# Patient Record
Sex: MALE
[De-identification: names, ages, dates, MRNs, and addresses within clinical notes are randomized per-mention and may not be internally consistent; named-entity substitution may affect disease eponyms.]

---

## 2018-04-17 ENCOUNTER — HOSPITAL ENCOUNTER (INPATIENT)
Dept: HOSPITAL 97 - ER | Age: 83
LOS: 4 days | Discharge: NURSING, REHAB OR LONG-TERM CARE | DRG: 872 | End: 2018-04-21
Attending: INTERNAL MEDICINE | Admitting: INTERNAL MEDICINE
Payer: COMMERCIAL

## 2018-04-17 VITALS — BODY MASS INDEX: 33.9 KG/M2

## 2018-04-17 DIAGNOSIS — N30.00: ICD-10-CM

## 2018-04-17 DIAGNOSIS — D64.9: ICD-10-CM

## 2018-04-17 DIAGNOSIS — B95.62: ICD-10-CM

## 2018-04-17 DIAGNOSIS — E78.5: ICD-10-CM

## 2018-04-17 DIAGNOSIS — E11.9: ICD-10-CM

## 2018-04-17 DIAGNOSIS — I10: ICD-10-CM

## 2018-04-17 DIAGNOSIS — A41.9: Primary | ICD-10-CM

## 2018-04-17 LAB
ALBUMIN SERPL BCP-MCNC: 3.5 G/DL (ref 3.2–5.5)
ALP SERPL-CCNC: 139 IU/L (ref 42–121)
ALT SERPL W P-5'-P-CCNC: 27 IU/L (ref 10–60)
AST SERPL W P-5'-P-CCNC: 29 IU/L (ref 10–42)
BUN BLD-MCNC: 53 MG/DL (ref 6–20)
GLUCOSE SERPLBLD-MCNC: 259 MG/DL (ref 65–120)
HCT VFR BLD CALC: 41 % (ref 39.6–49)
LIPASE SERPL-CCNC: 45 U/L (ref 22–51)
LYMPHOCYTES # SPEC AUTO: 2 K/UL (ref 0.7–4.9)
MCH RBC QN AUTO: 28.4 PG (ref 27–35)
MCV RBC: 88.4 FL (ref 80–100)
PMV BLD: 7.8 FL (ref 7.6–11.3)
POTASSIUM SERPL-SCNC: 4.8 MEQ/L (ref 3.6–5)
RBC # BLD: 4.64 M/UL (ref 4.33–5.43)
UA COMPLETE W REFLEX CULTURE PNL UR: (no result)

## 2018-04-17 PROCEDURE — 85025 COMPLETE CBC W/AUTO DIFF WBC: CPT

## 2018-04-17 PROCEDURE — 99285 EMERGENCY DEPT VISIT HI MDM: CPT

## 2018-04-17 PROCEDURE — 36415 COLL VENOUS BLD VENIPUNCTURE: CPT

## 2018-04-17 PROCEDURE — 82565 ASSAY OF CREATININE: CPT

## 2018-04-17 PROCEDURE — 87086 URINE CULTURE/COLONY COUNT: CPT

## 2018-04-17 PROCEDURE — 80202 ASSAY OF VANCOMYCIN: CPT

## 2018-04-17 PROCEDURE — 82962 GLUCOSE BLOOD TEST: CPT

## 2018-04-17 PROCEDURE — 87186 SC STD MICRODIL/AGAR DIL: CPT

## 2018-04-17 PROCEDURE — 71045 X-RAY EXAM CHEST 1 VIEW: CPT

## 2018-04-17 PROCEDURE — 93005 ELECTROCARDIOGRAM TRACING: CPT

## 2018-04-17 PROCEDURE — 81003 URINALYSIS AUTO W/O SCOPE: CPT

## 2018-04-17 PROCEDURE — 80053 COMPREHEN METABOLIC PANEL: CPT

## 2018-04-17 PROCEDURE — 96361 HYDRATE IV INFUSION ADD-ON: CPT

## 2018-04-17 PROCEDURE — 83735 ASSAY OF MAGNESIUM: CPT

## 2018-04-17 PROCEDURE — 96374 THER/PROPH/DIAG INJ IV PUSH: CPT

## 2018-04-17 PROCEDURE — 80048 BASIC METABOLIC PNL TOTAL CA: CPT

## 2018-04-17 PROCEDURE — 87040 BLOOD CULTURE FOR BACTERIA: CPT

## 2018-04-17 PROCEDURE — 87077 CULTURE AEROBIC IDENTIFY: CPT

## 2018-04-17 PROCEDURE — 80076 HEPATIC FUNCTION PANEL: CPT

## 2018-04-17 PROCEDURE — 83690 ASSAY OF LIPASE: CPT

## 2018-04-17 PROCEDURE — 81015 MICROSCOPIC EXAM OF URINE: CPT

## 2018-04-17 PROCEDURE — 87088 URINE BACTERIA CULTURE: CPT

## 2018-04-17 RX ADMIN — SODIUM CHLORIDE SCH MLS: 0.9 INJECTION, SOLUTION INTRAVENOUS at 23:53

## 2018-04-17 NOTE — ER
Nurse's Notes                                                                                     

 [facility]                                                                

Name: Pinky Avitia                                                                                 

Age: 83 yrs                                                                                       

Sex: Male                                                                                         

: 1935                                                                                   

MRN: [id number]                                                                                   

Arrival Date: 2018                                                                          

Time: 14:11                                                                                       

Account#: [id number]                                                                            

Bed 19                                                                                            

Private MD:                                                                                       

Diagnosis: Dehydration;Urinary tract infection, site not specified                                

                                                                                                  

Presentation:                                                                                     

                                                                                             

14:00 Presenting complaint: EMS states: Pt. is a 83 yr. old male from home. When he got up    rb1 

      this morning he felt dizzy. He is a paraplegic. A \T\ O x 4, GCS 15. Has history of         

      hypertension, diabetes, and hyperlipidemia; NKA. Pt. did complain of nausea on the ride     

      over here, but is not currently nauseous. SR on EKG with BBB. Denies SOB. , BP        

      170/83, P 81, R 16, 98% on RA. Pt. takes Metformin, Lasix, and Lipitor. Transition of       

      care: patient was not received from another setting of care. Onset of symptoms was          

      2018. Care prior to arrival: None.                                                

14:00 Method Of Arrival: EMS: [facility]                                                rb1 

14:00 Acuity: EVELIA 3                                                                           rb1 

                                                                                                  

Triage Assessment:                                                                                

15:20 General: Appears in no apparent distress. comfortable.                                  aj1 

                                                                                                  

Historical:                                                                                       

- Allergies:                                                                                      

14:00 No Known Allergies;                                                                     rb1 

- Home Meds:                                                                                      

14:00 Lipitor Oral [Active];                                                                  rb1 

15:20 metformin 1,000 mg oral tab 2 times per day [Active]; Lasix 40 mg oral tab once daily   aj1 

      [Active]; amlodipine 10 mg tab 1 tab once daily [Active]; Vitamin D Oral 400 unit daily     

      [Active]; gabapentin 300 mg oral cap 1 cap at bedtime [Active]; metoprolol tartrate 25      

      mg Oral tab 0.5 tab 2 times per day [Active]; multivitamin oral oral daily [Active];        

- PMHx:                                                                                           

14:00 Diabetes - NIDDM; Hypertension; muscle atrophy; Hyperlipidemia;                         rb1 

                                                                                                  

- Immunization history:: Adult Immunizations not up to date.                                      

- Social history:: Smoking status: Patient/guardian denies using tobacco.                         

- Family history:: not pertinent.                                                                 

- Hospitalizations: : No recent hospitalization is reported.                                      

                                                                                                  

                                                                                                  

Screening:                                                                                        

15:09 Abuse screen: Denies threats or abuse. Denies injuries from another. Nutritional        aj1 

      screening: No deficits noted. Tuberculosis screening: No symptoms or risk factors           

      identified.                                                                                 

20:15 Fall Risk No fall in past 12 months (0 pts). Secondary diagnosis (15 points) impaired   aj1 

      mobility, IV access (20 points). Ambulatory Aid- None/Bed Rest/Nurse Assist (0 pts).        

      Gait- Normal/Bed Rest/Wheelchair (0 pts) Mental Status- Oriented to own ability (0          

      pts). Total Hung Fall Scale indicates Low Risk Score (25-44 pts). Fall prevention          

      measures have been instituted. Side Rails Up X 2 Frequent Obs/Assesments occuring           

      Family Present and informed to notify staff if they need to leave bedside As available      

      Patient and Family Educated on Fall Prevention Program and strategies.                      

                                                                                                  

Assessment:                                                                                       

15:09 General: Appears in no apparent distress. comfortable, Behavior is calm, cooperative,   aj1 

      appropriate for age. Pain: Denies pain. Neuro: Level of Consciousness is awake, alert,      

      obeys commands, Oriented to person, place, time, situation, Paralysis in bilateral          

      leg(s) Patient states it is from "muscle weakness" but he has never received a              

      diagnosis further than that . Speech is normal, Facial symmetry appears normal, Reports     

      dizziness, started when he got out of bed this morning (his son was lifting him in          

      Leif lift). States he is now feeling better. Cardiovascular: Heart tones S1 S2 present     

      Patient's skin is warm and dry. Rhythm is regular. Respiratory: Airway is patent            

      Respiratory effort is even, unlabored, Respiratory pattern is regular, symmetrical,         

      Breath sounds are clear bilaterally. GI: No signs and/or symptoms were reported             

      involving the gastrointestinal system. : No signs and/or symptoms were reported           

      regarding the genitourinary system. EENT: No signs and/or symptoms were reported            

      regarding the EENT system. Derm: No signs and/or symptoms reported regarding the            

      dermatologic system. Skin is pink, warm \T\ dry. Musculoskeletal: No signs and/or           

      symptoms reported regarding the musculoskeletal system.                                     

15:14 Reassessment: Spoke with [facility] at [facility] who states that the patient's wife called   aj 

      her and told him that he was dizzy and he was advised to come to the emergency room for     

      that reason.                                                                                

16:15 Reassessment: Patient appears in no apparent distress at this time. No changes from     aj 

      previously documented assessment. Patient and/or family updated on plan of care and         

      expected duration. Pain level reassessed. Patient is alert, oriented x 3, equal             

      unlabored respirations, skin warm/dry/pink.                                                 

17:15 Reassessment: Patient appears in no apparent distress at this time. No changes from     aj1 

      previously documented assessment. Patient and/or family updated on plan of care and         

      expected duration. Pain level reassessed. Patient is alert, oriented x 3, equal             

      unlabored respirations, skin warm/dry/pink.                                                 

18:15 Reassessment: Patient and/or family updated on plan of care and expected duration. Pain aj1 

      level reassessed. General: Appears in no apparent distress. comfortable, Behavior is        

      calm, cooperative. Pain: Denies pain. Neuro: Level of Consciousness is awake, alert,        

      obeys commands, Oriented to person, place, time, situation, Paralysis in bilateral          

      leg(s) Speech is normal, Facial symmetry appears normal. Cardiovascular: Patient's skin     

      is warm and dry. Respiratory: Airway is patent Respiratory effort is even, unlabored,       

      Respiratory pattern is regular, symmetrical. GI: No signs and/or symptoms were reported     

      involving the gastrointestinal system. : No signs and/or symptoms were reported           

      regarding the genitourinary system. EENT: No signs and/or symptoms were reported            

      regarding the EENT system. Derm: No signs and/or symptoms reported regarding the            

      dermatologic system. Skin is pink, warm \T\ dry. Musculoskeletal: No signs and/or           

      symptoms reported regarding the musculoskeletal system.                                     

19:15 Reassessment: Patient appears in no apparent distress at this time. No changes from     aj1 

      previously documented assessment. Patient and/or family updated on plan of care and         

      expected duration. Pain level reassessed. Patient is alert, oriented x 3, equal             

      unlabored respirations, skin warm/dry/pink.                                                 

20:07 Reassessment: report given to Gisela velez  

20:14 Reassessment: Patient appears in no apparent distress at this time. No changes from     aj1 

      previously documented assessment. Patient and/or family updated on plan of care and         

      expected duration. Pain level reassessed. Patient is alert, oriented x 3, equal             

      unlabored respirations, skin warm/dry/pink.                                                 

21:21 Reassessment: Patient appears in no apparent distress at this time. No changes from     aj1 

      previously documented assessment. Patient and/or family updated on plan of care and         

      expected duration. Pain level reassessed. Patient is alert, oriented x 3, equal             

      unlabored respirations, skin warm/dry/pink.                                                 

                                                                                                  

Vital Signs:                                                                                      

14:00  / 71; Pulse 68; Resp 15; Pulse Ox 98% on R/A;                                    rb1 

15:09  / 69; Pulse 70; Resp 19; Pulse Ox 100% ;                                         aj1 

17:16  / 80; Pulse 65; Resp 18; Pulse Ox 100% on R/A;                                   aj1 

18:15  / 79; Pulse 66; Resp 17; Pulse Ox 100% on R/A;                                   aj1 

20:14  / 73; Pulse 70; Resp 20; Pulse Ox 97% on R/A;                                    aj1 

21:23  / 85; Pulse 69; Resp 18; Pulse Ox 99% ;                                          aj1 

                                                                                                  

ED Course:                                                                                        

14:11 Patient arrived in ED.                                                                  rb1 

14:17 Triage completed.                                                                       rb1 

14:33 Joleen Beasley, RN is Primary Nurse.                                                   aj1 

15:09 Walt Benton MD is Attending Physician.                                                rn  

15:09 Patient has correct armband on for positive identification. Bed in low position. Call   aj1 

      light in reach. Side rails up X2.                                                           

15:09 No provider procedures requiring assistance completed.                                  aj1 

15:20 EKG done, by EKG tech. reviewed by Walt Benton MD.                                      at1 

15:21 Arm band placed on.                                                                     aj1 

15:38 X-ray completed. Portable x-ray completed in exam room. Patient tolerated procedure     kc2 

      well.                                                                                       

15:39 XRAY Chest (1 view) In Process Unspecified.                                             EDMS

15:45 Missed attempt(s): 22 gauge in right antecubital area. Bleeding controlled, band aid    aj1 

      applied, catheter tip intact.                                                               

18:19 Rayna Posadas MD is Hospitalizing Provider.                                          rn  

20:15 Patient admitted, IV remains in place.                                                  aj1 

                                                                                                  

Administered Medications:                                                                         

17:13 Drug: NS 0.9% 500 ml Route: IV; Rate: bolus; Site: right hand;                          aj1 

20:17 Follow up: IV Status: Completed infusion; IV Intake: 500ml                              aj1 

20:02 Drug: Rocephin - (cefTRIAXone) 1 grams Route: IVPB; Infused Over: 30 mins; Site: right  aj1 

      hand;                                                                                       

20:17 Follow up: IV Status: Completed infusion; IV Intake: 10ml                               aj1 

                                                                                                  

                                                                                                  

Intake:                                                                                           

20:17 IV: 10ml; Total: 10ml.                                                                  aj1 

20:17 IV: 500ml; Total: 510ml.                                                                aj1 

                                                                                                  

Outcome:                                                                                          

18:19 Decision to Hospitalize by Provider.                                                    rn  

21:23 Admitted to Med/surg accompanied by tech, via stretcher, room 420.                      aj1 

21:23 Condition: stable                                                                           

21:23 Discharge instructions given to patient, Instructed on the need for admit, Demonstrated     

      understanding of instructions.                                                              

21:24 Patient left the ED.                                                                    aj1 

                                                                                                  

Signatures:                                                                                       

Dispatcher [employer]                           EDJoleen Ken RN                     RN   aj1                                                  

Veronica Mariscal RN                     RN   Walt Pettit MD MD rn gonzales, Amanda, EKG Tech              EKG Tat1                                                  

Mitali Hernandez, RN                     RN   rb1                                                  

Darlin Cameron2                                                  

                                                                                                  

Corrections: (The following items were deleted from the chart)                                    

15:20 14:00 Home Meds: Metformin Oral; rb1                                                    aj1 

15:20 14:00 Home Meds: Lasix Oral; rb1                                                        aj1 

                                                                                                  

**************************************************************************************************

## 2018-04-17 NOTE — EKG
Test Date:    2018-04-17               Test Time:    15:14:41

Technician:   LINDA                                     

                                                     

MEASUREMENT RESULTS:                                       

Intervals:                                           

Rate:         64                                     

RI:           160                                    

QRSD:         98                                     

QT:           446                                    

QTc:          460                                    

Axis:                                                

P:            61                                     

RI:           160                                    

QRS:          36                                     

T:            83                                     

                                                     

INTERPRETIVE STATEMENTS:                                       

                                                     

Normal sinus rhythm

Normal ECG

Compared to ECG 02/09/2017 10:00:54

No significant changes



Electronically Signed On 04-17-18 16:26:39 CDT by Sukumar Lares

## 2018-04-17 NOTE — RAD REPORT
EXAM DESCRIPTION:  RAD - Chest Single View - 4/17/2018 3:38 pm

 

CLINICAL HISTORY:  Shortness of breath

 

COMPARISON:  02/10/2027

 

FINDINGS:  Portable technique limits examination quality.

 

Calcified granuloma are present in left lung. The lungs are clear of acute infiltrate. The heart is n
ormal in size. No displaced fractures.

 

IMPRESSION:  No acute intrathoracic process suspected.

## 2018-04-17 NOTE — EDPHYS
Physician Documentation                                                                           

 [facility]                                                                

Name: Pinky Avitia                                                                                 

Age: 83 yrs                                                                                       

Sex: Male                                                                                         

: 1935                                                                                   

MRN: [id number]                                                                                   

Arrival Date: 2018                                                                          

Time: 14:11                                                                                       

Account#: [id number]                                                                            

Bed 19                                                                                            

Private MD:                                                                                       

ED Physician Walt Benton                                                                         

HPI:                                                                                              

                                                                                             

18:14 This 83 yrs old  Male presents to ER via EMS with complaints of Dizziness.      rn  

18:14 The patient presents with dizziness. Onset: The symptoms/episode began/occurred this    rn  

      morning. Modifying factors: The symptoms are alleviated by nothing, the symptoms are        

      aggravated by movement of head, changing position. Associated signs and symptoms:           

      Pertinent negatives: abdominal pain, chest pain, seizure, syncope, tingling, vomiting.      

      Severity of symptoms: At their worst the symptoms were moderate in the emergency            

      department the symptoms are unchanged. The patient has experienced similar episodes in      

      the past.                                                                                   

18:14 Pt woke up dizzy and lightheaded, felt like was going to pass out, no syncope, [facility]   [facility]  

      [facility]. .                                                             

                                                                                                  

Historical:                                                                                       

- Allergies:                                                                                      

14:00 No Known Allergies;                                                                     rb1 

- Home Meds:                                                                                      

14:00 Lipitor Oral [Active];                                                                  rb1 

15:20 metformin 1,000 mg oral tab 2 times per day [Active]; Lasix 40 mg oral tab once daily   aj1 

      [Active]; amlodipine 10 mg tab 1 tab once daily [Active]; Vitamin D Oral 400 unit daily     

      [Active]; gabapentin 300 mg oral cap 1 cap at bedtime [Active]; metoprolol tartrate 25      

      mg Oral tab 0.5 tab 2 times per day [Active]; multivitamin oral oral daily [Active];        

- PMHx:                                                                                           

14:00 Diabetes - NIDDM; Hypertension; muscle atrophy; Hyperlipidemia;                         rb1 

                                                                                                  

- Immunization history:: Adult Immunizations not up to date.                                      

- Social history:: Smoking status: Patient/guardian denies using tobacco.                         

- Family history:: not pertinent.                                                                 

- Hospitalizations: : No recent hospitalization is reported.                                      

                                                                                                  

                                                                                                  

ROS:                                                                                              

18:14 Constitutional: Negative for fever, chills, and weight loss, Eyes: Negative for injury, rn  

      pain, redness, and discharge, Neck: Negative for injury, pain, and swelling,                

      Cardiovascular: Negative for chest pain, palpitations, and edema, Respiratory: Negative     

      for shortness of breath, cough, wheezing, and pleuritic chest pain, Abdomen/GI:             

      Negative for abdominal pain, nausea, vomiting, diarrhea, and constipation, Back:            

      Negative for injury and pain, MS/Extremity: Negative for injury and deformity, Skin:        

      Negative for injury, rash, and discoloration, Neuro: Negative for headache, numbness,       

      tingling, and seizure.                                                                      

                                                                                                  

Exam:                                                                                             

18:14 Constitutional:  This is a well developed, well nourished patient who is awake, alert,  rn  

      and in no acute distress. Head/Face:  Normocephalic, atraumatic. Eyes:  Pupils equal        

      round and reactive to light, extra-ocular motions intact.  Lids and lashes normal.          

      Conjunctiva and sclera are non-icteric and not injected.  Cornea within normal limits.      

      Periorbital areas with no swelling, redness, or edema. Neck:  Trachea midline, no           

      thyromegaly or masses palpated, and no cervical lymphadenopathy.  Supple, full range of     

      motion without nuchal rigidity, or vertebral point tenderness.  No Meningismus.             

      Cardiovascular:  Regular rate and rhythm with a normal S1 and S2.  No gallops, murmurs,     

      or rubs.  Normal PMI, no JVD.  No pulse deficits. Respiratory:  Lungs have equal breath     

      sounds bilaterally, clear to auscultation and percussion.  No rales, rhonchi or wheezes     

      noted.  No increased work of breathing, no retractions or nasal flaring. Abdomen/GI:        

      Soft, non-tender, with normal bowel sounds.  No distension or tympany.  No guarding or      

      rebound.  No evidence of tenderness throughout. MS/ Extremity:  Pulses equal, no            

      cyanosis.   Neuro:  Awake, alert, paraplegic, normal upper strength                         

                                                                                                  

Vital Signs:                                                                                      

14:00  / 71; Pulse 68; Resp 15; Pulse Ox 98% on R/A;                                    rb1 

15:09  / 69; Pulse 70; Resp 19; Pulse Ox 100% ;                                         aj1 

17:16  / 80; Pulse 65; Resp 18; Pulse Ox 100% on R/A;                                   aj1 

18:15  / 79; Pulse 66; Resp 17; Pulse Ox 100% on R/A;                                   aj1 

20:14  / 73; Pulse 70; Resp 20; Pulse Ox 97% on R/A;                                    aj1 

21:23  / 85; Pulse 69; Resp 18; Pulse Ox 99% ;                                          aj1 

                                                                                                  

MDM:                                                                                              

15:09 Patient medically screened.                                                             rn  

18:14 Differential diagnosis: generalized weakness, hypovolemia, idiopathic dizziness,        rn  

      near-syncope, UTI. Data reviewed: vital signs, nurses notes, lab test result(s), EKG,       

      radiologic studies, plain films, and as a result, I will admit patient. Counseling: I       

      had a detailed discussion with the patient and/or guardian regarding: the historical        

      points, exam findings, and any diagnostic results supporting the discharge/admit            

      diagnosis, lab results, radiology results, the need for further work-up and treatment       

      in the hospital. Response to treatment: the patient's symptoms have mildly improved         

      after treatment, and as a result, I will admit patient. Admission orders: after a           

      detailed discussion of the patient's condition and case, the admit orders are written       

      by me.                                                                                      

                                                                                                  

                                                                                             

15:18 Order name: CBC with Diff; Complete Time: 18:09                                         rn  

                                                                                             

15:18 Order name: Basic Metabolic Panel; Complete Time: 18:09                                 rn  

                                                                                             

15:18 Order name: Urine Culture                                                               rn  

                                                                                             

15:18 Order name: Urine Microscopic Only; Complete Time: 18:09                                rn  

                                                                                             

15:19 Order name: Hepatic Function; Complete Time: 18:09                                      rn  

                                                                                             

15:19 Order name: Lipase; Complete Time: 18:09                                                rn  

                                                                                             

15:18 Order name: IV Start; Complete Time: 17:14                                              rn  

                                                                                             

15:18 Order name: Urine Dipstick-Ancillary (obtain specimen); Complete Time: 17:14            rn  

                                                                                             

15:18 Order name: EKG; Complete Time: 15:18                                                   rn  

                                                                                             

15:18 Order name: EKG - Nurse/Tech; Complete Time: 15:23                                      rn  

                                                                                             

15:18 Order name: XRAY Chest (1 view); Complete Time: 16:18                                   rn  

                                                                                             

16:57 Order name: Urine Dipstick--Ancillary (enter results); Complete Time: 18:09               

                                                                                             

15:19 Order name: Labs collected and sent; Complete Time: 17:14                               rn  

                                                                                                  

Administered Medications:                                                                         

17:13 Drug: NS 0.9% 500 ml Route: IV; Rate: bolus; Site: right hand;                          aj1 

20:17 Follow up: IV Status: Completed infusion; IV Intake: 500ml                              aj1 

20:02 Drug: Rocephin - (cefTRIAXone) 1 grams Route: IVPB; Infused Over: 30 mins; Site: right  aj1 

      hand;                                                                                       

20:17 Follow up: IV Status: Completed infusion; IV Intake: 10ml                               aj 

                                                                                                  

                                                                                                  

Disposition:                                                                                      

18 18:19 Hospitalization ordered by Rayna Posadas for Observation. Preliminary            

  diagnosis are Dehydration, Urinary tract infection, site not specified.                         

- Bed requested for Telemetry/MedSurg (observation).                                              

- Status is Observation.                                                                      aj1 

- Condition is Stable.                                                                            

- Problem is new.                                                                                 

- Symptoms have improved.                                                                         

UTI on Admission? Yes                                                                             

                                                                                                  

                                                                                                  

                                                                                                  

Signatures:                                                                                       

Dispatcher [employer]                           EDJoleen Ken RN                     RN   aj1                                                  

Marine Helm RN                        RN   Walt Worthington MD MD rn Barber, Rebecca RN                     RN   rb1                                                  

                                                                                                  

Corrections: (The following items were deleted from the chart)                                    

15:20 14:00 Home Meds: Metformin Oral; rb1                                                    aj 

15:20 14:00 Home Meds: Lasix Oral; [place] [id number] 

                                                                                                  

**************************************************************************************************

## 2018-04-18 LAB
BUN BLD-MCNC: 49 MG/DL (ref 6–20)
GLUCOSE SERPLBLD-MCNC: 173 MG/DL (ref 65–120)
HCT VFR BLD CALC: 49.6 % (ref 39.6–49)
LYMPHOCYTES # SPEC AUTO: 3 K/UL (ref 0.7–4.9)
MCH RBC QN AUTO: 28.5 PG (ref 27–35)
MCV RBC: 87.8 FL (ref 80–100)
MORPHOLOGY BLD-IMP: (no result)
PMV BLD: 8.4 FL (ref 7.6–11.3)
POTASSIUM SERPL-SCNC: 4.8 MEQ/L (ref 3.6–5)
RBC # BLD: 5.65 M/UL (ref 4.33–5.43)

## 2018-04-18 RX ADMIN — Medication SCH: at 21:00

## 2018-04-18 RX ADMIN — HUMAN INSULIN SCH UNIT: 100 INJECTION, SOLUTION SUBCUTANEOUS at 17:12

## 2018-04-18 RX ADMIN — HUMAN INSULIN SCH UNIT: 100 INJECTION, SOLUTION SUBCUTANEOUS at 12:22

## 2018-04-18 RX ADMIN — SODIUM CHLORIDE SCH: 0.9 INJECTION, SOLUTION INTRAVENOUS at 19:00

## 2018-04-18 RX ADMIN — SODIUM CHLORIDE SCH MLS: 0.9 INJECTION, SOLUTION INTRAVENOUS at 11:42

## 2018-04-18 RX ADMIN — HUMAN INSULIN SCH UNIT: 100 INJECTION, SOLUTION SUBCUTANEOUS at 21:31

## 2018-04-18 RX ADMIN — SODIUM CHLORIDE SCH MLS: 0.9 INJECTION, SOLUTION INTRAVENOUS at 21:34

## 2018-04-18 RX ADMIN — Medication SCH: at 09:00

## 2018-04-18 NOTE — P.HP
Certification for Inpatient


Patient admitted to: Observation


With expected LOS: <2 Midnights


Practitioner: I am a practitioner with admitting privileges, knowledge of 

patient current condition, hospital course, and medical plan of care.


Services: Services provided to patient in accordance with Admission 

requirements found in Title 42 Section 412.3 of the Code of Federal Regulations





Patient History


Date of Service: 04/17/18


Reason for admission: near syncope episode


History of Present Illness: 





Mr Avitia is an 83 years old male with multiple medical problems including DM II

, HTN, muscle atrophy, who was at home yesterday when he was trying to stand up 

from his bed, and become dizzy, had nausea, and felt that almost passed out. He 

denied any fever or chills. No chest pain, palpitations or SOB prior the 

episode. He satates that it happened before several times, but he never 

addressed this problem with his PCP. At the time of my encounter the patient 

was in non-distress. He has not repeated similar episodes since arrived to the 

hospital. He recently finish a course of antibiotics for UTI. Lab work was 

remarkable for Leukocytosis, UA abnormal consistent with UTI, which may be 

responsible for his leukocytosis, consider also leukemoid reaction. CXR showed 

no acute abnormalities.


Allergies





adhesive tape Adverse Reaction (Intermediate, Verified 02/09/17 15:25)


 Rash


No Allergy (Uncoded 02/14/18 23:14)


 Unknown


No Known Allergies Allergy (Uncoded 04/17/18 21:28)


 Unknown





Home Medications: 








Metoprolol Tartrate [Lopressor*] 25 mg PO BID 12/08/15 


Multivitamin [Multivitamins] 1 each PO DAILY 02/10/16 


Cholecalciferol (Vitamin D3) [Vitamin D 400 IU TAB*] 1,200 unit PO DAILY 02/09/ 17 


Furosemide [Lasix] 40 mg PO DAILY 02/09/17 


Gabapentin [Gralise] 300 mg PO BEDTIME 02/09/17 


Lisinopril 40 mg PO DAILY 02/09/17 


Metformin HCl [Glucophage] 1,000 mg PO BID 02/09/17 


Amlodipine [Norvasc*] 10 mg PO DAILY #30 tab 02/11/17 


Amox/Clavulanate [Augmentin 875-125 Tab*] 875 mg PO BID #10 tab 02/11/17 








- Past Medical/Surgical History


Has patient received pneumonia vaccine in the past: No


Diabetic: Yes


-: Hyperlipidemia


-: HTN


-: NIDDM


-: bladder infection


-: prostate problem


-: Myasthenia gravis


-: Peripheral neuropathy


-: Crow cataracts


-: broken leg 2006


-: finger surgery


-: prostate surgery





- Family History


  ** Father


-: Heart disease





  ** Mother


-: Hypertension





  ** Sister


-: Hypertension, Diabetes, Cancer, Kidney disease





  ** Brother


-: Heart disease, Cancer





- Social History


Smoking Status: Never smoker


Alcohol use: No


CD- Drugs: No


Caffeine use: Yes


Place of Residence: Home





Review of Systems


10-point ROS is otherwise unremarkable





Physical Examination





- Vital Signs


Temperature: 97.8 F


Blood Pressure: 154/79


Pulse: 94


Respirations: 18


Pulse Ox (%): 99





- Physical Exam


General: Alert, In no apparent distress


HEENT: Atraumatic, PERRLA, Mucous membr. moist/pink, EOMI, Sclerae nonicteric


Neck: Supple, 2+ carotid pulse no bruit, No LAD, Without JVD or thyroid 

abnormality


Respiratory: Clear to auscultation bilaterally, Normal air movement


Cardiovascular: Regular rate/rhythm, Normal S1 S2


Gastrointestinal: Normal bowel sounds, No tenderness


Musculoskeletal: No tenderness


Integumentary: No rashes


Neurological: Normal gait, Normal speech, Normal tone, Normal affect


Lymphatics: No axilla or inguinal lymphadenopathy





- Studies


Laboratory Data (last 24 hrs)





04/17/18 17:00: Sodium 133 L, Potassium 4.8, BUN 53 H, Creatinine 2.07 H, 

Glucose 259 H, Total Bilirubin 0.4, AST 29, ALT 27, Alkaline Phosphatase 139 H, 

Lipase 45


04/17/18 17:00: WBC 14.0 H, Hgb 13.2 L, Hct 41.0, Plt Count 437 H








Assessment and Plan





- Problems (Diagnosis)


(1) Vasovagal near syncope


Current Visit: Yes   Status: Acute   





(2) Diabetes


Current Visit: No   Status: Acute   


Qualifiers: 


   Diabetes mellitus type: type 2   Diabetes mellitus long term insulin use: 

without long term use   Diabetes mellitus complication status: with unspecified 

complications   Qualified Code(s): E11.8 - Type 2 diabetes mellitus with 

unspecified complications   





(3) HTN (hypertension)


Current Visit: No   Status: Acute   


Qualifiers: 


   Hypertension type: essential hypertension   Qualified Code(s): I10 - 

Essential (primary) hypertension   





(4) Leukocytosis


Onset Date: 10/19/15   Current Visit: No   Status: Acute   


Qualifiers: 


   Leukocytosis type: unspecified   Qualified Code(s): D72.829 - Elevated white 

blood cell count, unspecified   





(5) Urinary tract infectious disease


Onset Date: 12/09/15   Current Visit: No   Status: Acute   


Qualifiers: 


   Urinary tract infection type: acute cystitis   Hematuria presence: without 

hematuria   Qualified Code(s): N30.00 - Acute cystitis without hematuria   





- Plan





The patient will be admitte under observation due to near syncope episode. He 

is asymptomatic at this moment. likely vasovagal episodes. Will continue 

cardiac monitoring. Start IV Rocephin for UTI.





- Advance Directives


Does patient have a Living Will: No


Does patient have a Durable POA for Healthcare: No





- Code Status/Comfort Care


Code Status Assessed: Yes


Code Status: Full Code

## 2018-04-18 NOTE — P.PN
Subjective


Date of Service: 04/18/18


Chief Complaint: near syncope episode





Patient seen and examined at bedside with RN.  Case reviewed.  Patient 

currently has no complaints to offer.  Alert and oriented x3.  [facility] 

[facility]  And would like to be discharged 

before that.





Review of Systems


General: As per HPI





Physical Examination





- Vital Signs


Temperature: 97.8 F


Blood Pressure: 153/70


Pulse: 82


Respirations: 18


Pulse Ox (%): 98





- Physical Exam


General: Alert, Obese


HEENT: Atraumatic


Neck: Supple


Respiratory: Clear to auscultation bilaterally, Normal air movement


Cardiovascular: Regular rate/rhythm, Normal S1 S2


Gastrointestinal: Normal bowel sounds, Soft and benign, Non-distended, No 

tenderness


Musculoskeletal: No tenderness


Integumentary: Other (Flaking of the skin BL UE and LE. )


Neurological: Normal speech, Normal tone, Normal affect


Lymphatics: No axilla or inguinal lymphadenopathy





- Studies


Laboratory Data (last 24 hrs)





04/18/18 05:44: Sodium 134 L, Potassium 4.8, BUN 49 H, Creatinine 1.63 H, 

Glucose 173 H


04/18/18 05:44: WBC 20.2 H* D, Hgb 16.1  D, Hct 49.6 H D, Plt Count 320  D


04/17/18 17:00: Sodium 133 L, Potassium 4.8, BUN 53 H, Creatinine 2.07 H, 

Glucose 259 H, Total Bilirubin 0.4, AST 29, ALT 27, Alkaline Phosphatase 139 H, 

Lipase 45


04/17/18 17:00: WBC 14.0 H, Hgb 13.2 L, Hct 41.0, Plt Count 437 H





Medications List Reviewed: Yes





Assessment & Plan





- Problems (Diagnosis)


(1) UTI (urinary tract infection)


Onset Date: 04/18/18   Current Visit: Yes   Status: Acute   


Plan: 


UA + for UTI 


-Culture pending for now. So far growing Gram + Cocci


-Currently on Rocephin will switch to Levaquin at this time. 


-F/u with Culture esdras. 





Qualifiers: 


   Urinary tract infection type: acute cystitis   Hematuria presence: without 

hematuria   Qualified Code(s): N30.00 - Acute cystitis without hematuria   





(2) Diabetes


Onset Date: 04/18/18   Current Visit: Yes   Status: Chronic   


Qualifiers: 


   Diabetes mellitus type: type 2   Diabetes mellitus long term insulin use: 

without long term use   Diabetes mellitus complication status: with unspecified 

complications   Qualified Code(s): E11.8 - Type 2 diabetes mellitus with 

unspecified complications   





(3) HTN (hypertension)


Onset Date: 04/18/18   Current Visit: Yes   Status: Chronic   


Qualifiers: 


   Hypertension type: essential hypertension   Qualified Code(s): I10 - 

Essential (primary) hypertension   


Discharge Plan: Home


Plan to discharge in: 48 Hours





- Code Status/Comfort Care


Code Status Assessed: Yes


Critical Care: No

## 2018-04-19 RX ADMIN — HYDRALAZINE HYDROCHLORIDE PRN MG: 20 INJECTION INTRAMUSCULAR; INTRAVENOUS at 14:32

## 2018-04-19 RX ADMIN — ACETAMINOPHEN PRN MG: 500 TABLET, FILM COATED ORAL at 16:13

## 2018-04-19 RX ADMIN — Medication SCH ML: at 21:25

## 2018-04-19 RX ADMIN — HUMAN INSULIN SCH UNIT: 100 INJECTION, SOLUTION SUBCUTANEOUS at 16:09

## 2018-04-19 RX ADMIN — HUMAN INSULIN SCH UNIT: 100 INJECTION, SOLUTION SUBCUTANEOUS at 21:24

## 2018-04-19 RX ADMIN — SODIUM CHLORIDE SCH MLS: 0.9 INJECTION, SOLUTION INTRAVENOUS at 06:20

## 2018-04-19 RX ADMIN — SODIUM CHLORIDE SCH MLS: 0.9 INJECTION, SOLUTION INTRAVENOUS at 15:21

## 2018-04-19 RX ADMIN — HYDRALAZINE HYDROCHLORIDE PRN MG: 20 INJECTION INTRAMUSCULAR; INTRAVENOUS at 01:06

## 2018-04-19 RX ADMIN — Medication SCH: at 09:00

## 2018-04-19 RX ADMIN — HUMAN INSULIN SCH: 100 INJECTION, SOLUTION SUBCUTANEOUS at 07:30

## 2018-04-19 RX ADMIN — SODIUM CHLORIDE SCH MLS: 9 INJECTION, SOLUTION INTRAVENOUS at 12:28

## 2018-04-19 RX ADMIN — HUMAN INSULIN SCH UNIT: 100 INJECTION, SOLUTION SUBCUTANEOUS at 12:25

## 2018-04-19 NOTE — P.PN
Subjective


Date of Service: 04/19/18


Chief Complaint: near syncope episode





Patient seen and examined at bedside with RN.  Case reviewed.  Patient 

currently has no complaints to offer.  Alert and oriented x3. 





Review of Systems


10-point ROS is otherwise unremarkable





Physical Examination





- Vital Signs


Temperature: 98.3 F


Blood Pressure: 223/90


Pulse: 103


Respirations: 18


Pulse Ox (%): 98





- Physical Exam


General: Alert, In no apparent distress


HEENT: Atraumatic, PERRLA, EOMI


Neck: Supple, JVD not distended


Respiratory: Clear to auscultation bilaterally, Normal air movement


Cardiovascular: Regular rate/rhythm, Normal S1 S2


Gastrointestinal: Normal bowel sounds, No tenderness


Musculoskeletal: No tenderness


Integumentary: No rashes


Neurological: Normal speech, Normal tone, Normal affect


Lymphatics: No axilla or inguinal lymphadenopathy





- Studies


Microbiology Data (last 24 hrs): 








04/17/18 16:52   Catheterized Urine   [place] Count - Final


                            >100,000 CFU/ML.


04/17/18 16:52   Catheterized Urine    - Final


                            Meth Resistant Staph Aureus





Medications List Reviewed: Yes





Assessment & Plan





- Problems (Diagnosis)


(1) UTI (urinary tract infection)


Onset Date: 04/18/18   Current Visit: Yes   Status: Acute   


Plan: 


UA + for UTI 


-Culture + for MRSA. Sensitive to VANC


-PICC line for IV vanc and will be dc to SNF for completion of IV Abx. 





Qualifiers: 


   Urinary tract infection type: acute cystitis   Hematuria presence: without 

hematuria   Qualified Code(s): N30.00 - Acute cystitis without hematuria   





(2) Diabetes


Onset Date: 04/18/18   Current Visit: Yes   Status: Chronic   


Qualifiers: 


   Diabetes mellitus type: type 2   Diabetes mellitus long term insulin use: 

without long term use   Diabetes mellitus complication status: with unspecified 

complications   Qualified Code(s): E11.8 - Type 2 diabetes mellitus with 

unspecified complications   





(3) HTN (hypertension)


Onset Date: 04/18/18   Current Visit: Yes   Status: Chronic   


Qualifiers: 


   Hypertension type: essential hypertension   Qualified Code(s): I10 - 

Essential (primary) hypertension

## 2018-04-20 LAB
ALBUMIN SERPL BCP-MCNC: 2.8 G/DL (ref 3.2–5.5)
ALP SERPL-CCNC: 105 IU/L (ref 42–121)
ALT SERPL W P-5'-P-CCNC: 17 IU/L (ref 10–60)
AST SERPL W P-5'-P-CCNC: 19 IU/L (ref 10–42)
BUN BLD-MCNC: 26 MG/DL (ref 6–20)
BUN BLD-MCNC: 26 MG/DL (ref 6–20)
GLUCOSE SERPLBLD-MCNC: 159 MG/DL (ref 65–120)
GLUCOSE SERPLBLD-MCNC: 243 MG/DL (ref 65–120)
HCT VFR BLD CALC: 36.5 % (ref 39.6–49)
LYMPHOCYTES # SPEC AUTO: 1 K/UL (ref 0.7–4.9)
MAGNESIUM SERPL-MCNC: 1.6 MG/DL (ref 1.8–2.5)
MCH RBC QN AUTO: 29.5 PG (ref 27–35)
MCV RBC: 86.7 FL (ref 80–100)
PMV BLD: 7.4 FL (ref 7.6–11.3)
POTASSIUM SERPL-SCNC: 4.1 MEQ/L (ref 3.6–5)
POTASSIUM SERPL-SCNC: 4.2 MEQ/L (ref 3.6–5)
RBC # BLD: 4.21 M/UL (ref 4.33–5.43)

## 2018-04-20 PROCEDURE — 02HV33Z INSERTION OF INFUSION DEVICE INTO SUPERIOR VENA CAVA, PERCUTANEOUS APPROACH: ICD-10-PCS

## 2018-04-20 RX ADMIN — HUMAN INSULIN SCH UNIT: 100 INJECTION, SOLUTION SUBCUTANEOUS at 22:00

## 2018-04-20 RX ADMIN — HUMAN INSULIN SCH: 100 INJECTION, SOLUTION SUBCUTANEOUS at 07:30

## 2018-04-20 RX ADMIN — ACETAMINOPHEN PRN MG: 500 TABLET, FILM COATED ORAL at 12:04

## 2018-04-20 RX ADMIN — HUMAN INSULIN SCH UNIT: 100 INJECTION, SOLUTION SUBCUTANEOUS at 12:28

## 2018-04-20 RX ADMIN — HUMAN INSULIN SCH UNIT: 100 INJECTION, SOLUTION SUBCUTANEOUS at 18:15

## 2018-04-20 RX ADMIN — HYDRALAZINE HYDROCHLORIDE PRN MG: 20 INJECTION INTRAMUSCULAR; INTRAVENOUS at 10:08

## 2018-04-20 RX ADMIN — Medication SCH: at 09:00

## 2018-04-20 RX ADMIN — SODIUM CHLORIDE SCH MLS: 0.9 INJECTION, SOLUTION INTRAVENOUS at 06:47

## 2018-04-20 RX ADMIN — ACETAMINOPHEN PRN MG: 500 TABLET, FILM COATED ORAL at 22:04

## 2018-04-20 RX ADMIN — SODIUM CHLORIDE SCH: 0.9 INJECTION, SOLUTION INTRAVENOUS at 01:00

## 2018-04-20 NOTE — P.PN
Subjective


Date of Service: 04/20/18


Chief Complaint: near syncope episode





Patient seen and examined at bedside with RN.  Case reviewed.  Patient 

currently has no complaints to offer.  Alert and oriented x3. Approved to Go to 

[facility] esdras AM for Long term IV abx. Will get PICC line today. 





Review of Systems


10-point ROS is otherwise unremarkable





Physical Examination





- Vital Signs


Temperature: 98.1 F


Blood Pressure: 194/87


Pulse: 98


Respirations: 20


Pulse Ox (%): 99





- Physical Exam


General: Alert, In no apparent distress


HEENT: Atraumatic, PERRLA, EOMI


Neck: Supple, JVD not distended


Respiratory: Clear to auscultation bilaterally, Normal air movement


Cardiovascular: Regular rate/rhythm, Normal S1 S2


Gastrointestinal: Normal bowel sounds, No tenderness


Musculoskeletal: No tenderness


Integumentary: No rashes


Neurological: Normal speech, Normal tone, Normal affect


Lymphatics: No axilla or inguinal lymphadenopathy





- Studies


Microbiology Data (last 24 hrs): 








04/17/18 16:52   Catheterized Urine   [place] Count - Final


                            >100,000 CFU/ML.


04/17/18 16:52   Catheterized Urine    - Final


                            Meth Resistant Staph Aureus





Medications List Reviewed: Yes





Assessment & Plan





- Problems (Diagnosis)


(1) UTI (urinary tract infection)


Onset Date: 04/18/18   Current Visit: Yes   Status: Acute   


Plan: 


UA + for UTI 


-Culture + for MRSA. Sensitive to VANC


-PICC line for IV vanc and will be dc to SNF for completion of IV Abx.


-Accepted at [facility] to be DC esdras.  





Qualifiers: 


   Urinary tract infection type: acute cystitis   Hematuria presence: without 

hematuria   Qualified Code(s): N30.00 - Acute cystitis without hematuria   





(2) Diabetes


Onset Date: 04/18/18   Current Visit: Yes   Status: Chronic   


Qualifiers: 


   Diabetes mellitus type: type 2   Diabetes mellitus long term insulin use: 

without long term use   Diabetes mellitus complication status: with unspecified 

complications   Qualified Code(s): E11.8 - Type 2 diabetes mellitus with 

unspecified complications   





(3) HTN (hypertension)


Onset Date: 04/18/18   Current Visit: Yes   Status: Chronic   


Qualifiers: 


   Hypertension type: essential hypertension   Qualified Code(s): I10 - 

Essential (primary) hypertension   


Discharge Plan: Nursing Home


Plan to discharge in: 24 Hours





- Code Status/Comfort Care


Code Status Assessed: Yes


Critical Care: No

## 2018-04-20 NOTE — RAD REPORT
EXAM DESCRIPTION:  RAD - Chest Single View - 4/20/2018 6:20 pm

 

CLINICAL HISTORY:   Device placement PICC line placement

 

COMPARISON:  April 17, 2018

 

FINDINGS:   A PICC line has been inserted with its tip in the mid superior vena cava.

 

The lungs appear clear of acute infiltrate. Calcified lung granulomas are present. The heart is camille
l size.

 

IMPRESSION:   PICC line with its tip in the mid superior vena cava

## 2018-04-21 VITALS — TEMPERATURE: 99.1 F | SYSTOLIC BLOOD PRESSURE: 151 MMHG | DIASTOLIC BLOOD PRESSURE: 67 MMHG

## 2018-04-21 VITALS — OXYGEN SATURATION: 99 %

## 2018-04-21 LAB
ALBUMIN SERPL BCP-MCNC: 2.4 G/DL (ref 3.2–5.5)
ALP SERPL-CCNC: 97 IU/L (ref 42–121)
ALT SERPL W P-5'-P-CCNC: 16 IU/L (ref 10–60)
AST SERPL W P-5'-P-CCNC: 18 IU/L (ref 10–42)
BUN BLD-MCNC: 24 MG/DL (ref 6–20)
GLUCOSE SERPLBLD-MCNC: 154 MG/DL (ref 65–120)
HCT VFR BLD CALC: 31.3 % (ref 39.6–49)
LYMPHOCYTES # SPEC AUTO: 1.4 K/UL (ref 0.7–4.9)
MAGNESIUM SERPL-MCNC: 1.7 MG/DL (ref 1.8–2.5)
MCH RBC QN AUTO: 29.3 PG (ref 27–35)
MCV RBC: 85.9 FL (ref 80–100)
PMV BLD: 7.4 FL (ref 7.6–11.3)
POTASSIUM SERPL-SCNC: 4 MEQ/L (ref 3.6–5)
RBC # BLD: 3.64 M/UL (ref 4.33–5.43)

## 2018-04-21 RX ADMIN — HUMAN INSULIN SCH UNIT: 100 INJECTION, SOLUTION SUBCUTANEOUS at 11:49

## 2018-04-21 RX ADMIN — SODIUM CHLORIDE SCH MLS: 9 INJECTION, SOLUTION INTRAVENOUS at 00:16

## 2018-04-21 RX ADMIN — HUMAN INSULIN SCH: 100 INJECTION, SOLUTION SUBCUTANEOUS at 07:30

## 2018-04-22 NOTE — DS
Date of Discharge:  04/21/2018



Discharge Diagnoses:  

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

2.Diabetes mellitus.

3.Hypertension.

4.Vasovagal near-syncope.

5.? sepsis.

6.Anemia.



Consult:  None.



Procedure:  

1.Chest x-ray.

2.PICC line placement.



History Of Present Illness:  Please refer to Dr. Lu's history and physical exam.



Hospital Course:  Initially, the patient presented with dizziness, vasovagal near-syncope.  In the ER
, he was evaluated, and found to have leukocytosis, and UA was positive for MRSA.  The patient daniel
y completed a course of antibiotic as an outpatient basis but apparently he did not have good respons
e to that.  Chest x-ray was negative.  Upon admission, the patient was placed on antibiotics and his 
white blood cells went down from 20 to 8.7.  His urine culture showed MRSA which was sensitive to van
comycin.  Dr. Tompkins placed a PICC line, and she decided to put the patient on vancomycin for total of
 10 days given his recurrent UTI, so I will discharge the patient on 1 g every 24 hours.  The patient
 was getting famotidine 5 every  66 hours, but I do not think that is reasonable at the nursing home.
  I would advice strongly to nursing home to repeat a trough and nursing home pharmacy to adjust the 
vancomycin dose, and that to follow closely.  The patient was continued on his anti-blood pressure.  
He was placed on insulin sliding scale as well while he is inpatient.  He will be discharged today in
 stable condition.



Discharge Disposition:  To the rehab center.



Discharge Condition:  Stable.



Discharge Diet:  1800-diabetic ADA.



Discharge Followup:  With primary care physician in 1 week to make sure his UTI is resolved and pharm
acy to follow up the vancomycin dose.



Discharge Medication:  Vancomycin 1 g once a day IV through the PICC line for 8 more days, Norvasc 10
 mg orally once a day, vitamin D3 1200 mg once a day, Lasix 40 mg once a day, Neurontin 300 mg at bed
time, lisinopril 40 mg once a day, metformin 1000 mg twice a day, metoprolol 25 mg twice a day, multi
vitamin as before.



Discharge Physical Examination:  Vital Signs:  Today, showed blood pressure is 151/67, respiratory ra
te 18, pulse 92, temperature is 99.1.  The patient is saturating on room air 99%. 

General:  He is alert and oriented x3.  Does not look in any distress. 

HEENT:  Atraumatic, normocephalic.  PERRLA.  Oral mucosa is moist. 

Neck:  Supple.  No JVD.  No carotid bruits. 

Chest:  Clear to auscultation.  Good air entry. 

Heart:  Regular rate and rhythm.  S1, S2 normal.  No gallop or murmur.  

Abdomen:  Soft, nontender.  No masses.  No hepatosplenomegaly.  Positive bowel sounds.  

Extremities:  No clubbing, cyanosis, or edema.  No calf tenderness. 

Neurologic:  Deferred.





MT/MODL

DD:  04/21/2018 14:21:03Voice ID:  [id number]

DT:  04/22/2018 02:24:48Report ID:  [id number]

## 2018-06-25 ENCOUNTER — HOSPITAL ENCOUNTER (EMERGENCY)
Dept: HOSPITAL 97 - ER | Age: 83
LOS: 1 days | Discharge: ANOTHER HOSPITAL | End: 2018-06-26
Payer: COMMERCIAL

## 2018-06-25 DIAGNOSIS — R00.1: ICD-10-CM

## 2018-06-25 DIAGNOSIS — J81.1: ICD-10-CM

## 2018-06-25 DIAGNOSIS — I10: ICD-10-CM

## 2018-06-25 DIAGNOSIS — E11.9: ICD-10-CM

## 2018-06-25 DIAGNOSIS — E87.5: Primary | ICD-10-CM

## 2018-06-25 DIAGNOSIS — R60.1: ICD-10-CM

## 2018-06-25 DIAGNOSIS — E78.5: ICD-10-CM

## 2018-06-25 PROCEDURE — 96368 THER/DIAG CONCURRENT INF: CPT

## 2018-06-25 PROCEDURE — 87186 SC STD MICRODIL/AGAR DIL: CPT

## 2018-06-25 PROCEDURE — 87040 BLOOD CULTURE FOR BACTERIA: CPT

## 2018-06-25 PROCEDURE — 87088 URINE BACTERIA CULTURE: CPT

## 2018-06-25 PROCEDURE — 80048 BASIC METABOLIC PNL TOTAL CA: CPT

## 2018-06-25 PROCEDURE — 94660 CPAP INITIATION&MGMT: CPT

## 2018-06-25 PROCEDURE — 81015 MICROSCOPIC EXAM OF URINE: CPT

## 2018-06-25 PROCEDURE — 96367 TX/PROPH/DG ADDL SEQ IV INF: CPT

## 2018-06-25 PROCEDURE — 93005 ELECTROCARDIOGRAM TRACING: CPT

## 2018-06-25 PROCEDURE — 85025 COMPLETE CBC W/AUTO DIFF WBC: CPT

## 2018-06-25 PROCEDURE — 71045 X-RAY EXAM CHEST 1 VIEW: CPT

## 2018-06-25 PROCEDURE — 84145 PROCALCITONIN (PCT): CPT

## 2018-06-25 PROCEDURE — 81003 URINALYSIS AUTO W/O SCOPE: CPT

## 2018-06-25 PROCEDURE — 82805 BLOOD GASES W/O2 SATURATION: CPT

## 2018-06-25 PROCEDURE — 36415 COLL VENOUS BLD VENIPUNCTURE: CPT

## 2018-06-25 PROCEDURE — 96375 TX/PRO/DX INJ NEW DRUG ADDON: CPT

## 2018-06-25 PROCEDURE — 82553 CREATINE MB FRACTION: CPT

## 2018-06-25 PROCEDURE — 83605 ASSAY OF LACTIC ACID: CPT

## 2018-06-25 PROCEDURE — 83735 ASSAY OF MAGNESIUM: CPT

## 2018-06-25 PROCEDURE — 80076 HEPATIC FUNCTION PANEL: CPT

## 2018-06-25 PROCEDURE — 85730 THROMBOPLASTIN TIME PARTIAL: CPT

## 2018-06-25 PROCEDURE — 87077 CULTURE AEROBIC IDENTIFY: CPT

## 2018-06-25 PROCEDURE — 85610 PROTHROMBIN TIME: CPT

## 2018-06-25 PROCEDURE — 84484 ASSAY OF TROPONIN QUANT: CPT

## 2018-06-25 PROCEDURE — 36556 INSERT NON-TUNNEL CV CATH: CPT

## 2018-06-25 PROCEDURE — 96365 THER/PROPH/DIAG IV INF INIT: CPT

## 2018-06-25 PROCEDURE — 87086 URINE CULTURE/COLONY COUNT: CPT

## 2018-06-25 PROCEDURE — 83880 ASSAY OF NATRIURETIC PEPTIDE: CPT

## 2018-06-25 PROCEDURE — 51702 INSERT TEMP BLADDER CATH: CPT

## 2018-06-25 PROCEDURE — 87205 SMEAR GRAM STAIN: CPT

## 2018-06-25 PROCEDURE — 82962 GLUCOSE BLOOD TEST: CPT

## 2018-06-25 PROCEDURE — 99285 EMERGENCY DEPT VISIT HI MDM: CPT

## 2018-06-25 PROCEDURE — 82550 ASSAY OF CK (CPK): CPT

## 2018-06-25 PROCEDURE — 96361 HYDRATE IV INFUSION ADD-ON: CPT

## 2018-06-26 VITALS — TEMPERATURE: 98.2 F

## 2018-06-26 VITALS — OXYGEN SATURATION: 98 %

## 2018-06-26 VITALS — SYSTOLIC BLOOD PRESSURE: 127 MMHG | DIASTOLIC BLOOD PRESSURE: 57 MMHG

## 2018-06-26 LAB
ALBUMIN SERPL BCP-MCNC: 2.9 G/DL (ref 3.4–5)
ALP SERPL-CCNC: 105 U/L (ref 45–117)
ALT SERPL W P-5'-P-CCNC: 27 U/L (ref 12–78)
AST SERPL W P-5'-P-CCNC: 21 U/L (ref 15–37)
BUN BLD-MCNC: 115 MG/DL (ref 7–18)
CKMB CREATINE KINASE MB: 5.7 NG/ML (ref 0.3–3.6)
COHGB MFR BLDA: 0.9 % (ref 0–1.5)
GLUCOSE SERPLBLD-MCNC: 154 MG/DL (ref 74–106)
HCT VFR BLD CALC: 33.2 % (ref 39.6–49)
INR BLD: 1.06
LYMPHOCYTES # SPEC AUTO: 1.7 K/UL (ref 0.7–4.9)
MAGNESIUM SERPL-MCNC: 2.3 MG/DL (ref 1.8–2.4)
MCH RBC QN AUTO: 28.7 PG (ref 27–35)
MCV RBC: 90.1 FL (ref 80–100)
OXYHGB MFR BLDA: 95.6 % (ref 94–97)
PMV BLD: 8.1 FL (ref 7.6–11.3)
POTASSIUM SERPL-SCNC: 7.8 MMOL/L (ref 3.5–5.1)
RBC # BLD: 3.69 M/UL (ref 4.33–5.43)
SAO2 % BLDA: 97.5 % (ref 92–98.5)
UA COMPLETE W REFLEX CULTURE PNL UR: (no result)
URINE COARSE GRANULAR CASTS: (no result) /LPF
WAXY CASTS URNS QL MICRO: (no result) /LPF

## 2018-06-26 PROCEDURE — 06HM33Z INSERTION OF INFUSION DEVICE INTO RIGHT FEMORAL VEIN, PERCUTANEOUS APPROACH: ICD-10-PCS

## 2018-06-26 NOTE — RAD REPORT
EXAM DESCRIPTION:  RAD - Chest Single View - 6/26/2018 12:44 am

 

CLINICAL HISTORY:  DYSPNEA

Chest pain.

 

COMPARISON:  Chest Single View dated 4/20/2018; Chest Single View dated 4/17/2018; Chest Single View 
dated 2/10/2017; Chest Single View dated 2/9/2017

 

FINDINGS:  Portable technique limits examination quality.

 

Mild bibasilar lung opacities are present, greater on the right with a small pleural effusion suspect
ed. The findings may indicate atelectasis or developing pneumonia. The heart is upper limit normal in
 size. No displaced fractures.

## 2018-06-26 NOTE — EKG
Test Date:    2018-06-26               Test Time:    00:47:55

Technician:   SHANTAL                                   

                                                     

MEASUREMENT RESULTS:                                       

Intervals:                                           

Rate:         63                                     

DC:                                                  

QRSD:         184                                    

QT:           358                                    

QTc:          366                                    

Axis:                                                

P:                                                   

DC:                                                  

QRS:          -45                                    

T:            82                                     

                                                     

INTERPRETIVE STATEMENTS:                                       

                                                     

junctional rhythm

Right bundle branch block

Left anterior fascicular block

*** Bifascicular block ***

Abnormal ECG

Compared to ECG 04/17/2018 15:14:41

Right bundle-branch block now present

Left anterior fascicular block now present

Bifascicular block now present

Sinus rhythm no longer present



Electronically Signed On 06-26-18 15:41:05 CDT by Avery Sanchez

## 2018-06-26 NOTE — EDPHYS
Physician Documentation                                                                           

 [facility]                                                                

Name: Pinky Avitia                                                                                 

Age: 83 yrs                                                                                       

Sex: Male                                                                                         

: 1935                                                                                   

MRN: [id number]                                                                                   

Arrival Date: 2018                                                                          

Time: 00:08                                                                                       

Account#: [id number]                                                                            

Bed 2                                                                                             

Private MD:                                                                                       

ED Physician Walt Benton                                                                         

HPI:                                                                                              

                                                                                             

01:01 This 83 yrs old  Male presents to ER via EMS with complaints of Hand Swelling - snw 

      arm swelling, leaking fluid.                                                                

                                                                                                  

Historical:                                                                                       

- Allergies:                                                                                      

00:17 No Known Allergies;                                                                     lp1 

- Home Meds:                                                                                      

00:17 amlodipine 10 mg tab 1 tab once daily [Active]; gabapentin 300 mg Oral cap 1 cap at     lp1 

      bedtime [Active]; Lasix 40 mg Oral tab once daily [Active]; Lipitor Oral [Active];          

      metformin 1,000 mg Oral tab 2 times per day [Active]; metoprolol tartrate 25 mg Oral        

      tab 0.5 tab 2 times per day [Active]; multivitamin Oral daily [Active]; Vitamin D Oral      

      400 unit daily [Active];                                                                    

- PMHx:                                                                                           

00:17 Diabetes - NIDDM; Hyperlipidemia; Hypertension; muscle atrophy; Paralysis;              lp1 

                                                                                                  

- Immunization history:: Adult Immunizations up to date.                                          

- Social history:: Smoking status: Patient/guardian denies using tobacco.                         

- Ebola Screening: : No symptoms or risks identified at this time.                                

                                                                                                  

                                                                                                  

ROS:                                                                                              

00:50 Constitutional: Negative for fever, chills, and weight loss, Eyes: Negative for injury, snw 

      pain, redness, and discharge, ENT: Negative for injury, pain, and discharge, Neck:          

      Negative for injury, pain, and swelling, Cardiovascular: Negative for chest pain,           

      palpitations, and edema, Abdomen/GI: Negative for abdominal pain, nausea, vomiting,         

      diarrhea, and constipation, Back: Negative for injury and pain, MS/Extremity: Negative      

      for injury and deformity, Skin: Negative for injury, rash, and discoloration, Neuro:        

      Negative for headache, weakness, numbness, tingling, and seizure.                           

00:50 Respiratory: Positive for shortness of breath.                                              

                                                                                                  

Exam:                                                                                             

00:41 Head/Face:  Normocephalic, atraumatic. Eyes:  Pupils equal round and reactive to light, snw 

      extra-ocular motions intact.  Lids and lashes normal.  Conjunctiva and sclera are           

      non-icteric and not injected.  Cornea within normal limits.  Periorbital areas with no      

      swelling, redness, or edema. Neck:  Trachea midline, no thyromegaly or masses palpated,     

      and no cervical lymphadenopathy.  Supple, full range of motion without nuchal rigidity,     

      or vertebral point tenderness.  No Meningismus. Chest/axilla:  Normal chest wall            

      appearance and motion.  Nontender with no deformity.  No lesions are appreciated.           

00:41 Back:  No spinal tenderness.  No costovertebral tenderness.  Full range of motion. MS/      

      Extremity:  Pulses equal, no cyanosis.  Neurovascular intact.  Full, normal range of        

      motion. Neuro:  Awake and alert, GCS 15, oriented to person, place, time, and               

      situation.  Cranial nerves II-XII grossly intact.  Motor strength 5/5 in all                

      extremities.  Sensory grossly intact.  Cerebellar exam normal.  Normal gait.                

00:41 Constitutional: The patient appears anxious, lethargic, uncomfortable, unkempt.             

00:41 Cardiovascular: Rate: normal, Rhythm: regular, Pulses: no pulse deficits are                

      appreciated, Heart sounds: normal, Edema: 3+ edema to level of right shoulder, right        

      upper arm, right elbow, right forearm, right wrist, right hand, waist, left shoulder,       

      left upper arm, left elbow, left forearm, left wrist and left hand, JVD: is noted           

      bilaterally, to 2 cm.                                                                       

00:41 Respiratory: moderate respiratory distress is noted,  Respirations: shallow                 

      respirations, tachypnea, Breath sounds: decreased breath sounds, that are moderate, are     

      located in both bases.                                                                      

00:41 Abdomen/GI: Inspection: distension, that is moderate, obese                                 

00:41 Skin: Appearance: arms and dorsal hands weeping .                                           

                                                                                                  

Vital Signs:                                                                                      

00:11  / 40; Pulse 63; Resp 19; Temp 98.2(O); Pulse Ox 93% on R/A; Weight 99.79 kg;     lp1 

      Height 5 ft. 8 in. (172.72 cm);                                                             

01:30 BP 81 / 62; Pulse 30; Resp 20; Pulse Ox 99% on BiPAP;                                   aa1 

01:46  / 42; Pulse 49; Resp 22; Pulse Ox 100% on BiPAP;                                 aa1 

02:40  / 51; Pulse 44; Resp 22; Pulse Ox 97% on BiPAP; Pain 0/10;                       aa1 

03:13  / 73; Pulse 45; Resp 20; Pulse Ox 94% on BiPAP; Pain 0/10;                       aa1 

03:32  / 52; Pulse 53; Resp 20; Pulse Ox 98% on BiPAP; Pain 0/10;                       aa1 

04:10  / 57; Pulse 43; Resp 20; Pulse Ox 98% 3 lpm ; Pain 0/10;                         ao  

00:11 Body Mass Index 33.45 (99.79 kg, 172.72 cm)                                             lp1 

                                                                                                  

Procedures:                                                                                       

01:50 Central Line: the site was prepped with Betadine, in sterile fashion, a triple lumen    rn  

      catheter was inserted, in the right femoral vein, in 1 attempts. placement was              

      verified, by blood return, the site was dressed with Tegaderm, the patient tolerated        

      the procedure, well.                                                                        

                                                                                                  

MDM:                                                                                              

00:10 Patient medically screened.                                                             snw 

01:52 Data reviewed: vital signs, nurses notes, lab test result(s), EKG, radiologic studies.  snw 

      Data interpreted: Pulse oximetry: on is 93 %. Interpretation: placed on BiPap.              

      Counseling: I had a detailed discussion with the patient and/or guardian regarding: the     

      historical points, exam findings, and any diagnostic results supporting the                 

      discharge/admit diagnosis, lab results. Physician consultation:.                            

02:00 ED course: pt states he is breathing easier, very minimal urine output to temple. ABG's  snw 

      in progress.                                                                                

02:04 ED course: Pt responded well to hyperkalemia treatment, HR from 20s to 40s-50s, pt in   rn  

      acute renal failure, anasarca, could benefit from emergent HD, organizing transfer to       

      [facility] for ICU and higher level of care. .                                                

02:19 ED course: Accepted for transfer to [facility] by Dr. Temple for ICU level care..           rn  

02:56 ED course: Pt feels like breathing is better. New ECG shows sinus bradycardia..         rn  

                                                                                                  

                                                                                             

00:09 Order name: Basic Metabolic Panel; Complete Time: 01:48                                 snw 

                                                                                             

00:09 Order name: CBC with Diff; Complete Time: 01:20                                         snw 

                                                                                             

00:09 Order name: Ckmb; Complete Time: 01:48                                                  snw 

                                                                                             

00:09 Order name: CPK; Complete Time: 01:48                                                   snw 

                                                                                             

00:09 Order name: LFT's; Complete Time: 01:48                                                 snw 

                                                                                             

00:09 Order name: Magnesium; Complete Time: 01:48                                                                                                                                          

00:09 Order name: NT PRO-BNP; Complete Time: 01:48                                                                                                                                         

00:09 Order name: PT-INR; Complete Time: 01:20                                                                                                                                             

00:09 Order name: Ptt, Activated; Complete Time: 01:20                                                                                                                                     

00:09 Order name: Troponin (emerg Dept Use Only); Complete Time: 01:48                                                                                                                     

00:09 Order name: Blood Culture*                                                                                                                                                           

00:09 Order name: Lactate; Complete Time: 01:48                                                                                                                                            

00:09 Order name: Procalcitonin; Complete Time: 01:48                                                                                                                                      

01:54 Order name: Urine Culture                                                                                                                                                            

00:09 Order name: XRAY Chest (1 view)                                                                                                                                                      

01:01 Order name: BIPAP                                                                                                                                                                    

01:54 Order name: Urine Microscopic Only; Complete Time: 04:07                                                                                                                             

02:03 Order name: Urine Dipstick--Ancillary (enter results); Complete Time: 04:07               

                                                                                             

02:11 Order name: ABG; Complete Time: 02:42                                                                                                                                                

00:09 Order name: EKG; Complete Time: 00:11                                                                                                                                                

00:09 Order name: Cardiac monitoring; Complete Time: 00:41                                                                                                                                 

00:09 Order name: EKG - Nurse/Tech; Complete Time: 01:53                                                                                                                                   

00:09 Order name: IV Saline Lock; Complete Time: 00:40                                                                                                                                     

00:09 Order name: Labs collected and sent; Complete Time: 00:40                                                                                                                            

00:09 Order name: O2 Per Protocol; Complete Time: 00:40                                                                                                                                    

00:09 Order name: O2 Sat Monitoring; Complete Time: 00:41                                                                                                                                  

00:09 Order name: Urine Dipstick-Ancillary (obtain specimen); Complete Time: 02:05                                                                                                         

00:09 Order name: Temple; Complete Time: 01:27                                                                                                                                              

01:51 Order name: Pacing-External: to pt, no pacing at this time; Complete Time: 01:57                                                                                                     

01:53 Order name: FSBS: hourly; Complete Time: 02:41                                          snw 

                                                                                             

01:54 Order name: Intake and Output; Complete Time: 03:09                                     snw 

                                                                                             

01:54 Order name: NPO; Complete Time: 02:27                                                   snw 

                                                                                             

02:02 Order name: EKG; Complete Time: 02:04                                                   snw 

                                                                                                  

Administered Medications:                                                                         

00:46 Drug: NS 0.9% 250 ml Route: IV; Rate: bolus; Site: left jugular;                        ao  

04:05 Follow up: IV Status: Order to discontinue infusion; IV Intake: 40ml                    ao  

00:55 Drug: Lasix 40 mg Route: IVP; Site: right antecubital;                                  ao  

03:05 Follow up: Response: No change in condition; No change in condition; no urine output    aa1 

01:20 Drug: Atropine 1 mg {Note: By MARY Guido} Route: IVP; Site: right antecubital;         ao  

01:30 Follow up: Response: Cardiac rhythm is unchanged                                        aa1 

01:26 Drug: Dopamine drip 5 mcg/kg/min - (DOPamine 400 mg, D5W 250 ml) Route: IV; Rate:       aa1 

      calculated rate; Site: left antecubital;                                                    

03:01 Follow up: Rate change 10 mcg/kg/min                                                    aa1 

03:04 Follow up: IV Status: Infusion continued upon admission                                 aa1 

01:32 Drug: Calcium Chloride 1 grams Route: IVP; Site: left jugular;                          aa1 

03:06 Follow up: Response: No adverse reaction; No adverse reaction; given for hyperkalemia   aa1 

04:06 Follow up: Response: No adverse reaction                                                ao  

01:33 Drug: D50W 50 ml Route: IVP; Site: right femoral;                                       aa1 

03:07 Follow up: Response: No adverse reaction; No adverse reaction; given for hyperkalemia   aa1 

01:34 Drug: Insulin Regular Human 10 units {Co-Signature: ao (Jersey Barrera RN).} Route: IVP;    aa1 

      Site: right femoral;                                                                        

03:08 Follow up: Response: No adverse reaction; No adverse reaction; given for hyperkalemia   aa1 

01:35 Drug: Sodium Bicarbonate 1 amp Route: IVP; Site: right femoral;                         aa1 

04:07 Follow up: Response: No adverse reaction                                                ao  

01:36 Drug: Albuterol 2.5 mg Route: Inhalation;                                               aa1 

01:36 Drug: Sodium Bicarbonate 1 amp Route: IVP; Site: right femoral;                         aa1 

04:05 Follow up: Response: No adverse reaction                                                ao  

01:48 Not Given (Other Intervention Used): Calcium Gluconate 2 grams IVPB once over 60 mins;  aa1 

      (mix in  mL)                                                                          

01:51 Drug: vancoMYCIN 1 grams Route: IVPB; Infused Over: 2 hrs; Site: right antecubital;     ao  

04:06 Follow up: IV Status: Completed infusion; IV Intake: 250ml                              ao  

01:52 CANCELLED (Duplicate Order): Albuterol 2.5 mg Inhalation every 20 minutes x3            aa1 

01:52 CANCELLED (Duplicate Order): Sodium Bicarbonate 1 amp IVP once; (50 mL); equals 50 mEq  aa1 

01:53 CANCELLED (Duplicate Order): Calcium Chloride 10% 10 ml IVP once                        aa1 

01:58 Drug: Zosyn 3.375 grams Route: IVPB; Infused Over: 60 mins; Site: left antecubital;     ao  

02:41 Follow up: IV Status: Completed infusion                                                aa1 

03:18 Drug: Atropine 0.5 mg Route: IVP; Site: right femoral;                                  aa1 

03:30 Follow up: Response: No adverse reaction; Cardiac rhythm changed                        aa1 

03:31 Drug: Calcium Chloride 1 grams Route: IVP; Site: right femoral;                         aa1 

04:04 Follow up: Response: No adverse reaction                                                ao  

                                                                                                  

                                                                                                  

Point of Care Testing:                                                                            

      Blood Glucose:                                                                              

01:46 Blood Glucose: 272 mg/dL;                                                               rv  

02:40 Blood Glucose: 210 mg/dL;                                                               aa1 

03:36 Blood Glucose: 201 mg/dL;                                                               aa1 

      Ranges:                                                                                     

      Critical Glucose Levels:Adult <50 mg/dl or >400 mg/dl  <40 mg/dl or >180 mg/dl       

Disposition:                                                                                      

06:52 Co-signature as Attending Physician, Walt Benton MD.                                    rn  

                                                                                                  

Disposition:                                                                                      

18 02:23 Transfer ordered to [facility]. Diagnosis are Hyperkalemia,    

  Shortness of breath, Pulmonary edema, Anasarca, Bradycardia, unspecified.                       

- Reason for transfer: Higher level of care.                                                      

- Accepting physician is Dr. Temple.                                                                

- Condition is Serious.                                                                           

- Problem is new.                                                                                 

- Symptoms have improved.                                                                         

                                                                                                  

                                                                                                  

                                                                                                  

Critical care time excluding procedures:                                                          

02:19 Critical care time: Bedside Care: 25 minutes, Consultation: 5 minutes, Family           rn  

      Intervention: 5 minutes. Total time: 35 minutes                                             

                                                                                                  

Signatures:                                                                                       

Dispatcher [employer]                           EDMS                                                 

Dixie Desouza RN                        RN   aa1                                                  

Lata Crawley, FNP-C                 FNP-Csnw                                                  

Walt Benton MD MD rn Pena, Laura, RN RN   lp1                                                  

Jersey Barrera RN RN ao Alex Ortiz RN ao                                                   

                                                                                                  

Corrections: (The following items were deleted from the chart)                                    

01:52 01:50 Albuterol 2.5 mg Inhalation every 20 minutes x3 ordered. aa1                      aa1 

01:52 01:50 Sodium Bicarbonate 1 amp IVP once; (50 mL); equals 50 mEq ordered. aa1            aa1 

01:53 01:50 Calcium Chloride 10% 10 ml IVP once ordered. aa1                                  aa1 

04:16 02:23 2018 02:23 Transfer ordered to [facility]. Diagnosis is ao  

      Hyperkalemia; Shortness of breath; Pulmonary edema; Anasarca; Bradycardia, unspecified.     

      Reason for transfer: Higher level of care. Accepting physician is Dr. Temple. Condition       

      is Serious. Problem is new. Symptoms have improved. rn                                      

                                                                                                  

**************************************************************************************************

## 2018-06-26 NOTE — EKG
Test Date:    2018-06-26               Test Time:    02:09:57

Technician:   MT                                     

                                                     

MEASUREMENT RESULTS:                                       

Intervals:                                           

Rate:         48                                     

MA:           118                                    

QRSD:         128                                    

QT:           484                                    

QTc:          432                                    

Axis:                                                

P:            -24                                    

MA:           118                                    

QRS:          74                                     

T:            60                                     

                                                     

INTERPRETIVE STATEMENTS:                                       

                                                     

Sinus bradycardia

Right bundle branch block

Abnormal ECG

Compared to ECG 06/26/2018 00:47:55

Left anterior fascicular block no longer present

Bifascicular block no longer present



Electronically Signed On 06-26-18 15:40:38 CDT by Avery Sanchez

## 2018-06-26 NOTE — ER
Nurse's Notes                                                                                     

 [facility]                                                                

Name: Pinky Avitia                                                                                 

Age: 83 yrs                                                                                       

Sex: Male                                                                                         

: 1935                                                                                   

MRN: [id number]                                                                                   

Arrival Date: 2018                                                                          

Time: 00:08                                                                                       

Account#: [id number]                                                                            

Bed 2                                                                                             

Private MD:                                                                                       

Diagnosis: Hyperkalemia;Shortness of breath;Pulmonary edema;Anasarca;Bradycardia, unspecified     

                                                                                                  

Presentation:                                                                                     

                                                                                             

00:12 Presenting complaint: EMS states: Called to patient with bilateral arm swelling and     lp1 

      fluid leaking from arms; Patient states trouble breathing and had not been able to          

      urinate. Transition of care: patient was not received from another setting of care.         

      Onset of symptoms was 2018. Risk Assessment: Do you want to hurt yourself or       

      someone else? Patient reports no desire to harm self or others. Initial Sepsis Screen:      

      Does the patient meet any 2 criteria? No. Patient's initial sepsis screen is negative.      

      Does the patient have a suspected source of infection? No. Patient's initial sepsis         

      screen is negative. Care prior to arrival: None.                                            

00:12 Method Of Arrival: EMS: [facility]                                                lp1 

00:12 Acuity: EVELIA 2                                                                           lp1 

                                                                                                  

Historical:                                                                                       

- Allergies:                                                                                      

00:17 No Known Allergies;                                                                     lp1 

- Home Meds:                                                                                      

00:17 amlodipine 10 mg tab 1 tab once daily [Active]; gabapentin 300 mg Oral cap 1 cap at     lp1 

      bedtime [Active]; Lasix 40 mg Oral tab once daily [Active]; Lipitor Oral [Active];          

      metformin 1,000 mg Oral tab 2 times per day [Active]; metoprolol tartrate 25 mg Oral        

      tab 0.5 tab 2 times per day [Active]; multivitamin Oral daily [Active]; Vitamin D Oral      

      400 unit daily [Active];                                                                    

- PMHx:                                                                                           

00:17 Diabetes - NIDDM; Hyperlipidemia; Hypertension; muscle atrophy; Paralysis;              lp1 

                                                                                                  

- Immunization history:: Adult Immunizations up to date.                                          

- Social history:: Smoking status: Patient/guardian denies using tobacco.                         

- Ebola Screening: : No symptoms or risks identified at this time.                                

                                                                                                  

                                                                                                  

Screenin:17 Abuse screen: Denies threats or abuse. Denies injuries from another. Nutritional        lp1 

      screening: No deficits noted. Tuberculosis screening: No symptoms or risk factors           

      identified. Fall Risk Total Hung Fall Scale indicates High Risk Score (45 or more          

      points). Fall prevention measures have been instituted. Side Rails Up X 2 Frequent          

      Obs/Assessments Occuring As available patient and family educated on Fall Prevention        

      Program and Strategies.                                                                     

                                                                                                  

Assessment:                                                                                       

00:14 General: Appears in no apparent distress. uncomfortable, obese, unkempt, Behavior is    ao  

      calm, drowsy, listless. Pain: Denies pain. Pain does not radiate. Neuro: Level of           

      Consciousness is awake, confused, lethargic, listless, Oriented to person, place, time,     

      situation, Moves all extremities. Weakness. Cardiovascular: Capillary refill is > 3         

      seconds Patient's skin is warm and dry. Respiratory: Airway is patent Respiratory           

      effort is even, unlabored, Respiratory pattern is regular, symmetrical. GI: Abdomen is      

      obese. : No signs and/or symptoms were reported regarding the genitourinary system.       

      EENT: No signs and/or symptoms were reported regarding the EENT system. Derm: Skin has      

      blisters on Hands has skin tears on Right upper arm Skin is pink, warm \T\ dry. Skin        

      temperature is warm. Musculoskeletal: Swelling present in right arm, left arm, right        

      leg and left leg.                                                                           

01:10 Reassessment: Patient appears in no apparent distress at this time. Patient and/or      ao  

      family updated on plan of care and expected duration. Pain level reassessed. Patient        

      transferred to trauma pot due to HR.                                                        

01:30 Reassessment: Patient appears in no apparent distress at this time. MD and NP at        aa1 

      bedside for central line placement. Dopamine drip and hyperkalemia protocol initiated.      

02:00 Reassessment: Patient appears in no apparent distress at this time. Patient is alert,   aa1 

      oriented x 3, equal unlabored respirations, skin warm/dry/pink. Pt reports SOB greatly      

      improved Patient denies pain at this time. Patient states symptoms have improved.           

02:51 Reassessment: Patient appears in no apparent distress at this time. Patient and/or      aa1 

      family updated on plan of care and expected duration. Pain level reassessed. Patient is     

      alert, oriented x 3, equal unlabored respirations, skin warm/dry/pink. Report given to      

      JR Delosantos, RN at [facility]. Pt awaiting transfer.                           

03:00 Reassessment: Per MD, increase Dopamine drip to 10 mcg/kg/min.                          aa1 

03:30 Reassessment: Patient appears in no apparent distress at this time. Patient is alert,   aa1 

      oriented x 3, equal unlabored respirations, skin warm/dry/pink. [facility] present for           

      transport.                                                                                  

04:14 Reassessment: Patient left via EMS. VS WNL.                                             ao  

                                                                                                  

Vital Signs:                                                                                      

00:11  / 40; Pulse 63; Resp 19; Temp 98.2(O); Pulse Ox 93% on R/A; Weight 99.79 kg;     lp1 

      Height 5 ft. 8 in. (172.72 cm);                                                             

01:30 BP 81 / 62; Pulse 30; Resp 20; Pulse Ox 99% on BiPAP;                                   aa1 

01:46  / 42; Pulse 49; Resp 22; Pulse Ox 100% on BiPAP;                                 aa1 

02:40  / 51; Pulse 44; Resp 22; Pulse Ox 97% on BiPAP; Pain 0/10;                       aa1 

03:13  / 73; Pulse 45; Resp 20; Pulse Ox 94% on BiPAP; Pain 0/10;                       aa1 

03:32  / 52; Pulse 53; Resp 20; Pulse Ox 98% on BiPAP; Pain 0/10;                       aa1 

04:10  / 57; Pulse 43; Resp 20; Pulse Ox 98% 3 lpm ; Pain 0/10;                         ao  

00:11 Body Mass Index 33.45 (99.79 kg, 172.72 cm)                                             lp1 

                                                                                                  

ED Course:                                                                                        

00:08 Patient arrived in ED.                                                                  lp1 

00:08 [facility]                                                        snw 

00:08 Walt Benton MD is Attending Physician.                                                snw 

00:12 Arm band placed on right wrist.                                                         lp1 

00:15 Triage completed.                                                                       lp1 

00:18 Patient has correct armband on for positive identification. Bed in low position. Side   lp1 

      rails up X2. Cardiac monitor on. Pulse ox on. NIBP on.                                      

00:39 Barrera, Jersey, RN is Primary Nurse.                                                       ao  

00:41 X-ray completed. Portable x-ray completed in exam room. Patient tolerated procedure     kw  

      well.                                                                                       

00:44 XRAY Chest (1 view) In Process Unspecified.                                             EDMS

01:10 Inserted saline lock: 20 gauge in right antecubital area, using aseptic technique.      ao  

      ,using aseptic technique. By Dr Benton.                                                      

01:45 Grimes cath inserted, using sterile technique, 16 Fr., by me, balloon inflated, to       rv  

      gravity drainage.                                                                           

01:57 Assisted provider with central line placement. Set up central line tray. Triple lumen   ao  

      line placed in right femoral. Line placed by Walt Benton MD Placement verified by blood     

      return, Dressed with 4X4s, Tape, Tegaderm, Patient tolerated well.                          

02:06 initiated transfer with [facility] with . She will call her       

      house supervisor to check bed availability.                                                 

02:17 connected Dr. Temple from [facility] for patient transfer consultation.               eb  

02:27 administrative approval given by Araseli Thompson , Accepting Doctor is Dr. Bart Temple.  eb  

      Report is to be called to the transfer center at [phone]. pt is going to [facility] bed 9     

      at [facility].                                                                          

02:54 Patient transferred, IV remains in place.                                               aa1 

                                                                                                  

Administered Medications:                                                                         

00:46 Drug: NS 0.9% 250 ml Route: IV; Rate: bolus; Site: left jugular;                        ao  

04:05 Follow up: IV Status: Order to discontinue infusion; IV Intake: 40ml                    ao  

00:55 Drug: Lasix 40 mg Route: IVP; Site: right antecubital;                                  ao  

03:05 Follow up: Response: No change in condition; No change in condition; no urine output    aa1 

01:20 Drug: Atropine 1 mg {Note: By KANDIS Guido.} Route: IVP; Site: right antecubital;         ao  

01:30 Follow up: Response: Cardiac rhythm is unchanged                                        aa1 

01:26 Drug: Dopamine drip 5 mcg/kg/min - (DOPamine 400 mg, D5W 250 ml) Route: IV; Rate:       aa1 

      calculated rate; Site: left antecubital;                                                    

03:01 Follow up: Rate change 10 mcg/kg/min                                                    aa1 

03:04 Follow up: IV Status: Infusion continued upon admission                                 aa1 

01:32 Drug: Calcium Chloride 1 grams Route: IVP; Site: left jugular;                          aa1 

03:06 Follow up: Response: No adverse reaction; No adverse reaction; given for hyperkalemia   aa1 

04:06 Follow up: Response: No adverse reaction                                                ao  

01:33 Drug: D50W 50 ml Route: IVP; Site: right femoral;                                       aa1 

03:07 Follow up: Response: No adverse reaction; No adverse reaction; given for hyperkalemia   aa1 

01:34 Drug: Insulin Regular Human 10 units {Co-Signature: vidhi (Jersey Barrera RN).} Route: IVP;    aa1 

      Site: right femoral;                                                                        

03:08 Follow up: Response: No adverse reaction; No adverse reaction; given for hyperkalemia   aa1 

01:35 Drug: Sodium Bicarbonate 1 amp Route: IVP; Site: right femoral;                         aa1 

04:07 Follow up: Response: No adverse reaction                                                ao  

01:36 Drug: Albuterol 2.5 mg Route: Inhalation;                                               aa1 

01:36 Drug: Sodium Bicarbonate 1 amp Route: IVP; Site: right femoral;                         aa1 

04:05 Follow up: Response: No adverse reaction                                                ao  

01:48 Not Given (Other Intervention Used): Calcium Gluconate 2 grams IVPB once over 60 mins;  aa1 

      (mix in  mL)                                                                          

01:51 Drug: vancoMYCIN 1 grams Route: IVPB; Infused Over: 2 hrs; Site: right antecubital;     ao  

04:06 Follow up: IV Status: Completed infusion; IV Intake: 250ml                              ao  

01:52 CANCELLED (Duplicate Order): Albuterol 2.5 mg Inhalation every 20 minutes x3            aa1 

01:52 CANCELLED (Duplicate Order): Sodium Bicarbonate 1 amp IVP once; (50 mL); equals 50 mEq  aa1 

01:53 CANCELLED (Duplicate Order): Calcium Chloride 10% 10 ml IVP once                        aa1 

01:58 Drug: Zosyn 3.375 grams Route: IVPB; Infused Over: 60 mins; Site: left antecubital;     ao  

02:41 Follow up: IV Status: Completed infusion                                                aa1 

03:18 Drug: Atropine 0.5 mg Route: IVP; Site: right femoral;                                  aa1 

03:30 Follow up: Response: No adverse reaction; Cardiac rhythm changed                        aa1 

03:31 Drug: Calcium Chloride 1 grams Route: IVP; Site: right femoral;                         aa1 

04:04 Follow up: Response: No adverse reaction                                                ao  

                                                                                                  

                                                                                                  

Point of Care Testing:                                                                            

      Blood Glucose:                                                                              

01:46 Blood Glucose: 272 mg/dL;                                                               rv  

02:40 Blood Glucose: 210 mg/dL;                                                               aa1 

03:36 Blood Glucose: 201 mg/dL;                                                               aa1 

      Ranges:                                                                                     

                                                                                                  

Intake:                                                                                           

04:05 IV: 40ml; Total: 40ml.                                                                  ao  

04:06 IV: 250ml; Total: 290ml.                                                                ao  

                                                                                                  

Outcome:                                                                                          

02:23 ER care complete, transfer ordered by MD.                                               rn  

04:09 Transferred by ground EMS to [facility], Transfer form completed.    ao  

      X-rays sent w/ patient.                                                                     

04:09 Condition: stable                                                                           

04:09 Instructed on the need for transfer.                                                        

04:16 Patient left the ED.                                                                    ao  

                                                                                                  

Addendum:                                                                                         

2018                                                                                        

     10:14 Addendum: Culture Results: Positive urine culture. Culture report faxed to [facility], ATTN: СВЕТЛАНА Martinez.                                                                    

                                                                                                  

Signatures:                                                                                       

Dispatcher [employer]                           EDMS                                                 

Dixie Desouza, RN                        RN   aa1                                                  

Lata Crawley, FNP-C                 FNP-Csnw                                                  

Walt Benton MD MD rn Smirch, Shelby, RN RN                                                      

Gifty Schofield Laura, RN                         RN   lp1                                                  

Jersey Barrera RN                         Jocelynn Velasquez Ronaldo RN                    RN   rv                                                   

Jersey Barrera RN                                ao                                                   

                                                                                                  

Corrections: (The following items were deleted from the chart)                                    

                                                                                             

02:55 02:54 Grimes cath inserted, using sterile technique, 16 Fr., by me, balloon inflated, to rv  

      gravity drainage, rv                                                                        

                                                                                                  

**************************************************************************************************

## 2018-07-18 ENCOUNTER — HOSPITAL ENCOUNTER (INPATIENT)
Dept: HOSPITAL 97 - ER | Age: 83
LOS: 4 days | Discharge: HOME | DRG: 682 | End: 2018-07-22
Attending: INTERNAL MEDICINE | Admitting: FAMILY MEDICINE
Payer: COMMERCIAL

## 2018-07-18 VITALS — BODY MASS INDEX: 34.2 KG/M2

## 2018-07-18 DIAGNOSIS — G70.00: ICD-10-CM

## 2018-07-18 DIAGNOSIS — E11.649: ICD-10-CM

## 2018-07-18 DIAGNOSIS — Z79.84: ICD-10-CM

## 2018-07-18 DIAGNOSIS — E11.42: ICD-10-CM

## 2018-07-18 DIAGNOSIS — E11.21: ICD-10-CM

## 2018-07-18 DIAGNOSIS — I12.9: ICD-10-CM

## 2018-07-18 DIAGNOSIS — N18.3: ICD-10-CM

## 2018-07-18 DIAGNOSIS — K80.20: ICD-10-CM

## 2018-07-18 DIAGNOSIS — L98.491: ICD-10-CM

## 2018-07-18 DIAGNOSIS — K85.90: ICD-10-CM

## 2018-07-18 DIAGNOSIS — E78.5: ICD-10-CM

## 2018-07-18 DIAGNOSIS — N17.9: Primary | ICD-10-CM

## 2018-07-18 DIAGNOSIS — E87.2: ICD-10-CM

## 2018-07-18 DIAGNOSIS — Z91.048: ICD-10-CM

## 2018-07-18 DIAGNOSIS — E87.5: ICD-10-CM

## 2018-07-18 DIAGNOSIS — E11.22: ICD-10-CM

## 2018-07-18 DIAGNOSIS — E11.65: ICD-10-CM

## 2018-07-18 LAB
ALBUMIN SERPL BCP-MCNC: 2.8 G/DL (ref 3.4–5)
ALP SERPL-CCNC: 205 U/L (ref 45–117)
ALT SERPL W P-5'-P-CCNC: 34 U/L (ref 12–78)
AMYLASE SERPL-CCNC: 107 U/L (ref 25–115)
AST SERPL W P-5'-P-CCNC: 31 U/L (ref 15–37)
BUN BLD-MCNC: 66 MG/DL (ref 7–18)
BUN BLD-MCNC: 66 MG/DL (ref 7–18)
CKMB CREATINE KINASE MB: 4.1 NG/ML (ref 0.3–3.6)
COHGB MFR BLDA: 1.1 % (ref 0–1.5)
CRP SERPL-MCNC: 10.7 MG/L (ref ?–3)
GLUCOSE SERPLBLD-MCNC: 226 MG/DL (ref 74–106)
GLUCOSE SERPLBLD-MCNC: 65 MG/DL (ref 74–106)
HCT VFR BLD CALC: 29.2 % (ref 39.6–49)
INR BLD: 1.05
LIPASE SERPL-CCNC: 1093 U/L (ref 73–393)
LYMPHOCYTES # SPEC AUTO: 2 K/UL (ref 0.7–4.9)
MCH RBC QN AUTO: 29.4 PG (ref 27–35)
MCV RBC: 86.8 FL (ref 80–100)
OXYHGB MFR BLDA: 93.5 % (ref 94–97)
PMV BLD: 7.6 FL (ref 7.6–11.3)
POTASSIUM SERPL-SCNC: 5.5 MMOL/L (ref 3.5–5.1)
POTASSIUM SERPL-SCNC: 6.2 MMOL/L (ref 3.5–5.1)
RBC # BLD: 3.36 M/UL (ref 4.33–5.43)
SAO2 % BLDA: 95.6 % (ref 92–98.5)
UA COMPLETE W REFLEX CULTURE PNL UR: (no result)

## 2018-07-18 PROCEDURE — 51702 INSERT TEMP BLADDER CATH: CPT

## 2018-07-18 PROCEDURE — 71045 X-RAY EXAM CHEST 1 VIEW: CPT

## 2018-07-18 PROCEDURE — 84484 ASSAY OF TROPONIN QUANT: CPT

## 2018-07-18 PROCEDURE — 86901 BLOOD TYPING SEROLOGIC RH(D): CPT

## 2018-07-18 PROCEDURE — 76770 US EXAM ABDO BACK WALL COMP: CPT

## 2018-07-18 PROCEDURE — 80061 LIPID PANEL: CPT

## 2018-07-18 PROCEDURE — 96375 TX/PRO/DX INJ NEW DRUG ADDON: CPT

## 2018-07-18 PROCEDURE — 80069 RENAL FUNCTION PANEL: CPT

## 2018-07-18 PROCEDURE — 85730 THROMBOPLASTIN TIME PARTIAL: CPT

## 2018-07-18 PROCEDURE — 87040 BLOOD CULTURE FOR BACTERIA: CPT

## 2018-07-18 PROCEDURE — 86850 RBC ANTIBODY SCREEN: CPT

## 2018-07-18 PROCEDURE — 84145 PROCALCITONIN (PCT): CPT

## 2018-07-18 PROCEDURE — 93005 ELECTROCARDIOGRAM TRACING: CPT

## 2018-07-18 PROCEDURE — 87086 URINE CULTURE/COLONY COUNT: CPT

## 2018-07-18 PROCEDURE — 96374 THER/PROPH/DIAG INJ IV PUSH: CPT

## 2018-07-18 PROCEDURE — 82550 ASSAY OF CK (CPK): CPT

## 2018-07-18 PROCEDURE — 81015 MICROSCOPIC EXAM OF URINE: CPT

## 2018-07-18 PROCEDURE — 84439 ASSAY OF FREE THYROXINE: CPT

## 2018-07-18 PROCEDURE — 82150 ASSAY OF AMYLASE: CPT

## 2018-07-18 PROCEDURE — 83605 ASSAY OF LACTIC ACID: CPT

## 2018-07-18 PROCEDURE — 82553 CREATINE MB FRACTION: CPT

## 2018-07-18 PROCEDURE — 81003 URINALYSIS AUTO W/O SCOPE: CPT

## 2018-07-18 PROCEDURE — 85610 PROTHROMBIN TIME: CPT

## 2018-07-18 PROCEDURE — 97163 PT EVAL HIGH COMPLEX 45 MIN: CPT

## 2018-07-18 PROCEDURE — 85025 COMPLETE CBC W/AUTO DIFF WBC: CPT

## 2018-07-18 PROCEDURE — 80076 HEPATIC FUNCTION PANEL: CPT

## 2018-07-18 PROCEDURE — 87088 URINE BACTERIA CULTURE: CPT

## 2018-07-18 PROCEDURE — 84156 ASSAY OF PROTEIN URINE: CPT

## 2018-07-18 PROCEDURE — 86900 BLOOD TYPING SEROLOGIC ABO: CPT

## 2018-07-18 PROCEDURE — 93971 EXTREMITY STUDY: CPT

## 2018-07-18 PROCEDURE — 86140 C-REACTIVE PROTEIN: CPT

## 2018-07-18 PROCEDURE — 82962 GLUCOSE BLOOD TEST: CPT

## 2018-07-18 PROCEDURE — 82043 UR ALBUMIN QUANTITATIVE: CPT

## 2018-07-18 PROCEDURE — 82570 ASSAY OF URINE CREATININE: CPT

## 2018-07-18 PROCEDURE — 85652 RBC SED RATE AUTOMATED: CPT

## 2018-07-18 PROCEDURE — 99285 EMERGENCY DEPT VISIT HI MDM: CPT

## 2018-07-18 PROCEDURE — 36415 COLL VENOUS BLD VENIPUNCTURE: CPT

## 2018-07-18 PROCEDURE — 82805 BLOOD GASES W/O2 SATURATION: CPT

## 2018-07-18 PROCEDURE — 80048 BASIC METABOLIC PNL TOTAL CA: CPT

## 2018-07-18 PROCEDURE — 80053 COMPREHEN METABOLIC PANEL: CPT

## 2018-07-18 PROCEDURE — 83690 ASSAY OF LIPASE: CPT

## 2018-07-18 PROCEDURE — 84443 ASSAY THYROID STIM HORMONE: CPT

## 2018-07-18 NOTE — RAD REPORT
EXAM DESCRIPTION:  RAD - Chest Single View - 7/18/2018 4:45 pm

 

CLINICAL HISTORY:  Cough, shortness of breath

 

COMPARISON:  June 26

 

TECHNIQUE:  AP portable chest image was obtained 1641 hours .

 

FINDINGS:  Lung volumes remain low. This accentuates heart, vasculature and lung markings. Scattered 
granulomas are present. Lung markings are prominent in part due to the shallow inspiration. A mild in
terstitial edema or infiltrate could be masked. Lung markings overall are slightly less pronounced th
an seen on the June comparison.

 

Trachea midline. Heart and vasculature are normal. No measurable pleural effusion and no pneumothorax
. No gross bony abnormality seen. No acute aortic findings suspected.

 

IMPRESSION:  Interstitial markings are accentuated by body habitus and shallow inspiration. Early roberth
ma or infiltrate could be masked.

 

Lung markings overall are improved from the June 26 study.

## 2018-07-18 NOTE — XMS REPORT
Patient Summary Document

 Created on:2018



Patient:SUSU PÉREZ

Sex:Male

:1935

External Reference #:[id number]





Demographics







 Address  [address] [place] [address]

 

 Home Phone  [phone]

 

 Email Address  NONE

 

 Preferred Language  Unknown

 

 Marital Status  Unknown

 

 Pentecostalism Affiliation  Unknown

 

 Race  Unknown

 

 Additional Race(s)  Unavailable

 

 Ethnic Group  Unknown









Author







 Organization  [facility]

 

 Address  [address] [place] [address] Dr. Agosto [address] [address]

 

 Phone  [phone]









Care Team Providers







 Name  Role  Phone

 

 SHELIA INGRAM  Unavailable  Unavailable









Problems

This patient has no known problems.



Allergies, Adverse Reactions, Alerts

This patient has no known allergies or adverse reactions.



Medications

This patient has no known medications.



Results







 Test Description  Test Time  Test Comments  Text Results  Atomic Results  
Result Comments









 TISSUE EXAM  2018 18:37:00    Surgical Pathology Report  



       Case: [id number]  



       Authorizing Provider:  Guillermo Forte,  



       Collected:           2018 Casandra CLARK  



         



       Ordering Location:     [address]  



       Received:            2018 1115  



                            Service  



         



       Pathologist:           Joana Zepeda MD  



         



       Specimen:    Kidney, NATIVE KIDNEY  



         



       KIDNEY, LEFT, NEEDLE BIOPSIES- [facility]  



       GLOMERULOSCLEROSIS- NEGATIVE FOR IMMUNE MEDIATED  



       GLOMERULONEPHRITIS- DIFFUSE INTERSTITIAL FIBROSIS  



       AND TUBULAR ATROPHY (~70%)- MODERATE TO SEVERE  



       ARTERIAL INTIMAL SCLEROSIS- DIFFUSE ARTERIOLAR  



       HYALINOSIS- SEE COMMENT      Signing Pathologist  



       Direct Phone Line: [phone]  



       signed by Joana Zepeda MD on 2018 at  



       6:37 PMPreliminary result electronically signed  



       by Joana Zepeda MD on 2018 at  7:08 PMNo  



       immune mediated glomerulosclerosis is seen based  



       on negative immunofluorescence and absence of  



       electron dense deposits on electron microscopy.  



       The renal biopsies show mixed features of  



       hypertensive nephrosclerosis, and advanced  



       diabetic glomerulosclerosis, class IV (see ref  



       #1) characterized by nodular lesions and global  



       glomerulosclerosis involving 56% (15/27) of all  



       examined glomeruli, along with marked  



       interstitial fibrosis and tubular atrophy  



       involving about 70% of renal parenchyma.  



       Reference: 1. MALATHI Garcia., NEEMA Givens,  



       KIMBERLI Collins., Brendan, A.H., Gerald, H.T., JESSICA Lopez, CONRO Dye, JOSE Schwarz., NISHA Valencia., de  



       LEIGHTON Garber., et al. Pathologic classification of  



       diabetic nephropathy. J Am Soc Nephrol  



       21:556-563, 2010.The results were communicated to  



       Dr. Caldwell by Dr. Zepeda on [id number],  



       88313 x3, 89529, 88350 x7, 17139Cddszia as  



       communicated by Dr. Caldwell: 83 year old with long  



       standing history of DM, now presents with  



       increase in serum creatinine and 3g proteinuria.  



       All serologies negativeNative kidney biopsyThe  



       specimen is received in three parts all labeled  



       with the patient's information and labeled  



       "native kidney biopsy".  Received in formalin are  



       two tan-white core biopsies measuring 0.6 and 1.7  



       cm in length. The specimen is entirely submitted  



       A1. Received fresh is a 1 cm tan core submitted  



       for frozen for immunofluorescence staining and  



       received in glutaraldehyde is a 0.3 cm tan core  



       submitted for electron microscopy. CG/pl LIGHT  



       MICROSCOPY: Sections show two cores of tissue  



       composed of cortex 50% and medulla 50%.  



       Glomeruli: Approximately 19 glomeruli are  



       examined of which 10 glomeruli are globally  



       sclerotic/obsolescent or show near complete  



       obsolescence. The capillary tuft in some  



       sclerotic glomeruli is wrinkled and shrunken. The  



       remaining non-globally sclerotic glomeruli are  



       enlarged and have nodular mesangial expansion by  



       PAS- and silver-positive matrix with normal to  



       mild segmental hypercellularity. There is mild  



       wrinkling of capillary tuft. Focal and segmental  



       sclerosis is seen.  No endocapillary  



       hypercellularity, crescents or thrombi are  



       seen.Tubules and interstitium: There is severe  



       interstitial fibrosis with focal tubular atrophy  



       and mild interstitial inflammatory cell  



       infiltration by lymphocytes admixed with  



       eosinophils and neutrophils, involving about 70%  



       of renal cortex. Non-atrophic proximal tubules  



       are focally ectatic with loss of brush borders.  



       Vessels:   Interlobular arteries show moderate  



       intimal sclerosis and thickening. There is severe  



       diffuse arteriolosclerosis. Congo red stain is  



       negative for amyloid deposition.Special stains:  



       Alan trichrome, PAS and Saldana silver stains  



       were necessary for evaluation of this biopsy and  



       showed expected staining patterns of internal  



       control tissue matrix structures.Direct  



       Immunofluorescence:Histology: H&E-stained  



       sections show 2 non-obsolescent glomeruli and 2  



       obsolescent glomeruli.  Immunofluorescence  



       findings: IgA: negative in open glomeruli, focal  



       weak granular mesangial staining in sclerotic  



       glomerulus, tubular casts are positiveIgG: no  



       significant glomerular, tubulointerstitial or  



       vascular staining. IgM: negative in glomeruli  



       C3: negative in glomeruli,  Macario's capsule  



       +.C1q: no significant glomerular,  



       tubulointerstitial or vascular staining.  



       Fibrinogen: no significant glomerular,  



       tubulointerstitial or vascular staining. Kappa:  



       negative in open glomeruli, focal weak granular  



       mesangial staining in sclerotic glomerulus,  



       tubular casts are positiveLambda:  negative in  



       open glomeruli, focal weak granular mesangial  



       staining in sclerotic glomerulus, tubular casts  



       are positiveAll polyclonal antibodies used for  



       immunofluorescence staining have been previously  



       tested and shown to have appropriate reactivities  



       with positive control specimens. ELECTRON  



       MICROSCOPYToluidine blue-stained sections reveals  



       one non-obsolescent glomerulus and 3 globally  



       sclerotic glomeruli. One open and 2 globally  



       sclerotic glomeruli are examined  



       ultrastructurally.Ultrastructure:  Examination of  



       the glomerular ultrastructure reveals that the  



       glomerular basement membrane shows wrinkling and  



       is variably thickened, focally measuring upto  



       1163 nm (normal male ilmcssv=812 - 430 nm nm;  



       Annie CAMERON. Arch Pathol Lab Med 133; 224-232).  



       Subendothelial, subepithelial, and  



       mesangial/paramesangial electron-dense, immune  



       complex-type deposits are not present. Podocyte  



       foot processes are diffusely effaced with  



       microvillous change.  









 POCT-GLUCOSE METER  2018 17:15:00    









   Test Item  Value  Reference Range  Comments









 POC-GLUCOSE METER (BEAKER) (test  229 mg/dL    TESTED AT [address]



 ykyp=6835)      [facility] [address]



POCT-GLUCOSE [id number] 12:01:00





 Test Item  Value  Reference Range  Comments

 

 POC-GLUCOSE METER (BEAKER)  254 mg/dL    TESTED AT [address]



 (test jfcg=0237)      [facility] [address]



POCT-GLUCOSE [id number] 07:46:00





 Test Item  Value  Reference Range  Comments

 

 POC-GLUCOSE METER (BEAKER)  233 mg/dL    TESTED AT [address]



 (test iytj=5925)      [place] [id number]



[id number] 05:56:00





 Test Item  Value  Reference Range  Comments

 

 PHOSPHORUS (BEAKER) (test code=604)  3.8 mg/dL  2.3-4.7  



[id number] 05:56:00





 Test Item  Value  Reference Range  Comments

 

 MAGNESIUM (BEAKER) (test code=627)  1.7 mg/dL  1.6-2.6  



BASIC METABOLIC [id number] 05:56:00





 Test Item  Value  Reference Range  Comments

 

 SODIUM (BEAKER) (test  132 meq/L  136-145  



 fdug=253)      

 

 POTASSIUM (BEAKER) (test  4.4 meq/L  3.5-5.1  



 code=379)      

 

 CHLORIDE (BEAKER) (test  104 meq/L    



 code=382)      

 

 CO2 (BEAKER) (test  18 meq/L  22-29  



 code=355)      

 

 BLOOD UREA NITROGEN  44 mg/dL  7-21  



 (BEAKER) (test code=354)      

 

 CREATININE (BEAKER) (test  1.90 mg/dL  0.57-1.25  



 code=358)      

 

 GLUCOSE RANDOM (BEAKER)  204 mg/dL    



 (test code=652)      

 

 CALCIUM (BEAKER) (test  8.8 mg/dL  8.4-10.2  



 code=697)      

 

 EGFR (BEAKER) (test  34 mL/min/1.73 sq m    ESTIMATED GFR IS NOT AS



 code=1092)      ACCURATE AS CREATININE



       CLEARANCE IN PREDICTING



       GLOMERULAR FILTRATION



       RATE. ESTIMATED GFR IS



       NOT APPLICABLE FOR



       DIALYSIS PATIENTS.



CBC W/PLT COUNT &amp; AUTO [id number] 05:25:00





 Test Item  Value  Reference Range  Comments

 

 WHITE BLOOD CELL COUNT (BEAKER) (test code=775)  10.6 K/ L  3.5-10.5  

 

 RED BLOOD CELL COUNT (BEAKER) (test code=761)  2.90 M/ L  4.63-6.08  

 

 HEMOGLOBIN (BEAKER) (test code=410)  8.3 GM/DL  13.7-17.5  

 

 HEMATOCRIT (BEAKER) (test code=411)  25.4 %  40.1-51.0  

 

 MEAN CORPUSCULAR VOLUME (BEAKER) (test code=753)  87.6 fL  79.0-92.2  

 

 MEAN CORPUSCULAR HEMOGLOBIN (BEAKER) (test  28.6 pg  25.7-32.2  



 jixy=896)      

 

 MEAN CORPUSCULAR HEMOGLOBIN CONC (BEAKER) (test  32.7 GM/DL  32.3-36.5  



 code=752)      

 

 RED CELL DISTRIBUTION WIDTH (BEAKER) (test  13.8 %  11.6-14.4  



 code=412)      

 

 PLATELET COUNT (BEAKER) (test code=756)  490 K/CU MM  150-450  

 

 MEAN PLATELET VOLUME (BEAKER) (test code=754)  8.9 fL  9.4-12.4  

 

 NUCLEATED RED BLOOD CELLS (BEAKER) (test  0 /100 WBC  0-0  



 code=413)      

 

 NEUTROPHILS RELATIVE PERCENT (BEAKER) (test  63 %    



 code=429)      

 

 LYMPHOCYTES RELATIVE PERCENT (BEAKER) (test  16 %    



 code=430)      

 

 MONOCYTES RELATIVE PERCENT (BEAKER) (test  12 %    



 code=431)      

 

 EOSINOPHILS RELATIVE PERCENT (BEAKER) (test  6 %    



 code=432)      

 

 BASOPHILS RELATIVE PERCENT (BEAKER) (test  1 %    



 code=437)      

 

 NEUTROPHILS ABSOLUTE COUNT (BEAKER) (test  6.69 K/ L  1.78-5.38  



 code=670)      

 

 LYMPHOCYTES ABSOLUTE COUNT (BEAKER) (test  1.71 K/ L  1.32-3.57  



 code=414)      

 

 MONOCYTES ABSOLUTE COUNT (BEAKER) (test  1.26 K/ L  0.30-0.82  



 code=415)      

 

 EOSINOPHILS ABSOLUTE COUNT (BEAKER) (test  0.68 K/ L  0.04-0.54  



 code=416)      

 

 BASOPHILS ABSOLUTE COUNT (BEAKER) (test  0.12 K/ L  0.01-0.08  



 code=417)      

 

 IMMATURE GRANULOCYTES-RELATIVE PERCENT (BEAKER)  2 %  0-1  



 (test code=2801)      



URINALYSIS W/ MICROSCOPIC2018-07-10 23:14:00





 Test Item  Value  Reference Range  Comments

 

 COLOR (BEAKER) (test code=470)  Light Yellow    

 

 CLARITY (BEAKER) (test code=469)  Clear    

 

 SPECIFIC GRAVITY UA (BEAKER) (test  1.007  1.001-1.035  



 code=468)      

 

 PH UA (BEAKER) (test code=467)  6.5  5.0-8.0  

 

 PROTEIN UA (BEAKER) (test code=464)  600 mg/dL  Negative  

 

 GLUCOSE UA (BEAKER) (test code=365)  500 mg/dL  Negative  

 

 KETONES UA (BEAKER) (test code=371)  Negative  Negative  

 

 BILIRUBIN UA (BEAKER) (test code=462)  Negative  Negative  

 

 BLOOD UA (BEAKER) (test code=461)  Small  Negative  

 

 NITRITE UA (BEAKER) (test code=465)  Negative  Negative  

 

 LEUKOCYTE ESTERASE UA (BEAKER) (test  Negative  Negative  



 wflq=942)      

 

 UROBILINOGEN UA (BEAKER) (test code=463)  0.2 mg/dL  0.2-1.0  

 

 RBC UA (BEAKER) (test code=519)  3 /HPF    

 

 WBC UA (BEAKER) (test code=520)  2 /HPF    

 

 SQUAMOUS EPITHELIAL (BEAKER) (test  < /HPF    



 code=516)      

 

 HYALINE CASTS (BEAKER) (test code=514)  2 /LPF    

 

 AMORPHOUS CRYSTALS (BEAKER) (test  Rare    



 code=1584)      

 

 SOURCE(BEAKER) (test code=2795)  Urine, Clean Catch    



POCT-GLUCOSE METER2018-07-10 21:20:00





 Test Item  Value  Reference Range  Comments

 

 POC-GLUCOSE METER (BEAKER)  206 mg/dL    TESTED AT [address]



 (test onlg=0099)      [place] [id number]



POCT-GLUCOSE [id number] 17:20:00





 Test Item  Value  Reference Range  Comments

 

 POC-GLUCOSE METER (BEAKER)  268 mg/dL    TESTED AT [address]



 (test asmg=7400)      [place] [id number]



POCT-GLUCOSE METER2018-07-10 16:20:00





 Test Item  Value  Reference Range  Comments

 

 POC-GLUCOSE METER (BEAKER)  249 mg/dL    TESTED AT [address]



 (test bsle=5509)      [place] [id number]



POCT-GLUCOSE [id number] 14:29:00





 Test Item  Value  Reference Range  Comments

 

 POC-GLUCOSE METER (BEAKER)  196 mg/dL    TESTED AT [address]



 (test uqsv=8794)      [place] [address]



PHOSPHORUS2018-07-10 08:17:00





 Test Item  Value  Reference Range  Comments

 

 PHOSPHORUS (BEAKER) (test code=604)  3.8 mg/dL  2.3-4.7  



MAGNESIUM2018-07-10 08:17:00





 Test Item  Value  Reference Range  Comments

 

 MAGNESIUM (BEAKER) (test code=627)  1.7 mg/dL  1.6-2.6  



BASIC METABOLIC PANEL2018-07-10 08:17:00





 Test Item  Value  Reference Range  Comments

 

 SODIUM (BEAKER) (test  133 meq/L  136-145  



 cddz=515)      

 

 POTASSIUM (BEAKER) (test  4.7 meq/L  3.5-5.1  



 code=379)      

 

 CHLORIDE (BEAKER) (test  105 meq/L    



 code=382)      

 

 CO2 (BEAKER) (test  19 meq/L  22-29  



 code=355)      

 

 BLOOD UREA NITROGEN  42 mg/dL  7-21  



 (BEAKER) (test code=354)      

 

 CREATININE (BEAKER) (test  1.90 mg/dL  0.57-1.25  



 code=358)      

 

 GLUCOSE RANDOM (BEAKER)  186 mg/dL    



 (test code=652)      

 

 CALCIUM (BEAKER) (test  9.2 mg/dL  8.4-10.2  



 code=697)      

 

 EGFR (BEAKER) (test  34 mL/min/1.73 sq m    ESTIMATED GFR IS NOT AS



 code=1092)      ACCURATE AS CREATININE



       CLEARANCE IN PREDICTING



       GLOMERULAR FILTRATION



       RATE. ESTIMATED GFR IS



       NOT APPLICABLE FOR



       DIALYSIS PATIENTS.



POCT-GLUCOSE METER2018-07-10 08:15:00





 Test Item  Value  Reference Range  Comments

 

 POC-GLUCOSE METER (BEAKER)  225 mg/dL    TESTED AT [address]



 (test ivtl=3903)      [facility] [address]



CBC W/PLT COUNT &amp; AUTO DIFFERENTIAL2018-07-10 08:02:00





 Test Item  Value  Reference Range  Comments

 

 WHITE BLOOD CELL COUNT (BEAKER) (test code=775)  13.4 K/ L  3.5-10.5  

 

 RED BLOOD CELL COUNT (BEAKER) (test code=761)  3.10 M/ L  4.63-6.08  

 

 HEMOGLOBIN (BEAKER) (test code=410)  8.8 GM/DL  13.7-17.5  

 

 HEMATOCRIT (BEAKER) (test code=411)  27.7 %  40.1-51.0  

 

 MEAN CORPUSCULAR VOLUME (BEAKER) (test code=753)  89.4 fL  79.0-92.2  

 

 MEAN CORPUSCULAR HEMOGLOBIN (BEAKER) (test  28.4 pg  25.7-32.2  



 bwiu=946)      

 

 MEAN CORPUSCULAR HEMOGLOBIN CONC (BEAKER) (test  31.8 GM/DL  32.3-36.5  



 code=752)      

 

 RED CELL DISTRIBUTION WIDTH (BEAKER) (test  13.7 %  11.6-14.4  



 code=412)      

 

 PLATELET COUNT (BEAKER) (test code=756)  551 K/CU MM  150-450  

 

 MEAN PLATELET VOLUME (BEAKER) (test code=754)  8.9 fL  9.4-12.4  

 

 NUCLEATED RED BLOOD CELLS (BEAKER) (test  0 /100 WBC  0-0  



 code=413)      

 

 NEUTROPHILS RELATIVE PERCENT (BEAKER) (test  69 %    



 code=429)      

 

 LYMPHOCYTES RELATIVE PERCENT (BEAKER) (test  12 %    



 code=430)      

 

 MONOCYTES RELATIVE PERCENT (BEAKER) (test  11 %    



 code=431)      

 

 EOSINOPHILS RELATIVE PERCENT (BEAKER) (test  6 %    



 code=432)      

 

 BASOPHILS RELATIVE PERCENT (BEAKER) (test  1 %    



 code=437)      

 

 NEUTROPHILS ABSOLUTE COUNT (BEAKER) (test  9.32 K/ L  1.78-5.38  



 code=670)      

 

 LYMPHOCYTES ABSOLUTE COUNT (BEAKER) (test  1.57 K/ L  1.32-3.57  



 code=414)      

 

 MONOCYTES ABSOLUTE COUNT (BEAKER) (test  1.42 K/ L  0.30-0.82  



 code=415)      

 

 EOSINOPHILS ABSOLUTE COUNT (BEAKER) (test  0.84 K/ L  0.04-0.54  



 code=416)      

 

 BASOPHILS ABSOLUTE COUNT (BEAKER) (test  0.09 K/ L  0.01-0.08  



 code=417)      

 

 IMMATURE GRANULOCYTES-RELATIVE PERCENT (BEAKER)  1 %  0-1  



 (test code=2801)      



POCT-GLUCOSE [id number] 21:16:00





 Test Item  Value  Reference Range  Comments

 

 POC-GLUCOSE METER (BEAKER)  302 mg/dL    TESTED AT [address]



 (test cnqb=8882)      [place] [address]



POCT-GLUCOSE [id number] 17:13:00





 Test Item  Value  Reference Range  Comments

 

 POC-GLUCOSE METER (BEAKER)  208 mg/dL    TESTED AT [address]



 (test nnby=9943)      [place] [address]



POCT-GLUCOSE [id number] 13:20:00





 Test Item  Value  Reference Range  Comments

 

 POC-GLUCOSE METER (BEAKER)  252 mg/dL    TESTED AT [address]



 (test opmx=2013)      [place] [address]



POCT-GLUCOSE [id number] 08:25:00





 Test Item  Value  Reference Range  Comments

 

 POC-GLUCOSE METER (BEAKER)  229 mg/dL    TESTED AT [address]



 (test krqq=2005)      [place] [address]



BASIC METABOLIC [id number] 06:57:00





 Test Item  Value  Reference Range  Comments

 

 SODIUM (BEAKER) (test  131 meq/L  136-145  



 kzmh=994)      

 

 POTASSIUM (BEAKER) (test  4.4 meq/L  3.5-5.1  



 code=379)      

 

 CHLORIDE (BEAKER) (test  102 meq/L    



 code=382)      

 

 CO2 (BEAKER) (test  19 meq/L  22-29  



 code=355)      

 

 BLOOD UREA NITROGEN  43 mg/dL  7-21  



 (BEAKER) (test code=354)      

 

 CREATININE (BEAKER) (test  2.24 mg/dL  0.57-1.25  



 code=358)      

 

 GLUCOSE RANDOM (BEAKER)  198 mg/dL    



 (test code=652)      

 

 CALCIUM (BEAKER) (test  8.7 mg/dL  8.4-10.2  



 code=697)      

 

 EGFR (BEAKER) (test  28 mL/min/1.73 sq m    ESTIMATED GFR IS NOT AS



 code=1092)      ACCURATE AS CREATININE



       CLEARANCE IN PREDICTING



       GLOMERULAR FILTRATION



       RATE. ESTIMATED GFR IS



       NOT APPLICABLE FOR



       DIALYSIS PATIENTS.



[id number] 06:55:00





 Test Item  Value  Reference Range  Comments

 

 PHOSPHORUS (BEAKER) (test code=604)  3.8 mg/dL  2.3-4.7  



[id number] 06:55:00





 Test Item  Value  Reference Range  Comments

 

 MAGNESIUM (BEAKER) (test code=627)  1.7 mg/dL  1.6-2.6  



CBC W/PLT COUNT &amp; AUTO [id number] 06:09:00





 Test Item  Value  Reference Range  Comments

 

 WHITE BLOOD CELL COUNT (BEAKER) (test code=775)  14.0 K/ L  3.5-10.5  

 

 RED BLOOD CELL COUNT (BEAKER) (test code=761)  3.12 M/ L  4.63-6.08  

 

 HEMOGLOBIN (BEAKER) (test code=410)  9.0 GM/DL  13.7-17.5  

 

 HEMATOCRIT (BEAKER) (test code=411)  27.6 %  40.1-51.0  

 

 MEAN CORPUSCULAR VOLUME (BEAKER) (test code=753)  88.5 fL  79.0-92.2  

 

 MEAN CORPUSCULAR HEMOGLOBIN (BEAKER) (test  28.8 pg  25.7-32.2  



 chkn=182)      

 

 MEAN CORPUSCULAR HEMOGLOBIN CONC (BEAKER) (test  32.6 GM/DL  32.3-36.5  



 code=752)      

 

 RED CELL DISTRIBUTION WIDTH (BEAKER) (test  13.6 %  11.6-14.4  



 code=412)      

 

 PLATELET COUNT (BEAKER) (test code=756)  451 K/CU MM  150-450  

 

 MEAN PLATELET VOLUME (BEAKER) (test code=754)  8.8 fL  9.4-12.4  

 

 NUCLEATED RED BLOOD CELLS (BEAKER) (test  0 /100 WBC  0-0  



 code=413)      

 

 NEUTROPHILS RELATIVE PERCENT (BEAKER) (test  70 %    



 code=429)      

 

 LYMPHOCYTES RELATIVE PERCENT (BEAKER) (test  12 %    



 code=430)      

 

 MONOCYTES RELATIVE PERCENT (BEAKER) (test  10 %    



 code=431)      

 

 EOSINOPHILS RELATIVE PERCENT (BEAKER) (test  6 %    



 code=432)      

 

 BASOPHILS RELATIVE PERCENT (BEAKER) (test  1 %    



 code=437)      

 

 NEUTROPHILS ABSOLUTE COUNT (BEAKER) (test  9.86 K/ L  1.78-5.38  



 code=670)      

 

 LYMPHOCYTES ABSOLUTE COUNT (BEAKER) (test  1.63 K/ L  1.32-3.57  



 code=414)      

 

 MONOCYTES ABSOLUTE COUNT (BEAKER) (test  1.40 K/ L  0.30-0.82  



 code=415)      

 

 EOSINOPHILS ABSOLUTE COUNT (BEAKER) (test  0.85 K/ L  0.04-0.54  



 code=416)      

 

 BASOPHILS ABSOLUTE COUNT (BEAKER) (test  0.08 K/ L  0.01-0.08  



 code=417)      

 

 IMMATURE GRANULOCYTES-RELATIVE PERCENT (BEAKER)  1 %  0-1  



 (test code=2801)      



POCT-GLUCOSE [id number] 17:30:00





 Test Item  Value  Reference Range  Comments

 

 POC-GLUCOSE METER (BEAKER)  149 mg/dL    TESTED AT [address]



 (test kttu=4151)      [facility] [address]



POCT-GLUCOSE [id number] 12:10:00





 Test Item  Value  Reference Range  Comments

 

 POC-GLUCOSE METER (BEAKER)  245 mg/dL    TESTED AT [address]



 (test xmvc=2235)      [facility] [address]



CBC W/PLT COUNT &amp; AUTO [id number] 10:30:00





 Test Item  Value  Reference Range  Comments

 

 WHITE BLOOD CELL COUNT (BEAKER) (test code=775)  12.6 K/ L  3.5-10.5  

 

 RED BLOOD CELL COUNT (BEAKER) (test code=761)  2.95 M/ L  4.63-6.08  

 

 HEMOGLOBIN (BEAKER) (test code=410)  8.6 GM/DL  13.7-17.5  

 

 HEMATOCRIT (BEAKER) (test code=411)  26.3 %  40.1-51.0  

 

 MEAN CORPUSCULAR VOLUME (BEAKER) (test code=753)  89.2 fL  79.0-92.2  

 

 MEAN CORPUSCULAR HEMOGLOBIN (BEAKER) (test  29.2 pg  25.7-32.2  



 zcbw=132)      

 

 MEAN CORPUSCULAR HEMOGLOBIN CONC (BEAKER) (test  32.7 GM/DL  32.3-36.5  



 code=752)      

 

 RED CELL DISTRIBUTION WIDTH (BEAKER) (test  13.8 %  11.6-14.4  



 code=412)      

 

 PLATELET COUNT (BEAKER) (test code=756)  428 K/CU MM  150-450  

 

 MEAN PLATELET VOLUME (BEAKER) (test code=754)  9.1 fL  9.4-12.4  

 

 NUCLEATED RED BLOOD CELLS (BEAKER) (test  0 /100 WBC  0-0  



 code=413)      



(CELLAVISION MANUAL DIFF)2018 10:30:00





 Test Item  Value  Reference Range  Comments

 

 NEUTROPHILS - REL (CELLAVISION)(BEAKER) (test  83 %    



 code=2816)      

 

 LYMPHOCYTES - REL (CELLAVISION)(BEAKER) (test  8 %    



 code=2817)      

 

 MONOCYTES - REL (CELLAVISION)(BEAKER) (test  4 %    



 code=2818)      

 

 EOSINOPHILS - REL (CELLAVISION)(BEAKER) (test  4 %    



 code=2819)      

 

 BASOPHILS - REL (CELLAVISION)(BEAKER) (test  1 %    



 code=2820)      

 

 NEUTROPHILS - ABS (CELLAVISION)(BEAKER) (test  10.46 K/ul  1.78-5.38  



 code=2830)      

 

 LYMPHOCYTES - ABS (CELLAVISION)(BEAKER) (test  1.01 K/ul  1.32-3.57  



 code=2831)      

 

 MONOCYTES - ABS (CELLAVISION)(BEAKER) (test  0.50 K/uL  0.30-0.82  



 code=2832)      

 

 EOSINOPHILS - ABS (CELLAVISION)(BEAKER) (test  0.50 K/uL  0.04-0.54  



 code=2834)      

 

 BASOPHILS - ABS (CELLAVISION)(BEAKER) (test  0.13 K/uL  0.01-0.08  



 code=2835)      

 

 TOTAL COUNTED (BEAKER) (test code=1351)  100    

 

 MANUAL NRBC  CELLS (BEAKER) (test  1 /100 WBC  0-0  



 code=1353)      

 

 WBC MORPHOLOGY (BEAKER) (test code=487)  Normal    

 

 PLT MORPHOLOGY (BEAKER) (test code=486)  Normal    

 

 ANISOCYTOSIS (BEAKER) (test code=961)  1+ few    

 

 MICROCYTES (BEAKER) (test code=965)  1+ few    

 

 ARTIFACT (CELLAVISION)(BEAKER) (test code=3432)  Present    

 

 PLATELET CONCENTRATION (CELLAVISION)(BEAKER)  Adequate    



 (test code=3438)      



Received comment: User comments: Slide comments:POCT-GLUCOSE [id number] 08:
03:00





 Test Item  Value  Reference Range  Comments

 

 POC-GLUCOSE METER (BEAKER)  242 mg/dL    TESTED AT [facility] [address] [facility]



 (test lvwp=5369)      [facility] [address]



[id number] 07:26:00





 Test Item  Value  Reference Range  Comments

 

 PHOSPHORUS (BEAKER) (test code=604)  3.6 mg/dL  2.3-4.7  



[id number] 07:26:00





 Test Item  Value  Reference Range  Comments

 

 MAGNESIUM (BEAKER) (test code=627)  1.9 mg/dL  1.6-2.6  



BASIC METABOLIC [id number] 07:26:00





 Test Item  Value  Reference Range  Comments

 

 SODIUM (BEAKER) (test  131 meq/L  136-145  



 jkfp=825)      

 

 POTASSIUM (BEAKER) (test  4.3 meq/L  3.5-5.1  



 code=379)      

 

 CHLORIDE (BEAKER) (test  102 meq/L    



 code=382)      

 

 CO2 (BEAKER) (test  20 meq/L  22-29  



 code=355)      

 

 BLOOD UREA NITROGEN  44 mg/dL  7-21  



 (BEAKER) (test code=354)      

 

 CREATININE (BEAKER) (test  1.98 mg/dL  0.57-1.25  



 code=358)      

 

 GLUCOSE RANDOM (BEAKER)  216 mg/dL    



 (test code=652)      

 

 CALCIUM (BEAKER) (test  8.8 mg/dL  8.4-10.2  



 code=697)      

 

 EGFR (BEAKER) (test  33 mL/min/1.73 sq m    ESTIMATED GFR IS NOT AS



 code=1092)      ACCURATE AS CREATININE



       CLEARANCE IN PREDICTING



       GLOMERULAR FILTRATION



       RATE. ESTIMATED GFR IS



       NOT APPLICABLE FOR



       DIALYSIS PATIENTS.



POCT-GLUCOSE [id number] 21:03:00





 Test Item  Value  Reference Range  Comments

 

 POC-GLUCOSE METER (BEAKER)  414 mg/dL    TESTED AT [address]



 (test vjrb=4079)      [facility] [address]



POCT-GLUCOSE [id number] 17:11:00





 Test Item  Value  Reference Range  Comments

 

 POC-GLUCOSE METER (BEAKER)  201 mg/dL    TESTED AT [address]



 (test mzve=4500)      [facility] [address]



POCT-GLUCOSE [id number] 11:55:00





 Test Item  Value  Reference Range  Comments

 

 POC-GLUCOSE METER (BEAKER)  228 mg/dL    TESTED AT [address]



 (test vcfh=2454)      [facility] [address]



CALCIUM, [id number] 08:13:00





 Test Item  Value  Reference Range  Comments

 

 CALCIUM IONIZED (BEAKER) (test code=698)  1.08 mmol/L  1.12-1.27  

 

 PH, BLOOD (BEAKER) (test code=1810)  7.32    



CBC W/PLT COUNT &amp; AUTO [id number] 07:52:00





 Test Item  Value  Reference Range  Comments

 

 WHITE BLOOD CELL COUNT (BEAKER) (test code=775)  12.8 K/ L  3.5-10.5  

 

 RED BLOOD CELL COUNT (BEAKER) (test code=761)  3.11 M/ L  4.63-6.08  

 

 HEMOGLOBIN (BEAKER) (test code=410)  9.0 GM/DL  13.7-17.5  

 

 HEMATOCRIT (BEAKER) (test code=411)  28.0 %  40.1-51.0  

 

 MEAN CORPUSCULAR VOLUME (BEAKER) (test code=753)  90.0 fL  79.0-92.2  

 

 MEAN CORPUSCULAR HEMOGLOBIN (BEAKER) (test  28.9 pg  25.7-32.2  



 etop=493)      

 

 MEAN CORPUSCULAR HEMOGLOBIN CONC (BEAKER) (test  32.1 GM/DL  32.3-36.5  



 code=752)      

 

 RED CELL DISTRIBUTION WIDTH (BEAKER) (test  13.8 %  11.6-14.4  



 code=412)      

 

 PLATELET COUNT (BEAKER) (test code=756)  432 K/CU MM  150-450  

 

 MEAN PLATELET VOLUME (BEAKER) (test code=754)  9.0 fL  9.4-12.4  

 

 NUCLEATED RED BLOOD CELLS (BEAKER) (test  0 /100 WBC  0-0  



 code=413)      

 

 NEUTROPHILS RELATIVE PERCENT (BEAKER) (test  71 %    



 code=429)      

 

 LYMPHOCYTES RELATIVE PERCENT (BEAKER) (test  10 %    



 code=430)      

 

 MONOCYTES RELATIVE PERCENT (BEAKER) (test  11 %    



 code=431)      

 

 EOSINOPHILS RELATIVE PERCENT (BEAKER) (test  6 %    



 code=432)      

 

 BASOPHILS RELATIVE PERCENT (BEAKER) (test  1 %    



 code=437)      

 

 NEUTROPHILS ABSOLUTE COUNT (BEAKER) (test  9.05 K/ L  1.78-5.38  



 code=670)      

 

 LYMPHOCYTES ABSOLUTE COUNT (BEAKER) (test  1.33 K/ L  1.32-3.57  



 code=414)      

 

 MONOCYTES ABSOLUTE COUNT (BEAKER) (test  1.42 K/ L  0.30-0.82  



 code=415)      

 

 EOSINOPHILS ABSOLUTE COUNT (BEAKER) (test  0.76 K/ L  0.04-0.54  



 code=416)      

 

 BASOPHILS ABSOLUTE COUNT (BEAKER) (test  0.07 K/ L  0.01-0.08  



 code=417)      

 

 IMMATURE GRANULOCYTES-RELATIVE PERCENT (BEAKER)  1 %  0-1  



 (test code=2801)      



[id number] 07:49:00





 Test Item  Value  Reference Range  Comments

 

 PHOSPHORUS (BEAKER) (test code=604)  3.9 mg/dL  2.3-4.7  



[id number] 07:49:00





 Test Item  Value  Reference Range  Comments

 

 MAGNESIUM (BEAKER) (test code=627)  1.8 mg/dL  1.6-2.6  



COMPREHENSIVE METABOLIC [id number] 07:49:00





 Test Item  Value  Reference Range  Comments

 

 TOTAL PROTEIN (BEAKER)  6.2 gm/dL  6.0-8.3  



 (test code=770)      

 

 ALBUMIN (BEAKER) (test  2.9 g/dL  3.5-5.0  



 code=1145)      

 

 ALKALINE PHOSPHATASE  314 U/L    



 (BEAKER) (test code=346)      

 

 BILIRUBIN TOTAL (BEAKER)  0.3 mg/dL  0.2-1.2  



 (test code=377)      

 

 SODIUM (BEAKER) (test  132 meq/L  136-145  



 jfks=087)      

 

 POTASSIUM (BEAKER) (test  4.2 meq/L  3.5-5.1  



 code=379)      

 

 CHLORIDE (BEAKER) (test  102 meq/L    



 code=382)      

 

 CO2 (BEAKER) (test  19 meq/L  22-29  



 code=355)      

 

 BLOOD UREA NITROGEN  44 mg/dL  7-21  



 (BEAKER) (test code=354)      

 

 CREATININE (BEAKER) (test  2.15 mg/dL  0.57-1.25  



 code=358)      

 

 GLUCOSE RANDOM (BEAKER)  219 mg/dL    



 (test code=652)      

 

 CALCIUM (BEAKER) (test  8.7 mg/dL  8.4-10.2  



 code=697)      

 

 AST (SGOT) (BEAKER) (test  23 U/L  5-34  



 code=353)      

 

 ALT (SGPT) (BEAKER) (test  29 U/L  6-55  



 code=347)      

 

 EGFR (BEAKER) (test  30 mL/min/1.73 sq m    ESTIMATED GFR IS NOT AS



 code=1092)      ACCURATE AS CREATININE



       CLEARANCE IN PREDICTING



       GLOMERULAR FILTRATION



       RATE. ESTIMATED GFR IS



       NOT APPLICABLE FOR



       DIALYSIS PATIENTS.



POCT-GLUCOSE [id number] 07:34:00





 Test Item  Value  Reference Range  Comments

 

 POC-GLUCOSE METER (BEAKER)  251 mg/dL    TESTED AT [address]



 (test nerj=5688)      [facility] [address]



POCT-GLUCOSE [id number] 20:55:00





 Test Item  Value  Reference Range  Comments

 

 POC-GLUCOSE METER (BEAKER)  196 mg/dL    TESTED AT [address]



 (test vfky=7888)      [facility] [address]



HEMOGLOBIN AND [id number] 17:55:00





 Test Item  Value  Reference Range  Comments

 

 HEMOGLOBIN (BEAKER) (test code=410)  9.0 GM/DL  13.7-17.5  

 

 HEMATOCRIT (BEAKER) (test code=411)  27.6 %  40.1-51.0  



Call [id number] 13:46:00





 Test Item  Value  Reference Range  Comments

 

 POC-GLUCOSE METER (BEAKER)  245 mg/dL    TESTED AT [address]



 (test qxos=9394)      [place] [address]



U/S, BIOPSY, RENAL (KIDNEY)2018 10:42:00Reason for exam:-&gt;MARIANNE, 
nephrotic proteinuriaFINAL REPORT PATIENT ID:   [id number]  Ultrasound guided 
core biopsy of the left native kidney dated 2018 Procedure: Core biopsy of 
the left native kidney. Clinical Indication: Proteinuria Sedation: Moderate 
sedation was administered. 0.5 mg of Versed and 25 mcg fentanyl IV was used for 
moderate sedation monitored under my direction. Total intraservice time of the 
sedation was 50 minutes. The patient's vital signs were monitored throughout 
the procedure and recorded in the patient's medical recordby the nurse. 
Anesthesia: 1% Xylocaine mixed with sodium bicarbonate local anesthesia. 
Technique: After obtained informed consent, ultrasound guided core biopsy of 
the left native kidney was performed under usual sterile technique. Using an 18 
gauge core biopsy needle, puncture was made posteriorly onthe left with real 
time ultrasound guidance. 2 passes were performed with  sufficient material for 
histology. Patient tolerated procedure well. Complication: None Estimated Blood 
Loss: None The specimen was sent to pathology. Impression: Successful ultrasound
-guided core biopsy of the left native kidney.  Signed: Demetris Costa 
Verified Date/Time:  2018 10:42:49 Reading Location: [address] 
Ultrasound Reading Room      Electronically signed by: DEMETRIS COSTA M.D. on 2018 10:42 AMPOCT-GLUCOSE [id number] 08:29:00





 Test Item  Value  Reference Range  Comments

 

 POC-GLUCOSE METER (BEAKER)  286 mg/dL    TESTED AT [address]



 (test yial=5930)      [place] [address]



CBC W/PLT COUNT &amp; AUTO [id number] 05:46:00





 Test Item  Value  Reference Range  Comments

 

 WHITE BLOOD CELL COUNT (BEAKER) (test code=775)  11.4 K/ L  3.5-10.5  

 

 RED BLOOD CELL COUNT (BEAKER) (test code=761)  2.96 M/ L  4.63-6.08  

 

 HEMOGLOBIN (BEAKER) (test code=410)  8.8 GM/DL  13.7-17.5  

 

 HEMATOCRIT (BEAKER) (test code=411)  26.4 %  40.1-51.0  

 

 MEAN CORPUSCULAR VOLUME (BEAKER) (test code=753)  89.2 fL  79.0-92.2  

 

 MEAN CORPUSCULAR HEMOGLOBIN (BEAKER) (test  29.7 pg  25.7-32.2  



 rfdq=140)      

 

 MEAN CORPUSCULAR HEMOGLOBIN CONC (BEAKER) (test  33.3 GM/DL  32.3-36.5  



 code=752)      

 

 RED CELL DISTRIBUTION WIDTH (BEAKER) (test  13.7 %  11.6-14.4  



 code=412)      

 

 PLATELET COUNT (BEAKER) (test code=756)  401 K/CU MM  150-450  

 

 MEAN PLATELET VOLUME (BEAKER) (test code=754)  9.0 fL  9.4-12.4  

 

 NUCLEATED RED BLOOD CELLS (BEAKER) (test  0 /100 WBC  0-0  



 code=413)      

 

 NEUTROPHILS RELATIVE PERCENT (BEAKER) (test  67 %    



 code=429)      

 

 LYMPHOCYTES RELATIVE PERCENT (BEAKER) (test  11 %    



 code=430)      

 

 MONOCYTES RELATIVE PERCENT (BEAKER) (test  14 %    



 code=431)      

 

 EOSINOPHILS RELATIVE PERCENT (BEAKER) (test  7 %    



 code=432)      

 

 BASOPHILS RELATIVE PERCENT (BEAKER) (test  1 %    



 code=437)      

 

 NEUTROPHILS ABSOLUTE COUNT (BEAKER) (test  7.70 K/ L  1.78-5.38  



 code=670)      

 

 LYMPHOCYTES ABSOLUTE COUNT (BEAKER) (test  1.26 K/ L  1.32-3.57  



 code=414)      

 

 MONOCYTES ABSOLUTE COUNT (BEAKER) (test  1.55 K/ L  0.30-0.82  



 code=415)      

 

 EOSINOPHILS ABSOLUTE COUNT (BEAKER) (test  0.74 K/ L  0.04-0.54  



 code=416)      

 

 BASOPHILS ABSOLUTE COUNT (BEAKER) (test  0.06 K/ L  0.01-0.08  



 code=417)      

 

 IMMATURE GRANULOCYTES-RELATIVE PERCENT (BEAKER)  1 %  0-1  



 (test code=2801)      



COMPREHENSIVE METABOLIC [id number] 05:41:00





 Test Item  Value  Reference Range  Comments

 

 TOTAL PROTEIN (BEAKER)  6.0 gm/dL  6.0-8.3  



 (test code=770)      

 

 ALBUMIN (BEAKER) (test  2.9 g/dL  3.5-5.0  



 code=1145)      

 

 ALKALINE PHOSPHATASE  346 U/L    



 (BEAKER) (test code=346)      

 

 BILIRUBIN TOTAL (BEAKER)  0.2 mg/dL  0.2-1.2  



 (test code=377)      

 

 SODIUM (BEAKER) (test  131 meq/L  136-145  



 owuz=565)      

 

 POTASSIUM (BEAKER) (test  3.9 meq/L  3.5-5.1  



 code=379)      

 

 CHLORIDE (BEAKER) (test  103 meq/L    



 code=382)      

 

 CO2 (BEAKER) (test  20 meq/L  22-29  



 code=355)      

 

 BLOOD UREA NITROGEN  44 mg/dL  7-21  



 (BEAKER) (test code=354)      

 

 CREATININE (BEAKER) (test  2.41 mg/dL  0.57-1.25  



 code=358)      

 

 GLUCOSE RANDOM (BEAKER)  288 mg/dL    



 (test code=652)      

 

 CALCIUM (BEAKER) (test  8.4 mg/dL  8.4-10.2  



 code=697)      

 

 AST (SGOT) (BEAKER) (test  24 U/L  5-34  



 code=353)      

 

 ALT (SGPT) (BEAKER) (test  29 U/L  6-55  



 code=347)      

 

 EGFR (BEAKER) (test  26 mL/min/1.73 sq m    ESTIMATED GFR IS NOT AS



 code=1092)      ACCURATE AS CREATININE



       CLEARANCE IN PREDICTING



       GLOMERULAR FILTRATION



       RATE. ESTIMATED GFR IS



       NOT APPLICABLE FOR



       DIALYSIS PATIENTS.



[id number] 05:40:00





 Test Item  Value  Reference Range  Comments

 

 PHOSPHORUS (BEAKER) (test code=604)  3.9 mg/dL  2.3-4.7  



[id number] 05:40:00





 Test Item  Value  Reference Range  Comments

 

 MAGNESIUM (BEAKER) (test code=627)  1.6 mg/dL  1.6-2.6  



CALCIUM, [id number] 05:30:00





 Test Item  Value  Reference Range  Comments

 

 CALCIUM IONIZED (BEAKER) (test code=698)  1.05 mmol/L  1.12-1.27  

 

 PH, BLOOD (BEAKER) (test code=1810)  7.36    



POCT-GLUCOSE [id number] 21:53:00





 Test Item  Value  Reference Range  Comments

 

 POC-GLUCOSE METER (BEAKER)  346 mg/dL    Notified RN MD/TESTED AT [facility]



 (test code=1538)      [address] [facility] [address]



POCT-GLUCOSE [id number] 17:08:00





 Test Item  Value  Reference Range  Comments

 

 POC-GLUCOSE METER (BEAKER)  365 mg/dL    Notified RN MD/TESTED AT [facility]



 (test code=1538)      [address] [place] [address]



POCT-GLUCOSE [id number] 17:01:00





 Test Item  Value  Reference Range  Comments

 

 POC-GLUCOSE METER (BEAKER)  294 mg/dL    TESTED AT [facility] [address] [place] [address]



 (test nyqo=2606)      [facility] [address]



URINE IMMUNOFIXATION, 24 [id number] 10:11:00





 Test Item  Value  Reference Range  Comments

 

 VOLUME, TOTAL (BEAKER) (test  1700 ml    



 code=1457)      

 

 PROTEIN, URINE (BEAKER)  176 mg/dL  0-14  



 (test code=1569)      

 

 PROTEIN, 24HR URINE (BEAKER)  2992 mg/24hr  0-300  



 (test code=1570)      

 

 ALBUMIN, 24HR URINE (BEAKER)  30.3 %    



 (test code=1609)      

 

 GLOBULIN, 24HR URINE  69.7 %    



 (BEAKER) (test code=1610)      

 

 URINE MOE ID (BEAKER) (test  No monoclonal proteins or    



 code=1587)  monoclonal free light chains    



   detected.    

 

 [id number]  Jocelynn Gonzalez MD    



 (BEAKER) (test code=2611)  (electronic signature)    



RAD, CHEST, 1 VIEW, NON [id number] 08:40:00Reason for exam:-&gt;edemaShould 
this be performed at the bedside?-&gt;YesFINAL REPORT PATIENT ID:   [id number] 
CLINICAL HISTORY: edema  TECHNIQUE: 1 view of the chest. COMPARISON: 2018 
IMPRESSION: The right central line remains in the SVC. Mild bilateral airspace 
opacities have decreased. Trace bilateral pleural effusions remain. The 
cardiomediastinal silhouette is magnified by technique.  Signed: Favian Weiner 
MDReport Verified Date/Time:  2018 08:40:13 Reading Location: [facility] Radiology Reading Room      Electronically signed by: FAVIAN WEINER M.D. 
 08:40 AMPOCT-GLUCOSE [id number] 08:13:00





 Test Item  Value  Reference Range  Comments

 

 POC-GLUCOSE METER (BEAKER)  193 mg/dL    [facility]



 (test dmoh=6704)      [facility] [address]



COMPREHENSIVE METABOLIC [id number] 06:59:00





 Test Item  Value  Reference Range  Comments

 

 TOTAL PROTEIN (BEAKER)  5.9 gm/dL  6.0-8.3  



 (test code=770)      

 

 ALBUMIN (BEAKER) (test  2.8 g/dL  3.5-5.0  



 code=1145)      

 

 ALKALINE PHOSPHATASE  297 U/L    



 (BEAKER) (test code=346)      

 

 BILIRUBIN TOTAL (BEAKER)  0.3 mg/dL  0.2-1.2  



 (test code=377)      

 

 SODIUM (BEAKER) (test  132 meq/L  136-145  



 chxv=567)      

 

 POTASSIUM (BEAKER) (test  3.7 meq/L  3.5-5.1  



 code=379)      

 

 CHLORIDE (BEAKER) (test  103 meq/L    



 code=382)      

 

 CO2 (BEAKER) (test  19 meq/L  22-29  



 code=355)      

 

 BLOOD UREA NITROGEN  42 mg/dL  7-21  



 (BEAKER) (test code=354)      

 

 CREATININE (BEAKER) (test  2.33 mg/dL  0.57-1.25  



 code=358)      

 

 GLUCOSE RANDOM (BEAKER)  168 mg/dL    



 (test code=652)      

 

 CALCIUM (BEAKER) (test  8.3 mg/dL  8.4-10.2  



 code=697)      

 

 AST (SGOT) (BEAKER) (test  27 U/L  5-34  



 code=353)      

 

 ALT (SGPT) (BEAKER) (test  31 U/L  6-55  



 code=347)      

 

 EGFR (BEAKER) (test  27 mL/min/1.73 sq m    ESTIMATED GFR IS NOT AS



 code=1092)      ACCURATE AS CREATININE



       CLEARANCE IN PREDICTING



       GLOMERULAR FILTRATION



       RATE. ESTIMATED GFR IS



       NOT APPLICABLE FOR



       DIALYSIS PATIENTS.



[id number] 06:57:00





 Test Item  Value  Reference Range  Comments

 

 PHOSPHORUS (BEAKER) (test code=604)  3.9 mg/dL  2.3-4.7  



[id number] 06:57:00





 Test Item  Value  Reference Range  Comments

 

 MAGNESIUM (BEAKER) (test code=627)  1.6 mg/dL  1.6-2.6  



CALCIUM, [id number] 06:56:00





 Test Item  Value  Reference Range  Comments

 

 CALCIUM IONIZED (BEAKER) (test code=698)  1.02 mmol/L  1.12-1.27  

 

 PH, BLOOD (BEAKER) (test code=1810)  7.37    



B-TYPE NATRIURETIC FACTOR (BNP)2018 06:43:00





 Test Item  Value  Reference Range  Comments

 

 B-TYPE NATRIURETIC PEPTIDE (BEAKER) (test  109 pg/mL  0-100  



 code=700)      



CBC W/PLT COUNT &amp; AUTO [id number] 06:32:00





 Test Item  Value  Reference Range  Comments

 

 WHITE BLOOD CELL COUNT (BEAKER) (test code=775)  11.0 K/ L  3.5-10.5  

 

 RED BLOOD CELL COUNT (BEAKER) (test code=761)  2.91 M/ L  4.63-6.08  

 

 HEMOGLOBIN (BEAKER) (test code=410)  8.5 GM/DL  13.7-17.5  

 

 HEMATOCRIT (BEAKER) (test code=411)  25.7 %  40.1-51.0  

 

 MEAN CORPUSCULAR VOLUME (BEAKER) (test code=753)  88.3 fL  79.0-92.2  

 

 MEAN CORPUSCULAR HEMOGLOBIN (BEAKER) (test  29.2 pg  25.7-32.2  



 khwt=224)      

 

 MEAN CORPUSCULAR HEMOGLOBIN CONC (BEAKER) (test  33.1 GM/DL  32.3-36.5  



 code=752)      

 

 RED CELL DISTRIBUTION WIDTH (BEAKER) (test  13.6 %  11.6-14.4  



 code=412)      

 

 PLATELET COUNT (BEAKER) (test code=756)  337 K/CU MM  150-450  

 

 MEAN PLATELET VOLUME (BEAKER) (test code=754)  9.1 fL  9.4-12.4  

 

 NUCLEATED RED BLOOD CELLS (BEAKER) (test  0 /100 WBC  0-0  



 code=413)      

 

 NEUTROPHILS RELATIVE PERCENT (BEAKER) (test  66 %    



 code=429)      

 

 LYMPHOCYTES RELATIVE PERCENT (BEAKER) (test  11 %    



 code=430)      

 

 MONOCYTES RELATIVE PERCENT (BEAKER) (test  13 %    



 code=431)      

 

 EOSINOPHILS RELATIVE PERCENT (BEAKER) (test  8 %    



 code=432)      

 

 BASOPHILS RELATIVE PERCENT (BEAKER) (test  1 %    



 code=437)      

 

 NEUTROPHILS ABSOLUTE COUNT (BEAKER) (test  7.21 K/ L  1.78-5.38  



 code=670)      

 

 LYMPHOCYTES ABSOLUTE COUNT (BEAKER) (test  1.23 K/ L  1.32-3.57  



 code=414)      

 

 MONOCYTES ABSOLUTE COUNT (BEAKER) (test  1.43 K/ L  0.30-0.82  



 code=415)      

 

 EOSINOPHILS ABSOLUTE COUNT (BEAKER) (test  0.89 K/ L  0.04-0.54  



 code=416)      

 

 BASOPHILS ABSOLUTE COUNT (BEAKER) (test  0.07 K/ L  0.01-0.08  



 code=417)      

 

 IMMATURE GRANULOCYTES-RELATIVE PERCENT (BEAKER)  2 %  0-1  



 (test code=2801)      



POCT-GLUCOSE [id number] 21:34:00





 Test Item  Value  Reference Range  Comments

 

 POC-GLUCOSE METER (BEAKER)  244 mg/dL    TESTED AT [address]



 (test sbpf=3875)      [place] [address]



POCT-GLUCOSE [id number] 17:02:00





 Test Item  Value  Reference Range  Comments

 

 POC-GLUCOSE METER (BEAKER)  192 mg/dL    TESTED AT [address]



 (test vqye=2801)      [place] [id number]



POCT-GLUCOSE [id number] 11:45:00





 Test Item  Value  Reference Range  Comments

 

 POC-GLUCOSE METER (BEAKER)  219 mg/dL    TESTED AT [address]



 (test dkni=7402)      [place] [id number]



COMPREHENSIVE METABOLIC [id number] 08:04:00





 Test Item  Value  Reference Range  Comments

 

 TOTAL PROTEIN (BEAKER)  6.0 gm/dL  6.0-8.3  



 (test code=770)      

 

 ALBUMIN (BEAKER) (test  2.8 g/dL  3.5-5.0  



 code=1145)      

 

 ALKALINE PHOSPHATASE  334 U/L    



 (BEAKER) (test code=346)      

 

 BILIRUBIN TOTAL (BEAKER)  0.3 mg/dL  0.2-1.2  



 (test code=377)      

 

 SODIUM (BEAKER) (test  128 meq/L  136-145  



 tvvh=588)      

 

 POTASSIUM (BEAKER) (test  3.8 meq/L  3.5-5.1  



 code=379)      

 

 CHLORIDE (BEAKER) (test  99 meq/L    



 code=382)      

 

 CO2 (BEAKER) (test  19 meq/L  22-29  



 code=355)      

 

 BLOOD UREA NITROGEN  43 mg/dL  7-21  



 (BEAKER) (test code=354)      

 

 CREATININE (BEAKER) (test  2.60 mg/dL  0.57-1.25  



 code=358)      

 

 GLUCOSE RANDOM (BEAKER)  214 mg/dL    



 (test code=652)      

 

 CALCIUM (BEAKER) (test  8.1 mg/dL  8.4-10.2  



 code=697)      

 

 AST (SGOT) (BEAKER) (test  34 U/L  5-34  



 code=353)      

 

 ALT (SGPT) (BEAKER) (test  38 U/L  6-55  



 code=347)      

 

 EGFR (BEAKER) (test  24 mL/min/1.73 sq m    ESTIMATED GFR IS NOT AS



 code=1092)      ACCURATE AS CREATININE



       CLEARANCE IN PREDICTING



       GLOMERULAR FILTRATION



       RATE. ESTIMATED GFR IS



       NOT APPLICABLE FOR



       DIALYSIS PATIENTS.



[id number] 07:58:00





 Test Item  Value  Reference Range  Comments

 

 PHOSPHORUS (BEAKER) (test code=604)  3.5 mg/dL  2.3-4.7  



[id number] 07:58:00





 Test Item  Value  Reference Range  Comments

 

 MAGNESIUM (BEAKER) (test code=627)  1.5 mg/dL  1.6-2.6  



POCT-GLUCOSE [id number] 07:46:00





 Test Item  Value  Reference Range  Comments

 

 POC-GLUCOSE METER (BEAKER)  239 mg/dL    TESTED AT [facility] [address] [facility]



 (test tzjt=5496)      [facility] [address]



CALCIUM, [id number] 07:07:00





 Test Item  Value  Reference Range  Comments

 

 CALCIUM IONIZED (BEAKER) (test code=698)  0.99 mmol/L  1.12-1.27  

 

 PH, BLOOD (BEAKER) (test code=1810)  7.36    



PROTHROMBIN [id number] 06:47:00





 Test Item  Value  Reference Range  Comments

 

 PROTIME (BEAKER) (test code=759)  15.1 seconds  11.7-14.7  

 

 INR (BEAKER) (test code=370)  1.2  <=5.9  



RECOMMENDED COUMADIN/WARFARIN INR THERAPY RANGESSTANDARD DOSE: 2.0 - 3.0   
Includes: PROPHYLAXIS forvenous thrombosis, systemic embolization; TREATMENT 
for venous thrombosis and/or pulmonary embolus.HIGH RISK: Target INR is 2.5-3.5 
for patients with mechanical heart valves.CBC W/PLT COUNT &amp; AUTO 
[id number] 06:46:00





 Test Item  Value  Reference Range  Comments

 

 WHITE BLOOD CELL COUNT (BEAKER) (test code=775)  11.4 K/ L  3.5-10.5  

 

 RED BLOOD CELL COUNT (BEAKER) (test code=761)  3.00 M/ L  4.63-6.08  

 

 HEMOGLOBIN (BEAKER) (test code=410)  8.6 GM/DL  13.7-17.5  

 

 HEMATOCRIT (BEAKER) (test code=411)  26.6 %  40.1-51.0  

 

 MEAN CORPUSCULAR VOLUME (BEAKER) (test code=753)  88.7 fL  79.0-92.2  

 

 MEAN CORPUSCULAR HEMOGLOBIN (BEAKER) (test  28.7 pg  25.7-32.2  



 yomc=526)      

 

 MEAN CORPUSCULAR HEMOGLOBIN CONC (BEAKER) (test  32.3 GM/DL  32.3-36.5  



 code=752)      

 

 RED CELL DISTRIBUTION WIDTH (BEAKER) (test  13.6 %  11.6-14.4  



 code=412)      

 

 PLATELET COUNT (BEAKER) (test code=756)  337 K/CU MM  150-450  

 

 MEAN PLATELET VOLUME (BEAKER) (test code=754)  9.3 fL  9.4-12.4  

 

 NUCLEATED RED BLOOD CELLS (BEAKER) (test  0 /100 WBC  0-0  



 code=413)      

 

 NEUTROPHILS RELATIVE PERCENT (BEAKER) (test  65 %    



 code=429)      

 

 LYMPHOCYTES RELATIVE PERCENT (BEAKER) (test  12 %    



 code=430)      

 

 MONOCYTES RELATIVE PERCENT (BEAKER) (test  12 %    



 code=431)      

 

 EOSINOPHILS RELATIVE PERCENT (BEAKER) (test  9 %    



 code=432)      

 

 BASOPHILS RELATIVE PERCENT (BEAKER) (test  0 %    



 code=437)      

 

 NEUTROPHILS ABSOLUTE COUNT (BEAKER) (test  7.45 K/ L  1.78-5.38  



 code=670)      

 

 LYMPHOCYTES ABSOLUTE COUNT (BEAKER) (test  1.38 K/ L  1.32-3.57  



 code=414)      

 

 MONOCYTES ABSOLUTE COUNT (BEAKER) (test  1.41 K/ L  0.30-0.82  



 code=415)      

 

 EOSINOPHILS ABSOLUTE COUNT (BEAKER) (test  0.99 K/ L  0.04-0.54  



 code=416)      

 

 BASOPHILS ABSOLUTE COUNT (BEAKER) (test  0.05 K/ L  0.01-0.08  



 code=417)      

 

 IMMATURE GRANULOCYTES-RELATIVE PERCENT (BEAKER)  1 %  0-1  



 (test code=2801)      



POCT-GLUCOSE [id number] 20:40:00





 Test Item  Value  Reference Range  Comments

 

 POC-GLUCOSE METER (BEAKER)  311 mg/dL    TESTED AT [address]



 (test mtot=2499)      [place] [address]



RAPID DRUG SCREEN, [id number] 19:24:00





 Test Item  Value  Reference Range  Comments

 

 BARBITURATE URINE (BEAKER) (test code=725)  Negative  Negative  

 

 BENZODIAZEPINE SCREEN URINE (BEAKER) (test  Negative  Negative  



 code=726)      

 

 COCAINE (METAB.) SCREEN (BEAKER) (test code=1164)  Negative  Negative  

 

 METHADONE SCREEN (BEAKER) (test code=1436)  Negative  Negative  

 

 OPIATE SCREEN URINE (BEAKER) (test code=734)  Negative  Negative  

 

 CANNABINOID SCREEN URINE (BEAKER) (test code=727)  Negative  Negative  

 

 AMPH/METHAMPH SCREEN (BEAKER) (test code=1438)  Negative  Negative  

 

 PHENCYCLIDINE SCREEN URINE (BEAKER) (test code=608)  Negative  Negative  

 

 OXYCODONE SCREEN URINE (BEAKER) (test code=2761)  Negative  Negative  



DRUG                CUTOFF CONC.Cocaine               300 ng/mL Cannabinoid    
        50 ng/mL Benzodiazepine        200 ng/mLBarbiturate           200 ng/
mLPhencyclidine          25 ng/mLOpiate         300 ng/mLMethadone             
300 ng/mLAmphetamine/         1000 ng/mL  MethamphetamineOxycodone             
300 ng/mLThis assay provides an unconfirmed qualitative test result for the 
clinical management of patients in emergency situations. Chain of custody not 
maintained. Some over-the-counter medications, as well as adulterants, may 
cause inaccurate results. Clinical correlation should be applied. A more 
comprehensive drug screen or confirmation of a detected drug may be performed 
upon request.POCT-GLUCOSE [id number] 17:47:00





 Test Item  Value  Reference Range  Comments

 

 POC-GLUCOSE METER (BEAKER)  241 mg/dL    TESTED AT [address]



 (test cvru=0552)      [place] [id number]



CBC W/PLT COUNT &amp; AUTO [id number] 12:01:00





 Test Item  Value  Reference Range  Comments

 

 WHITE BLOOD CELL COUNT (BEAKER) (test code=775)  10.3 K/ L  3.5-10.5  

 

 RED BLOOD CELL COUNT (BEAKER) (test code=761)  3.04 M/ L  4.63-6.08  

 

 HEMOGLOBIN (BEAKER) (test code=410)  8.7 GM/DL  13.7-17.5  

 

 HEMATOCRIT (BEAKER) (test code=411)  27.2 %  40.1-51.0  

 

 MEAN CORPUSCULAR VOLUME (BEAKER) (test code=753)  89.5 fL  79.0-92.2  

 

 MEAN CORPUSCULAR HEMOGLOBIN (BEAKER) (test  28.6 pg  25.7-32.2  



 yylc=868)      

 

 MEAN CORPUSCULAR HEMOGLOBIN CONC (BEAKER) (test  32.0 GM/DL  32.3-36.5  



 code=752)      

 

 RED CELL DISTRIBUTION WIDTH (BEAKER) (test  13.6 %  11.6-14.4  



 code=412)      

 

 PLATELET COUNT (BEAKER) (test code=756)  315 K/CU MM  150-450  

 

 MEAN PLATELET VOLUME (BEAKER) (test code=754)  9.2 fL  9.4-12.4  

 

 NUCLEATED RED BLOOD CELLS (BEAKER) (test  0 /100 WBC  0-0  



 code=413)      



(CELLAVISION MANUAL DIFF)2018 12:01:00





 Test Item  Value  Reference Range  Comments

 

 NEUTROPHILS - REL (CELLAVISION)(BEAKER) (test  75 %    



 code=2816)      

 

 LYMPHOCYTES - REL (CELLAVISION)(BEAKER) (test  9 %    



 code=2817)      

 

 MONOCYTES - REL (CELLAVISION)(BEAKER) (test  9 %    



 code=2818)      

 

 EOSINOPHILS - REL (CELLAVISION)(BEAKER) (test  6 %    



 code=2819)      

 

 BASOPHILS - REL (CELLAVISION)(BEAKER) (test  1 %    



 code=2820)      

 

 NEUTROPHILS - ABS (CELLAVISION)(BEAKER) (test  7.73 K/ul  1.78-5.38  



 code=2830)      

 

 LYMPHOCYTES - ABS (CELLAVISION)(BEAKER) (test  0.93 K/ul  1.32-3.57  



 code=2831)      

 

 MONOCYTES - ABS (CELLAVISION)(BEAKER) (test  0.93 K/uL  0.30-0.82  



 code=2832)      

 

 EOSINOPHILS - ABS (CELLAVISION)(BEAKER) (test  0.62 K/uL  0.04-0.54  



 code=2834)      

 

 BASOPHILS - ABS (CELLAVISION)(BEAKER) (test  0.10 K/uL  0.01-0.08  



 code=2835)      

 

 TOTAL COUNTED (BEAKER) (test code=1351)  100    

 

 RBC MORPHOLOGY (BEAKER) (test code=762)  Normal    

 

 WBC MORPHOLOGY (BEAKER) (test code=487)  Normal    

 

 PLT MORPHOLOGY (BEAKER) (test code=486)  Normal    

 

 ARTIFACT (CELLAVISION)(BEAKER) (test code=3432)  Present    

 

 PLATELET CONCENTRATION (CELLAVISION)(BEAKER) (test  Adequate    



 bkfk=4023)      



Received comment: User comments: Slide comments:POCT-GLUCOSE [id number] 11:
34:00





 Test Item  Value  Reference Range  Comments

 

 POC-GLUCOSE METER (BEAKER)  295 mg/dL    TESTED AT [address]



 (test wbav=5389)      [facility] [address]



POCT-GLUCOSE [id number] 07:38:00





 Test Item  Value  Reference Range  Comments

 

 POC-GLUCOSE METER (BEAKER)  188 mg/dL    TESTED AT [address]



 (test sgwd=8634)      [facility] [address]



CALCIUM, [id number] 06:26:00





 Test Item  Value  Reference Range  Comments

 

 CALCIUM IONIZED (BEAKER) (test code=698)  1.00 mmol/L  1.12-1.27  

 

 PH, BLOOD (BEAKER) (test code=1810)  7.35    



COMPREHENSIVE METABOLIC [id number] 06:13:00





 Test Item  Value  Reference Range  Comments

 

 TOTAL PROTEIN (BEAKER)  6.0 gm/dL  6.0-8.3  



 (test code=770)      

 

 ALBUMIN (BEAKER) (test  2.9 g/dL  3.5-5.0  



 code=1145)      

 

 ALKALINE PHOSPHATASE  336 U/L    



 (BEAKER) (test code=346)      

 

 BILIRUBIN TOTAL (BEAKER)  0.3 mg/dL  0.2-1.2  



 (test code=377)      

 

 SODIUM (BEAKER) (test  129 meq/L  136-145  



 lura=978)      

 

 POTASSIUM (BEAKER) (test  3.4 meq/L  3.5-5.1  



 code=379)      

 

 CHLORIDE (BEAKER) (test  101 meq/L    



 code=382)      

 

 CO2 (BEAKER) (test  19 meq/L  22-29  



 code=355)      

 

 BLOOD UREA NITROGEN  43 mg/dL  7-21  



 (BEAKER) (test code=354)      

 

 CREATININE (BEAKER) (test  2.84 mg/dL  0.57-1.25  



 code=358)      

 

 GLUCOSE RANDOM (BEAKER)  205 mg/dL    



 (test code=652)      

 

 CALCIUM (BEAKER) (test  8.1 mg/dL  8.4-10.2  



 code=697)      

 

 AST (SGOT) (BEAKER) (test  37 U/L  5-34  



 code=353)      

 

 ALT (SGPT) (BEAKER) (test  37 U/L  6-55  



 code=347)      

 

 EGFR (BEAKER) (test  21 mL/min/1.73 sq m    ESTIMATED GFR IS NOT AS



 code=1092)      ACCURATE AS CREATININE



       CLEARANCE IN PREDICTING



       GLOMERULAR FILTRATION



       RATE. ESTIMATED GFR IS



       NOT APPLICABLE FOR



       DIALYSIS PATIENTS.



[id number] 06:12:00





 Test Item  Value  Reference Range  Comments

 

 PHOSPHORUS (BEAKER) (test code=604)  3.6 mg/dL  2.3-4.7  



[id number] 06:12:00





 Test Item  Value  Reference Range  Comments

 

 MAGNESIUM (BEAKER) (test code=627)  1.6 mg/dL  1.6-2.6  



PROTHROMBIN [id number] 05:33:00





 Test Item  Value  Reference Range  Comments

 

 PROTIME (BEAKER) (test code=759)  14.0 seconds  11.7-14.7  

 

 INR (BEAKER) (test code=370)  1.1  <=5.9  



RECOMMENDED COUMADIN/WARFARIN INR THERAPY RANGESSTANDARD DOSE: 2.0 - 3.0   
Includes: PROPHYLAXIS forvenous thrombosis, systemic embolization; TREATMENT 
for venous thrombosis and/or pulmonary embolus.HIGH RISK: Target INR is 2.5-3.5 
for patients with mechanical heart valves.POCT-GLUCOSE [id number] 17:32:00





 Test Item  Value  Reference Range  Comments

 

 POC-GLUCOSE METER (BEAKER)  144 mg/dL    TESTED AT [address]



 (test elwu=9136)      [facility] [address]



URINE PROTEIN ELECTROPHORESIS, 24 [id number] 14:15:00





 Test Item  Value  Reference Range  Comments

 

 PROTEIN, 24HR URINE (BEAKER)  2992 mg/24hr  0-300  



 (test code=1570)      

 

 VOLUME, TOTAL (BEAKER) (test  1700 ml    



 code=1457)      

 

 ALBUMIN, 24HR URINE (BEAKER)  30.3 %    



 (test code=1609)      

 

 GLOBULIN, 24HR URINE  69.7 %    



 (BEAKER) (test code=1610)      

 

 UPEP, ID (BEAKER) (test  Spillage of large amounts of    



 code=1433)  globulin fractions may mask    



   underlying monoclonal proteins;    



   urine MOE ordered and results    



   pending.    

 

 PROTEIN, URINE (BEAKER)  176 mg/dL  0-14  



 (test code=1569)      

 

 [facility]  Jocelynn Gonzalez MD    



 (BEAKER) (test code=2598)  (electronic signature)    



POCT-GLUCOSE [id number] 12:04:00





 Test Item  Value  Reference Range  Comments

 

 POC-GLUCOSE METER (BEAKER)  155 mg/dL    TESTED AT [address]



 (test upoo=7465)      [facility] [address]



CBC W/PLT COUNT &amp; AUTO [id number] 08:49:00





 Test Item  Value  Reference Range  Comments

 

 WHITE BLOOD CELL COUNT (BEAKER) (test code=775)  10.9 K/ L  3.5-10.5  

 

 RED BLOOD CELL COUNT (BEAKER) (test code=761)  2.96 M/ L  4.63-6.08  

 

 HEMOGLOBIN (BEAKER) (test code=410)  8.6 GM/DL  13.7-17.5  

 

 HEMATOCRIT (BEAKER) (test code=411)  26.2 %  40.1-51.0  

 

 MEAN CORPUSCULAR VOLUME (BEAKER) (test code=753)  88.5 fL  79.0-92.2  

 

 MEAN CORPUSCULAR HEMOGLOBIN (BEAKER) (test  29.1 pg  25.7-32.2  



 ciuh=737)      

 

 MEAN CORPUSCULAR HEMOGLOBIN CONC (BEAKER) (test  32.8 GM/DL  32.3-36.5  



 code=752)      

 

 RED CELL DISTRIBUTION WIDTH (BEAKER) (test  13.5 %  11.6-14.4  



 code=412)      

 

 PLATELET COUNT (BEAKER) (test code=756)  298 K/CU MM  150-450  

 

 MEAN PLATELET VOLUME (BEAKER) (test code=754)  9.2 fL  9.4-12.4  

 

 NUCLEATED RED BLOOD CELLS (BEAKER) (test  0 /100 WBC  0-0  



 code=413)      



(CELLAVISION MANUAL DIFF)2018 08:49:00





 Test Item  Value  Reference Range  Comments

 

 NEUTROPHILS - REL (CELLAVISION)(BEAKER) (test  78 %    



 code=2816)      

 

 LYMPHOCYTES - REL (CELLAVISION)(BEAKER) (test  7 %    



 code=2817)      

 

 MONOCYTES - REL (CELLAVISION)(BEAKER) (test  11 %    



 code=2818)      

 

 EOSINOPHILS - REL (CELLAVISION)(BEAKER) (test  4 %    



 code=2819)      

 

 NEUTROPHILS - ABS (CELLAVISION)(BEAKER) (test  8.50 K/ul  1.78-5.38  



 code=2830)      

 

 LYMPHOCYTES - ABS (CELLAVISION)(BEAKER) (test  0.76 K/ul  1.32-3.57  



 code=2831)      

 

 MONOCYTES - ABS (CELLAVISION)(BEAKER) (test  1.20 K/uL  0.30-0.82  



 code=2832)      

 

 EOSINOPHILS - ABS (CELLAVISION)(BEAKER) (test  0.44 K/uL  0.04-0.54  



 code=2834)      

 

 TOTAL COUNTED (BEAKER) (test code=1351)  100    

 

 RBC MORPHOLOGY (BEAKER) (test code=762)  Normal    

 

 WBC MORPHOLOGY (BEAKER) (test code=487)  Normal    

 

 PLT MORPHOLOGY (BEAKER) (test code=486)  Normal    

 

 ARTIFACT (CELLAVISION)(BEAKER) (test code=3432)  Present    

 

 PLATELET CONCENTRATION (CELLAVISION)(BEAKER) (test  Adequate    



 vqln=3675)      



Received comment: User comments: Slide comments:POCT-GLUCOSE [id number] 08:
16:00





 Test Item  Value  Reference Range  Comments

 

 POC-GLUCOSE METER (BEAKER)  166 mg/dL    TESTED AT [facility] [address] [facility]



 (test smki=2544)      [facility] [address]



CALCIUM, [id number] 06:10:00





 Test Item  Value  Reference Range  Comments

 

 CALCIUM IONIZED (BEAKER) (test code=698)  0.98 mmol/L  1.12-1.27  

 

 PH, BLOOD (BEAKER) (test code=1810)  7.40    



BASIC METABOLIC [id number] 05:06:00





 Test Item  Value  Reference Range  Comments

 

 SODIUM (BEAKER) (test  131 meq/L  136-145  



 luzk=433)      

 

 POTASSIUM (BEAKER) (test  3.6 meq/L  3.5-5.1  



 code=379)      

 

 CHLORIDE (BEAKER) (test  100 meq/L    



 code=382)      

 

 CO2 (BEAKER) (test  21 meq/L  22-29  



 code=355)      

 

 BLOOD UREA NITROGEN  39 mg/dL  7-21  



 (BEAKER) (test code=354)      

 

 CREATININE (BEAKER) (test  2.74 mg/dL  0.57-1.25  



 code=358)      

 

 GLUCOSE RANDOM (BEAKER)  158 mg/dL    



 (test code=652)      

 

 CALCIUM (BEAKER) (test  8.1 mg/dL  8.4-10.2  



 code=697)      

 

 EGFR (BEAKER) (test  22 mL/min/1.73 sq m    ESTIMATED GFR IS NOT AS



 code=1092)      ACCURATE AS CREATININE



       CLEARANCE IN PREDICTING



       GLOMERULAR FILTRATION



       RATE. ESTIMATED GFR IS



       NOT APPLICABLE FOR



       DIALYSIS PATIENTS.



[id number] 05:03:00





 Test Item  Value  Reference Range  Comments

 

 PHOSPHORUS (BEAKER) (test code=604)  3.4 mg/dL  2.3-4.7  



[id number] 05:03:00





 Test Item  Value  Reference Range  Comments

 

 MAGNESIUM (BEAKER) (test code=627)  1.6 mg/dL  1.6-2.6  



POCT-GLUCOSE [id number] 21:25:00





 Test Item  Value  Reference Range  Comments

 

 POC-GLUCOSE METER (BEAKER)  252 mg/dL    TESTED AT [address]



 (test bptb=0967)      [facility] [address]



POCT-GLUCOSE [id number] 17:44:00





 Test Item  Value  Reference Range  Comments

 

 POC-GLUCOSE METER (BEAKER)  127 mg/dL    TESTED AT [address]



 (test pcly=5667)      [facility] [address]



POCT-GLUCOSE [id number] 11:31:00





 Test Item  Value  Reference Range  Comments

 

 POC-GLUCOSE METER (BEAKER)  221 mg/dL    TESTED AT [facility] [address] [facility]



 (test hita=5708)      [facility] [address]



BLOOD [id number] 11:00:00





 Test Item  Value  Reference Range  Comments

 

 CULTURE (BEAKER) (test code=1095)  No growth in 5 days    



POCT-GLUCOSE [id number] 08:08:00





 Test Item  Value  Reference Range  Comments

 

 POC-GLUCOSE METER (BEAKER)  118 mg/dL    TESTED AT [address]



 (test zsix=3721)      [facility] [address]



COMPREHENSIVE METABOLIC [id number] 06:57:00





 Test Item  Value  Reference Range  Comments

 

 TOTAL PROTEIN (BEAKER)  5.9 gm/dL  6.0-8.3  



 (test code=770)      

 

 ALBUMIN (BEAKER) (test  2.9 g/dL  3.5-5.0  



 code=1145)      

 

 ALKALINE PHOSPHATASE  276 U/L    



 (BEAKER) (test code=346)      

 

 BILIRUBIN TOTAL (BEAKER)  0.5 mg/dL  0.2-1.2  



 (test code=377)      

 

 SODIUM (BEAKER) (test  130 meq/L  136-145  



 zlbd=005)      

 

 POTASSIUM (BEAKER) (test  3.9 meq/L  3.5-5.1  



 code=379)      

 

 CHLORIDE (BEAKER) (test  99 meq/L    



 code=382)      

 

 CO2 (BEAKER) (test  20 meq/L  22-29  



 code=355)      

 

 BLOOD UREA NITROGEN  35 mg/dL  7-21  



 (BEAKER) (test code=354)      

 

 CREATININE (BEAKER) (test  2.39 mg/dL  0.57-1.25  



 code=358)      

 

 GLUCOSE RANDOM (BEAKER)  116 mg/dL    



 (test code=652)      

 

 CALCIUM (BEAKER) (test  8.0 mg/dL  8.4-10.2  



 code=697)      

 

 AST (SGOT) (BEAKER) (test  36 U/L  5-34  



 code=353)      

 

 ALT (SGPT) (BEAKER) (test  28 U/L  6-55  



 code=347)      

 

 EGFR (BEAKER) (test  26 mL/min/1.73 sq m    ESTIMATED GFR IS NOT AS



 code=1092)      ACCURATE AS CREATININE



       CLEARANCE IN PREDICTING



       GLOMERULAR FILTRATION



       RATE. ESTIMATED GFR IS



       NOT APPLICABLE FOR



       DIALYSIS PATIENTS.



[id number] 05:44:00





 Test Item  Value  Reference Range  Comments

 

 PHOSPHORUS (BEAKER) (test code=604)  3.2 mg/dL  2.3-4.7  



[id number] 05:44:00





 Test Item  Value  Reference Range  Comments

 

 MAGNESIUM (BEAKER) (test code=627)  1.7 mg/dL  1.6-2.6  



PROTHROMBIN [id number] 04:53:00





 Test Item  Value  Reference Range  Comments

 

 PROTIME (BEAKER) (test code=759)  15.5 seconds  11.7-14.7  

 

 INR (BEAKER) (test code=370)  1.2  <=5.9  



RECOMMENDED COUMADIN/WARFARIN INR THERAPY RANGESSTANDARD DOSE: 2.0 - 3.0   
Includes: PROPHYLAXIS forvenous thrombosis, systemic embolization; TREATMENT 
for venous thrombosis and/or pulmonary embolus.HIGH RISK: Target INR is 2.5-3.5 
for patients with mechanical heart valves.CBC W/PLT COUNT &amp; AUTO 
[id number] 04:43:00





 Test Item  Value  Reference Range  Comments

 

 WHITE BLOOD CELL COUNT (BEAKER) (test code=775)  11.2 K/ L  3.5-10.5  

 

 RED BLOOD CELL COUNT (BEAKER) (test code=761)  3.22 M/ L  4.63-6.08  

 

 HEMOGLOBIN (BEAKER) (test code=410)  9.2 GM/DL  13.7-17.5  

 

 HEMATOCRIT (BEAKER) (test code=411)  28.9 %  40.1-51.0  

 

 MEAN CORPUSCULAR VOLUME (BEAKER) (test code=753)  89.8 fL  79.0-92.2  

 

 MEAN CORPUSCULAR HEMOGLOBIN (BEAKER) (test  28.6 pg  25.7-32.2  



 hlqd=739)      

 

 MEAN CORPUSCULAR HEMOGLOBIN CONC (BEAKER) (test  31.8 GM/DL  32.3-36.5  



 code=752)      

 

 RED CELL DISTRIBUTION WIDTH (BEAKER) (test  13.3 %  11.6-14.4  



 code=412)      

 

 PLATELET COUNT (BEAKER) (test code=756)  327 K/CU MM  150-450  

 

 MEAN PLATELET VOLUME (BEAKER) (test code=754)  9.4 fL  9.4-12.4  

 

 NUCLEATED RED BLOOD CELLS (BEAKER) (test  0 /100 WBC  0-0  



 code=413)      

 

 NEUTROPHILS RELATIVE PERCENT (BEAKER) (test  68 %    



 code=429)      

 

 LYMPHOCYTES RELATIVE PERCENT (BEAKER) (test  11 %    



 code=430)      

 

 MONOCYTES RELATIVE PERCENT (BEAKER) (test  13 %    



 code=431)      

 

 EOSINOPHILS RELATIVE PERCENT (BEAKER) (test  7 %    



 code=432)      

 

 BASOPHILS RELATIVE PERCENT (BEAKER) (test  0 %    



 code=437)      

 

 NEUTROPHILS ABSOLUTE COUNT (BEAKER) (test  7.59 K/ L  1.78-5.38  



 code=670)      

 

 LYMPHOCYTES ABSOLUTE COUNT (BEAKER) (test  1.23 K/ L  1.32-3.57  



 code=414)      

 

 MONOCYTES ABSOLUTE COUNT (BEAKER) (test  1.49 K/ L  0.30-0.82  



 code=415)      

 

 EOSINOPHILS ABSOLUTE COUNT (BEAKER) (test  0.78 K/ L  0.04-0.54  



 code=416)      

 

 BASOPHILS ABSOLUTE COUNT (BEAKER) (test  0.04 K/ L  0.01-0.08  



 code=417)      

 

 IMMATURE GRANULOCYTES-RELATIVE PERCENT (BEAKER)  0 %  0-1  



 (test code=2801)      



CALCIUM, [id number] 04:27:00





 Test Item  Value  Reference Range  Comments

 

 CALCIUM IONIZED (BEAKER) (test code=698)  0.97 mmol/L  1.12-1.27  

 

 PH, BLOOD (BEAKER) (test code=1810)  7.42    



PROTEIN, 24 HOUR [id number] 02:50:00





 Test Item  Value  Reference Range  Comments

 

 PROTEIN, 24HR URINE (BEAKER) (test code=1570)  2992 mg/24hr  0-300  

 

 VOLUME, TOTAL (BEAKER) (test code=1457)  1700 ml    

 

 PROTEIN, URINE (BEAKER) (test code=1569)  176 mg/dL  0-14  



POCT-GLUCOSE [id number] 21:39:00





 Test Item  Value  Reference Range  Comments

 

 POC-GLUCOSE METER (BEAKER)  194 mg/dL    TESTED AT [address]



 (test zcpd=3668)      [facility] [address]



POCT-GLUCOSE [id number] 17:08:00





 Test Item  Value  Reference Range  Comments

 

 POC-GLUCOSE METER (BEAKER)  183 mg/dL    TESTED AT [facility] [address]



 (test fviy=9023)      [facility] [address]



POCT-GLUCOSE [id number] 12:10:00





 Test Item  Value  Reference Range  Comments

 

 POC-GLUCOSE METER (BEAKER)  157 mg/dL    TESTED AT [facility] [address] [facility]



 (test wwcz=6671)      [facility] [address]



POCT-GLUCOSE [id number] 07:53:00





 Test Item  Value  Reference Range  Comments

 

 POC-GLUCOSE METER (BEAKER)  132 mg/dL    TESTED AT [place] [id number] [facility]



 (test wbzl=3628)      [facility] [address]



CALCIUM, [id number] 07:44:00





 Test Item  Value  Reference Range  Comments

 

 CALCIUM IONIZED (BEAKER) (test code=698)  0.94 mmol/L  1.12-1.27  

 

 PH, BLOOD (BEAKER) (test code=1810)  7.42    



COMPREHENSIVE METABOLIC [id number] 07:05:00





 Test Item  Value  Reference Range  Comments

 

 TOTAL PROTEIN (BEAKER)  5.9 gm/dL  6.0-8.3  



 (test code=770)      

 

 ALBUMIN (BEAKER) (test  2.9 g/dL  3.5-5.0  



 code=1145)      

 

 ALKALINE PHOSPHATASE  216 U/L    



 (BEAKER) (test code=346)      

 

 BILIRUBIN TOTAL (BEAKER)  0.6 mg/dL  0.2-1.2  



 (test code=377)      

 

 SODIUM (BEAKER) (test  132 meq/L  136-145  



 ahal=370)      

 

 POTASSIUM (BEAKER) (test  3.6 meq/L  3.5-5.1  



 code=379)      

 

 CHLORIDE (BEAKER) (test  97 meq/L    



 code=382)      

 

 CO2 (BEAKER) (test  24 meq/L  22-29  



 code=355)      

 

 BLOOD UREA NITROGEN  36 mg/dL  7-21  



 (BEAKER) (test code=354)      

 

 CREATININE (BEAKER) (test  2.43 mg/dL  0.57-1.25  



 code=358)      

 

 GLUCOSE RANDOM (BEAKER)  113 mg/dL    



 (test code=652)      

 

 CALCIUM (BEAKER) (test  7.9 mg/dL  8.4-10.2  



 code=697)      

 

 AST (SGOT) (BEAKER) (test  29 U/L  5-34  



 code=353)      

 

 ALT (SGPT) (BEAKER) (test  25 U/L  6-55  



 code=347)      

 

 EGFR (BEAKER) (test  26 mL/min/1.73 sq m    ESTIMATED GFR IS NOT AS



 code=1092)      ACCURATE AS CREATININE



       CLEARANCE IN PREDICTING



       GLOMERULAR FILTRATION



       RATE. ESTIMATED GFR IS



       NOT APPLICABLE FOR



       DIALYSIS PATIENTS.



[id number] 07:04:00





 Test Item  Value  Reference Range  Comments

 

 PHOSPHORUS (BEAKER) (test code=604)  3.0 mg/dL  2.3-4.7  



[id number] 07:04:00





 Test Item  Value  Reference Range  Comments

 

 MAGNESIUM (BEAKER) (test code=627)  1.7 mg/dL  1.6-2.6  



CBC W/PLT COUNT &amp; AUTO [id number] 06:36:00





 Test Item  Value  Reference Range  Comments

 

 WHITE BLOOD CELL COUNT (BEAKER) (test code=775)  12.1 K/ L  3.5-10.5  

 

 RED BLOOD CELL COUNT (BEAKER) (test code=761)  3.30 M/ L  4.63-6.08  

 

 HEMOGLOBIN (BEAKER) (test code=410)  9.4 GM/DL  13.7-17.5  

 

 HEMATOCRIT (BEAKER) (test code=411)  29.3 %  40.1-51.0  

 

 MEAN CORPUSCULAR VOLUME (BEAKER) (test code=753)  88.8 fL  79.0-92.2  

 

 MEAN CORPUSCULAR HEMOGLOBIN (BEAKER) (test  28.5 pg  25.7-32.2  



 ygbm=812)      

 

 MEAN CORPUSCULAR HEMOGLOBIN CONC (BEAKER) (test  32.1 GM/DL  32.3-36.5  



 code=752)      

 

 RED CELL DISTRIBUTION WIDTH (BEAKER) (test  13.7 %  11.6-14.4  



 code=412)      

 

 PLATELET COUNT (BEAKER) (test code=756)  310 K/CU MM  150-450  

 

 MEAN PLATELET VOLUME (BEAKER) (test code=754)  9.7 fL  9.4-12.4  

 

 NUCLEATED RED BLOOD CELLS (BEAKER) (test  0 /100 WBC  0-0  



 code=413)      

 

 NEUTROPHILS RELATIVE PERCENT (BEAKER) (test  69 %    



 code=429)      

 

 LYMPHOCYTES RELATIVE PERCENT (BEAKER) (test  12 %    



 code=430)      

 

 MONOCYTES RELATIVE PERCENT (BEAKER) (test  12 %    



 code=431)      

 

 EOSINOPHILS RELATIVE PERCENT (BEAKER) (test  7 %    



 code=432)      

 

 BASOPHILS RELATIVE PERCENT (BEAKER) (test  0 %    



 code=437)      

 

 NEUTROPHILS ABSOLUTE COUNT (BEAKER) (test  8.28 K/ L  1.78-5.38  



 code=670)      

 

 LYMPHOCYTES ABSOLUTE COUNT (BEAKER) (test  1.40 K/ L  1.32-3.57  



 code=414)      

 

 MONOCYTES ABSOLUTE COUNT (BEAKER) (test  1.43 K/ L  0.30-0.82  



 code=415)      

 

 EOSINOPHILS ABSOLUTE COUNT (BEAKER) (test  0.81 K/ L  0.04-0.54  



 code=416)      

 

 BASOPHILS ABSOLUTE COUNT (BEAKER) (test  0.04 K/ L  0.01-0.08  



 code=417)      

 

 IMMATURE GRANULOCYTES-RELATIVE PERCENT (BEAKER)  1 %  0-1  



 (test code=2801)      



POCT-GLUCOSE [id number] 21:45:00





 Test Item  Value  Reference Range  Comments

 

 POC-GLUCOSE METER (BEAKER)  152 mg/dL    TESTED AT [facility] [address] [facility]



 (test rfwf=2889)      [facility] [address]



URINALYSIS W/ REFLEX URINE [id number] 19:43:00





 Test Item  Value  Reference Range  Comments

 

 COLOR (BEAKER) (test code=470)  Light Yellow    

 

 CLARITY (BEAKER) (test code=469)  Clear    

 

 SPECIFIC GRAVITY UA (BEAKER) (test code=468)  1.005  1.001-1.035  

 

 PH UA (BEAKER) (test code=467)  8.5  5.0-8.0  

 

 PROTEIN UA (BEAKER) (test code=464)  200 mg/dL  Negative  

 

 GLUCOSE UA (BEAKER) (test code=365)  500 mg/dL  Negative  

 

 KETONES UA (BEAKER) (test code=371)  Negative  Negative  

 

 BILIRUBIN UA (BEAKER) (test code=462)  Negative  Negative  

 

 BLOOD UA (BEAKER) (test code=461)  Trace  Negative  

 

 NITRITE UA (BEAKER) (test code=465)  Negative  Negative  

 

 LEUKOCYTE ESTERASE UA (BEAKER) (test code=466)  Negative  Negative  

 

 UROBILINOGEN UA (BEAKER) (test code=463)  0.2 mg/dL  0.2-1.0  

 

 RBC UA (BEAKER) (test code=519)  0 /HPF    

 

 WBC UA (BEAKER) (test code=520)  1 /HPF    

 

 BACTERIA (BEAKER) (test egjd=430)  Rare    

 

 SQUAMOUS EPITHELIAL (BEAKER) (test code=516)  < /HPF    

 

 SOURCE(BEAKER) (test code=2795)      



POCT-GLUCOSE [id number] 16:59:00





 Test Item  Value  Reference Range  Comments

 

 POC-GLUCOSE METER (BEAKER)  184 mg/dL    TESTED AT [facility] [address]



 (test tzsk=7371)      [facility] [address]



URINE PROTEIN ELECTROPHORESIS, [id number] 16:32:00





 Test Item  Value  Reference Range  Comments

 

 PROTEIN, URINE (BEAKER) (test  101 mg/dL  0-14  



 code=1569)      

 

 ALBUMIN URINE ELP (BEAKER)  45.1 %    



 (test code=1018)      

 

 GAMMA GLOBULIN URINE (BEAKER)  54.9 %    



 (test code=1015)      

 

 UPEP, ID-438 (BEAKER) (test  No monoclonal bands detected.    



 code=2604)      

 

 [facility]  Jocelynn Gonzalez MD    



 (test code=2605)  (electronic signature)    



PROTEIN ELECTROPHORESIS, [id number] 16:02:00





 Test Item  Value  Reference Range  Comments

 

 ALBUMIN FRACTION (BEAKER)  2.5 g/dL  3.5-5.5  



 (test code=405)      

 

 ALPHA 1 FRACTION (BEAKER)  0.3 g/dL  0.2-0.4  



 (test code=389)      

 

 ALPHA 2 FRACTION (BEAKER)  0.7 g/dL  0.5-0.9  



 (test code=390)      

 

 BETA FRACTION (BEAKER) (test  0.8 g/dL  0.6-1.1  



 code=392)      

 

 GAMMA GLOBULIN FRACTION  1.1 g/dL  0.7-1.7  



 (BEAKER) (test code=391)      

 

 INTERPRETATION-119 (BEAKER)  Decreased albumin with    



 (test code=2615)  concurrent relative increases    



   in all globulin fractions. No    



   monoclonal bands detected.    

 

 [facility]-PATHOLOGIST-119 (BEAKER)  Jocelynn Gonzalez MD    



 (test code=2616)  (electronic signature)    

 

 PROTEIN TOTAL SERUM, SPEP  5.3 gm/dL  6.0-8.3  



 (BEAKER) (test code=2660)      



POCT-GLUCOSE [id number] 11:58:00





 Test Item  Value  Reference Range  Comments

 

 POC-GLUCOSE METER (BEAKER)  197 mg/dL    TESTED AT [facility] [address] [facility]



 (test gvgz=8532)      [facility] [address]



POCT-GLUCOSE [id number] 08:42:00





 Test Item  Value  Reference Range  Comments

 

 POC-GLUCOSE METER (BEAKER)  119 mg/dL    TESTED AT [facility] [address] [facility]



 (test ojti=6107)      [facility] [address]



HEMOGLOBIN [id number] 08:19:00





 Test Item  Value  Reference Range  Comments

 

 HEMOGLOBIN A1C (BEAKER) (test code=368)  5.9 %  4.3-6.1  



COMPREHENSIVE METABOLIC [id number] 05:26:00





 Test Item  Value  Reference Range  Comments

 

 TOTAL PROTEIN (BEAKER)  5.8 gm/dL  6.0-8.3  



 (test code=770)      

 

 ALBUMIN (BEAKER) (test  2.9 g/dL  3.5-5.0  



 code=1145)      

 

 ALKALINE PHOSPHATASE  132 U/L    



 (BEAKER) (test code=346)      

 

 BILIRUBIN TOTAL (BEAKER)  0.5 mg/dL  0.2-1.2  



 (test code=377)      

 

 SODIUM (BEAKER) (test  133 meq/L  136-145  



 ommk=813)      

 

 POTASSIUM (BEAKER) (test  3.5 meq/L  3.5-5.1  



 code=379)      

 

 CHLORIDE (BEAKER) (test  97 meq/L    



 code=382)      

 

 CO2 (BEAKER) (test  27 meq/L  22-29  



 code=355)      

 

 BLOOD UREA NITROGEN  32 mg/dL  7-21  



 (BEAKER) (test code=354)      

 

 CREATININE (BEAKER) (test  2.26 mg/dL  0.57-1.25  



 code=358)      

 

 GLUCOSE RANDOM (BEAKER)  103 mg/dL    



 (test code=652)      

 

 CALCIUM (BEAKER) (test  7.9 mg/dL  8.4-10.2  



 code=697)      

 

 AST (SGOT) (BEAKER) (test  24 U/L  5-34  



 code=353)      

 

 ALT (SGPT) (BEAKER) (test  18 U/L  6-55  



 code=347)      

 

 EGFR (BEAKER) (test  28 mL/min/1.73 sq m    ESTIMATED GFR IS NOT AS



 code=1092)      ACCURATE AS CREATININE



       CLEARANCE IN PREDICTING



       GLOMERULAR FILTRATION



       RATE. ESTIMATED GFR IS



       NOT APPLICABLE FOR



       DIALYSIS PATIENTS.



[id number] 04:54:00





 Test Item  Value  Reference Range  Comments

 

 PHOSPHORUS (BEAKER) (test code=604)  2.8 mg/dL  2.3-4.7  



[id number] 04:54:00





 Test Item  Value  Reference Range  Comments

 

 MAGNESIUM (BEAKER) (test code=627)  1.6 mg/dL  1.6-2.6  



CBC W/PLT COUNT &amp; AUTO [id number] 04:30:00





 Test Item  Value  Reference Range  Comments

 

 WHITE BLOOD CELL COUNT (BEAKER) (test code=775)  13.5 K/ L  3.5-10.5  

 

 RED BLOOD CELL COUNT (BEAKER) (test code=761)  3.33 M/ L  4.63-6.08  

 

 HEMOGLOBIN (BEAKER) (test code=410)  9.6 GM/DL  13.7-17.5  

 

 HEMATOCRIT (BEAKER) (test code=411)  29.5 %  40.1-51.0  

 

 MEAN CORPUSCULAR VOLUME (BEAKER) (test code=753)  88.6 fL  79.0-92.2  

 

 MEAN CORPUSCULAR HEMOGLOBIN (BEAKER) (test  28.8 pg  25.7-32.2  



 mjqm=928)      

 

 MEAN CORPUSCULAR HEMOGLOBIN CONC (BEAKER) (test  32.5 GM/DL  32.3-36.5  



 code=752)      

 

 RED CELL DISTRIBUTION WIDTH (BEAKER) (test  14.0 %  11.6-14.4  



 code=412)      

 

 PLATELET COUNT (BEAKER) (test code=756)  277 K/CU MM  150-450  

 

 MEAN PLATELET VOLUME (BEAKER) (test code=754)  9.2 fL  9.4-12.4  

 

 NUCLEATED RED BLOOD CELLS (BEAKER) (test  0 /100 WBC  0-0  



 code=413)      

 

 NEUTROPHILS RELATIVE PERCENT (BEAKER) (test  74 %    



 code=429)      

 

 LYMPHOCYTES RELATIVE PERCENT (BEAKER) (test  9 %    



 code=430)      

 

 MONOCYTES RELATIVE PERCENT (BEAKER) (test  12 %    



 code=431)      

 

 EOSINOPHILS RELATIVE PERCENT (BEAKER) (test  5 %    



 code=432)      

 

 BASOPHILS RELATIVE PERCENT (BEAKER) (test  0 %    



 code=437)      

 

 NEUTROPHILS ABSOLUTE COUNT (BEAKER) (test  9.96 K/ L  1.78-5.38  



 code=670)      

 

 LYMPHOCYTES ABSOLUTE COUNT (BEAKER) (test  1.25 K/ L  1.32-3.57  



 code=414)      

 

 MONOCYTES ABSOLUTE COUNT (BEAKER) (test  1.58 K/ L  0.30-0.82  



 code=415)      

 

 EOSINOPHILS ABSOLUTE COUNT (BEAKER) (test  0.63 K/ L  0.04-0.54  



 code=416)      

 

 BASOPHILS ABSOLUTE COUNT (BEAKER) (test  0.05 K/ L  0.01-0.08  



 code=417)      

 

 IMMATURE GRANULOCYTES-RELATIVE PERCENT (BEAKER)  1 %  0-1  



 (test code=2801)      



CALCIUM, [id number] 04:27:00





 Test Item  Value  Reference Range  Comments

 

 CALCIUM IONIZED (BEAKER) (test code=698)  0.94 mmol/L  1.12-1.27  

 

 PH, BLOOD (BEAKER) (test code=1810)  7.41    



POCT-GLUCOSE [id number] 21:41:00





 Test Item  Value  Reference Range  Comments

 

 POC-GLUCOSE METER (BEAKER)  225 mg/dL    TESTED AT [facility] [address] [facility]



 (test ghkc=9888)      [facility] [address]



(CELLAVISION MANUAL DIFF)2018 16:57:00





 Test Item  Value  Reference Range  Comments

 

 NEUTROPHILS - REL (CELLAVISION)(BEAKER) (test  83 %    



 code=2816)      

 

 LYMPHOCYTES - REL (CELLAVISION)(BEAKER) (test  7 %    



 code=2817)      

 

 MONOCYTES - REL (CELLAVISION)(BEAKER) (test  8 %    



 code=2818)      

 

 EOSINOPHILS - REL (CELLAVISION)(BEAKER) (test  2 %    



 code=2819)      

 

 NEUTROPHILS - ABS (CELLAVISION)(BEAKER) (test  8.38 K/ul  1.78-5.38  



 code=2830)      

 

 LYMPHOCYTES - ABS (CELLAVISION)(BEAKER) (test  0.71 K/ul  1.32-3.57  



 code=2831)      

 

 MONOCYTES - ABS (CELLAVISION)(BEAKER) (test  0.81 K/uL  0.30-0.82  



 code=2832)      

 

 EOSINOPHILS - ABS (CELLAVISION)(BEAKER) (test  0.20 K/uL  0.04-0.54  



 code=2834)      

 

 TOTAL COUNTED (BEAKER) (test code=1351)  100    

 

 MANUAL NRBC  CELLS (BEAKER) (test  1 /100 WBC  0-0  



 code=1353)      

 

 SMUDGE CELLS (BEAKER) (test code=1371)  Present    

 

 GIANT PLATELETS (BEAKER) (test code=313)  Present    

 

 HYPOCHROMIA (BEAKER) (test code=963)  1+ few    

 

 ANISOCYTOSIS (BEAKER) (test code=961)  1+ few    

 

 POIKILOCYTES (BEAKER) (test code=966)  1+ few    

 

 PLATELET CONCENTRATION (CELLAVISION)(BEAKER)  Adequate    



 (test code=3438)      



Received comment: User comments: Slide comments:POCT-GLUCOSE [id number] 16:
37:00





 Test Item  Value  Reference Range  Comments

 

 POC-GLUCOSE METER (BEAKER)  190 mg/dL    TESTED AT [facility] [address] [facility]



 (test pxjr=7149)      [facility] [address]



PET, CARDIAC PERFUSION MULTIPLE STUDIES, REST AND [id number] 14:59:
00Reason for exam:-&gt;chest painFINAL REPORT PATIENT ID:   [id number] PROCEDURE:
      Rest/Stress MYOCARDIAL PERFUSION PET with regadenoson\XA9\ CPT CODE:     
     04389 INDICATION:      Chest pain, risk factors for CAD HISTORY:  Cardiac 
risk factors: Diabetes, hypertension, tobacco use. Other cardiovascular history
: No reported CAD. Recent cardiac symptoms: Chest pain, dyspnea. Current 
cardiovascular-related medications: Metoprolol. PROTOCOL:      Limited low-dose 
CT imaging was performed for attenuation correction. 40.0 mCi of Rb-82 chloride 
was injected iv at rest, and gated PET (positron emission tomography) images 
wereobtained. Subsequently, 40.1 mCi of Rb-82 chloride was injected iv at 
expected peak pharmacologic effect, and gated PET images were obtained.  
PRELIMINARY STRESS TEST DATA FROM NONINVASIVE CARDIOLOGY:Pharmacologic stress 
was by 10-second iv infusion of 0.4 mg of regadenoson. Radiotracer was 
eryquqfj12 seconds after start of stress. Heart rate was 70 beats/min at rest 
and 85 beats/min (61% of MPHR)at tracer injection. BP was 90/58 mmHg at rest 
and 105/52 mmHg at tracer injection. Stress was stopped for predetermined 
endpoint. The patient experienced abdominal discomfort; treatment was not 
required. Preliminary ECG evaluation revealed normal sinus rhythm, nonspecific 
interventricular conduction delay at rest and no ischemic changes with stress. (
Final ECG interpretation and other stress and monitoring data are reported 
separately by Cardiology.)  IMAGING FINDINGS:        Study quality is 
good.Images obtained after stress injection show decreased tracer uptake in the 
apex. Resting images showmostly improved tracer uptake in the apex. LV volume 
appears normal. RV volume appears normal. Gatedimages obtained immediately 
after stress show normal LV wall motion. Gated images obtained at rest show 
normal LV wall motion. LVEF at rest is 58%. LVEF at stress is 64%. IMPRESSION: 
  1. Abnormal study.  2. Appropriate pharmacologic stress.  3. Abnormal 
myocardial perfusion.  There is a mild severity, medium size, mildly reversible
, perfusion defect in all the apical segments of the LV.  4. Normal resting LV 
function. No deterioration of function is noted with pharmacologic stress.  5. 
Extracardiactracer distribution is normal.  6. No previous [facility] study for 
comparison.   NONINVASIVE RISK STRATIFICATION: The above findings are 
considered intermediate risk (1% to 3% annual mortality rate) based on the 
following criterion: - Mild/moderate resting left ventricular dysfunction (LVEF 
35% to 49%)- Stress-induced moderate perfusion defect without LV dilation or 
increasedlung intake (thallium-201)([facility] 2012;59(9):857-83.) Signed: Felton Erickson [facility] Verified Date/Time:  2018 14:59:03 Reading Location: [address] Reading Room    Electronically signed by: FELTON ERICKSON MD on 2018 02:59 PMPOCT-GLUCOSE [id number] 12:45:00





 Test Item  Value  Reference Range  Comments

 

 POC-GLUCOSE METER (Sellfy)  138 mg/dL    TESTED AT [facility] [address] [place] [address]



 (test vftn=5286)      [facility] [address]



LACTIC ACID, ARTERIAL, WHOLE [id number] 10:51:00





 Test Item  Value  Reference Range  Comments

 

 LACTATE BLOOD ARTERIAL (2) (Sellfy) (test  1.1 mmol/L  0.5-2.2  



 code=2874)      



Effective 2016: Units/Reference Range ChangeNew: 0.5-2.2 mmol/L  Previous: 5
-20 mg/dLANTI-NUCLEAR ANTIBODY (MARSHA)2018 10:51:00





 Test Item  Value  Reference Range  Comments

 

 ANTI-NUCLEAR ANTIBODY (MARSHA) (Sellfy) (test  Negative  Negative  



 code=418)      



VITAMIN D, 10-[id number] 06:31:00





 Test Item  Value  Reference Range  Comments

 

 VITAMIN D 25-OH (BEAKER) (test code=2764)  14.3 ng/mL  6.6-49.9  



Effective 10/11/2017: Reference Range ChangeNew: 6.6-49.9 ng/mL   Previous: 13.0
-47.8 ng/mLRecommended Vitamin D Target Range: 30.0-40.0 ng/mLTSH/FREE T4 IF 
[id number] 06:31:00





 Test Item  Value  Reference Range  Comments

 

 THYROID STIMULATING HORMONE (BEAKER) (test  1.55 uIU/mL  0.35-4.94  



 code=772)      



POCT-GLUCOSE [id number] 06:22:00





 Test Item  Value  Reference Range  Comments

 

 POC-GLUCOSE METER (BEAKER)  132 mg/dL    TESTED AT [facility] [address] [facility]



 (test kiof=8291)      [facility] [address]



HEPATITIS PANEL, [id number] 06:07:00





 Test Item  Value  Reference Range  Comments

 

 HEPATITIS A IGM ANTIBODY (BEAKER) (test  Nonreactive  Nonreactive  



 code=498)      

 

 HEPATITIS B CORE IGM ANTIBODY (BEAKER) (test  Nonreactive  Nonreactive  



 code=645)      

 

 HEPATITIS C ANTIBODY (BEAKER) (test code=367)  Nonreactive  Nonreactive  

 

 HEPATITIS B SURFACE ANTIGEN (2) (BEAKER) (test  Nonreactive  Nonreactive  



 code=2585)      



COMPLEMENT COMPONENT [id number] 05:52:00





 Test Item  Value  Reference Range  Comments

 

 C4 COMPLEMENT (BEAKER) (test code=394)  23 mg/dL  15-57  



COMPLEMENT COMPONENT [id number] 05:52:00





 Test Item  Value  Reference Range  Comments

 

 C3 COMPLEMENT (BEAKER) (test code=393)  82 mg/dL    



PTH, [id number] 05:43:00





 Test Item  Value  Reference Range  Comments

 

 PARATHYROID HORMONE INTACT (BEAKER) (test  247.5 pg/mL  8.5-72.5  



 code=577)      



URIC [id number] 05:39:00





 Test Item  Value  Reference Range  Comments

 

 URIC ACID (BEAKER) (test code=773)  3.9 mg/dL  2.6-7.2  



[id number] 05:39:00





 Test Item  Value  Reference Range  Comments

 

 MAGNESIUM (BEAKER) (test code=627)  1.9 mg/dL  1.6-2.6  



[id number] 05:39:00





 Test Item  Value  Reference Range  Comments

 

 PHOSPHORUS (BEAKER) (test code=604)  2.8 mg/dL  2.3-4.7  



COMPREHENSIVE METABOLIC [id number] 05:39:00





 Test Item  Value  Reference Range  Comments

 

 TOTAL PROTEIN (BEAKER)  5.6 gm/dL  6.0-8.3  



 (test code=770)      

 

 ALBUMIN (BEAKER) (test  2.8 g/dL  3.5-5.0  



 code=1145)      

 

 ALKALINE PHOSPHATASE  112 U/L    



 (BEAKER) (test code=346)      

 

 BILIRUBIN TOTAL (BEAKER)  0.5 mg/dL  0.2-1.2  



 (test code=377)      

 

 SODIUM (BEAKER) (test  137 meq/L  136-145  



 rdgw=591)      

 

 POTASSIUM (BEAKER) (test  3.4 meq/L  3.5-5.1  



 code=379)      

 

 CHLORIDE (BEAKER) (test  99 meq/L    



 code=382)      

 

 CO2 (BEAKER) (test  29 meq/L  22-29  



 code=355)      

 

 BLOOD UREA NITROGEN  31 mg/dL  7-21  



 (BEAKER) (test code=354)      

 

 CREATININE (BEAKER) (test  1.99 mg/dL  0.57-1.25  



 code=358)      

 

 GLUCOSE RANDOM (BEAKER)  111 mg/dL    



 (test code=652)      

 

 CALCIUM (BEAKER) (test  8.3 mg/dL  8.4-10.2  



 code=697)      

 

 AST (SGOT) (BEAKER) (test  22 U/L  5-34  



 code=353)      

 

 ALT (SGPT) (BEAKER) (test  17 U/L  6-55  



 code=347)      

 

 EGFR (BEAKER) (test  32 mL/min/1.73 sq m    ESTIMATED GFR IS NOT AS



 code=1092)      ACCURATE AS CREATININE



       CLEARANCE IN PREDICTING



       GLOMERULAR FILTRATION



       RATE. ESTIMATED GFR IS



       NOT APPLICABLE FOR



       DIALYSIS PATIENTS.



CREATINE KINASE (CK)2018 05:39:00





 Test Item  Value  Reference Range  Comments

 

 CREATINE KINASE TOTAL (BEAKER) (test code=380)  97 U/L    



CBC W/PLT COUNT &amp; AUTO [id number] 05:07:00





 Test Item  Value  Reference Range  Comments

 

 WHITE BLOOD CELL COUNT (BEAKER) (test code=775)  10.1 K/ L  3.5-10.5  

 

 RED BLOOD CELL COUNT (BEAKER) (test code=761)  3.27 M/ L  4.63-6.08  

 

 HEMOGLOBIN (BEAKER) (test code=410)  9.4 GM/DL  13.7-17.5  

 

 HEMATOCRIT (BEAKER) (test code=411)  28.4 %  40.1-51.0  

 

 MEAN CORPUSCULAR VOLUME (BEAKER) (test code=753)  86.9 fL  79.0-92.2  

 

 MEAN CORPUSCULAR HEMOGLOBIN (BEAKER) (test  28.7 pg  25.7-32.2  



 ujrb=057)      

 

 MEAN CORPUSCULAR HEMOGLOBIN CONC (BEAKER) (test  33.1 GM/DL  32.3-36.5  



 code=752)      

 

 RED CELL DISTRIBUTION WIDTH (BEAKER) (test  14.4 %  11.6-14.4  



 code=412)      

 

 PLATELET COUNT (BEAKER) (test code=756)  286 K/CU MM  150-450  

 

 MEAN PLATELET VOLUME (BEAKER) (test code=754)  9.6 fL  9.4-12.4  

 

 NUCLEATED RED BLOOD CELLS (BEAKER) (test  0 /100 WBC  0-0  



 code=413)      



CALCIUM, [id number] 05:06:00





 Test Item  Value  Reference Range  Comments

 

 CALCIUM IONIZED (BEAKER) (test code=698)  1.00 mmol/L  1.12-1.27  

 

 PH, BLOOD (BEAKER) (test code=1810)  7.46    



RHEUMATOID FACTOR [id number] 01:38:00





 Test Item  Value  Reference Range  Comments

 

 RHEUMATOID FACTOR TITER (BEAKER) (test code=2285)  :8    



RHEUMATOID FACTOR AB, REFLEX TO [id number] 01:38:00





 Test Item  Value  Reference Range  Comments

 

 RHEUMATOID FACTOR (BEAKER) (test code=573)  Positive    



POCT-GLUCOSE [id number] 22:04:00





 Test Item  Value  Reference Range  Comments

 

 POC-GLUCOSE METER (BEAKER)  212 mg/dL    TESTED AT [facility] [address] [facility]



 (test jlej=3421)      [facility] [address]



POCT-GLUCOSE [id number] 18:12:00





 Test Item  Value  Reference Range  Comments

 

 POC-GLUCOSE METER (BEAKER)  155 mg/dL    TESTED AT [facility] [address]



 (test hmka=9274)      [facility] [address]



HIV-1 ANTIGEN WITH HIV-1/2 [id number] 16:21:00





 Test Item  Value  Reference Range  Comments

 

 HIV-1 ANTIGEN WITH HIV 1\T\2 ANTIBODY (2)  Nonreactive  Nonreactive  



 (BEAKER) (test code=2586)      



TROPONIN -56-34 16:04:00





 Test Item  Value  Reference Range  Comments

 

 TROPONIN I (BEAKER) (test code=397)  0.06 ng/mL  0.00-0.03  



Troponin I (TnI) levels must be interpreted in the context of the presenting 
symptoms and the clinical findings. Elevated TnI levels indicate myocardial 
damage, but are not specific for ischemic heart disease. Elevated TnI levels 
are seen in patients with other cardiac conditions (including myocarditis and 
congestive heart failure), and slight TnI elevations occur in patients with 
other conditions, including sepsis, renal failure, acidosis, acute neurological 
disease, and persistent tachyarrhythmia.CREATININE, RANDOM [id number] 16:00
:00





 Test Item  Value  Reference Range  Comments

 

 CREATININE URINE (BEAKER) (test code=375)  < mg/dL    



Reference Range: No NormalsPROTHROMBIN TIME/[id number] 15:58:00





 Test Item  Value  Reference Range  Comments

 

 PROTIME (BEAKER) (test code=759)  16.0 seconds  11.7-14.7  

 

 INR (BEAKER) (test code=370)  1.3  <=5.9  



RECOMMENDED COUMADIN/WARFARIN INR THERAPY RANGESSTANDARD DOSE: 2.0 - 3.0   
Includes: PROPHYLAXIS forvenous thrombosis, systemic embolization; TREATMENT 
for venous thrombosis and/or pulmonary embolus.HIGH RISK: Target INR is 2.5-3.5 
for patients with mechanical heart valves.PROTEIN, RANDOM [id number] 15:56:
00





 Test Item  Value  Reference Range  Comments

 

 PROTEIN, URINE (BEAKER) (test code=1569)  94 mg/dL  0-14  



SODIUM, RANDOM [id number] 15:56:00





 Test Item  Value  Reference Range  Comments

 

 SODIUM URINE (BEAKER) (test code=243)  91 meq/L    



Reference Range: No NormalsURINALYSIS W/ [id number] 15:53:00





 Test Item  Value  Reference Range  Comments

 

 COLOR (BEAKER) (test code=470)  Light Yellow    

 

 CLARITY (BEAKER) (test code=469)  Clear    

 

 SPECIFIC GRAVITY UA (BEAKER) (test code=468)  1.003  1.001-1.035  

 

 PH UA (BEAKER) (test code=467)  8.0  5.0-8.0  

 

 PROTEIN UA (BEAKER) (test code=464)  100 mg/dL  Negative  

 

 GLUCOSE UA (BEAKER) (test code=365)  30 mg/dL  Negative  

 

 KETONES UA (BEAKER) (test code=371)  Negative  Negative  

 

 BILIRUBIN UA (BEAKER) (test code=462)  Negative  Negative  

 

 BLOOD UA (BEAKER) (test code=461)  Small  Negative  

 

 NITRITE UA (BEAKER) (test code=465)  Negative  Negative  

 

 LEUKOCYTE ESTERASE UA (BEAKER) (test code=466)  Negative  Negative  

 

 UROBILINOGEN UA (BEAKER) (test code=463)  0.2 mg/dL  0.2-1.0  

 

 RBC UA (BEAKER) (test code=519)  1 /HPF    

 

 WBC UA (BEAKER) (test code=520)  4 /HPF    

 

 BACTERIA (BEAKER) (test zxio=778)  Rare    

 

 MUCUS (BEAKER) (test code=1574)  Rare    

 

 SQUAMOUS EPITHELIAL (BEAKER) (test code=516)  < /HPF    

 

 SOURCE(BEAKER) (test code=2795)  Urine, [place]    



BASIC METABOLIC [id number] 15:52:00





 Test Item  Value  Reference Range  Comments

 

 SODIUM (BEAKER) (test  137 meq/L  136-145  



 dhmo=901)      

 

 POTASSIUM (BEAKER) (test  3.3 meq/L  3.5-5.1  



 code=379)      

 

 CHLORIDE (BEAKER) (test  100 meq/L    



 code=382)      

 

 CO2 (BEAKER) (test  26 meq/L  22-29  



 code=355)      

 

 BLOOD UREA NITROGEN  23 mg/dL  7-21  



 (BEAKER) (test code=354)      

 

 CREATININE (BEAKER) (test  1.45 mg/dL  0.57-1.25  



 code=358)      

 

 GLUCOSE RANDOM (BEAKER)  120 mg/dL    



 (test code=652)      

 

 CALCIUM (BEAKER) (test  8.1 mg/dL  8.4-10.2  



 code=697)      

 

 EGFR (BEAKER) (test  47 mL/min/1.73 sq m    ESTIMATED GFR IS NOT AS



 code=1092)      ACCURATE AS CREATININE



       CLEARANCE IN PREDICTING



       GLOMERULAR FILTRATION



       RATE. ESTIMATED GFR IS



       NOT APPLICABLE FOR



       DIALYSIS PATIENTS.



POCT-GLUCOSE [id number] 12:09:00





 Test Item  Value  Reference Range  Comments

 

 POC-GLUCOSE METER (BEAKER)  87 mg/dL    TESTED AT [address]



 (test uwdq=8091)      [place] [id number]



POCT-GLUCOSE [id number] 11:21:00





 Test Item  Value  Reference Range  Comments

 

 POC-GLUCOSE METER (BEAKER)  90 mg/dL    TESTED AT [address]



 (test rkai=3883)      [place] [id number]



POCT-GLUCOSE [id number] 11:12:00





 Test Item  Value  Reference Range  Comments

 

 POC-GLUCOSE METER (BEAKER)  111 mg/dL    TESTED AT [address]



 (test gdwf=3610)      [place] [address]



HEMOGLOBIN [id number] 09:59:00





 Test Item  Value  Reference Range  Comments

 

 HEMOGLOBIN A1C (BEAKER) (test code=368)  6.1 %  4.3-6.1  



CREATINE KINASE (CK), TOTAL AND [id number] 08:02:00





 Test Item  Value  Reference Range  Comments

 

 CREATINE KINASE TOTAL (BEAKER) (test code=380)  141 U/L    

 

 CREATINE KINASE-MB (BEAKER) (test code=750)  7.9 ng/mL  0.0-6.6  

 

 CREATINE KINASE-MB INDEX (BEAKER) (test code=395)  5.6 %    



CK-MB Reference Range:&lt;6.7      Normal6.7-10.0  Borderline&gt;10.0     
AbnormalTROPONIN -47-19 08:02:00





 Test Item  Value  Reference Range  Comments

 

 TROPONIN I (BEAKER) (test code=397)  0.05 ng/mL  0.00-0.03  








 Test Item  Value  Reference Range  Comments

 

 POTASSIUM (BEAKER) (test code=379)  4.6 meq/L  3.5-5.1  



CALCIUM, [id number] 07:49:00





 Test Item  Value  Reference Range  Comments

 

 CALCIUM IONIZED (BEAKER) (test code=698)  0.99 mmol/L  1.12-1.27  

 

 PH, BLOOD (BEAKER) (test code=1810)  7.45    



LACTIC ACID, VENOUS, WHOLE [id number] 06:58:00





 Test Item  Value  Reference Range  Comments

 

 LACTATE BLOOD VENOUS (2)  3.8 mmol/L  0.5-2.2  Specimen slightly hemolyzed



 (BEAKER) (test code=2872)      



Effective 2016: Units/Reference Range ChangeNew: 0.5-2.2 mmol/L  Previous: 5
-20 mg/dLCOMPREHENSIVE METABOLIC [id number] 04:38:00





 Test Item  Value  Reference Range  Comments

 

 TOTAL PROTEIN (BEAKER)  5.7 gm/dL  6.0-8.3  



 (test code=770)      

 

 ALBUMIN (BEAKER) (test  2.9 g/dL  3.5-5.0  



 code=1145)      

 

 ALKALINE PHOSPHATASE  89 U/L    



 (BEAKER) (test code=346)      

 

 BILIRUBIN TOTAL (BEAKER)  0.4 mg/dL  0.2-1.2  



 (test code=377)      

 

 SODIUM (BEAKER) (test  135 meq/L  136-145  



 mrwm=541)      

 

 POTASSIUM (BEAKER) (test  4.6 meq/L  3.5-5.1  



 code=379)      

 

 CHLORIDE (BEAKER) (test  98 meq/L    



 code=382)      

 

 CO2 (BEAKER) (test  20 meq/L  22-29  



 code=355)      

 

 BLOOD UREA NITROGEN  59 mg/dL  7-21  



 (BEAKER) (test code=354)      

 

 CREATININE (BEAKER) (test  2.81 mg/dL  0.57-1.25  



 code=358)      

 

 GLUCOSE RANDOM (BEAKER)  105 mg/dL    



 (test code=652)      

 

 CALCIUM (BEAKER) (test  8.6 mg/dL  8.4-10.2  



 code=697)      

 

 AST (SGOT) (BEAKER) (test  20 U/L  5-34  



 code=353)      

 

 ALT (SGPT) (BEAKER) (test  19 U/L  6-55  



 code=347)      

 

 EGFR (BEAKER) (test  22 mL/min/1.73 sq m    ESTIMATED GFR IS NOT AS



 code=1092)      ACCURATE AS CREATININE



       CLEARANCE IN PREDICTING



       GLOMERULAR FILTRATION



       RATE. ESTIMATED GFR IS



       NOT APPLICABLE FOR



       DIALYSIS PATIENTS.



B-TYPE NATRIURETIC FACTOR (BNP)2018 04:37:00





 Test Item  Value  Reference Range  Comments

 

 B-TYPE NATRIURETIC PEPTIDE (BEAKER) (test  561 pg/mL  0-100  



 code=700)      



[id number] 04:37:00





 Test Item  Value  Reference Range  Comments

 

 PHOSPHORUS (BEAKER) (test code=604)  6.1 mg/dL  2.3-4.7  



[id number] 04:37:00





 Test Item  Value  Reference Range  Comments

 

 MAGNESIUM (BEAKER) (test code=627)  1.8 mg/dL  1.6-2.6  



CBC W/PLT COUNT &amp; AUTO [id number] 04:18:00





 Test Item  Value  Reference Range  Comments

 

 WHITE BLOOD CELL COUNT (BEAKER) (test code=775)  8.2 K/ L  3.5-10.5  

 

 RED BLOOD CELL COUNT (BEAKER) (test code=761)  3.15 M/ L  4.63-6.08  

 

 HEMOGLOBIN (BEAKER) (test code=410)  9.0 GM/DL  13.7-17.5  

 

 HEMATOCRIT (BEAKER) (test code=411)  27.2 %  40.1-51.0  

 

 MEAN CORPUSCULAR VOLUME (BEAKER) (test code=753)  86.3 fL  79.0-92.2  

 

 MEAN CORPUSCULAR HEMOGLOBIN (BEAKER) (test  28.6 pg  25.7-32.2  



 lsis=180)      

 

 MEAN CORPUSCULAR HEMOGLOBIN CONC (BEAKER) (test  33.1 GM/DL  32.3-36.5  



 code=752)      

 

 RED CELL DISTRIBUTION WIDTH (BEAKER) (test  14.5 %  11.6-14.4  



 code=412)      

 

 PLATELET COUNT (BEAKER) (test code=756)  319 K/CU MM  150-450  

 

 MEAN PLATELET VOLUME (BEAKER) (test code=754)  9.4 fL  9.4-12.4  

 

 NUCLEATED RED BLOOD CELLS (BEAKER) (test  0 /100 WBC  0-0  



 code=413)      

 

 NEUTROPHILS RELATIVE PERCENT (BEAKER) (test  69 %    



 code=429)      

 

 LYMPHOCYTES RELATIVE PERCENT (BEAKER) (test  10 %    



 code=430)      

 

 MONOCYTES RELATIVE PERCENT (BEAKER) (test  19 %    



 code=431)      

 

 EOSINOPHILS RELATIVE PERCENT (BEAKER) (test  2 %    



 code=432)      

 

 BASOPHILS RELATIVE PERCENT (BEAKER) (test  0 %    



 code=437)      

 

 NEUTROPHILS ABSOLUTE COUNT (BEAKER) (test  5.68 K/ L  1.78-5.38  



 code=670)      

 

 LYMPHOCYTES ABSOLUTE COUNT (BEAKER) (test  0.81 K/ L  1.32-3.57  



 code=414)      

 

 MONOCYTES ABSOLUTE COUNT (BEAKER) (test  1.55 K/ L  0.30-0.82  



 code=415)      

 

 EOSINOPHILS ABSOLUTE COUNT (BEAKER) (test  0.14 K/ L  0.04-0.54  



 code=416)      

 

 BASOPHILS ABSOLUTE COUNT (BEAKER) (test  0.02 K/ L  0.01-0.08  



 code=417)      

 

 IMMATURE GRANULOCYTES-RELATIVE PERCENT (BEAKER)  1 %  0-1  



 (test code=2801)      



[id number] 02:41:00





 Test Item  Value  Reference Range  Comments

 

 POTASSIUM (BEAKER) (test code=379)  4.7 meq/L  3.5-5.1  



LACTIC ACID, VENOUS, WHOLE [id number] 00:45:00





 Test Item  Value  Reference Range  Comments

 

 LACTATE BLOOD VENOUS (2)  7.6 mmol/L  0.5-2.2  Specimen slightly hemolyzed



 (BEAKER) (test code=2872)      



Effective 2016: Units/Reference Range ChangeNew: 0.5-2.2 mmol/L  Previous: 5
-20 mg/dLPOCT-GLUCOSE [id number] 00:27:00





 Test Item  Value  Reference Range  Comments

 

 POC-GLUCOSE METER (BEAKER)  159 mg/dL    TESTED AT [facility] [address] [facility]



 (test jask=2949)      [facility] [address]



CREATINE KINASE (CK), TOTAL AND [id number] 22:57:00





 Test Item  Value  Reference Range  Comments

 

 CREATINE KINASE TOTAL (BEAKER) (test code=380)  175 U/L    

 

 CREATINE KINASE-MB (BEAKER) (test code=750)  12.3 ng/mL  0.0-6.6  

 

 CREATINE KINASE-MB INDEX (BEAKER) (test code=395)  7.0 %    



CK-MB Reference Range:&lt;6.7      Normal6.7-10.0  Borderline&gt;10.0     
AbnormalTROPONIN -21-46 22:57:00





 Test Item  Value  Reference Range  Comments

 

 TROPONIN I (BEAKER) (test code=397)  0.04 ng/mL  0.00-0.03  








 Test Item  Value  Reference Range  Comments

 

 POTASSIUM (BEAKER) (test code=379)  4.7 meq/L  3.5-5.1  



POCT-GLUCOSE [id number] 19:28:00





 Test Item  Value  Reference Range  Comments

 

 POC-GLUCOSE METER (BEAKER)  117 mg/dL    TESTED AT [facility] [address] [facility]



 (test bzqe=3732)      [facility] [address]



BASIC METABOLIC [id number] 19:07:00





 Test Item  Value  Reference Range  Comments

 

 SODIUM (BEAKER) (test  133 meq/L  136-145  



 banv=776)      

 

 POTASSIUM (BEAKER) (test  4.6 meq/L  3.5-5.1  



 code=379)      

 

 CHLORIDE (BEAKER) (test  95 meq/L    



 code=382)      

 

 CO2 (BEAKER) (test  19 meq/L  22-29  



 code=355)      

 

 BLOOD UREA NITROGEN  57 mg/dL  7-21  



 (BEAKER) (test code=354)      

 

 CREATININE (BEAKER) (test  2.64 mg/dL  0.57-1.25  



 code=358)      

 

 GLUCOSE RANDOM (BEAKER)  131 mg/dL    



 (test code=652)      

 

 CALCIUM (BEAKER) (test  9.1 mg/dL  8.4-10.2  



 code=697)      

 

 EGFR (BEAKER) (test  23 mL/min/1.73 sq m    ESTIMATED GFR IS NOT AS



 code=1092)      ACCURATE AS CREATININE



       CLEARANCE IN PREDICTING



       GLOMERULAR FILTRATION



       RATE. ESTIMATED GFR IS



       NOT APPLICABLE FOR



       DIALYSIS PATIENTS.



Call [id number] ACID, VENOUS, WHOLE [id number] 19:00:00





 Test Item  Value  Reference Range  Comments

 

 LACTATE BLOOD VENOUS (2) (BEAKER) (test  5.8 mmol/L  0.5-2.2  



 code=2872)      



Effective 2016: Units/Reference Range ChangeNew: 0.5-2.2 mmol/L  Previous: 5
-20 mg/dLU/S, RENAL, [id number] 16:16:00Reason for exam:-&gt;akiShould 
this be performed at the bedside?-&gt;YesFINAL REPORT PATIENT ID:   [id number] 
Ultrasound of the Kidneys Clinical History:  marianne Discussion: Sonographic 
evaluation of the kidneys is performed. Right kidney:  10 x 4.7 x 4 cm, with 
cortical thickness of 1.1 cm.  Normal cortical echogenicity.  No mass.  No 
shadowing calculus. No hydronephrosis. Left kidney: 10.2 x 4.5 x 4.7 cm, with 
cortical thickness of 1.3 cm.  Normal cortical echogenicity.  [place].  No 
shadowing calculus.  No hydronephrosis. Limited doppler evaluation of right 
main renal artery and vein demonstrate patency. Left renal vessels are poorly 
visualized due to patient's body habitus. Bladder:  Decompressed with Grimes 
catheter. Impression: No hydronephrosis. Signed: [place] Verified Date
/Time:  2018 16:16:10 Reading Location: [address] Ultrasound Reading 
Room      Electronically signed by: RANCHO RASHEED M.D. on 2018 04:16 
PMCREATINE KINASE (CK), TOTAL AND [id number] 15:14:00





 Test Item  Value  Reference Range  Comments

 

 CREATINE KINASE TOTAL (BEAKER) (test code=380)  173 U/L    

 

 CREATINE KINASE-MB (BEAKER) (test code=750)  11.1 ng/mL  0.0-6.6  

 

 CREATINE KINASE-MB INDEX (BEAKER) (test code=395)  6.4 %    



CK-MB Reference Range:&lt;6.7      Normal6.7-10.0  Borderline&gt;10.0     
AbnormalTROPONIN -40-55 15:14:00





 Test Item  Value  Reference Range  Comments

 

 TROPONIN I (BEAKER) (test code=397)  0.04 ng/mL  0.00-0.03  



Troponin I (TnI) levels must be interpreted in the context of the presenting 
symptoms and the clinical findings. Elevated TnI levels indicate myocardial 
damage, but are not specific for ischemic heart disease. Elevated TnI levels 
are seen in patients with other cardiac conditions (including myocarditis and 
congestive heart failure), and slight TnI elevations occur in patients with 
other conditions, including sepsis, renal failure, acidosis, acute neurological 
disease, and persistent tachyarrhythmia.BASIC METABOLIC [id number] 15:11:00





 Test Item  Value  Reference Range  Comments

 

 SODIUM (BEAKER) (test  137 meq/L  136-145  



 rfyr=775)      

 

 POTASSIUM (BEAKER) (test  4.4 meq/L  3.5-5.1  



 code=379)      

 

 CHLORIDE (BEAKER) (test  97 meq/L    



 code=382)      

 

 CO2 (BEAKER) (test  17 meq/L  22-29  



 code=355)      

 

 BLOOD UREA NITROGEN  55 mg/dL  7-21  



 (BEAKER) (test code=354)      

 

 CREATININE (BEAKER) (test  2.48 mg/dL  0.57-1.25  



 code=358)      

 

 GLUCOSE RANDOM (BEAKER)  66 mg/dL    



 (test code=652)      

 

 CALCIUM (BEAKER) (test  9.3 mg/dL  8.4-10.2  



 code=697)      

 

 EGFR (BEAKER) (test  25 mL/min/1.73 sq m    ESTIMATED GFR IS NOT AS



 code=1092)      ACCURATE AS CREATININE



       CLEARANCE IN PREDICTING



       GLOMERULAR FILTRATION



       RATE. ESTIMATED GFR IS



       NOT APPLICABLE FOR



       DIALYSIS PATIENTS.



[id number] 15:06:00





 Test Item  Value  Reference Range  Comments

 

 PHOSPHORUS (BEAKER) (test code=604)  5.4 mg/dL  2.3-4.7  



[id number] 15:06:00





 Test Item  Value  Reference Range  Comments

 

 MAGNESIUM (BEAKER) (test code=627)  2.8 mg/dL  1.6-2.6  



URINALYSIS W/ REFLEX URINE [id number] 14:00:00





 Test Item  Value  Reference Range  Comments

 

 COLOR (BEAKER) (test code=470)  Yellow    

 

 CLARITY (BEAKER) (test code=469)  Hazy    

 

 SPECIFIC GRAVITY UA (BEAKER) (test code=468)  1.013  1.001-1.035  

 

 PH UA (BEAKER) (test code=467)  6.5  5.0-8.0  

 

 PROTEIN UA (BEAKER) (test code=464)  600 mg/dL  Negative  

 

 GLUCOSE UA (BEAKER) (test code=365)  200 mg/dL  Negative  

 

 KETONES UA (BEAKER) (test code=371)  Negative  Negative  

 

 BILIRUBIN UA (BEAKER) (test code=462)  Negative  Negative  

 

 BLOOD UA (BEAKER) (test code=461)  Small  Negative  

 

 NITRITE UA (BEAKER) (test code=465)  Negative  Negative  

 

 LEUKOCYTE ESTERASE UA (BEAKER) (test code=466)  Moderate  Negative  

 

 UROBILINOGEN UA (BEAKER) (test code=463)  0.2 mg/dL  0.2-1.0  

 

 RBC UA (BEAKER) (test code=519)  2 /HPF    

 

 WBC UA (BEAKER) (test code=520)  3 /HPF    

 

 SQUAMOUS EPITHELIAL (BEAKER) (test code=516)  < /HPF    

 

 GRANULAR CASTS (BEAKER) (test code=515)  2 /LPF    

 

 SOURCE(BEAKER) (test code=2795)      



CBC W/PLT COUNT &amp; AUTO [id number] 12:50:00





 Test Item  Value  Reference Range  Comments

 

 WHITE BLOOD CELL COUNT (BEAKER) (test code=775)  14.7 K/ L  3.5-10.5  

 

 RED BLOOD CELL COUNT (BEAKER) (test code=761)  3.47 M/ L  4.63-6.08  

 

 HEMOGLOBIN (BEAKER) (test code=410)  10.2 GM/DL  13.7-17.5  

 

 HEMATOCRIT (BEAKER) (test code=411)  32.0 %  40.1-51.0  

 

 MEAN CORPUSCULAR VOLUME (BEAKER) (test code=753)  92.2 fL  79.0-92.2  

 

 MEAN CORPUSCULAR HEMOGLOBIN (BEAKER) (test  29.4 pg  25.7-32.2  



 dlwr=143)      

 

 MEAN CORPUSCULAR HEMOGLOBIN CONC (BEAKER) (test  31.9 GM/DL  32.3-36.5  



 code=752)      

 

 RED CELL DISTRIBUTION WIDTH (BEAKER) (test  14.7 %  11.6-14.4  



 code=412)      

 

 PLATELET COUNT (BEAKER) (test code=756)  450 K/CU MM  150-450  

 

 MEAN PLATELET VOLUME (BEAKER) (test code=754)  9.9 fL  9.4-12.4  

 

 NUCLEATED RED BLOOD CELLS (BEAKER) (test  0 /100 WBC  0-0  



 code=413)      



(CELLAVISION MANUAL DIFF)2018 12:50:00





 Test Item  Value  Reference Range  Comments

 

 NEUTROPHILS - REL (CELLAVISION)(BEAKER) (test  78 %    



 code=2816)      

 

 LYMPHOCYTES - REL (CELLAVISION)(BEAKER) (test  8 %    



 code=2817)      

 

 MONOCYTES - REL (CELLAVISION)(BEAKER) (test  10 %    



 code=2818)      

 

 BANDS - REL (CELLAVISION)(BEAKER) (test  1 %  0-10  



 code=2826)      

 

 ATYPICAL LYMPHOCYTES - REL (CELLAVISION)(BEAKER)  3 %  0-0  



 (test code=2829)      

 

 NEUTROPHILS - ABS (CELLAVISION)(BEAKER) (test  11.47 K/ul  1.78-5.38  



 code=2830)      

 

 LYMPHOCYTES - ABS (CELLAVISION)(BEAKER) (test  1.18 K/ul  1.32-3.57  



 code=2831)      

 

 MONOCYTES - ABS (CELLAVISION)(BEAKER) (test  1.47 K/uL  0.30-0.82  



 code=2832)      

 

 BANDS - ABS (CELLAVISION)(BEAKER) (test  0.15 K/uL  0.00-0.80  



 code=2840)      

 

 ATYPICAL LYMPHOCYTES - ABS (CELLAVISION)(BEAKER)  0.44 K/uL  0.00-0.00  



 (test code=2858)      

 

 TOTAL COUNTED (BEAKER) (test code=1351)  100    

 

 PLT MORPHOLOGY (BEAKER) (test code=486)  Normal    

 

 SMUDGE CELLS (BEAKER) (test code=1371)  Present    

 

 POIKILOCYTES (BEAKER) (test code=966)  2+ moderate    

 

 HI CELLS (BEAKER) (test code=474)  2+ moderate    

 

 PLATELET CONCENTRATION (CELLAVISION)(BEAKER)  Adequate    



 (test code=3438)      



Received comment: User comments: Slide comments:HEPATITIS B SURFACE [id number]
[id number] 12:11:00





 Test Item  Value  Reference Range  Comments

 

 HEPATITIS B SURFACE ANTIGEN (2) (BEAKER) (test  Nonreactive  Nonreactive  



 code=4775)      



HEPATITIS B CORE ANTIBODY, [id number] 12:11:00





 Test Item  Value  Reference Range  Comments

 

 HEPATITIS B CORE IGM ANTIBODY (BEAKER) (test  Nonreactive  Nonreactive  



 code=645)      



HEPATITIS C [id number] 12:11:00





 Test Item  Value  Reference Range  Comments

 

 HEPATITIS C ANTIBODY (BEAKER) (test code=367)  Nonreactive  Nonreactive  



HEPATITIS B CORE ANTIBODY, [id number] 12:11:00





 Test Item  Value  Reference Range  Comments

 

 HEPATITIS B CORE TOTAL ANTIBODY (BEAKER) (test  Nonreactive  Nonreactive  



 code=497)      



POCT-GLUCOSE [id number] 12:05:00





 Test Item  Value  Reference Range  Comments

 

 POC-GLUCOSE METER (BEAKER)  179 mg/dL    TESTED AT [facility] [address] [facility]



 (test ghyx=2979)      [facility] [address]



LACTIC ACID, VENOUS, WHOLE [id number] 11:18:00





 Test Item  Value  Reference Range  Comments

 

 LACTATE BLOOD VENOUS (2) (BEAKER) (test  8.0 mmol/L  0.5-2.2  



 code=2872)      



Effective 2016: Units/Reference Range ChangeNew: 0.5-2.2 mmol/L  Previous: 5
-20 mg/[id number] 11:16:00





 Test Item  Value  Reference Range  Comments

 

 POTASSIUM (BEAKER) (test code=379)  5.3 meq/L  3.5-5.1  



BLOOD GAS, [id number] 10:48:00





 Test Item  Value  Reference Range  Comments

 

 PH VENOUS (BEAKER) (test code=701)  7.49  7.32-7.42  

 

 PCO2 VENOUS (BEAKER) (test code=755)  24 mmHg  41-51  

 

 PO2 VENOUS (BEAKER) (test code=702)  41 mmHg  25-40  

 

 O2 SATURATION VENOUS (BEAKER) (test code=703)  86.8 %  40.0-70.0  

 

 HCO3 VENOUS (BEAKER) (test code=705)  18 mmol/L  21-29  

 

 BASE EXCESS VENOUS (BEAKER) (test code=704)  -4.6 mmol/L  -2.0-3.0  

 

 PATIENT TEMPERATURE (BEAKER) (test code=1818)  34.5 C    

 

 FIO2 (BEAKER) (test code=1819)  36.0 %    



[id number] 10:27:00





 Test Item  Value  Reference Range  Comments

 

 SODIUM (BEAKER) (test code=381)  133 meq/L  136-145  

 

 POTASSIUM (BEAKER) (test code=379)  6.8 meq/L  3.5-5.1  

 

 CHLORIDE (BEAKER) (test code=382)  102 meq/L    

 

 CO2 (BEAKER) (test code=355)  8 meq/L  22-29  



Call [id number] FUNCTION [id number] 10:23:00





 Test Item  Value  Reference Range  Comments

 

 TOTAL PROTEIN (BEAKER) (test code=770)  6.3 gm/dL  6.0-8.3  

 

 ALBUMIN (BEAKER) (test code=1145)  3.2 g/dL  3.5-5.0  

 

 BILIRUBIN TOTAL (BEAKER) (test code=377)  0.4 mg/dL  0.2-1.2  

 

 BILIRUBIN DIRECT (BEAKER) (test code=706)  0.2 mg/dL  0.1-0.5  

 

 ALKALINE PHOSPHATASE (BEAKER) (test code=346)  95 U/L    

 

 AST (SGOT) (BEAKER) (test code=353)  20 U/L  5-34  

 

 ALT (SGPT) (BEAKER) (test code=347)  21 U/L  6-55  



Call [id number] 10:14:00





 Test Item  Value  Reference Range  Comments

 

 PHOSPHORUS (BEAKER) (test code=604)  10.6 mg/dL  2.3-4.7  



[id number] 10:12:00





 Test Item  Value  Reference Range  Comments

 

 MAGNESIUM (BEAKER) (test code=627)  2.2 mg/dL  1.6-2.6  



BLOOD GAS, [id number] 09:10:00





 Test Item  Value  Reference Range  Comments

 

 PH VENOUS (BEAKER) (test code=701)  7.24  7.32-7.42  

 

 PCO2 VENOUS (BEAKER) (test code=755)  24 mmHg  41-51  

 

 PO2 VENOUS (BEAKER) (test code=702)  54 mmHg  25-40  

 

 O2 SATURATION VENOUS (BEAKER) (test code=703)  86.6 %  40.0-70.0  

 

 HCO3 VENOUS (BEAKER) (test code=705)  10 mmol/L  21-29  

 

 BASE EXCESS VENOUS (BEAKER) (test code=704)  -16.0 mmol/L  -2.0-3.0  

 

 PATIENT TEMPERATURE (BEAKER) (test code=1818)  35.6 C    

 

 FIO2 (BEAKER) (test code=1819)  36.0 %    



RAD, CHEST, 1 VIEW, NON [id number] 08:59:00Reason for exam:-&gt;edemaShould 
this be performed at the bedside?-&gt;YesFINAL REPORT PATIENT ID:   [id number] 
Chest one view Discussion: Semiconfluent bilateral pulmonary opacities 
presumably representing edema. There is some suspected scattered nodularity 
that probably represents granulomas. No gross effusion or pneumothorax. Right 
IJ line in place. Heart size normal. Signed: Chente Delarosa Verified Date
/Time:  2018 08:59:05 Reading Location: [facility] Radiology Reading 
Room      Electronically signed by: CHENTE DELAROSA M.D. on 2018 08:59 
[id number] 07:53:00





 Test Item  Value  Reference Range  Comments

 

 PHOSPHORUS (BEAKER) (test code=604)  10.8 mg/dL  2.3-4.7  



CALCIUM, [id number] 07:12:00





 Test Item  Value  Reference Range  Comments

 

 CALCIUM IONIZED (BEAKER) (test code=698)  1.14 mmol/L  1.12-1.27  

 

 PH, BLOOD (BEAKER) (test code=1810)  7.11    



BASIC METABOLIC [id number] 07:04:00





 Test Item  Value  Reference Range  Comments

 

 SODIUM (BEAKER) (test  129 meq/L  136-145  



 atjg=413)      

 

 POTASSIUM (BEAKER) (test  7.3 meq/L  3.5-5.1  



 code=379)      

 

 CHLORIDE (BEAKER) (test  102 meq/L    



 code=382)      

 

 CO2 (BEAKER) (test  6 meq/L  22-29  



 code=355)      

 

 BLOOD UREA NITROGEN  123 mg/dL  7-21  



 (BEAKER) (test code=354)      

 

 CREATININE (BEAKER) (test  4.47 mg/dL  0.57-1.25  



 code=358)      

 

 GLUCOSE RANDOM (BEAKER)  222 mg/dL    



 (test code=652)      

 

 CALCIUM (BEAKER) (test  9.5 mg/dL  8.4-10.2  



 code=697)      

 

 EGFR (BEAKER) (test  13 mL/min/1.73 sq m    ESTIMATED GFR IS NOT AS



 code=1092)      ACCURATE AS CREATININE



       CLEARANCE IN PREDICTING



       GLOMERULAR FILTRATION



       RATE. ESTIMATED GFR IS



       NOT APPLICABLE FOR



       DIALYSIS PATIENTS.



POCT-GLUCOSE [id number] 06:57:00





 Test Item  Value  Reference Range  Comments

 

 POC-GLUCOSE METER (BEAKER)  203 mg/dL    TESTED AT [facility] [address] [facility]



 (test ayya=8339)      [facility] [address]



CREATINE KINASE (CK), TOTAL AND [id number] 06:54:00





 Test Item  Value  Reference Range  Comments

 

 CREATINE KINASE TOTAL (BEAKER) (test code=380)  94 U/L    

 

 CREATINE KINASE-MB (BEAKER) (test code=750)  7.4 ng/mL  0.0-6.6  

 

 CREATINE KINASE-MB INDEX (BEAKER) (test code=395)  7.9 %    



CK-MB Reference Range:&lt;6.7      Normal6.7-10.0  Borderline&gt;10.0     
AbnormalTROPONIN -93-66 06:54:00





 Test Item  Value  Reference Range  Comments

 

 TROPONIN I (BEAKER) (test code=397)  0.02 ng/mL  0.00-0.03  








 Test Item  Value  Reference Range  Comments

 

 MAGNESIUM (BEAKER) (test code=627)  2.3 mg/dL  1.6-2.6  



B-TYPE NATRIURETIC FACTOR (BNP)2018 06:38:00





 Test Item  Value  Reference Range  Comments

 

 B-TYPE NATRIURETIC PEPTIDE (BEAKER) (test  866 pg/mL  0-100  



 code=700)      



PROTHROMBIN [id number] 06:36:00





 Test Item  Value  Reference Range  Comments

 

 PROTIME (BEAKER) (test code=759)  16.1 seconds  11.7-14.7  

 

 INR (BEAKER) (test code=370)  1.3  <=5.9  



RECOMMENDED COUMADIN/WARFARIN INR THERAPY RANGESSTANDARD DOSE: 2.0 - 3.0   
Includes: PROPHYLAXIS forvenous thrombosis, systemic embolization; TREATMENT 
for venous thrombosis and/or pulmonary embolus.HIGH RISK: Target INR is 2.5-3.5 
for patients with mechanical heart valves.LACTIC ACID, VENOUS, WHOLE [id number] 06:15:00





 Test Item  Value  Reference Range  Comments

 

 LACTATE BLOOD VENOUS (2) (BEAKER) (test  8.9 mmol/L  0.5-2.2  



 code=2872)      



Effective 2016: Units/Reference Range ChangeNew: 0.5-2.2 mmol/L  Previous: 5
-20 mg/dL

## 2018-07-18 NOTE — ER
Nurse's Notes                                                                                     

 [facility]                                                                

Name: Pinky Avitia                                                                                 

Age: 83 yrs                                                                                       

Sex: Male                                                                                         

: 1935                                                                                   

MRN: [id number]                                                                                   

Arrival Date: 2018                                                                          

Time: 16:16                                                                                       

Account#: [id number]                                                                            

Bed 30                                                                                            

Private MD:                                                                                       

Diagnosis: Hyperkalemia;pancreatitis                                                              

                                                                                                  

Presentation:                                                                                     

                                                                                             

16:17 Presenting complaint: EMS states: " Pt's wife called EMS for increased swelling of      ph  

      lower extremities, ronna legs and ronna lower arms noted to be swollen, breath sounds           

      diminished in bases, pt denies pain or SOB, was admitted recently for renal failure and     

      received emergent dialysis then left AMA.". Transition of care: patient was not             

      received from another setting of care. Onset of symptoms was 2018. Risk            

      Assessment: Do you want to hurt yourself or someone else? Patient reports no desire to      

      harm self or others. Initial Sepsis Screen: Does the patient meet any 2 criteria? No.       

      Patient's initial sepsis screen is negative. Does the patient have a suspected source       

      of infection? No. Patient's initial sepsis screen is negative. Care prior to arrival:       

      None.                                                                                       

16:17 Method Of Arrival: EMS: [facility]  

16:17 Acuity: EVELIA 3                                                                           ph  

                                                                                                  

Historical:                                                                                       

- Allergies:                                                                                      

16:21 adhesive tape-silicones;                                                                ph  

- Home Meds:                                                                                      

16:28 amlodipine 10 mg tab 1 tab once daily [Active]; gabapentin 300 mg Oral cap 1 cap at     kr2 

      bedtime [Active]; Lasix 40 mg Oral tab once daily [Active]; Lipitor Oral [Active];          

      metformin 1,000 mg Oral tab 2 times per day [Active]; metoprolol tartrate 25 mg Oral        

      tab 0.5 tab 2 times per day [Active]; multivitamin Oral daily [Active]; Vitamin D Oral      

      400 unit daily [Active];                                                                    

- PMHx:                                                                                           

16:21 Diabetes - NIDDM; Hyperlipidemia; Hypertension; muscle atrophy; paralysis; renal        ph  

      failure;                                                                                    

                                                                                                  

- Immunization history:: Adult Immunizations unknown.                                             

- Social history:: Smoking status: Patient/guardian denies using tobacco.                         

- Ebola Screening: : No symptoms or risks identified at this time.                                

                                                                                                  

                                                                                                  

Screenin:26 Abuse screen: Denies threats or abuse. Denies injuries from another. Nutritional        kr2 

      screening: No deficits noted. Tuberculosis screening: No symptoms or risk factors           

      identified. Fall Risk Gait- Impaired (20 pts.).                                             

                                                                                                  

Assessment:                                                                                       

16:22 General: Appears in no apparent distress. comfortable, obese, Behavior is calm,         kr2 

      cooperative, appropriate for age. Pain: Denies pain. Neuro: Level of Consciousness is       

      awake, alert, obeys commands, Oriented to person, place, time, situation, Appropriate       

      for age. Cardiovascular: Heart tones S1 S2 Patient's skin is warm and dry. Rhythm is        

      sinus bradycardia. Cardiovascular: Edema is 2+ to bilateral upper extremities Chest         

      pain is denied. Respiratory: Airway is patent Respiratory effort is even, unlabored,        

      Respiratory pattern is regular, symmetrical, Breath sounds are diminished bilaterally.      

      GI: Abdomen is non-distended, obese. EENT: Oral mucosa is moist. Derm: Skin is fragile,     

      with poor turgor Skin is pink, warm \T\ dry. Wound noted left supraclavicular area,         

      bilateral upper arms Wound is reportedly "from oxygen tubing during last                    

      hospitalization" Eschar noted. Small skin tears to bilateral upper extremities.             

      Musculoskeletal: Circulation, motion, and sensation intact.                                 

17:28 Reassessment: Patient appears in no apparent distress at this time. Patient and/or      kr2 

      family updated on plan of care and expected duration. Pain level reassessed. Patient is     

      alert, oriented x 3, equal unlabored respirations, skin warm/dry/pink. Patient denies       

      pain at this time.                                                                          

18:30 Reassessment: Patient appears in no apparent distress at this time. Patient and/or      kr2 

      family updated on plan of care and expected duration. Pain level reassessed. Patient is     

      alert, oriented x 3, equal unlabored respirations, skin warm/dry/pink. Patient denies       

      pain at this time.                                                                          

19:26 Reassessment: Patient appears in no apparent distress at this time. Patient and/or      kr2 

      family updated on plan of care and expected duration. Pain level reassessed. Patient is     

      alert, oriented x 3, equal unlabored respirations, skin warm/dry/pink. Patient denies       

      pain at this time.                                                                          

20:30 Reassessment: Patient appears in no apparent distress at this time. Patient and/or      kr2 

      family updated on plan of care and expected duration. Pain level reassessed. Patient is     

      alert, oriented x 3, equal unlabored respirations, skin warm/dry/pink. Patient denies       

      pain at this time.                                                                          

21:39 Reassessment: Patient appears in no apparent distress at this time. Patient and/or      kr2 

      family updated on plan of care and expected duration. Pain level reassessed. Patient is     

      alert, oriented x 3, equal unlabored respirations, skin warm/dry/pink. Attempted to         

      call report to receiving nurse, held for 4 minutes Patient denies pain at this time.        

                                                                                                  

Vital Signs:                                                                                      

16:21 Weight 101.15 kg; Height 5 ft. 8 in. (172.72 cm); Pain 0/10;                            ph  

16:21  / 53; Pulse 61; Resp 19; Temp 98.4; Pulse Ox 99% on R/A; Pain 0/10;              kr2 

17:27  / 55; Pulse 58; Resp 18; Pulse Ox 98% on R/A;                                    kr2 

18:37  / 56; Pulse 59; Resp 19; Pulse Ox 100% on R/A;                                   kr2 

19:27  / 77; Pulse 67; Resp 17; Pulse Ox 100% on R/A;                                   kr2 

20:58  / 81; Pulse 64; Resp 18; Pulse Ox 100% on R/A;                                   jb5 

22:00  / 78; Pulse 60; Resp 19; Pulse Ox 99% on R/A;                                    kr2 

16:21 Body Mass Index 33.91 (101.15 kg, 172.72 cm)                                            ph  

                                                                                                  

ED Course:                                                                                        

16:16 Patient arrived in ED.                                                                  ph  

16:19 Triage completed.                                                                       ph  

16:21 Ammy Sandhu, RN is Primary Nurse.                                                     kr2 

16:22 Arm band placed on.                                                                     ph  

16:26 Patient has correct armband on for positive identification. Bed in low position. Call   kr2 

      light in reach. Side rails up X2. Cardiac monitor on. Pulse ox on. NIBP on. Door            

      closed. Pillow given. Head of bed elevated.                                                 

16:36 Lata Crawley FNP-C is PHCP.                                                        snw 

16:36 Ruben Faye MD is Attending Physician.                                             snw 

16:43 X-ray completed. Portable x-ray completed in exam room. Patient tolerated procedure     kp1 

      well.                                                                                       

16:44 Chest Single View XRAY In Process Unspecified.                                          EDMS

16:52 Notified Nurse Practitioner and/or Physician Assistant of blood glucose 64.             kr2 

16:52 First set of blood cultures drawn by me. Inserted saline lock: 22 gauge in right wrist, kr2 

      using aseptic technique. Blood collected.                                                   

17:08 Second set of blood cultures drawn by me.                                               kr2 

17:52 EKG done, by ED staff, reviewed by Lata GALVEZ.                               kr2 

18:30 Grimes cath inserted, using sterile technique, 16 Fr., by me, balloon inflated, to       kr2 

      gravity drainage, urine specimen collected. returned clear yellow urine. Patient            

      tolerated well.                                                                             

18:39 Notified Nurse Practitioner and/or Physician Assistant of a critical lab result(s), 6.2 hb  

      potassium.                                                                                  

19:12 Maryuri Matos MD is Hospitalizing Provider.                                            wa  

19:52 Notified ED physician of other Repeat blood glucose 294.                                kr2 

20:04 Repeat lab(s) drawn. by me, sent to lab.                                                kr2 

21:52 No provider procedures requiring assistance completed. Patient admitted, IV remains in  kr2 

      place.                                                                                      

                                                                                                  

Administered Medications:                                                                         

17:03 Drug: D50W 50 ml Route: IVP; Site: right wrist;                                           

17:51 Follow up: Response: No adverse reaction; No adverse reaction, blood glucose up to 159  kr2 

18:47 Drug: Rocephin 1 grams Route: IV; Rate: calculated rate; Site: right wrist;             kr2 

18:55 Follow up: Response: No adverse reaction; IV Status: Completed infusion                 kr2 

19:08 Drug: D50W 50 ml Route: IVP; Site: right wrist;                                         kr2 

20:06 Follow up: Response: No adverse reaction                                                kr2 

19:10 Drug: Insulin Regular Human 10 units {Co-Signature: kr2 (Ammy Sandhu RN).} Route: IVP;   

      Site: right wrist;                                                                          

20:06 Follow up: Response: No adverse reaction                                                kr2 

19:22 Drug: D50W 50 ml Route: IVP; Site: right wrist;                                         kr2 

20:07 Follow up: Response: No adverse reaction                                                kr2 

                                                                                                  

                                                                                                  

Point of Care Testing:                                                                            

      Blood Glucose:                                                                              

16:52 Blood Glucose: 64 mg/dL;                                                                kr2 

17:53 Blood Glucose: 159 mg/dL;                                                               kr2 

19:51 Blood Glucose: 294 mg/dL;                                                               kr2 

20:57 Blood Glucose: 249 mg/dL;                                                               jb5 

      Ranges:                                                                                     

                                                                                                  

Outcome:                                                                                          

19:13 Decision to Hospitalize by Provider.                                                    wa  

21:52 Admitted to Tele accompanied by tech, via stretcher, room 414, with chart, Report       kr2 

      called to  Nelly, RN                                                                        

21:52 Condition: stable                                                                           

21:52 Instructed on the need for admit, Demonstrated understanding of instructions.               

22:02 Patient left the ED.                                                                    kr2 

                                                                                                  

Signatures:                                                                                       

Dispatcher [employer]                           EDAgatha Cook, RN                  RN   Lata Olivo, FNP-C                 FNP-Csnw                                                  

Brenna Correa RN                      RN                                                      

Micaela Cutler RN                     RN                                                      

Cayla Briones                          jb5                                                  

Micaela Coley                                 kp1                                                  

Ruben Faye MD MD wa Reaves, Karey, RN RN   kr2                                                  

Ammy Sandhu RN                              kr2                                                  

                                                                                                  

Corrections: (The following items were deleted from the chart)                                    

20:09 19:52 Notified ED physician of other Repeat blood glucose 259 kr2                       kr2 

21:40 21:39 Reassessment: Patient appears in no apparent distress at this time. Patient       kr2 

      and/or family updated on plan of care and expected duration. Pain level reassessed.         

      Patient is alert, oriented x 3, equal unlabored respirations, skin warm/dry/pink.           

      Patient denies pain at this time. kr2                                                       

                                                                                                  

**************************************************************************************************

## 2018-07-18 NOTE — XMS REPORT
Clinical Summary

 Created on:2018



Patient:Pinky Pérez

Sex:Male

:1935

External Reference #:[id number]





Demographics







 Address  [address] [place] [address]

 

 Home Phone  [phone]

 

 Preferred Language  English

 

 Marital Status  Unknown

 

 [employer] Affiliation  Unknown

 

 Race  White

 

 Ethnic Group   or 









Author







 Organization  [facility]

 

 Address  [address] [place] [address]

 

 Phone  [phone]









Support







 Name  Relationship  Address  Phone

 

 Unavailable  [employer]  [address]  [phone]



     [place] [address]  

 

 Unavailable  Unavailable  [address]  [phone]



     [place] [address]  









Care Team Providers







 Name  Role  Phone

 

 Unavailable  Primary Care Provider  Unavailable









Allergies







 Active Allergy  Reactions  Severity  Noted Date  Comments

 

 Adhesive Tape-Silicones  Rash  Medium  2017  







Current Medications







 Prescription  Sig.  Disp.  Refills  Start Date  End Date  Status

 

 aspirin 81 MG  Take 1 tablet  30 tablet  0  2018  Active



 chewable tablet  (81 mg total) by          



   mouth daily.          

 

 atorvastatin  Take 1 tablet  30 tablet  0  2018  Active



 (LIPITOR) 40 MG  (40 mg total) by          



 tablet  mouth nightly.          

 

 gabapentin  Take 1 capsule  30 capsule  0  2018  Active



 (NEURONTIN) 300 MG  (300 mg total)          



 capsule  by mouth          



   nightly.          

 

 metoprolol  Take 1 tablet  30 tablet  0  2018  Active



 (LOPRESSOR) 25 MG  (25 mg total) by          



 tablet  mouth 2 (two)          



   times daily.          

 

 sodium bicarbonate  Take 1 tablet  30 tablet  0  2018  Active



 650 MG tablet  (650 mg total)          



   by mouth 2 (two)          



   times daily.          

 

 torsemide (DEMADEX)  Take 1 tablet  30 tablet  0  2018  
Active



 20 MG tablet  (20 mg total) by          



   mouth daily.          

 

 traMADol (ULTRAM) 50  Take 1 tablet  30 tablet  0  2018  
Active



 mg tablet  (50 mg total) by          



   mouth every 6          



   (six) hours as          



   needed for up to          



   10 days. Max          



   Daily Amount:          



   200 mg          







Active Problems







 Problem  Noted Date

 

 BPH (benign prostatic hyperplasia)  2018

 

 Diabetic neuropathy (HCC)  2018

 

 Hyperlipidemia  2018

 

 Hyperphosphatemia  2018

 

 Anemia of chronic disease  2018

 

 Hyperglycemia  2018

 

 Muscle atrophy  2018

 

 Leukocytosis  2018

 

 Acute exacerbation of CHF (congestive heart failure) ([facility])  2018

 

 Acute cystitis without hematuria  2018

 

 Hyperkalemia  2018

 

 Type 2 diabetes mellitus with complication, without long-term current use  



 of insulin ([facility])  

 

 Bradycardia  2018

 

 Shortness of breath  2018

 

 MARIANNE (acute kidney injury) ([facility])  2018







Encounters







 Date  Type  Specialty  Care Team  Description

 

 2018 -  Hospital Encounter  General Internal  Ever Temple  MARIANNE (acute 
kidney



 2018    Medicine  MD Phong



  injury) ([facility])



       Guillermo Forte MD  

 

 2018  Orders Only  General Internal    



     Medicine    



after 2017



Social History







 Tobacco Use  Types  Packs/Day  Years Used  Date

 

 Never Smoker        









 Smokeless Tobacco: Never Used      









 Sex Assigned at Birth  Date Recorded

 

 Not on file  







Last Filed Vital Signs







 Vital Sign  Reading  Time Taken

 

 Blood Pressure  170/74  2018  2:35 PM CDT

 

 Pulse  65  2018  2:35 PM CDT

 

 Temperature  35.8 C (96.4 F)  2018  2:35 PM CDT

 

 Respiratory Rate  18  2018  2:35 PM CDT

 

 Oxygen Saturation  99%  2018  2:35 PM CDT

 

 Inhaled Oxygen Concentration  -  -

 

 Weight  103.2 kg (227 lb 8.2 oz)  2018  6:01 AM CDT

 

 Height  172.7 cm (5' 8")  2018  6:00 AM CDT

 

 Body Mass Index  34.59  2018  6:01 AM CDT







Plan of Treatment







 Date  Type  Specialty  Care Team  Description

 

 2018  Office Visit  Cardiology  Salvatore Swift, KANDIS



  



       [address] [facility]



  



       [place] [address] [phone]  







Procedures







 Procedure Name  Priority  Date/Time  Associated Diagnosis  Comments

 

 US GUIDE, VASCULAR  Routine  2018  2:43 PM  MARIANNE (acute kidney  Results 
for this



 ACCESS    CDT  injury) ([facility])  procedure are in



         the results



         section.

 

 INSERT NON-TUNNEL  Routine  2018  2:43 PM  MARIANNE (acute kidney  Results 
for this



 CV CATH    CDT  injury) (HCC)  procedure are in



         the results



         section.



after 2017



Results

RHYTHM STRIP - SCAN (2018  1:41 PM)POC-Glucose meter (2018  5:13 PM)
Only the most recent of62 resultswithin the time period is included.





 Component  Value  Ref Range

 

 POC-Glucose Meter  229 (H)Comment: TESTED AT [address]  70 - 
110 mg/dL



   [place] [address]  









 Specimen  Performing Laboratory

 

 Blood  [address] [address]



CBC with platelet count + automated diff (2018  4:58 AM)Only the most 
recent of16 resultswithin the time period is included.





 Component  Value  Ref Range

 

 WBC  10.6 (H)  3.5 - 10.5 K/L

 

 RBC  2.90 (L)  4.63 - 6.08 M/L

 

 Hemoglobin  8.3 (L)  13.7 - 17.5 GM/DL

 

 Hematocrit  25.4 (L)  40.1 - 51.0 %

 

 MCV  87.6  79.0 - 92.2 fL

 

 MCH  28.6  25.7 - 32.2 pg

 

 MCHC  32.7  32.3 - 36.5 GM/DL

 

 RDW  13.8  11.6 - 14.4 %

 

 Platelets  490 (H)  150 - 450 K/CU MM

 

 MPV  8.9 (L)  9.4 - 12.4 fL

 

 nRBC  0  0 - 0 /100 WBC

 

 % Neutros  63  %

 

 % Lymphs  16  %

 

 % Monos  12  %

 

 % Eos  6  %

 

 % Baso  1  %

 

 # Neutros  6.69 (H)  1.78 - 5.38 K/L

 

 # Lymphs  1.71  1.32 - 3.57 K/L

 

 # Monos  1.26 (H)  0.30 - 0.82 K/L

 

 # Eos  0.68 (H)  0.04 - 0.54 K/L

 

 # Baso  0.12 (H)  0.01 - 0.08 K/L

 

 Immature Granulocytes-Relative  2 (H)  0 - 1 %









 Specimen  Performing Laboratory

 

 Blood  [address] [address]



CBC with platelet count + automated diff (2018  4:58 AM)Only the most 
recent of16 resultswithin the time period is included.





 Specimen  Performing Laboratory

 

 Blood  









 Narrative

 

 The following orders were created for panel order CBC with platelet count + 
automated



 diff.







 Procedure



 Abnormality Status 







 ---------



 ----------- ------ 







 CBC with platelet count ...[[id number]]AbnormalFinal



 result 







  







 Please view results for these tests on the individual orders.



Phosphorus (2018  4:58 AM)Only the most recent of18 resultswithin the 
time period is included.





 Component  Value  Ref Range

 

 Phosphorus  3.8  2.3 - 4.7 mg/dL









 Specimen  Performing Laboratory

 

 Blood  [address] [address]



Magnesium (2018  4:58 AM)Only the most recent of18 resultswithin the time 
period is included.





 Component  Value  Ref Range

 

 Magnesium  1.7  1.6 - 2.6 mg/dL









 Specimen  Performing Laboratory

 

 Blood  [address] [address]



Basic Metabolic Panel (2018  4:58 AM)Only the most recent of9 
resultswithin the time period is included.





 Component  Value  Ref Range

 

 Sodium  132 (L)  136 - 145 meq/L

 

 Potassium  4.4  3.5 - 5.1 meq/L

 

 Chloride  104  98 - 107 meq/L

 

 CO2  18 (L)  22 - 29 meq/L

 

 BUN  44 (H)  7 - 21 mg/dL

 

 Creatinine  1.90 (H)  0.57 - 1.25 mg/dL

 

 Glucose  204 (H)  70 - 105 mg/dL

 

 Calcium  8.8  8.4 - 10.2 mg/dL

 

 EGFR  34Comment: ESTIMATED GFR IS NOT AS ACCURATE AS  mL/min/1.73 sq m



   CREATININE CLEARANCE IN PREDICTING GLOMERULAR FILTRATION  



   RATE. ESTIMATED GFR IS NOT APPLICABLE FOR DIALYSIS  



   PATIENTS.  









 Specimen  Performing Laboratory

 

 Blood  [address] [address]



Urinalysis w/Microscopic (07/10/2018 10:53 PM)Only the most recent of2 
resultswithin the time period is included.





 Component  Value  Ref Range

 

 Color, UA  Light Yellow  

 

 Clarity, UA  Clear  

 

 Specific Gravity, UA  1.007  1.001 - 1.035

 

 pH, UA  6.5  5.0 - 8.0

 

 Protein, UA  600 mg/dL (A)  Negative

 

 Glucose, UA  500 mg/dL (A)  Negative

 

 Ketones, UA  Negative  Negative

 

 Bilirubin, UA  Negative  Negative

 

 Blood, UA  Small (A)  Negative

 

 Nitrite, UA  Negative  Negative

 

 Leukocytes, UA  Negative  Negative

 

 Urobilinogen, UA  0.2  0.2 - 1.0 mg/dL

 

 RBC, UA  3  /HPF

 

 WBC, UA  2  /HPF

 

 Squam Epithel, UA  <1  /HPF

 

 Hyaline Casts, UA  2  /LPF

 

 Amorphous Crystals  Rare  

 

 Specimen Source  Urine, Clean Catch  









 Specimen  Performing Laboratory

 

 Urine - Urine, Clean Catch  [address] [address]



Manual Differential (2018  5:31 AM)Only the most recent of5 resultswithin 
the time period is included.





 Component  Value  Ref Range

 

 % Neutros  83  %

 

 % Lymphs  8  %

 

 % Monos  4  %

 

 % Eos  4  %

 

 % Baso  1  %

 

 # Neutros  10.46 (H)  1.78 - 5.38 K/ul

 

 # Lymphs  1.01 (L)  1.32 - 3.57 K/ul

 

 # Monos  0.50  0.30 - 0.82 K/uL

 

 # Eos  0.50  0.04 - 0.54 K/uL

 

 # Baso  0.13 (H)  0.01 - 0.08 K/uL

 

 Total Counted  100  

 

 nRBC (manual)  1 (H)  0 - 0 /100 WBC

 

 WBC Morphology  Normal  

 

 Platelet Morphology  Normal  

 

 Anisocytosis  1+ few  

 

 Microcytes  1+ few  

 

 Artifact  Present  

 

 Platelet Conc  Adequate  









 Specimen  Performing Laboratory

 

 Blood  [address] [address]









 Narrative

 

 Received comment: 







 User comments: 







 Slide comments:



Calcium, Ionized (2018  6:44 AM)Only the most recent of12 resultswithin 
the time period is included.





 Component  Value  Ref Range

 

 Calcium, Ion  1.08 (L)  1.12 - 1.27 mmol/L

 

 pH, Blood  7.32  









 Specimen  Performing Laboratory

 

 Blood  [address] [address]



Comprehensive metabolic panel (2018  6:44 AM)Only the most recent of10 
resultswithin the time period is included.





 Component  Value  Ref Range

 

 Protein, Total  6.2  6.0 - 8.3 gm/dL

 

 Albumin  2.9 (L)  3.5 - 5.0 g/dL

 

 Alkaline Phosphatase  314 (H)  40 - 150 U/L

 

 Total Bilirubin  0.3  0.2 - 1.2 mg/dL

 

 Sodium  132 (L)  136 - 145 meq/L

 

 Potassium  4.2  3.5 - 5.1 meq/L

 

 Chloride  102  98 - 107 meq/L

 

 CO2  19 (L)  22 - 29 meq/L

 

 BUN  44 (H)  7 - 21 mg/dL

 

 Creatinine  2.15 (H)  0.57 - 1.25 mg/dL

 

 Glucose  219 (H)  70 - 105 mg/dL

 

 Calcium  8.7  8.4 - 10.2 mg/dL

 

 AST  23  5 - 34 U/L

 

 ALT  29  6 - 55 U/L

 

 EGFR  30Comment: ESTIMATED GFR IS NOT AS ACCURATE AS  mL/min/1.73 sq m



   CREATININE CLEARANCE IN PREDICTING GLOMERULAR  



   FILTRATION RATE. ESTIMATED GFR IS NOT  



   APPLICABLE FOR DIALYSIS PATIENTS.  









 Specimen  Performing Laboratory

 

 Blood  [address] [address]



Hemoglobin and hematocrit (2018  5:20 PM)





 Component  Value  Ref Range

 

 Hemoglobin  9.0 (L)  13.7 - 17.5 GM/DL

 

 Hematocrit  27.6 (L)  40.1 - 51.0 %









 Specimen  Performing Laboratory

 

 Blood - Central Venous Line  [address] [address]









 Narrative

 

 Call [phone]



Tissue Exam (2018 11:15 AM)





 Component  Value  Ref Range

 

 Case Report  Surgical Pathology Report
 Case: [id number]
  



   Authorizing Provider:Guillermo Forte, Collected:
 2018 Casandra CLARK

  



   Ordering Location: [address] Received:
2018 1115  



    Service

  



   Pathologist: Joana Zepeda MD

  



   Specimen:Kidney, NATIVE KIDNEY

  



     

 

 DIAGNOSIS  KIDNEY, LEFT, NEEDLE BIOPSIES  



   - ADVANCED DIABETIC GLOMERULOSCLEROSIS  



   - NEGATIVE FOR IMMUNE MEDIATED GLOMERULONEPHRITIS  



   - DIFFUSE INTERSTITIAL FIBROSIS AND TUBULAR ATROPHY (~70%)  



   - MODERATE TO SEVERE ARTERIAL INTIMAL SCLEROSIS  



   - DIFFUSE ARTERIOLAR HYALINOSIS  



   - SEE COMMENT  



   Signing Pathologist Direct Phone Line: [phone]  

 

 COMMENT  No immune mediated glomerulosclerosis is seen based on negative 
immunofluorescence and absence of electron dense deposits on electron 
microscopy.  



   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 
advanced diabetic glomerulosclerosis, class IV (see ref #1) characterized by 
nodular lesions and global glomerulosclerosis inv  



   olving 56% (15/27) of all examined glomeruli, along with marked interstitial 
fibrosis and tubular atrophy involving about 70% of renal parenchyma.  



     



   Reference:  



   1. MALATHI Garcia., CHANNING Givens., KIMBERLI Collins., Brendan, AXinH., SHARRI Duarte., 
MAURICIO Lopez., CONOR Dye, JOSE Schwarz., WILLA Valencia, de LEIGHTON Garber., et al. 
Pathologic classification of diabetic nephropathy. J Am Soc Nephrol 21:556-563, 
2010.  



     



     



   The results were communicated to Dr. Caldwell by Dr. Zepeda on 2018.  

 

 CPT Code(s)  37180, 88313 x3, 33312, 88350 x7, 36174  



     

 

 CLINICAL HISTORY  History as communicated by Dr. Caldwell: 83 year old  



   with long standing history of DM, now presents with  



   increase in serum creatinine and 3g proteinuria.  



   All serologies negative  

 

 SPECIMEN SOURCE  Native kidney biopsy  

 

 GROSS DESCRIPTION  The specimen is received in three parts all labeled  



   with the patient's information and labeled "native  



   kidney biopsy".  Received in formalin are two  



   tan-white core biopsies measuring 0.6 and 1.7 cm in  



   length. The specimen is entirely submitted A1.  



   Received fresh is a 1 cm tan core submitted for  



   frozen for immunofluorescence staining and received  



   in glutaraldehyde is a 0.3 cm tan core submitted  



   for electron microscopy. CG/pl  

 

 MICROSCOPIC DESCRIPTION  LIGHT MICROSCOPY:  



   Sections show two cores of tissue composed of cortex 50% and medulla 50%.
  



   Glomeruli: Approximately 19 glomeruli are examined of which 10 glomeruli are 
globally sclerotic/obsolescent or show near complete obsolescence. The 
capillary tuft in some sclerotic glomeruli is wrinkled  



    and shrunken. The remaining non-globally sclerotic glomeruli are enlarged 
and have nodular mesangial expansion by PAS- and silver-positive matrix with 
normal to mild segmental hypercellularity. There i  



   s mild wrinkling of capillary tuft. Focal and segmental sclerosis is seen.
No endocapillary hypercellularity, crescents or thrombi are seen.  



   Tubules and interstitium: There is severe interstitial fibrosis with focal 
tubular atrophy and mild interstitial inflammatory cell infiltration by 
lymphocytes admixed with eosinophils and neutrophils, i  



   nvolving about 70% of renal cortex. Non-atrophic proximal tubules are 
focally ectatic with loss of brush borders.  



   Vessels: Interlobular arteries show moderate intimal sclerosis and 
thickening. There is severe diffuse arteriolosclerosis.  



     



   Congo red stain is negative for amyloid deposition.  



     



   Special stains:Alan trichrome, PAS and Saldana silver stains were 
necessary for evaluation of this biopsy and showed expected staining patterns 
of internal control tissue matrix structures.  



     



   Direct Immunofluorescence:  



   Histology: H&E-stained sections show 2 non-obsolescent glomeruli and 2 
obsolescent glomeruli.  



     



   Immunofluorescence findings:  



   IgA: negative in open glomeruli, focal weak granular mesangial staining in 
sclerotic glomerulus, tubular casts are positive  



   IgG: no significant glomerular, tubulointerstitial or vascular staining.  



   IgM: negative in glomeruli  



   C3: negative in glomeruli,Macario's capsule +.  



   C1q: no significant glomerular, tubulointerstitial or vascular staining.  



   Fibrinogen: no significant glomerular, tubulointerstitial or vascular 
staining.  



   Kappa: negative in open glomeruli, focal weak granular mesangial 
staining in sclerotic glomerulus, tubular casts are positive  



   Lambda:negative in open glomeruli, focal weak granular mesangial 
staining in sclerotic glomerulus, tubular casts are positive  



     



   All polyclonal antibodies used for immunofluorescence staining have been 
previously tested and shown to have appropriate reactivities with positive 
control specimens.  



     



   ELECTRON MICROSCOPY  



   Toluidine blue-stained sections reveals one non-obsolescent glomerulus and 3 
globally sclerotic glomeruli. One open and 2 globally sclerotic glomeruli are 
examined ultrastructurally.  



   Ultrastructure:Examination of the glomerular ultrastructure reveals that 
the glomerular basement membrane shows wrinkling and is variably thickened, 
focally measuring upto 1163 nm (normal male avera  



   pp=537 - 430 nm nm; Annie CROUCH Arch Pathol Lab Med 133; 224-232). Subendothelial
, subepithelial, and mesangial/paramesangial electron-dense, immune complex-
type deposits are not present. Podocyte foot proc  



   esses are diffusely effaced with microvillous change.  



     



     



     









 Specimen  Performing Laboratory

 

 Tissue - Kidney  [facility] [address] renal biopsy (2018 10:40 AM)





 Specimen  Performing Laboratory

 

   [facility]









 Narrative

 

 FINAL REPORT







  







 PATIENT ID: [id number]







  







  







 Ultrasound guided core biopsy of the left native kidney dated 2018







  







 Procedure: Core biopsy of the left native kidney.







  







 Clinical Indication: Proteinuria







  







 Sedation: Moderate sedation was administered. 0.5 mg of Versed and 25 







 mcg fentanyl IV was used for moderate sedation monitored under my 







 direction. Total intraservice time of the sedation was 50 minutes. 







 The patient's vital signs were monitored throughout the procedure and 







 recorded in the patient's medical record by the nurse.







  







 Anesthesia: 1% Xylocaine mixed with sodium bicarbonate local 







 anesthesia.







  







 Technique: After obtained informed consent, ultrasound guided core 







 biopsy of the left native kidney was performed under usual sterile 







 technique. Using an 18 gauge core biopsy needle, puncture was made 







 posteriorly on the left with real time ultrasound guidance. 2 passes 







 were performed withsufficient material for histology. Patient 







 tolerated procedure well.







  







 Complication: None







  







 Estimated Blood Loss: None







  







 The specimen was sent to pathology.







  







 Impression: Successful ultrasound-guided core biopsy of the left 







 native kidney. 







  







 Signed: Demetris Costa MD







 Report Verified Date/Time:2018 10:42:49







  







 Reading Location: [address] Ultrasound Reading Room







 Electronically signed by: DEMETRIS COSTA M.D. on 2018 10:42 AM







 









 Procedure Note

 

 Interface, External Ris In - 2018 12:12 PM CDT



FINAL REPORT



 



 PATIENT ID:   [id number]



 



 



 Ultrasound guided core biopsy of the left native kidney dated 2018



 



 Procedure: Core biopsy of the left native kidney.



 



 Clinical Indication: Proteinuria



 



 Sedation: Moderate sedation was administered. 0.5 mg of Versed and 25



 mcg fentanyl IV was used for moderate sedation monitored under my



 direction. Total intraservice time of the sedation was 50 minutes.



 The patient's vital signs were monitored throughout the procedure and



 recorded in the patient's medical record by the nurse.



 



 Anesthesia: 1% Xylocaine mixed with sodium bicarbonate local



 anesthesia.



 



 Technique: After obtained informed consent, ultrasound guided core



 biopsy of the left native kidney was performed under usual sterile



 technique. Using an 18 gauge core biopsy needle, puncture was made



 posteriorly on the left with real time ultrasound guidance. 2 passes



 were performed with  sufficient material for histology. Patient



 tolerated procedure well.



 



 Complication: None



 



 Estimated Blood Loss: None



 



 The specimen was sent to pathology.



 



 Impression: Successful ultrasound-guided core biopsy of the left



 native kidney.



 



 Signed: Demetris Costa MD



 Report Verified Date/Time:  2018 10:42:49



 



 Reading Location: [address] Ultrasound Reading Room



       Electronically signed by: DEMETRIS COSTA M.D. on 2018 10:42 AM



 



XR chest 1 view portable / bedside (2018  8:38 AM)Only the most recent 
of2 resultswithin the time period is included.





 Specimen  Performing Laboratory

 

   GE [facility]









 Narrative

 

 FINAL REPORT







  







 PATIENT ID: [id number]







  







 CLINICAL HISTORY: edema 







  







 TECHNIQUE: 1 view of the chest.







  







 COMPARISON: 2018







  







 IMPRESSION:







  







 The right central line remains in the SVC. Mild bilateral airspace 







 opacities have decreased. Trace bilateral pleural effusions remain. 







 The cardiomediastinal silhouette is magnified by technique. 







  







 Signed: Favian Weiner MD







 Report Verified Date/Time:2018 08:40:13







  







 Reading Location: [facility] Radiology Reading Room







 Electronically signed by: FAVIAN WEINER M.D. on 2018 08:40 
AM







 









 Procedure Note

 

 Interface, External Ris In - 2018  8:42 AM CDT



FINAL REPORT



 



 PATIENT ID:   [id number]



 



 CLINICAL HISTORY: edema



 



 TECHNIQUE: 1 view of the chest.



 



 COMPARISON: 2018



 



 IMPRESSION:



 



 The right central line remains in the SVC. Mild bilateral airspace



 opacities have decreased. Trace bilateral pleural effusions remain.



 The cardiomediastinal silhouette is magnified by technique.



 



 Signed: Favian Weiner MD



 Report Verified Date/Time:  2018 08:40:13



 



 Reading Location: [facility] Radiology Reading Room



       Electronically signed by: FAVIAN WEINER M.D. on 2018 08:40 AM



 



B-type Natriuretic Factor (BNP) (2018  6:01 AM)Only the most recent of3 
resultswithin the time period is included.





 Component  Value  Ref Range

 

 BNP  109 (H)  0 - 100 pg/mL









 Specimen  Performing Laboratory

 

 Blood - Central Venous Line  [address] [address]



Prothrombin time/INR (2018  5:46 AM)Only the most recent of5 
resultswithin the time period is included.





 Component  Value  Ref Range

 

 Protime  15.1 (H)  11.7 - 14.7 seconds

 

 INR  1.2  <=5.9









 Specimen  Performing Laboratory

 

 Blood - Central Venous Line  [address] [address]









 Narrative

 

  







 RECOMMENDED COUMADIN/WARFARIN INR THERAPY RANGES







 STANDARD DOSE: 2.0 - 3.0 Includes: PROPHYLAXIS for venous thrombosis, 
systemic



 embolization; TREATMENT for venous thrombosis and/or pulmonary embolus.







 HIGH RISK: Target INR is 2.5-3.5 for patients with mechanical heart valves.



Rapid drug screen, urine (2018  6:33 PM)





 Component  Value  Ref Range

 

 Barbiturate Screen  Negative  Negative

 

 Benzodiazepine Screen  Negative  Negative

 

 Cocaine (Metab.) Screen  Negative  Negative

 

 Methadone Screen  Negative  Negative

 

 Opiate Screen  Negative  Negative

 

 Cannabinoid Screen  Negative  Negative

 

 Amph/Methamph Screen  Negative  Negative

 

 Phencyclidine Screen  Negative  Negative

 

 Oxycodone Screen  Negative  Negative









 Specimen  Performing Laboratory

 

 Urine  [address] [address]









 Narrative

 

  







 DRUGCUTOFF CONC.







 Cocaine 300 ng/mL 







 Sudtkxzynng23 ng/mL 







 Fsysnksrtscfeh337 ng/mL







 Barbiturate 200 ng/mL







 Izidoycyamanl57 ng/mL







 Gwmfpm331 ng/mL







 Methadone 300 ng/mL







 Amphetamine/ 1000 ng/mL







 Methamphetamine







 Oxycodone 300 ng/mL







  







 This assay provides an unconfirmed qualitative test result for the clinical



 management of patients in emergency situations. Chain of custody not 
maintained. Some



 over-the-counter medications, as well as adulterants, may cause inaccurate 
results.



 Clinical correlation should be applied. A more comprehensive drug screen or



 confirmation of a detected drug may be performed upon request.



Urine Immunofixation, 24 hour (2018 11:19 PM)





 Component  Value  Ref Range

 

 Volume, Urine  1700  ml

 

 Protein, Urine  176 (H)  0 - 14 mg/dL

 

 Protein, 24hr Urine  2992 (H)  0 - 300 mg/24hr

 

 Albumin, 24hr Urine  30.3  %

 

 Globulin, 24hr Urine  69.7  %

 

 Urine MOE ID  No monoclonal proteins or monoclonal free light  



   chains detected.  

 

 Pathologist:  Jocelynn Gonzalez MD (electronic signature)  









 Specimen  Performing Laboratory

 

 Urine - Urine, Voided  [address] [address]



Urine Protein Electrophoresis, 24 hour (2018 11:19 PM)





 Component  Value  Ref Range

 

 Protein, 24hr Urine  2992 (H)  0 - 300 mg/24hr

 

 Volume, Urine  1700  ml

 

 Albumin, 24hr Urine  30.3  %

 

 Globulin, 24hr Urine  69.7  %

 

 UPEP, ID  Spillage of large amounts of globulin fractions  



   may mask underlying monoclonal proteins; urine  



   MOE ordered and results pending.  

 

 Protein, Urine  176 (H)  0 - 14 mg/dL

 

 Pathologist:  Jocelynn Gonzalez MD (electronic signature)  









 Specimen  Performing Laboratory

 

 Urine - Urine, Voided  [address] [address]



Protein, 24 hour urine (2018 11:19 PM)





 Component  Value  Ref Range

 

 Protein, 24hr Urine  2992 (H)  0 - 300 mg/24hr

 

 Volume, Urine  1700  ml

 

 Protein, Urine  176 (H)  0 - 14 mg/dL









 Specimen  Performing Laboratory

 

 Urine - Urine, Voided  [address] [address]



Urinalysis w/Microscopic + Reflex to Culture (2018  7:08 PM)Only the most 
recent of2 resultswithin the time period is included.





 Component  Value  Ref Range

 

 Color, UA  Light Yellow  

 

 Clarity, UA  Clear  

 

 Specific Gravity, UA  1.005  1.001 - 1.035

 

 pH, UA  8.5 (H)  5.0 - 8.0

 

 Protein, UA  200 mg/dL (A)  Negative

 

 Glucose, UA  500 mg/dL (A)  Negative

 

 Ketones, UA  Negative  Negative

 

 Bilirubin, UA  Negative  Negative

 

 Blood, UA  Trace (A)  Negative

 

 Nitrite, UA  Negative  Negative

 

 Leukocytes, UA  Negative  Negative

 

 Urobilinogen, UA  0.2  0.2 - 1.0 mg/dL

 

 RBC, UA  0  /HPF

 

 WBC, UA  1  /HPF

 

 Bacteria, UA  Rare  

 

 Squam Epithel, UA  <1  /HPF

 

 Specimen Source    









 Specimen  Performing Laboratory

 

 Urine - Urine, Voided  [address] [address]



Hemoglobin A1c (2018  4:08 AM)Only the most recent of2 resultswithin the 
time period is included.





 Component  Value  Ref Range

 

 Hemoglobin A1C  5.9  4.3 - 6.1 %









 Specimen  Performing Laboratory

 

 Blood - Central Venous Line  [address] [address]



NM myocardial perfusion PET (rest and stress) (2018 12:03 PM)





 Specimen  Performing Laboratory

 

   [employer]









 Narrative

 

 FINAL REPORT







  







 PATIENT ID: [id number]







  







 PROCEDURE:Rest/Stress MYOCARDIAL PERFUSION PET with 







 regadenoson\XA9\







  







 CPT CODE:20778







  







 INDICATION:Chest pain, risk factors for CAD







  







 HISTORY:Cardiac risk factors: Diabetes, hypertension, 







 tobacco use. Other cardiovascular history: No reported CAD. Recent 







 cardiac symptoms: Chest pain, dyspnea. Current cardiovascular-related 







 medications: Metoprolol.







  







 PROTOCOL:Limited low-dose CT imaging was performed for 







 attenuation correction. 40.0 mCi of Rb-82 chloride was injected iv at 







 rest, and gated PET (positron emission tomography) images were 







 obtained. Subsequently, 40.1 mCi of Rb-82 chloride was injected iv at 







 expected peak pharmacologic effect, and gated PET images were 







 obtained. 







  







 PRELIMINARY STRESS TEST DATA FROM NONINVASIVE CARDIOLOGY:







 Pharmacologic stress was by 10-second iv infusion of 0.4 mg of 







 regadenoson. Radiotracer was injected 30 seconds after start of 







 stress. Heart rate was 70 beats/min at rest and 85 beats/min (61% of 







 MPHR) at tracer injection. BP was 90/58 mmHg at rest and 105/52 mmHg 







 at tracer injection. Stress was stopped for predetermined endpoint. 







 The patient experienced abdominal discomfort; treatment was not 







 required. Preliminary ECG evaluation revealed normal sinus rhythm, 







 nonspecific interventricular conduction delay at rest and no ischemic 







 changes with stress. (Final ECG interpretation and other stress and 







 monitoring data are reported separately by Cardiology.) 







  







 IMAGING FINDINGS:Study quality is good. Images obtained after 







 stress injection show decreased tracer uptake in the apex. Resting 







 images show mostly improved tracer uptake in the apex. LV volume 







 appears normal. RV volume appears normal. Gated images obtained 







 immediately after stress show normal LV wall motion. Gated images 







 obtained at rest show normal LV wall motion. LVEF at rest is 58%. 







 LVEF at stress is 64%.







  







 IMPRESSION: 1. Abnormal study.2. Appropriate pharmacologic 







 stress.3. Abnormal myocardial perfusion.There is a mild severity, 







 medium size, mildly reversible, perfusion defect in all the apical 







 segments of the LV.4. Normal resting LV function. No deterioration 







 of function is noted with pharmacologic stress.5. Extracardiac 







 tracer distribution is normal.6. No previous [facility] study for 







 comparison.







  







 NONINVASIVE RISK STRATIFICATION: 







 The above findings are considered intermediate risk (1% to 3% annual 







 mortality rate) based on the following criterion: 







 - Mild/moderate resting left ventricular dysfunction (LVEF 35% to 49%)







 - Stress-induced moderate perfusion defect without LV dilation or 







 increased







 lung intake (thallium-201)







 (JACC. 2012;59(9):857-81.)







  







 Signed: Felton Erickson MD







 Report Verified Date/Time:2018 14:59:03







  







 Reading Location: [address] Reading Room







 Electronically signed by: FELTON ERICKSON MD on 2018 02:59 
PM







 









 Procedure Note

 

 Interface, External Ris In - 2018  3:01 PM CDT



FINAL REPORT



 



 PATIENT ID:   [id number]



 



 PROCEDURE:      Rest/Stress MYOCARDIAL PERFUSION PET with



 regadenoson\XA9\



 



 CPT CODE:          41455



 



 INDICATION:      Chest pain, risk factors for CAD



 



 HISTORY:          Cardiac risk factors: Diabetes, hypertension,



 tobacco use. Other cardiovascular history: No reported CAD. Recent



 cardiac symptoms: Chest pain, dyspnea. Current cardiovascular-related



 medications: Metoprolol.



 



 PROTOCOL:      Limited low-dose CT imaging was performed for



 attenuation correction. 40.0 mCi of Rb-82 chloride was injected iv at



 rest, and gated PET (positron emission tomography) images were



 obtained. Subsequently, 40.1 mCi of Rb-82 chloride was injected iv at



 expected peak pharmacologic effect, and gated PET images were



 obtained.



 



 PRELIMINARY STRESS TEST DATA FROM NONINVASIVE CARDIOLOGY:



 Pharmacologic stress was by 10-second iv infusion of 0.4 mg of



 regadenoson. Radiotracer was injected 30 seconds after start of



 stress. Heart rate was 70 beats/min at rest and 85 beats/min (61% of



 MPHR) at tracer injection. BP was 90/58 mmHg at rest and 105/52 mmHg



 at tracer injection. Stress was stopped for predetermined endpoint.



 The patient experienced abdominal discomfort; treatment was not



 required. Preliminary ECG evaluation revealed normal sinus rhythm,



 nonspecific interventricular conduction delay at rest and no ischemic



 changes with stress. (Final ECG interpretation and other stress and



 monitoring data are reported separately by Cardiology.)



 



 IMAGING FINDINGS:        Study quality is good. Images obtained after



 stress injection show decreased tracer uptake in the apex. Resting



 images show mostly improved tracer uptake in the apex. LV volume



 appears normal. RV volume appears normal. Gated images obtained



 immediately after stress show normal LV wall motion. Gated images



 obtained at rest show normal LV wall motion. LVEF at rest is 58%.



 LVEF at stress is 64%.



 



 IMPRESSION:   1. Abnormal study.  2. Appropriate pharmacologic



 stress.  3. Abnormal myocardial perfusion.  There is a mild severity,



 medium size, mildly reversible, perfusion defect in all the apical



 segments of the LV.  4. Normal resting LV function. No deterioration



 of function is noted with pharmacologic stress.  5. Extracardiac



 tracer distribution is normal.  6. No previous [facility] study for



 comparison.



 



 NONINVASIVE RISK STRATIFICATION:



 The above findings are considered intermediate risk (1% to 3% annual



 mortality rate) based on the following criterion:



 - Mild/moderate resting left ventricular dysfunction (LVEF 35% to 49%)



 - Stress-induced moderate perfusion defect without LV dilation or



 increased



 lung intake (thallium-201)



 (JACC. 2012;59(9):152-92.)



 



 Signed: Felton Erickson MD



 Report Verified Date/Time:  2018 14:59:03



 



 Reading Location: [address] [facility] Reading Room



     Electronically signed by: FELTON ERICKSON MD on 2018 02:59 PM



 



Treadmill tolerance(Non-Nuclear Treadmill) (2018 11:58 AM)





 Specimen  Performing Laboratory

 

   GE [facility]









 Narrative

 

 Protocol Name Regadenoson 







 Time In Exercise Phase 00:01:00 







 Max. Systolic  mmHg







 Max Diastolic BP 52 mmHg







 Max Heart Rate 85 BPM







 Max Predicted Heart Rate 138 BPM







 Reason For Termination Predetermined end point 







 Reason for Test Chest Pain 







 Target HR Formula (220 - Age)*100% 







 Arrhythmias ventricular premature beats-isolated 







 Resting ECG Normal sinus rhythm 







 Nonspecific Intraventricular Conduction Delay:Pos







 ST Changes No Significant Changes 







 Overall Impression Indeterminate due to pharmacological stress 







 Chest Pain CHEST TIGHTNESS 







 HR Response To Exercise







 BP Response To Exercise







  







 metoprolol







 Interpretation:indeterminate gxt/mibi to follow







 Confirmed by fellow Alexy Haile ([id number]) on 2018 1:18:29 PM







 Confirmed by MD AGRAWAL MAJID ([id number]) on 2018 3:56:57 PM









 Procedure Note

 

 Interface, External Ris In - 2018  3:57 PM CDT



Protocol Name Regadenoson



 Time In Exercise Phase 00:01:00



 Max. Systolic  mmHg



 Max Diastolic BP 52 mmHg



 Max Heart Rate 85 BPM



 Max Predicted Heart Rate 138 BPM



 Reason For Termination Predetermined end point



 Reason for Test Chest Pain



 Target HR Formula (220 - Age)*100%



 Arrhythmias ventricular premature beats-isolated



 Resting ECG Normal sinus rhythm



 Nonspecific Intraventricular Conduction Delay:Pos



 ST Changes No Significant Changes



 Overall Impression Indeterminate due to pharmacological stress



 Chest Pain CHEST TIGHTNESS



 HR Response To Exercise



 BP Response To Exercise



 



 metoprolol



 Interpretation:  indeterminate gxt/mibi to follow



 Confirmed by fellow Alexy Haile ([id number]) on 2018 1:18:29 PM



 Confirmed by MD AGRAWAL MAJID ([id number]) on 2018 3:56:57 PM



Lactic acid, arterial, whole blood (2018  9:57 AM)





 Component  Value  Ref Range

 

 Lactate, Art  1.1  0.5 - 2.2 mmol/L









 Specimen  Performing Laboratory

 

 Blood, Arterial - Central Venous Line  [address] [address]









 Narrative

 

  







 Effective 2016: Units/Reference Range Change







 New: 0.5-2.2 mmol/LPrevious: 5-20 mg/dL



TSH/Free T4 If Indicated (2018  4:34 AM)





 Component  Value  Ref Range

 

 TSH  1.55  0.35 - 4.94 uIU/mL









 Specimen  Performing Laboratory

 

 Blood - Central Venous Line  [address] [address]



Hepatitis panel, acute (2018  4:34 AM)





 Component  Value  Ref Range

 

 Hep A IgM  Nonreactive  Nonreactive

 

 Hep B C IgM  Nonreactive  Nonreactive

 

 Hepatitis C Ab  Nonreactive  Nonreactive

 

 hepatitis B Surface Ag  Nonreactive  Nonreactive









 Specimen  Performing Laboratory

 

 Blood - Central Venous Line  [address] [address]



Vitamin D, 25-Hydroxy (2018  4:34 AM)





 Component  Value  Ref Range

 

 Vitamin D 25-Hydroxy  14.3  6.6 - 49.9 ng/mL









 Specimen  Performing Laboratory

 

 Blood - Central Venous Line  [address] [address]









 Narrative

 

  







 Effective 10/11/2017: Reference Range Change







 New: 6.6-49.9 ng/mL Previous: 13.0-47.8 ng/mL







  







 Recommended Vitamin D Target Range: 30.0-40.0 ng/mL



Complement Component C3 (2018  4:34 AM)





 Component  Value  Ref Range

 

 C3 Complement  82  82 - 193 mg/dL









 Specimen  Performing Laboratory

 

 Blood - Central Venous Line  [address] [address]



Complement Component C4 (2018  4:34 AM)





 Component  Value  Ref Range

 

 C4 Complement  23  15 - 57 mg/dL









 Specimen  Performing Laboratory

 

 Blood - Central Venous Line  [address] [address]



Uric acid (2018  4:34 AM)





 Component  Value  Ref Range

 

 Uric Acid  3.9  2.6 - 7.2 mg/dL









 Specimen  Performing Laboratory

 

 Blood - Central Venous Line  [address] [address]



PTH, intact (2018  4:34 AM)





 Component  Value  Ref Range

 

 PTH  247.5 (H)  8.5 - 72.5 pg/mL









 Specimen  Performing Laboratory

 

 Blood - Central Venous Line  [address] [address]



Creatine Kinase (CK) (2018  4:34 AM)





 Component  Value  Ref Range

 

 Total CK  97  29 - 200 U/L









 Specimen  Performing Laboratory

 

 Blood - Central Venous Line  [address] [address]



TRANSFUSION SERVICE REPORT - SCAN (2018  6:01 PM)Venous doppler arms 
bilateral (2018  4:55 PM)





 Component  Value  Ref Range

 

 Ejection Fraction    









 Specimen  Performing Laboratory

 

   SLE ECHO HEARTLAB MKCKESSON CPACS









 Impressions

 

 Right Impression







 1. The jugular vein is not visualized due to invasive line placement.







 2. There is no deep venous obstruction in the subclavian, axillary,







 brachial, radial or ulnar veins.







 3. There is no superficial venous obstruction in the cephalic or basilic







 veins.







 Left Impression







 1. There is no deep venous obstruction in the jugular, subclavian, axillary,







 brachial, radial or ulnar veins.







 2. There is no superficial venous obstruction in the cephalic or basilic







 veins.







  







  Conclusions







  







  Summary







  







  Venous duplex imaging and compression of the bilateral upper extremities







  was performed. The veins were adequately visualized except as noted above.







  The bilateral venous systems were patent and compressible with no evidence







  of thrombus where visualized.







  







  Signature







  







  ----------------------------------------------------------------







  Electronically signed by BRITTANY Feliciano MD,







  RPVI(Interpreting physician) on 2018 04:39 AM







  ----------------------------------------------------------------







  







 Velocities are measured in cm/s ; Diameters are measured in cm







 









 Narrative

 

 [facility] - Upper Extremities Veins







  







  Demographics







  







  Patient Name PINKY PÉREZ Date of Study 2018







  







  [id number]



 Age 83







  







  Visit Number [id number] GenderMale







  







  Accession Number [id number] Date of Birth 1935







  







  [id number]







  Physician







  







  SonographAntonio Prescott InterpretingBRITTANY Feliciano MD,







 RVSPhysician




  RPVI







  







 Procedure







 Type of Study:







  







  Veins: Upper Extremities Veins, VENOUS DOPPLER ARMS, BILATERAL.







  







 Indications for Study:Edema.







  







 Patient Status:Routine.







 Study Location:Portable.







 Technical Quality:Adequate visualization.







  







 Risk Factors







 History of Disease







 +------------------+----------+--------------------------------------------+







 !Diagnosis



 !Date!Comments




 !







 +------------------+----------+--------------------------------------------+







 !History/Risk!2018!CHF, DM, MARIANNE, Leukocytosis, Muscle atrophy
, !







 !Factors:!!Morbid



 obesity!







 +------------------+----------+--------------------------------------------+







 









 Procedure Note

 

 Interface, External Ris In - 2018  4:39 AM CDT



PV [facility] - Upper Extremities Veins



 



  Demographics



 



  Patient Name     PINKY PÉREZ     Date of Study     2018



 



  MRN              [id number]         Age               83



 



  Visit Number     [id number]       Gender            Male



 



  Accession Number [id number]         Date of Birth     1935



 



  Referring        Tc Denney S    Room Number       6A09



  Physician



 



  Sonographer      Dayday Prescott Interpreting      JXin Feliciano MD,



                   RVS              Physician         RPVI



 



 Procedure



 Type of Study:



 



  Veins: Upper Extremities Veins, VENOUS DOPPLER ARMS, BILATERAL.



 



 Indications for Study:Edema.



 



 Patient Status:Routine.



 Study Location:Portable.



 Technical Quality:Adequate visualization.



 



 Risk Factors



 History of Disease



 +------------------+----------+--------------------------------------------+



 !Diagnosis         !Date      !Comments                                    !



 +------------------+----------+--------------------------------------------+



 !History/Risk      !2018!CHF, DM, MARIANNE, Leukocytosis, Muscle atrophy, !



 !Factors:          !          !Morbid obesity                              !



 +------------------+----------+--------------------------------------------+



 



 Impressions



 Right Impression



 1. The jugular vein is not visualized due to invasive line placement.



 2. There is no deep venous obstruction in the subclavian, axillary,



 brachial, radial or ulnar veins.



 3. There is no superficial venous obstruction in the cephalic or basilic



 veins.



 Left Impression



 1. There is no deep venous obstruction in the jugular, subclavian, axillary,



 brachial, radial or ulnar veins.



 2. There is no superficial venous obstruction in the cephalic or basilic



 veins.



 



  Conclusions



 



  Summary



 



  Venous duplex imaging and compression of the bilateral upper extremities



  was performed. The veins were adequately visualized except as noted above.



  The bilateral venous systems were patent and compressible with no evidence



  of thrombus where visualized.



 



  Signature



 



  ----------------------------------------------------------------



  Electronically signed by BRITTANY Feliciano MD,



  RPVI(Interpreting physician) on 2018 04:39 AM



  ----------------------------------------------------------------



 



 Velocities are measured in cm/s ; Diameters are measured in cm



Urine Protein Electrophoresis, random (2018  4:12 PM)





 Component  Value  Ref Range

 

 Protein, Urine  101 (H)  0 - 14 mg/dL

 

 Albumin %, Urine  45.1  %

 

 Globulin %, Urine  54.9  %

 

 UPEP,ID  No monoclonal bands detected.  

 

 Pathologist:  Jocelynn Gonzalez MD (electronic signature)  









 Specimen  Performing Laboratory

 

 Urine - Urine, [address] [address]



ECG 12 lead (2018  4:03 PM)Only the most recent of2 resultswithin the 
time period is included.





 Specimen  Performing Laboratory

 

   GE [facility]









 Narrative

 

 Ventricular Rate 90 BPM







 Atrial Rate 90 BPM







 P-R Interval 158 ms







 QRS Duration 96 ms







 Q-T Interval 472 ms







 QTC Calculation(Bazett) 577 ms







 P Axis 53 degrees







 R Axis 14 degrees







 T Axis 64 degrees







  







 Normal sinus rhythm







 ST & T wave abnormality, consider inferior ischemia







 Prolonged QT







 Abnormal ECG







 When compared with ECG of 2018 09:59,







 Non-specific change in ST segment in Anterior leads







 T wave inversion now evident in Inferior leads







 QT has lengthened







 Confirmed by MD AKILA, JAMISON CONNER ([id number]) on 2018 5:07:59 PM









 Procedure Note

 

 Interface, External Ris In - 2018  5:08 PM CDT



Ventricular Rate 90 BPM



 Atrial Rate 90 BPM



 P-R Interval 158 ms



 QRS Duration 96 ms



 Q-T Interval 472 ms



 QTC Calculation(Bazett) 577 ms



 P Axis 53 degrees



 R Axis 14 degrees



 T Axis 64 degrees



 



 Normal sinus rhythm



 ST & T wave abnormality, consider inferior ischemia



 Prolonged QT



 Abnormal ECG



 When compared with ECG of 2018 09:59,



 Non-specific change in ST segment in Anterior leads



 T wave inversion now evident in Inferior leads



 QT has lengthened



 Confirmed by MD AKILA, JAMISON CONNER ([id number]) on 2018 5:07:59 PM



Sodium, random urine (2018  3:27 PM)





 Component  Value  Ref Range

 

 Sodium Urine  91  meq/L









 Specimen  Performing Laboratory

 

 Urine - Urine, [address] [address]









 Narrative

 

  







 Reference Range: No Normals



Protein, random urine (2018  3:27 PM)





 Component  Value  Ref Range

 

 Protein, Urine  94 (H)  0 - 14 mg/dL









 Specimen  Performing Laboratory

 

 Urine - Urine, [address] [address]



Creatinine, random urine (2018  3:27 PM)





 Component  Value  Ref Range

 

 Creatinine, Ur  <5.0  mg/dL









 Specimen  Performing Laboratory

 

 Urine - Urine, [address] [address]









 Narrative

 

  







 Reference Range: No Normals



Troponin I (2018  3:24 PM)Only the most recent of5 resultswithin the time 
period is included.





 Component  Value  Ref Range

 

 Troponin I  0.06 (H)  0.00 - 0.03 ng/mL









 Specimen  Performing Laboratory

 

 Blood  [address] [address]









 Narrative

 

  







 Troponin I (TnI) levels must be interpreted in the context of the presenting 
symptoms



 and the clinical findings. Elevated TnI levels indicate myocardial damage, but 
are



 not specific for ischemic heart disease. Elevated TnI levels are seen in 
patients



 with other cardiac conditions (including myocarditis and congestive heart 
failure),



 and slight TnI elevations occur in patients with other conditions, including 
sepsis,



 renal failure, acidosis, acute neurological disease, and persistent 
tachyarrhythmia.



Protein electrophoresis, serum (2018  3:24 PM)





 Component  Value  Ref Range

 

 Albumin Fraction  2.5 (L)  3.5 - 5.5 g/dL

 

 Alpha 1 Fraction  0.3  0.2 - 0.4 g/dL

 

 Alpha 2 Fraction  0.7  0.5 - 0.9 g/dL

 

 Beta Fraction  0.8  0.6 - 1.1 g/dL

 

 Gamma Globulin Fraction  1.1  0.7 - 1.7 g/dL

 

 Interpretation  Decreased albumin with concurrent relative  



   increases in all globulin fractions. No  



   monoclonal bands detected.  

 

 Pathologist:  Jocelynn Gonzalez MD (electronic signature)  

 

 Protein, Total  5.3 (L)  6.0 - 8.3 gm/dL









 Specimen  Performing Laboratory

 

 Blood  [address] [address]



HIV-1 Antigen with HIV-1/2 Antibody (2018  3:21 PM)





 Component  Value  Ref Range

 

 HIV-1 Antigen with HIV 1&2 Antibody  Nonreactive  Nonreactive









 Specimen  Performing Laboratory

 

 Blood - Central Venous Line  [address] [address]



Glomerular basement membrane antibody (2018  3:21 PM)





 Component  Value  Ref Range

 

 GBM Ab  <1.0  <1.0 AI



   Comment:  



     



   Interpretation:  



    < 1.0 AI No Antibody Detected  



   > OR=1.0 AI Antibody Detected  









 Specimen  Performing Laboratory

 

 Blood - Central Venous Line  [facility]







   [facility]







   [address] [place] [address]









 Narrative

 

 Performing Lab







  E[facility]







  [facility] [address] [address]







  CLAUDIA Cabrera MD, PhD, ZULAY



Rheumatoid factor Ab, reflex to titer (2018  3:21 PM)





 Component  Value  Ref Range

 

 Rheumatoid Factor  Positive  









 Specimen  Performing Laboratory

 

 Blood - Central Venous Line  [address] [address]



Anti-Neutrophil Cytoplasmic Ab (ANCA) (2018  3:21 PM)





 Component  Value  Ref Range

 

 Proteinase-3 Ab  <1.0  <1.0 AI



   Comment:  



     



   <1.0 AI No Antibody Detected  



   > or=1.0 AI Antibody Detected  



     



   Autoantibodies to proteinase-3 (GA-3) are accepted as characteristic for  



   granulomatosis with polyangiitis (GPA, Wegener's), and are detectable in 95%
  



   of the histologically proven cases. The cytoplasmic IFA pattern, (c-ANCA), 
is  



   based largely on autoantibody to GA-3 which serves as the primary antigen.  



   These autoantibodies are present in active disease.  

 

 Myeloperoxidase Ab  <1.0  <1.0 AI



   Comment:  



     



   <1.0 AI No Antibody Detected  



   > or=1.0 AI Antibody Detected  



     



   Autoantibodies to myeloperoxidase (MPO) are commonly associated with the  



   following small-vessel vasculitides: microscopic polyangiitis, polyarteritis
  



   nodosa, Churg-Salomón syndrome, necrotizing and crescentic 
glomerulonephritis  



   and occasionally granulomatosis with polyangiitis (GPA, Wegener's). The  



   perinuclear IFA pattern, (p-ANCA) is based largely on autoantibody  



   to myeloperoxidase which serves as the primary antigen. These autoantibodies
  



   are present in active disease.  









 Specimen  Performing Laboratory

 

 Blood - Central Venous Line  [facility]







   [facility]







   [id number] [place] [id number]









 Narrative

 

 Performing Lab







  [facility]







  [facility] [place] [id number] [place] [address]







  CLAUDIA Cabrera MD, PhD, ZULAY



Rheumatoid factor titer (2018  3:21 PM)





 Component  Value  Ref Range

 

 Rheumatoid Factor TTR  1:8  









 Specimen  Performing Laboratory

 

 Blood - Central Venous Line  [address] [address]



Anti-Nuclear Antibody (MARSHA) (2018  3:21 PM)





 Component  Value  Ref Range

 

 MARSHA  Negative  Negative









 Specimen  Performing Laboratory

 

 Blood - Central Venous Line  [address] [address]



Lactic acid, venous, whole blood (2018  6:17 AM)Only the most recent of5 
resultswithin the time period is included.





 Component  Value  Ref Range

 

 Lactate, Venous  3.8 (H)Comment: Specimen slightly hemolyzed  0.5 - 2.2 mmol/L









 Specimen  Performing Laboratory

 

 Blood - Central Venous Line  [address] [address]









 Narrative

 

  







 Effective 2016: Units/Reference Range Change







 New: 0.5-2.2 mmol/LPrevious: 5-20 mg/dL



Potassium (2018  6:17 AM)Only the most recent of4 resultswithin the time 
period is included.





 Component  Value  Ref Range

 

 Potassium  4.6  3.5 - 5.1 meq/L









 Specimen  Performing Laboratory

 

 Blood - Central Venous Line  [address] [address]



Creatine Kinase (CK), Total and MB (2018  6:17 AM)Only the most recent 
of4 resultswithin the time period is included.





 Component  Value  Ref Range

 

 Total CK  141  29 - 200 U/L

 

 CK-MB  7.9 (H)  0.0 - 6.6 ng/mL

 

 MB Relative Index  5.6  %









 Specimen  Performing Laboratory

 

 Blood - Central Venous Line  [facility]







   [address] [place] [id number]









 Narrative

 

 CK-MB Reference Range:







 <6.7Normal







 6.7-10.0Borderline







 >10.0 Abnormal



ECHOCARDIOGRAM REPORT - SCAN (2018  5:14 PM)Central Line (2018  2:
43 PM)





 Narrative

 

 José Miguel Alonso [facility] 20182:43 PM







 Central Line







 Date/Time: 2018 8:00 AM







 Performed by: JOSÉ MIGUEL ALONSO







 Authorized by: JOSÉ MIGUEL ALONSO 







 Consent: The procedure was performed in an emergent situation. Verbal 







 consent obtained.







 Risks and benefits: risks, benefits and alternatives were discussed







 Consent given by: patient







 Patient understanding: patient states understanding of the procedure being 







 performed







 Patient consent: the patient's understanding of the procedure matches 







 consent given







 Procedure consent: procedure consent matches procedure scheduled







 Relevant documents: relevant documents present and verified







 Test results: test results available and properly labeled







 Site marked: the operative site was marked







 Imaging studies: imaging studies available







 Required items: required blood products, implants, devices, and special 







 equipment available







 Patient identity confirmed: verbally with patient, arm band and 







 hospital-assigned identification number







 Time out: Immediately prior to procedure a "time out" was called to verify 







 the correct patient, procedure, equipment, support staff and site/side 







 marked as required.







 Indications: vascular access







 Anesthesia: local infiltration







  







 Anesthesia:







 Local Anesthetic: lidocaine 2% without epinephrine







 Anesthetic total: 5 mL







  







 Sedation:







 Patient sedated: no







 Preparation: skin prepped with 2% chlorhexidine and skin prepped with 







 ChloraPrep







 Skin prep agent dried: skin prep agent completely dried prior to procedure







 Sterile barriers: all five maximum sterile barriers used - cap, mask, 







 sterile gown, sterile gloves, and large sterile sheet







 Hand hygiene: hand hygiene performed prior to central venous catheter 







 insertion







 Location details: right internal jugular







 Patient position: Trendelenburg







 Catheter type: triple lumen







 Catheter size: 14 Fr







 Pre-procedure: landmarks identified







 Ultrasound guidance: yes







 Sterile ultrasound techniques: sterile gel and sterile probe covers were 







 used







 Number of attempts: 1







 Successful placement: yes







 Post-procedure: line sutured and dressing applied







 Assessment: blood return through all ports,free fluid flow,placement 







 verified by x-ray and no pneumothorax on x-ray







 Patient tolerance: Patient tolerated the procedure well with no immediate 







 complications







 Immediate Post-Procedure Note







  







 Date/Time: 2018 8:00 AM







 Assistants to the procedure: None







 Pre-procedure diagnosis: MARIANNE with symptomatic hyperkalemia







 Post-procedure diagnosis: MARIANNE with symptomatic Hyperkalemia







 Procedures Performed: Central Line







 Specimens removed: None







 Estimated blood loss (mL): None







 Complications: None







 Type of anesthesia: None







 Grafts or Implants: None







  







 Comments: Wire removed from patient, all sharps accounted for and disposed 







 of properly.







  







 



2D Echo W/Doppler(CW/PW/Color) (2018  2:12 PM)





 Component  Value  Ref Range

 

 Ejection Fraction    









 Specimen  Performing Laboratory

 

   [facility] ECHO HEARTLAB [facility]









 Narrative

 

 Transthoracic Echocardiography Report (TTE)







  







  Demographics







  







  Patient Name PINKY PÉREZ Date of Study 2018







  







  [id number]



 GenderMale







  







  Visit Number [id number] Race
Unknown







  







  [id number] Number [id number]







  Number







  







  Date of Birth1935 Referring Physician Jumana Montez MD







  







  Age82 year(s) Sonographer 
ISHA Vallejo,Interpreting
Jamison Anthony MD







 [facility] Physician







  







 Procedure







  







  Type of







  Study TTE procedure:2DECHO W DOPPLER(CW/PW/COLOR) (STAT)







  







 Indications:Known or suspected heart failure.







  







 Clinical History







 DIABETES, BRADYCARDIA, OBESITY







  







 Contrast Medium: Definity. Amount - 2 ml







  







 Height: 68 inches Weight: 106.59 kg (235 lbs) BSA: 2.19 m^2 BMI: 35.73







 kg/m^2







  







 HR: 91 bpm BP: 121/57 mmHg







  







  Summary







  The left ventricle is chamber size (by vol index) is normal (male - LVED







  vol - 34-74ml/m2). Normal LV wall thickness. All of the LV segments are







  hyperkinetic . Global LV systolic function hyperdynamic . LVEF by







  Petit's method of disk assessment is increased (>70%) .







  Grade 1 diastolic dysfunction (impaired relaxation and low-normal LA







  pressure).







  Estimated peak systolic PA pressure is 40-45 mmHg .







  







  Previous Study







  No prior exam available for comparison.







  







  Signature







  







  ----------------------------------------------------------------







  Electronically signed by Jamison Anthony MD(Interpreting







  physician) on 2018 04:24 PM







  ----------------------------------------------------------------







  







  Findings







  







 Technical Quality: Technically difficult exam.







  







  Left Ventricle LV endocardium is adequately visualized with IV







 ultrasound enhancing agent.







 The left ventricle is chamber 
size



 (by vol index)







 is normal (male - LVED vol -



 34-74ml/m2).







 Normal LV wall thickness.







 All of the LV segments are



 hyperkinetic .







 Global LV systolic function



 hyperdynamic .







 LVEF by Petit's method of 
disk



 assessment is







 increased (>70%) .







 The LVEF was measured using 
Petit's



 bi-plane







 method of disk .







 High (cardiac index >4 L/min/m2
)



 cardiac output







 state at rest is noted.







 Grade 1 diastolic dysfunction



 (impaired relaxation







 and low-normal LA pressure).







  







  Left AtriumLA size is normal (16-34 ml/m2) .







  







  Right VentricleThe right ventricular chamber size and systolic







 function are within normal 
limits.







  







  Right Atrium RA cavity size is normal .







  







  Aortic Valve Normal AoV structure and function.







  







  Mitral Valve Normal MV structure and function.







  







  Tricuspid ValveTV structure is normal.







 Mild tricuspid regurgitation.







 Estimated peak systolic PA 
pressure



 is 40-45 mmHg .







  







  Pulmonic Valve Normal PV structure and function by limited 
views







 and Doppler.







  







  AortaAortic root size (SInus of Valsalva



 diameter) is







 normal .







  







  PericardiumNo pericardial effusion is visualized.







  







  IVC/SVC/PA/PV/PleuralThe estimated RA pressure by IVC dynamics 0-5mmHg .







 The inferior vena cava size is 
normal



 .







 The inferior vena cava is 
partially



 visualized.







  







 Chambers/Structures







  







  Left Atrium







  







  LA Volume: 58.11 ml LA Area: 18.55 cm^
2







  LA Vol. Index: 27 ml/m^2







  







  Left Ventricle







  







  LVIDd: 3.57 cm







  LV Septum Diastolic: 1.02 cm







  LV PW Diastolic: 1.12 cm







  LVEDV Petit's:135.21 ml







  LVESV Petit's:36.43 ml







  LVEF Petit's: 73 %LVEDVI: 62



 ml/m^2







 




  LVESVI: 17 ml/m^2







  LVOT Diameter: 2.38 cm







  







 Doppler/Quantitative Measurements







  







  Mitral Valve







  







  MV Peak E-Wave: 0.83 m/sMV Peak A-Wave: 1.05 m/s







  E/
A



 Ratio: 0.79







  
Peak



 Gradient: 2.73 mmHg







 



 Deceleration Time: 235.7 msec







  







  MV Gerson. Peak:







  







  Tissue Doppler







  







  E' Septal Velocity: 0.06 m/sE/E': 14.35







  







  Aortic Valve







  







  Peak Velocity: 1.51 m/sMean Velocity: 
1.07 m/s







  Peak Gradient: 9.18 mmHg Mean Gradient: 5.05 
mmHg







  AV Area (continuity): 3.87 cm^2







  AV VTI: 27.63 cm







  







  AV DVI: 0.87







  







  LVOT







  







  Peak Velocity: 1.26 m/s Peak Gradient: 6.39 mmHg







  Mean Velocity: 0.87 m/s Mean Gradient: 3.37 mmHg







  LVOT Diameter: 2.38 cmLVOT VTI: 24.02 cm







  LVOT Area: 4.45 cm^2LVOT SV:106.81 ml







  LVOT CO: 9.72 l/min LVOT CI: 4.44 l/min/m^2







  







  Tricuspid Valve







  







  TR Velocity: 3.05 m/s







  TR Gradient: 37.24 mmHg







 









 Procedure Note

 

 Interface, External Ris In - 2018  4:24 PM CDT



Transthoracic Echocardiography Report (TTE)



 



  Demographics



 



  Patient Name   PINKY PÉREZ     Date of Study       2018



 



  MRN            [id number]         Gender              Male



 



  Visit Number   [id number]       Race                Unknown



 



  Accession      [id number]        Room Number         6A09



  Number



 



  Date of Birth  1935       Referring Physician Jumana Montez MD



 



  Age            83 year(s)       Sonographer         Noe Rosario [facility]



 



  Analyst        Nupur Lynn,    Maricarmen Anthony MD



                 [facility]             Physician



 



 Procedure



 



  Type of



  Study     TTE procedure:2DECHO W DOPPLER(CW/PW/COLOR) (STAT)



 



 Indications:Known or suspected heart failure.



 



 Clinical History



 DIABETES, BRADYCARDIA, OBESITY



 



 Contrast Medium: Definity. Amount - 2 ml



 



 Height: 68 inches Weight: 106.59 kg (235 lbs) BSA: 2.19 m^2 BMI: 35.73



 kg/m^2



 



 HR: 91 bpm BP: 121/57 mmHg



 



  Summary



  The left ventricle is chamber size (by vol index) is normal (male - LVED



  vol - 34-74ml/m2). Normal LV wall thickness. All of the LV segments are



  hyperkinetic . Global LV systolic function hyperdynamic . LVEF by



  Petit's method of disk assessment is increased (>70%) .



  Grade 1 diastolic dysfunction (impaired relaxation and low-normal LA



  pressure).



  Estimated peak systolic PA pressure is 40-45 mmHg .



 



  Previous Study



  No prior exam available for comparison.



 



  Signature



 



  ----------------------------------------------------------------



  Electronically signed by Jamison Anthony MD(Interpreting



  physician) on 2018 04:24 PM



  ----------------------------------------------------------------



 



  Findings



 



 Technical Quality: Technically difficult exam.



 



  Left Ventricle         LV endocardium is adequately visualized with IV



                         ultrasound enhancing agent.



                         The left ventricle is chamber size (by vol index)



                         is normal (male - LVED vol - 34-74ml/m2).



                         Normal LV wall thickness.



                         All of the LV segments are hyperkinetic .



                         Global LV systolic function hyperdynamic .



                         LVEF by Petit's method of disk assessment is



                         increased (>70%) .



                         The LVEF was measured using Petit's bi-plane



                         method of disk .



                         High (cardiac index >4 L/min/m2) cardiac output



                         state at rest is noted.



                         Grade 1 diastolic dysfunction (impaired relaxation



                         and low-normal LA pressure).



 



  Left Atrium            LA size is normal (16-34 ml/m2) .



 



  Right Ventricle        The right ventricular chamber size and systolic



                         function are within normal limits.



 



  Right Atrium           RA cavity size is normal .



 



  Aortic Valve           Normal AoV structure and function.



 



  Mitral Valve           Normal MV structure and function.



 



  Tricuspid Valve        TV structure is normal.



                         Mild tricuspid regurgitation.



                         Estimated peak systolic PA pressure is 40-45 mmHg .



 



  Pulmonic Valve         Normal PV structure and function by limited views



                         and Doppler.



 



  Aorta                  Aortic root size (SInus of Valsalva diameter) is



                         normal .



 



  Pericardium            No pericardial effusion is visualized.



 



  IVC/SVC/PA/PV/Pleural  The estimated RA pressure by IVC dynamics 0-5mmHg .



                         The inferior vena cava size is normal .



                         The inferior vena cava is partially visualized.



 



 Chambers/Structures



 



  Left Atrium



 



  LA Volume: 58.11 ml                     LA Area: 18.55 cm^2



  LA Vol. Index: 27 ml/m^2



 



  Left Ventricle



 



  LVIDd: 3.57 cm



  LV Septum Diastolic: 1.02 cm



  LV PW Diastolic: 1.12 cm



  LVEDV Petit's:135.21 ml



  LVESV Petit's:36.43 ml



  LVEF Petit's: 73 %                        LVEDVI: 62 ml/m^2



                                              LVESVI: 17 ml/m^2



  LVOT Diameter: 2.38 cm



 



 Doppler/Quantitative Measurements



 



  Mitral Valve



 



  MV Peak E-Wave: 0.83 m/s              MV Peak A-Wave: 1.05 m/s



                                        E/A Ratio: 0.79



                                        Peak Gradient: 2.73 mmHg



                                        Deceleration Time: 235.7 msec



 



  MV Gerson. Peak:



 



  Tissue Doppler



 



  E' Septal Velocity: 0.06 m/s          E/E': 14.35



 



  Aortic Valve



 



  Peak Velocity: 1.51 m/s                  Mean Velocity: 1.07 m/s



  Peak Gradient: 9.18 mmHg                 Mean Gradient: 5.05 mmHg



  AV Area (continuity): 3.87 cm^2



  AV VTI: 27.63 cm



 



  AV DVI: 0.87



 



  LVOT



 



  Peak Velocity: 1.26 m/s             Peak Gradient: 6.39 mmHg



  Mean Velocity: 0.87 m/s             Mean Gradient: 3.37 mmHg



  LVOT Diameter: 2.38 cm              LVOT VTI: 24.02 cm



  LVOT Area: 4.45 cm^2                LVOT SV:106.81 ml



  LVOT CO: 9.72 l/min                 LVOT CI: 4.44 l/min/m^2



 



  Tricuspid Valve



 



  TR Velocity: 3.05 m/s



  TR Gradient: 37.24 mmHg



 



US renal complete (2018  1:48 PM)





 Specimen  Performing Laboratory

 

   [employer]

 

 FINAL REPORT







  







 PATIENT ID: [id number]







  







 Ultrasound of the Kidneys







  







 Clinical History:marianne







  







 Discussion:







  







 Sonographic evaluation of the kidneys is performed.







  







 Right kidney:10 x 4.7 x 4 cm, with cortical thickness of 1.1 cm.







 Normal cortical echogenicity.No mass.No shadowing calculus. No 







 hydronephrosis.







  







 Left kidney: 10.2 x 4.5 x 4.7 cm, with cortical thickness of 1.3 cm.







 Normal cortical echogenicity.No mass.No shadowing calculus.No 







 hydronephrosis.







  







 Limited doppler evaluation of right main renal artery and vein 







 demonstrate patency. Left renal vessels are poorly visualized due to 







 patient's body habitus.







  







 Bladder:Decompressed with Grimes catheter.







  







 Impression:







  







 No hydronephrosis.







  







 Signed: Rancho Rasheed MD







 Report Verified Date/Time:2018 16:16:10







  







 Reading Location: [address] Ultrasound Reading Room







 Electronically signed by: RANCHO RASHEED M.D. on 2018 04:16 
PM







 









 Procedure Note

 

 Interface, External Ris In - 2018  4:18 PM CDT



FINAL REPORT



 



 PATIENT ID:   [id number]



 



 Ultrasound of the Kidneys



 



 Clinical History:  marianne



 



 Discussion:



 



 Sonographic evaluation of the kidneys is performed.



 



 Right kidney:  10 x 4.7 x 4 cm, with cortical thickness of 1.1 cm.



 Normal cortical echogenicity.  No mass.  No shadowing calculus. No



 hydronephrosis.



 



 Left kidney: 10.2 x 4.5 x 4.7 cm, with cortical thickness of 1.3 cm.



 Normal cortical echogenicity.  No mass.  No shadowing calculus.  No



 hydronephrosis.



 



 Limited doppler evaluation of right main renal artery and vein



 demonstrate patency. Left renal vessels are poorly visualized due to



 patient's body habitus.



 



 Bladder:  Decompressed with Grimes catheter.



 



 Impression:



 



 No hydronephrosis.



 



 Signed: Rancho Rasheed MD



 Report Verified Date/Time:  2018 16:16:10



 



 Reading Location: [address] Ultrasound Reading Room



         Electronically signed by: RANCHO RASHEED M.D. on 2018 04:16 PM



 



Blood gas, venous (2018 10:38 AM)Only the most recent of2 resultswithin 
the time period is included.





 Component  Value  Ref Range

 

 pH, Sedrick  7.49 (H)  7.32 - 7.42

 

 pCO2, Sedrick  24 (L)  41 - 51 mmHg

 

 pO2, Sedrick  41 (H)  25 - 40 mmHg

 

 O2 Sat, Sedrick  86.8 (H)  40.0 - 70.0 %

 

 HCO3, Sedrick  18 (L)  21 - 29 mmol/L

 

 Base Excess, Sedrick  -4.6 (L)  -2.0 - 3.0 mmol/L

 

 Patient Temperature  34.5  C

 

 FIO2  36.0  %









 Specimen  Performing Laboratory

 

 Blood  [address] [address]



Hepatitis C antibody (2018 10:15 AM)





 Component  Value  Ref Range

 

 Hepatitis C Ab  Nonreactive  Nonreactive









 Specimen  Performing Laboratory

 

 Blood  [address] [address]



Hepatitis B core antibody, IgM (2018 10:15 AM)





 Component  Value  Ref Range

 

 Hep B C IgM  Nonreactive  Nonreactive









 Specimen  Performing Laboratory

 

 Blood  [address] [address]



Hepatitis B core antibody, total (2018 10:15 AM)





 Component  Value  Ref Range

 

 Hep B Core Total Ab  Nonreactive  Nonreactive









 Specimen  Performing Laboratory

 

 Blood  [address] [address]



Hepatitis B surface antigen (2018 10:15 AM)





 Component  Value  Ref Range

 

 hepatitis B Surface Ag  Nonreactive  Nonreactive









 Specimen  Performing Laboratory

 

 Blood  [address] [address]



Hepatic function panel (2018  8:57 AM)





 Component  Value  Ref Range

 

 Protein, Total  6.3  6.0 - 8.3 gm/dL

 

 Albumin  3.2 (L)  3.5 - 5.0 g/dL

 

 Total Bilirubin  0.4  0.2 - 1.2 mg/dL

 

 Bilirubin, Direct  0.2  0.1 - 0.5 mg/dL

 

 Alkaline Phosphatase  95  40 - 150 U/L

 

 AST  20  5 - 34 U/L

 

 ALT  21  6 - 55 U/L









 Specimen  Performing Laboratory

 

 Blood - Central Venous Line  [address] [address]









 Narrative

 

 Call [phone]



Electrolytes (2018  8:57 AM)





 Component  Value  Ref Range

 

 Sodium  133 (L)  136 - 145 meq/L

 

 Potassium  6.8 (HH)  3.5 - 5.1 meq/L

 

 Chloride  102  98 - 107 meq/L

 

 CO2  8 (LL)  22 - 29 meq/L









 Specimen  Performing Laboratory

 

 Blood - Central Venous Line  [address] [address]









 Narrative

 

 Call [phone]



Type and screen, automated (2018  5:47 AM)





 Component  Value  Ref Range

 

 ABO/RH AUTOMATED (BEAKER)  O POSITIVE  

 

 Ab Scrn  NEGATIVE  









 Specimen  Performing Laboratory

 

 Blood  [address] [address]



Blood culture (2018  5:46 AM)





 Component  Value  Ref Range

 

 Result  No growth in 5 days  









 Specimen  Performing Laboratory

 

 Blood - Central Venous Line  [address] [address]



after 2017

## 2018-07-18 NOTE — EDPHYS
Physician Documentation                                                                           

 [facility]                                                                

Name: Pinky Avitia                                                                                 

Age: 83 yrs                                                                                       

Sex: Male                                                                                         

: 1935                                                                                   

MRN: [id number]                                                                                   

Arrival Date: 2018                                                                          

Time: 16:16                                                                                       

Account#: [id number]                                                                            

Bed 30                                                                                            

Private MD:                                                                                       

ED Physician Ruben Faye                                                                      

HPI:                                                                                              

                                                                                             

16:40 This 83 yrs old  Male presents to ER via EMS with complaints of Leg Swelling.   snw 

16:40 bilateral lower ext edema. Onset: The symptoms/episode began/occurred suddenly, this    snw 

      morning. Severity of symptoms: At their worst the symptoms were moderate. The patient       

      has experienced a previous episode, approximately 1 months ago, but today's symptoms        

      are not as bad as this previous episode. The patient has not recently seen a physician.     

      left after one week hospitalization at [facility]. MD requested pt stay in rehab for           

      longer but pt states he left [facility] because he didn't like the food..                          

                                                                                                  

Historical:                                                                                       

- Allergies:                                                                                      

16:21 adhesive tape-silicones;                                                                ph  

- Home Meds:                                                                                      

16:28 amlodipine 10 mg tab 1 tab once daily [Active]; gabapentin 300 mg Oral cap 1 cap at     kr2 

      bedtime [Active]; Lasix 40 mg Oral tab once daily [Active]; Lipitor Oral [Active];          

      metformin 1,000 mg Oral tab 2 times per day [Active]; metoprolol tartrate 25 mg Oral        

      tab 0.5 tab 2 times per day [Active]; multivitamin Oral daily [Active]; Vitamin D Oral      

      400 unit daily [Active];                                                                    

- PMHx:                                                                                           

16:21 Diabetes - NIDDM; Hyperlipidemia; Hypertension; muscle atrophy; paralysis; renal        ph  

      failure;                                                                                    

                                                                                                  

- Immunization history:: Adult Immunizations unknown.                                             

- Social history:: Smoking status: Patient/guardian denies using tobacco.                         

- Ebola Screening: : No symptoms or risks identified at this time.                                

                                                                                                  

                                                                                                  

ROS:                                                                                              

16:38 Constitutional: Negative for fever, chills, and weight loss, Eyes: Negative for injury, snw 

      pain, redness, and discharge, ENT: Negative for injury, pain, and discharge, Neck:          

      Negative for injury, pain, and swelling, Cardiovascular: Negative for chest pain,           

      palpitations, and edema, Respiratory: Negative for shortness of breath, cough,              

      wheezing, and pleuritic chest pain, Abdomen/GI: Negative for abdominal pain, nausea,        

      vomiting, diarrhea, and constipation, Back: Negative for injury and pain, : Negative      

      for injury, bleeding, discharge, and swelling, Skin: Negative for injury, rash, and         

      discoloration, Neuro: Negative for headache, weakness, numbness, tingling, and seizure,     

      Psych: Negative for depression, anxiety, suicide ideation, homicidal ideation, and          

      hallucinations.                                                                             

16:38 MS/extremity: Positive for swelling.                                                        

                                                                                                  

Exam:                                                                                             

16:37 Constitutional:  This is a well developed, well nourished patient who is awake, alert,  snw 

      and in no acute distress. Head/Face:  Normocephalic, atraumatic. Eyes:  Pupils equal        

      round and reactive to light, extra-ocular motions intact.  Lids and lashes normal.          

      Conjunctiva and sclera are non-icteric and not injected.  Cornea within normal limits.      

      Periorbital areas with no swelling, redness, or edema. ENT:  Nares patent. No nasal         

      discharge, no septal abnormalities noted.  Tympanic membranes are normal and external       

      auditory canals are clear.  Oropharynx with no redness, swelling, or masses, exudates,      

      or evidence of obstruction, uvula midline.  Mucous membranes moist. Neck:  Trachea          

      midline, no thyromegaly or masses palpated, and no cervical lymphadenopathy.  Supple,       

      full range of motion without nuchal rigidity, or vertebral point tenderness.  No            

      Meningismus. Chest/axilla:  Normal chest wall appearance and motion.  Nontender with no     

      deformity.  No lesions are appreciated. Cardiovascular:  Regular rate and rhythm with a     

      normal S1 and S2.  No gallops, murmurs, or rubs.  Normal PMI, no JVD.  No pulse             

      deficits. Respiratory:  Lungs have equal breath sounds bilaterally, clear to                

      auscultation and percussion.  No rales, rhonchi or wheezes noted.  No increased work of     

      breathing, no retractions or nasal flaring. Abdomen/GI:  Soft, non-tender, with normal      

      bowel sounds.  No distension or tympany.  No guarding or rebound.  No evidence of           

      tenderness throughout. Back:  No spinal tenderness.  No costovertebral tenderness.          

      Full range of motion. Skin:  Warm, dry with normal turgor.  Normal color with no            

      rashes, no lesions, and no evidence of cellulitis. Evidence of previous extravasation       

      injury of left upper chest wall. Pt states it started as a blister and then over the        

      week long hospitalization he kept dragging his O2 over the area and it opened and           

      enlarged. Area clean, scabbed Neuro:  Awake and alert, GCS 15, oriented to person,          

      place, time, and situation.  Cranial nerves II-XII grossly intact.  Motor strength 5/5      

      in all extremities.  Sensory grossly intact.  Cerebellar exam normal.  Normal gait.         

      Psych:  Awake, alert, with orientation to person, place and time.  Behavior, mood, and      

      affect are within normal limits.                                                            

16:37 Musculoskeletal/extremity: Extremities: grossly normal except: bilateral lower              

      extremity edema, Circulation is intact in all extremities. Sensation intact.                

                                                                                                  

Vital Signs:                                                                                      

16:21 Weight 101.15 kg; Height 5 ft. 8 in. (172.72 cm); Pain 0/10;                            ph  

16:21  / 53; Pulse 61; Resp 19; Temp 98.4; Pulse Ox 99% on R/A; Pain 0/10;              kr2 

17:27  / 55; Pulse 58; Resp 18; Pulse Ox 98% on R/A;                                    kr2 

18:37  / 56; Pulse 59; Resp 19; Pulse Ox 100% on R/A;                                   kr2 

19:27  / 77; Pulse 67; Resp 17; Pulse Ox 100% on R/A;                                   kr2 

20:58  / 81; Pulse 64; Resp 18; Pulse Ox 100% on R/A;                                   jb5 

22:00  / 78; Pulse 60; Resp 19; Pulse Ox 99% on R/A;                                    kr2 

16:21 Body Mass Index 33.91 (101.15 kg, 172.72 cm)                                            ph  

                                                                                                  

MDM:                                                                                              

16:53 Patient medically screened.                                                             snw 

17:55 Data reviewed: vital signs, nurses notes. Data interpreted: Pulse oximetry: on room air snw 

      is 98 %. Interpretation: normal. Counseling: I had a detailed discussion with the           

      patient and/or guardian regarding: the historical points, exam findings, and any            

      diagnostic results supporting the discharge/admit diagnosis. Physician consultation:        

      Ruben Faye MD was called at 17:56, was contacted at 17:56. Transition of care:          

      After a detail discussion of the patient's case, care is transferred to Ruben Faye MD.                                                                                         

19:10 Special discussion: pt noted with hyperkalemia. emergent treatment given. will admit.   wa  

      of note, pt with elevated lipase. need further eval. denies abd pain or vomiting. wife      

      states pt has not been eating much with loss of appetite.                                   

19:16 Test interpretation: by ED physician or midlevel provider: labs noted for elevated      wa  

      lipase. renal insufficiency. anemia. elevated lactate. .                                    

                                                                                                  

                                                                                             

16:26 Order name: T\T\S; Complete Time: 17:41                                                   snw

                                                                                             

16:26 Order name: Urine Microscopic Only; Complete Time: 18:45                                snw 

                                                                                             

16:26 Order name: Urine Culture                                                               snw 

                                                                                             

16:26 Order name: ABG; Complete Time: 17:41                                                   snw 

                                                                                             

16:26 Order name: Amylase, Serum; Complete Time: 18:45                                        snw 

                                                                                             

16:26 Order name: Basic Metabolic Panel; Complete Time: 18:45                                 snw 

                                                                                             

16:26 Order name: Blood Culture Adult (2)                                                     w 

                                                                                             

16:26 Order name: C-Reactive Protein; Complete Time: 18:45                                    snw 

                                                                                             

16:26 Order name: CBC with Diff; Complete Time: 17:41                                         snw 

                                                                                             

16:26 Order name: Ckmb; Complete Time: 19:02                                                  snw 

                                                                                             

16:26 Order name: CPK; Complete Time: 18:45                                                   snw 

                                                                                             

16:26 Order name: Lactate; Complete Time: 17:50                                               snw 

                                                                                             

16:26 Order name: LFT's; Complete Time: 18:45                                                 snw 

                                                                                             

16:26 Order name: Lipase; Complete Time: 18:45                                                snw 

                                                                                             

16:26 Order name: Procalcitonin; Complete Time: 17:41                                         snw 

                                                                                             

16:26 Order name: Protime (+inr); Complete Time: 17:41                                        snw 

                                                                                             

16:26 Order name: Ptt, Activated; Complete Time: 17:41                                        snw 

                                                                                             

16:26 Order name: Sed Rate; Complete Time: 17:41                                              snw 

                                                                                             

16:26 Order name: Troponin (emerg Dept Use Only); Complete Time: 17:41                        snw 

                                                                                             

18:33 Order name: Urine Dipstick--Ancillary (enter results); Complete Time: 18:45             bd  

                                                                                             

19:55 Order name: Basic Metabolic Panel                                                       kr2 

                                                                                             

21:04 Order name: CBC with Automated Diff                                                     [facility]

                                                                                             

21:04 Order name: CBC with Automated Diff                                                     [facility]

                                                                                             

21:04 Order name: Comprehensive Metabolic Panel                                               [facility]

                                                                                             

21:04 Order name: Comprehensive Metabolic Panel                                               [facility]

                                                                                             

21:04 Order name: Lipase                                                                      [facility]

                                                                                             

21:04 Order name: Lipase                                                                      [facility]

                                                                                             

21:04 Order name: Lipid Profile                                                               [facility]

                                                                                             

21:04 Order name: Lipid Profile                                                               [facility]

                                                                                             

21:35 Order name: Lactate Sepsis 2 HR Follow-up                                               [facility]

                                                                                             

16:26 Order name: Cath; Complete Time: 18:36                                                  snw 

                                                                                             

16:26 Order name: Chest Single View XRAY; Complete Time: 17:41                                snw 

                                                                                             

16:26 Order name: Accucheck; Complete Time: 17:12                                             snw 

                                                                                             

16:26 Order name: Cardiac monitoring; Complete Time: 17:12                                    snw 

                                                                                             

16:26 Order name: EKG - Nurse/Tech; Complete Time: 17:52                                      snw 

                                                                                             

16:26 Order name: IV Saline Lock - Large Bore; Complete Time: 17:12                           snw 

                                                                                             

16:26 Order name: Labs collected and sent; Complete Time: 17:12                               snw 

                                                                                             

16:26 Order name: O2 Per Protocol; Complete Time: 17:12                                       snw 

                                                                                             

16:26 Order name: O2 Sat Monitoring; Complete Time: 17:12                                     snw 

                                                                                             

16:26 Order name: Urine Dipstick-Ancillary (obtain specimen); Complete Time: 18:36            snw 

                                                                                             

17:29 Order name: ARTERIAL BLOOD GAS                                                          [facility]

                                                                                             

21:04 Order name: CONS Pharmacy Consult                                                       [facility]

                                                                                             

21:04 Order name: NPO                                                                         [facility]

                                                                                                  

Administered Medications:                                                                         

17:03 Drug: D50W 50 ml Route: IVP; Site: right wrist;                                         ch  

17:51 Follow up: Response: No adverse reaction; No adverse reaction, blood glucose up to 159  kr2 

18:47 Drug: Rocephin 1 grams Route: IV; Rate: calculated rate; Site: right wrist;             kr2 

18:55 Follow up: Response: No adverse reaction; IV Status: Completed infusion                 kr2 

19:08 Drug: D50W 50 ml Route: IVP; Site: right wrist;                                         kr2 

20:06 Follow up: Response: No adverse reaction                                                kr2 

19:10 Drug: Insulin Regular Human 10 units {Co-Signature: kr2 (Ammy Sandhu RN).} Route: IVP;   

      Site: right wrist;                                                                          

20:06 Follow up: Response: No adverse reaction                                                kr2 

19:22 Drug: D50W 50 ml Route: IVP; Site: right wrist;                                         kr2 

20:07 Follow up: Response: No adverse reaction                                                kr2 

                                                                                                  

                                                                                                  

Point of Care Testing:                                                                            

      Blood Glucose:                                                                              

16:52 Blood Glucose: 64 mg/dL;                                                                kr2 

17:53 Blood Glucose: 159 mg/dL;                                                               kr2 

19:51 Blood Glucose: 294 mg/dL;                                                               kr2 

20:57 Blood Glucose: 249 mg/dL;                                                               jb5 

      Ranges:                                                                                     

      Critical Glucose Levels:Adult <50 mg/dl or >400 mg/dl  <40 mg/dl or >180 mg/dl       

Disposition:                                                                                      

18 19:13 Hospitalization ordered by Maryuri Matos for Inpatient Admission. Preliminary      

  diagnosis are Hyperkalemia, pancreatitis.                                                       

- Bed requested for Telemetry/MedSurg (Inpatient).                                                

- Status is Inpatient Admission.                                                              kr2 

- Condition is Stable.                                                                            

- Problem is new.                                                                                 

- Symptoms have improved.                                                                         

UTI on Admission? No                                                                              

                                                                                                  

                                                                                                  

                                                                                                  

Addendum:                                                                                         

2018                                                                                        

     20:03 Co-signature as Attending Physician, Ruben Faye MD I agree with the assessment and  w
a

           plan of care.                                                                          

                                                                                                  

Signatures:                                                                                       

Dispatcher [employer]                           Agatha Lopez RN RN                                                      

Ashley Cervantes RN RN                                                      

Lata Crawley, FNP-C                 FNP-Csnw                                                  

Brenna Correa RN RN                                                      

Ruben Faye MD MD wa Reaves, Karey, RN                       RN   kr2                                                  

Ammy Sandhu RN                              kr2                                                  

                                                                                                  

Corrections: (The following items were deleted from the chart)                                    

                                                                                             

16:40 16:37 Constitutional: This is a well developed, well nourished patient who is awake,    snw 

      alert, and in no acute distress. Head/Face: Normocephalic, atraumatic. Eyes: Pupils         

      equal round and reactive to light, extra-ocular motions intact. Lids and lashes normal.     

      Conjunctiva and sclera are non-icteric and not injected. Cornea within normal limits.       

      Periorbital areas with no swelling, redness, or edema. ENT: Nares patent. No nasal          

      discharge, no septal abnormalities noted. Tympanic membranes are normal and external        

      auditory canals are clear. Oropharynx with no redness, swelling, or masses, exudates,       

      or evidence of obstruction, uvula midline. Mucous membranes moist. Neck: Trachea            

      midline, no thyromegaly or masses palpated, and no cervical lymphadenopathy. Supple,        

      full range of motion without nuchal rigidity, or vertebral point tenderness. No             

      Meningismus. Chest/axilla: Normal chest wall appearance and motion. Nontender with no       

      deformity. No lesions are appreciated. Cardiovascular: Regular rate and rhythm with a       

      normal S1 and S2. No gallops, murmurs, or rubs. Normal PMI, no JVD. No pulse deficits.      

      Respiratory: Lungs have equal breath sounds bilaterally, clear to auscultation and          

      percussion. No rales, rhonchi or wheezes noted. No increased work of breathing, no          

      retractions or nasal flaring. Abdomen/GI: Soft, non-tender, with normal bowel sounds.       

      No distension or tympany. No guarding or rebound. No evidence of tenderness throughout.     

      Back: No spinal tenderness. No costovertebral tenderness. Full range of motion. Skin:       

      Warm, dry with normal turgor. Normal color with no rashes, no lesions, and no evidence      

      of cellulitis. Neuro: Awake and alert, GCS 15, oriented to person, place, time, and         

      situation. Cranial nerves II-XII grossly intact. Motor strength 5/5 in all extremities.     

      Sensory grossly intact. Cerebellar exam normal. Normal gait. Psych: Awake, alert, with      

      orientation to person, place and time. Behavior, mood, and affect are within normal         

      limits. snw                                                                                 

16:53 16:51 Misc. Order ordered. sn                                                          snw 

19:43 19:13 Hospitalization Ordered by Maryuri Matos MD for Inpatient Admission. Preliminary   mw  

      diagnosis is Hyperkalemia; pancreatitis. Bed requested for Telemetry/MedSurg                

      (Inpatient). Status is Inpatient Admission. Condition is Stable. Problem is new.            

      Symptoms have improved. UTI on Admission? No. wa                                            

22:01 19:43 2018 19:13 Hospitalization Ordered by Maryuri Matos MD for Inpatient         kr2 

      Admission. Preliminary diagnosis is Hyperkalemia; pancreatitis. Bed requested for           

      Telemetry/MedSurg (Inpatient). Status is Inpatient Admission. Condition is Stable.          

      Problem is new. Symptoms have improved. UTI on Admission? No. mw                            

22:02 22:01 2018 19:13 Hospitalization Ordered by Maryuri Matos MD for Inpatient         kr2 

      Admission. Preliminary diagnosis is Hyperkalemia; pancreatitis. Bed requested for           

      Telemetry/MedSurg (Inpatient). Status is Inpatient Admission. Condition is Stable.          

      Problem is new. Symptoms have improved. UTI on Admission? No. kr2                           

                                                                                                  

**************************************************************************************************

## 2018-07-19 LAB
ALBUMIN SERPL BCP-MCNC: 2.8 G/DL (ref 3.4–5)
ALP SERPL-CCNC: 192 U/L (ref 45–117)
ALT SERPL W P-5'-P-CCNC: 31 U/L (ref 12–78)
AST SERPL W P-5'-P-CCNC: 25 U/L (ref 15–37)
BUN BLD-MCNC: 63 MG/DL (ref 7–18)
GLUCOSE SERPLBLD-MCNC: 113 MG/DL (ref 74–106)
HCT VFR BLD CALC: 29.7 % (ref 39.6–49)
HDLC SERPL-MCNC: 23 MG/DL (ref 40–60)
LDLC SERPL CALC-MCNC: 55 MG/DL (ref ?–130)
LIPASE SERPL-CCNC: 791 U/L (ref 73–393)
LYMPHOCYTES # SPEC AUTO: 1.4 K/UL (ref 0.7–4.9)
MCH RBC QN AUTO: 29.3 PG (ref 27–35)
MCV RBC: 86.8 FL (ref 80–100)
PMV BLD: 7.4 FL (ref 7.6–11.3)
POTASSIUM SERPL-SCNC: 5.2 MMOL/L (ref 3.5–5.1)
RBC # BLD: 3.43 M/UL (ref 4.33–5.43)

## 2018-07-19 RX ADMIN — METOPROLOL TARTRATE SCH MG: 25 TABLET ORAL at 22:20

## 2018-07-19 RX ADMIN — SODIUM BICARBONATE TAB 325 MG SCH MG: 325 TAB at 22:20

## 2018-07-19 RX ADMIN — COLLAGENASE SANTYL SCH APPL: 250 OINTMENT TOPICAL at 20:40

## 2018-07-19 RX ADMIN — Medication SCH: at 21:00

## 2018-07-19 RX ADMIN — ATORVASTATIN CALCIUM SCH MG: 20 TABLET, FILM COATED ORAL at 22:21

## 2018-07-19 RX ADMIN — ENOXAPARIN SODIUM SCH MG: 30 INJECTION SUBCUTANEOUS at 17:26

## 2018-07-19 RX ADMIN — SODIUM CHLORIDE SCH: 0.9 INJECTION, SOLUTION INTRAVENOUS at 17:28

## 2018-07-19 RX ADMIN — SODIUM CHLORIDE SCH MLS: 0.9 INJECTION, SOLUTION INTRAVENOUS at 17:27

## 2018-07-19 RX ADMIN — SODIUM CHLORIDE SCH MLS: 0.9 INJECTION, SOLUTION INTRAVENOUS at 05:17

## 2018-07-19 RX ADMIN — Medication SCH ML: at 09:29

## 2018-07-19 RX ADMIN — GABAPENTIN SCH MG: 300 CAPSULE ORAL at 22:21

## 2018-07-19 NOTE — PN
Date of Progress Note:  07/19/2018



Subjective:  The patient seen and examined, chart reviewed, and case discussed with RN and Dr. Man.
  The patient denies any abdominal pain, some nausea, but no vomiting.  Wants to eat.



Review of Systems:

Negative except as per HPI.



Medications:  List reviewed.



Objective:  Vital signs:  Temperature 97.7, heart rate 60, blood pressure 135/63, respirations 16, an
d O2 saturation 99% on room air. 

General:  Awake, alert, oriented x3, in some mild distress.  Elderly male, obese, BMI 33. 

CV:  S1, S2.  Regular rate and rhythm.  Peripheral pulses present. 

Respiratory:  Moving air well bilaterally.  No wheezing.  No stridor. 

Gastrointestinal:  Abdomen is soft, nontender, nondistended.  Positive bowel sounds.  No guarding or 
rigidity. 

Extremities:  No clubbing, cyanosis, or edema. 

Neurologic:  Nonfocal.



Laboratory Data:  Sodium 136, potassium 5.5, chloride 107, CO2 16, BUN 66, creatinine 2.9, glucose 22
6, lactate 3.9, and calcium is 8.  WBC 8.4, H and H 10.1 and 29.7, and platelets 502.  Lipase 791.  E
xtremity venous Doppler shows no evidence of thrombus within the veins of the left upper extremity.  
Renal ultrasound shows mildly increased renal echotexture, consistent with parenchymal disease.



Assessment And Plan:  An 83-year-old male with;

1.Anasarca.  We will continue with diuresis.  Left upper extremity swelling worse than right.  Doppl
er sono is negative for deep venous thrombosis.

2.Dyspnea, improving.

3.Diabetes mellitus type 2 without long-term use of insulin with hyperglycemia.  We will continue sl
iding scale insulin.  Continue Accu-Chek.

4.Essential hypertension.  Resume home medications as appropriate.

5.Acute pancreatitis.  Lipase level is improving.  We will advance diet.  No further nausea or vomit
ing.  GI has been consulted.

6.Thrombosis of the left upper extremity, started on apixaban.

7.Cholelithiasis.  No intervention planned by Dr. Man.

8.Gastrointestinal and deep venous thrombosis prophylaxis, addressed.





SA/MODL

DD:  07/19/2018 12:30:32Voice ID:  [id number]

DT:  07/19/2018 15:13:07Report ID:  [id number]

## 2018-07-19 NOTE — EKG
Test Date:    2018-07-18               Test Time:    17:44:19

Technician:   ELDER                                   

                                                     

MEASUREMENT RESULTS:                                       

Intervals:                                           

Rate:         61                                     

CT:           174                                    

QRSD:         94                                     

QT:           428                                    

QTc:          430                                    

Axis:                                                

P:            49                                     

CT:           174                                    

QRS:          18                                     

T:            71                                     

                                                     

INTERPRETIVE STATEMENTS:                                       

                                                     

Normal sinus rhythm

Normal ECG

Compared to ECG 06/26/2018 02:09:57

Sinus bradycardia no longer present

Right bundle-branch block no longer present



Electronically Signed On 07-19-18 13:52:07 CDT by Avery Sanchez

## 2018-07-19 NOTE — CON
Date of Consultation:  07/19/2018



Reason:  Wound to left neck and abdominal pain.



History Of Present Illness:  The patient is an 83-year-old gentleman, who was admitted through the em
ergency room with anasarca and swelling in the upper and lower extremities.  [facility], had a kidney biopsy done not too long ago.  He has an oxygen tubing, which he says broke
 down in left side of his neck and caused him to have a blister and following the blister he has necr
otic eschar.  I was asked to evaluate that, but he also complains of some pain in the abdomen and his
 lipase is elevated and he has a history of gallstones, therefore I was consulted.  At this time, he 
is sitting up in bed.  He is awake, alert, no pain, no nausea, no vomiting.  He is hungry.  No diarrh
ea or constipation.  No blood in his stool.  No dysuria or hematuria.  No sore throat, runny nose, co
ugh, headaches, or dizziness.  No chest pain.



Review of Systems:

Otherwise unremarkable.



Past Medical History:  Hyperlipidemia, hypertension, type 2 diabetes, myasthenia gravis, and peripher
al neuropathy.



Past Surgical History:  Bilateral cataract surgery, Broken leg in 2006, finger surgery and prostate s
urgery.



Allergies:  INCLUDE ADHESIVE TAPE.



Social History:  He does not smoke.  Does not drink alcohol.



Family History:  Significant for heart disease, hypertension, diabetes, and kidney disease.



Physical Examination:

Vital signs:  Stable.  He is afebrile. 

General:  He is awake, alert, oriented x3. 

Head and neck:  Cranial nerves 2 through 12 grossly within normal limits.  No neck masses.  No JVD.  
Throat clear.  Neck is supple. 

Chest:  Clear . 

Heart:  S1, S2. 

Abdomen:  Soft, nondistended, nontender.  Positive bowel sounds. 

Extremities:  There is diffuse edema present both upper and lower extremity with the left arm being s
lightly bigger than the right arm and on the left supraclavicular region, there is approximately a 10
 x 3 cm area of necrotic eschar.  There is no surrounding erythema, warmth, or edema.



Laboratory Data:  Platelets are 502.  H and H 10.1 and 29.7.  INR is 1.05.  Chemistry reviewed.  His 
potassium is 5.2.  CO2 is 19.  BUN is 63, creatinine is 2.6.  His lipase ws 791.  His alkaline phosph
atase is 192, total bilirubin is 0.3, AST is 25, and ALT is 31.  He had a renal ultrasound, which bhavin
ws parenchymal disease and he had a CT scan back in February, which showed gallstones.



Assessment:  An 83-year-old gentleman with the gallstones, comes in with anasarca, renal insufficienc
y, and pancreatitis possibly.  Clinically, he does not have acute pancreatitis as he has no tendernes
s, no nausea, or vomiting at this time.  It may be a reflection of his renal insufficiency.  He does 
have gallstones, but he does not have any symptoms for that yet.  He has multiple other medical issue
s present As far as the eschar is concerned, we will just treat it with collagenase for the time laurel baca to soften it up and can be debrided as an outpatient.  If he is medically stabilized, we will do it
 in-house.  As far as his gallbladder issues are concerned, I do not think gallstones causing the pro
blem.  I think we have to look for other reasons for his lipase to be elevated.  Clinically, he does 
not appear to have pancreatitis.  We will follow this patient while in the hospital.  No need for any
 urgent surgical intervention at this time.





AS/MODL

DD:  07/19/2018 13:28:43Voice ID:  [id number]

DT:  07/19/2018 15:24:29Report ID:  [id number]

## 2018-07-19 NOTE — RAD REPORT
EXAM DESCRIPTION:  US - Renal Ultrasound-Complete - 7/19/2018 8:41 am

 

CLINICAL HISTORY:  . Acute renal insufficiency. Chronic renal disease

 

COMPARISON:  February 2018

 

FINDINGS:  The right kidney measures 10 cm with a mildly increased echotexture

 

The left kidney measures 9 cm with a mildly increased echotexture

 

Hydronephrosis is not seen.

 

Evaluation the bladder was limited as the patient has a Grimes catheter in place

 

IMPRESSION:  Mildly increased renal echotexture consistent with parenchymal disease

## 2018-07-19 NOTE — RAD REPORT
EXAM DESCRIPTION:  VASExtremity Venous Uni Ltd7/19/2018 8:55 am

 

CLINICAL HISTORY:   Left arm swelling

 

COMPARISON:   2015

 

FINDINGS:  The left internal jugular, left subclavian, left cephalic, left axillary, left brachial, l
eft basilic, left ulnar and left radial veins are generally compressible and demonstrate augmentation
. Doppler demonstrates good flow.

 

Chronic reversal of flow within the left subclavian vein is present.

 

IMPRESSION:   No evidence of thrombus within the veins of the left upper extremity

## 2018-07-19 NOTE — P.HP
Certification for Inpatient


Patient admitted to: Inpatient


With expected LOS: >2 Midnights


Patient will require the following post-hospital care: None


Practitioner: I am a practitioner with admitting privileges, knowledge of 

patient current condition, hospital course, and medical plan of care.


Services: Services provided to patient in accordance with Admission 

requirements found in Title 42 Section 412.3 of the Code of Federal Regulations





Patient History


Date of Service: 07/18/18


Reason for admission: Anasarca with bilateral upper extremity swelling


History of Present Illness: 





  Patient is an 83-year-old gentleman who came into the emergency room with 

anasarca.  Patient has swelling in the upper and lower extremity.  Patient's 

left upper extremity is swollen more so than the   Right side.  Patient has 

minimal lower extremity edema at this time.  Patient was recently at [facility] at the rehab facility but was upset at his diet and left 

against medical advice.  Patient has not been feeling well and started having 

abdominal pain.  Patient had intractable nausea and vomiting.  Patient was seen 

in our emergency room and his labs revealed an elevated lipase level.  Patient 

has significant edema of the left upper extremity as well.  Patient will be 

admitted to the hospital for anasarca and acute pancreatitis.  a Doppler of the 

left upper extremity will also be ordered.  Patient will need possible 

placement at a skilled nursing facility  or a rehab.


Allergies





adhesive tape Adverse Reaction (Intermediate, Verified 02/09/17 15:25)


 Rash


adhesive tape- Allergy (Uncoded 07/18/18 22:06)


 Unknown


No Allergy (Uncoded 02/14/18 23:14)


 Unknown


No Known Allergies Allergy (Uncoded 04/17/18 21:28)


 Unknown





Home Medications: 








Metoprolol Tartrate [Lopressor*] 12.5 mg PO BID 12/08/15 


Multivitamin [Multivitamins] 1 each PO DAILY 02/10/16 


Cholecalciferol (Vitamin D3) [Vitamin D 400 IU TAB*] 1,200 unit PO DAILY 02/09/ 17 


Furosemide [Lasix] 40 mg PO DAILY 02/09/17 


Gabapentin [Gralise] 300 mg PO BEDTIME 02/09/17 


Metformin HCl [Glucophage] 1,000 mg PO BID 02/09/17 


Amlodipine [Norvasc*] 10 mg PO DAILY #30 tab 02/11/17 


Atorvastatin Calcium [Lipitor] 20 mg PO BEDTIME 07/19/18 








- Past Medical/Surgical History


Has patient received pneumonia vaccine in the past: No


Diabetic: Yes


-: Hyperlipidemia


-: HTN


-: NIDDM


-: bladder infection


-: prostate problem


-: Myasthenia gravis


-: Peripheral neuropathy


-: Crow cataracts


-: broken leg 2006


-: finger surgery


-: prostate surgery





- Family History


  ** Father


Medical History: Heart disease





  ** Mother


Medical History: Hypertension





  ** Sister


Medical History: Hypertension, Diabetes, Cancer, Kidney disease





  ** Brother


Medical History: Heart disease, Cancer





- Social History


Smoking Status: Never smoker


Alcohol use: No


CD- Drugs: No


Caffeine use: Yes


Place of Residence: Home





Review of Systems


10-point ROS is otherwise unremarkable





Physical Examination





- Vital Signs


Temperature: 96.1 F


Blood Pressure: 136/65


Pulse: 56


Respirations: 16


Pulse Ox (%): 100





- Physical Exam


General: Alert, In no apparent distress, Oriented x2


HEENT: Atraumatic, PERRLA, Mucous membr. moist/pink, EOMI, Sclerae nonicteric


Neck: Supple, 2+ carotid pulse no bruit, No LAD, Without JVD or thyroid 

abnormality


Respiratory: Diminished, Crackles/rales


Cardiovascular: Regular rate/rhythm, Normal S1 S2, Systolic murmur


Gastrointestinal: Normal bowel sounds, Soft and benign, Non-distended, No 

tenderness


Musculoskeletal: No tenderness, Swelling ( mainly of the upper extremity)


Integumentary: No rashes


Neurological: Normal speech, Normal tone, Sensation intact, Cranial nerves 3-12 

intact, Normal affect, Abnormal gait, Abnormal strength


Lymphatics: No axilla or inguinal lymphadenopathy





- Studies


Laboratory Data (last 24 hrs)





07/18/18 16:52: PT 12.4, INR 1.05, APTT 31.2


07/18/18 16:52: WBC 9.9, Hgb 9.9 L, Hct 29.2 L, Plt Count 596 H


07/18/18 16:52: Sodium 137, Potassium 6.2 H*, BUN 66 H, Creatinine 2.90 H, 

Glucose 65 L, Total Bilirubin 0.2, AST 31, ALT 34, Alkaline Phosphatase 205 H, 

Amylase 107, Lipase 1093 H








Assessment & Plan





- Problems (Diagnosis)


(1) Anasarca


Current Visit: Yes   Status: Acute   





(2) Dyspnea


Onset Date: 02/10/17   Current Visit: No   Status: Acute   





(3) Diabetes


Onset Date: 04/18/18   Current Visit: No   Status: Chronic   


Qualifiers: 


   Diabetes mellitus type: type 2   Diabetes mellitus long term insulin use: 

without long term use   Diabetes mellitus complication status: with unspecified 

complications   Qualified Code(s): E11.8 - Type 2 diabetes mellitus with 

unspecified complications   





(4) HTN (hypertension)


Onset Date: 04/18/18   Current Visit: No   Status: Chronic   


Qualifiers: 


   Hypertension type: essential hypertension   Qualified Code(s): I10 - 

Essential (primary) hypertension   





(5) Acute pancreatitis


Current Visit: Yes   Status: Acute   





(6) Thrombosis of left upper extremity


Current Visit: Yes   Status: Acute   





(7) Cholelithiasis


Current Visit: Yes   Status: Acute   





- Plan





 plan:


1.  Gentle hydration


2. pain control as needed


3.  Doppler of the left upper extremity


4.  Anticoagulation


5. physical therapy evaluation


6. albumin and  may give a low-dose Lasix


7. GI consultation


8.  Nephrology consultation 


9. GI prophylaxis





- Advance Directives


Does patient have a Living Will: No


Does patient have a Durable POA for Healthcare: Yes

## 2018-07-20 LAB
BUN BLD-MCNC: 51 MG/DL (ref 7–18)
CREAT UR-SCNC: 25 MG/DL (ref 20–370)
GLUCOSE SERPLBLD-MCNC: 130 MG/DL (ref 74–106)
MICROALBUMIN UR-MCNC: 185 MG/DL (ref ?–1.9)
POTASSIUM SERPL-SCNC: 4.7 MMOL/L (ref 3.5–5.1)
PROT UR-MCNC: 251 MG/DL (ref ?–11.9)

## 2018-07-20 RX ADMIN — METOPROLOL TARTRATE SCH MG: 25 TABLET ORAL at 08:56

## 2018-07-20 RX ADMIN — METOPROLOL TARTRATE SCH MG: 25 TABLET ORAL at 20:08

## 2018-07-20 RX ADMIN — ALBUMIN (HUMAN) SCH MLS: 5 SOLUTION INTRAVENOUS at 18:50

## 2018-07-20 RX ADMIN — ATORVASTATIN CALCIUM SCH MG: 20 TABLET, FILM COATED ORAL at 20:09

## 2018-07-20 RX ADMIN — GABAPENTIN SCH MG: 300 CAPSULE ORAL at 20:09

## 2018-07-20 RX ADMIN — AMLODIPINE BESYLATE SCH MG: 10 TABLET ORAL at 08:56

## 2018-07-20 RX ADMIN — ALBUMIN (HUMAN) SCH MG: 5 SOLUTION INTRAVENOUS at 20:11

## 2018-07-20 RX ADMIN — Medication SCH: at 08:58

## 2018-07-20 RX ADMIN — Medication SCH ML: at 20:09

## 2018-07-20 RX ADMIN — FUROSEMIDE SCH MG: 40 TABLET ORAL at 08:57

## 2018-07-20 RX ADMIN — SODIUM CHLORIDE SCH MLS: 0.9 INJECTION, SOLUTION INTRAVENOUS at 09:49

## 2018-07-20 RX ADMIN — SODIUM BICARBONATE TAB 325 MG SCH MG: 325 TAB at 08:57

## 2018-07-20 RX ADMIN — SODIUM BICARBONATE TAB 325 MG SCH MG: 325 TAB at 20:09

## 2018-07-20 RX ADMIN — ENOXAPARIN SODIUM SCH MG: 30 INJECTION SUBCUTANEOUS at 16:59

## 2018-07-20 RX ADMIN — COLLAGENASE SANTYL SCH APPL: 250 OINTMENT TOPICAL at 08:55

## 2018-07-20 NOTE — CON
Date of Consultation:  07/19/2018



Chief Complaint:  Elevated BUN and creatinine, hyperkalemia, metabolic acidosis.



History Of Present Illness:  Nephrology consultation was requested for abnormal 
electrolytes and acute on chronic kidney injury.  The patient was admitted to 
the hospital and was found to have severe hyperkalemia, potassium was 6.2.  The 
patient was found to have acute on chronic kidney injury.  BUN was 66 and 
creatinine 2.9.  The patient has nonoliguric urine output.  Renal function has 
not improved significantly over the last 24 hours.  The patient was treated for 
hyperkalemia.  Potassium improved to 5.2.  The patient has non-anion gap 
metabolic acidosis, hyperchloremic, chloride is 110, sodium 140, and 
bicarbonate 19.  Review of previous lab work obtained in April 2018 showed 
creatinine 1.1.  The patient has previous history of acute kidney injury back 
in April 2018.  Creatinine was elevated up to 2.07.  Subsequently, acute kidney 
injury resolved and creatinine improved to baseline.  On April 20th, creatinine 
was 0.97.  The patient was found to have severe acute kidney injury.  On June 26th, creatinine was up to 4.60.  The patient is an 83-year-old man who came to 
the emergency room because of shortness of breath, anasarca, progressively 
worse legs edema in upper and lower extremities.  The patient was complaining 
of swelling mostly in the left upper extremity more than in the right upper 
extremity.  He has slight lower extremity edema.  He was feeling weak and was 
complaining of abdominal pain, intractable nausea and vomiting.  The patient 
was found to have acute pancreatitis and anasarca.  He is admitted to the 
hospital for acute kidney injury with hyperkalemia.  Prior to this admission, 
the patient was treated with metformin, received furosemide.  He was treated 
for hypertension and had metoprolol and amlodipine.



Review of Systems:

Constitutional:  Complains of generalized weakness. 

Eyes:  Denies vision changes. 

Ears, Nose, Mouth, and Throat:  Denies sore throat or earache. 

Respiratory:  Denies PND or orthopnea. 

Cardiovascular:  Denies chest pain or palpitation. 

GI:  Has some abdominal pain, nausea, vomiting.  Denies melena or hematemesis.  

Neurologic:  Denies slow rate speech or tremor. 



All other systems reviewed and all are negative.



Past Medical History:  Hypertension, non-insulin-dependent diabetes mellitus, 
history of urinary tract infection, prostate enlargement, myasthenia gravis, 
peripheral vascular disease, bilateral cataracts, leg fracture, finger surgery, 
prostate surgery, hyperlipidemia.



Family History:  Heart disease in his father.  Hypertension in his mother.  
Sister had hypertension, diabetes, cancer, and kidney disease.



Social History:  Denies tobacco, alcohol, or illicit drugs.



Physical Examination:

General:  The patient is awake, alert 

Eyes:  Anicteric sclerae.  EOMI.  

Ears, Nose, Mouth and Throat:  Oral mucosa moist.  No pallor. 

Neck:  Supple.  No JVD.  No bruits.  

Lungs:  Clear to auscultation bilaterally. 

Abdomen:  Generalized tenderness.  No rebound, no guarding. 

Extremities:  Edema present in upper and lower extremities.

SKIN: warm and dry , no skin rashes

NEUROLOGIC: alert x3 , no tremor

PSYCHIATRIC: normal affect , alert, oriented



Laboratory Data:  Sodium 137, potassium 6.2, BUN 66, creatinine 2.90, glucose 65
, total bilirubin 0.2, , amylase 107, lipase 1093.



Assessment And Plan:  

1.   Anasarca, acute, likely secondary to some fluid overload with history of 
hypoalbuminemia.  Continue low-sodium diet and diuretic as needed.

2.   Diabetes mellitus.  The patient is not a candidate for metformin.  
Continue insulin.  Monitor blood glucose closely.  The patient was found to 
have borderline hypoglycemia.  The patient may need IV fluids with dextrose to 
control hypoglycemia.

3.   Acute pancreatitis, pending workup to rule out biliary pathology.

4.   The patient is on liquid diet.  Monitor potassium intake.

5.   Hyperkalemia.  The patient will have Kayexalate with retention as needed.  
The patient has acute on chronic kidney injury, nonoliguric.  Continue IV 
fluids for mild hydration.

6.   Extremity edema.  The patient was found to have deep venous thrombosis.  
Further workup per primary team.

7.   Acute kidney injury due to prerenal azotemia, acute tubular necrosis.  
Continue IV fluids to prevent renal hypoperfusion, avoid ACE inhibitor, and 
monitor fluid balance and urine output.

8.   Renal ultrasound was ordered to evaluate kidney size and the patient was 
found to have mildly increased renal echotexture without hydronephrosis.Patient 
has chronic kidney disease , baseline renal function likely has worsened .





JOELLEN/YOLA

DD:  07/19/2018 22:36:31   Voice ID:  [id number]

DT:  07/20/2018 00:23:54   Report ID:  [id number]

STEPAN

## 2018-07-20 NOTE — PN
Date of Progress Note:  07/20/2018



Subjective:  The patient is awake, alert.  No abdominal pain.  Tolerating diet.



Objective:  Vital signs:  Stable, afebrile. 

Abdomen:  Benign.



Laboratory Data:  Reviewed.



Assessment:  Cholelithiasis, doubt cholecystitis at this time.



Recommendations:  Medical management.  Wound care is ordered for the left neck wound.  follow up in [facility] for outpatient debridement.  No need for any urgent surgical intervention at 
this time.





AS/YOLA

DD:  07/20/2018 10:32:47Voice ID:  [id number]

DT:  07/20/2018 11:52:03Report ID:  [id number]

## 2018-07-20 NOTE — PN
Date of Progress Note:  07/20/2018



Subjective:  The patient seen and examined, chart reviewed, and case discussed 
with RN and Dr. Man.  No abdominal pain.  The patient tolerating diet.  Still 
has some edema.



Review of Systems:

Negative except as above.



Medications:  List reviewed.



Objective:  Vital signs:  Temperature 97.7, heart rate 72, blood pressure 172/77
, respirations 18, and O2 100% on room air. 

General:  Awake, alert, oriented x3, not in any acute distress.  Elderly male, 
obese, BMI 34.2. 

CV:  S1, S2.  No murmurs.  Peripheral pulses present. 

Respiratory:  Moving air well bilaterally.  No wheezing. 

Gastrointestinal:  Abdomen is soft, nontender, nondistended.  Positive bowel 
sounds. 

Extremities:  No clubbing, cyanosis.  The patient has anasarca with edema in 
all 4 extremities. 

Neurologic:  Nonfocal.



Laboratory Data:  Sodium 142, potassium 4.7, chloride 113, CO2 18, BUN 51, 
creatinine 2.20, glucose 130, and calcium 7.9.  Blood cultures, no growth to 
date.



Assessment:  An 83-year-old male with;

1.   Diffuse anasarca.  We will continue diuresis.  Monitor inputs and outputs, 
daily weights, likely secondary to kidney dysfunction.

2.   Acute dyspnea, resolved.  The patient now saturating well on room air.

3.   Diabetes mellitus type 2, without long-term use of insulin with 
hyperglycemia.  Continue sliding scale.  Continue Accu-Cheks.

4.   Essential hypertension, stable.

5.   Acute pancreatitis.  No abdominal pain.  Clinically resolved.  The patient 
tolerating diet.  Lipase level is 718, trending down.  GI on board.

6.   Cholelithiasis.  No surgery planned at this point.  The patient is 
asymptomatic.  Will need to follow up outpatient with Dr. Man.

7.   Gastrointestinal and deep venous thrombosis prophylaxis, addressed.  No 
thrombosis of the left upper extremity.  Doppler sono was negative.  Apixaban 
has been stopped.  We will continue with gastrointestinal and deep venous 
thrombosis prophylaxis.

8.   Hyperkalemia, corrected.

9.   Necrotic eschar on left lateral neck/shoulder: Outpt f/up w Dr. Man for 
debridement.



Plan:  

1.   Discharge planning.

2.   Disposition:  May need to go back to rehab.





SA/MODL

DD:  07/20/2018 12:14:58   Voice ID:  [id number]

DT:  07/20/2018 14:55:18   Report ID:  [id number]

MTDD

## 2018-07-21 LAB
ALBUMIN SERPL BCP-MCNC: 3.2 G/DL (ref 3.4–5)
BUN BLD-MCNC: 43 MG/DL (ref 7–18)
GLUCOSE SERPLBLD-MCNC: 132 MG/DL (ref 74–106)
HCT VFR BLD CALC: 29.1 % (ref 39.6–49)
LYMPHOCYTES # SPEC AUTO: 1.5 K/UL (ref 0.7–4.9)
MCH RBC QN AUTO: 29.7 PG (ref 27–35)
MCV RBC: 86.7 FL (ref 80–100)
PMV BLD: 7.3 FL (ref 7.6–11.3)
POTASSIUM SERPL-SCNC: 4.2 MMOL/L (ref 3.5–5.1)
RBC # BLD: 3.35 M/UL (ref 4.33–5.43)
TSH SERPL DL<=0.05 MIU/L-ACNC: 3.9 UIU/ML (ref 0.36–3.74)

## 2018-07-21 RX ADMIN — Medication SCH ML: at 08:52

## 2018-07-21 RX ADMIN — METOPROLOL TARTRATE SCH MG: 25 TABLET ORAL at 20:51

## 2018-07-21 RX ADMIN — ALBUMIN (HUMAN) SCH MG: 5 SOLUTION INTRAVENOUS at 23:18

## 2018-07-21 RX ADMIN — SODIUM BICARBONATE TAB 325 MG SCH MG: 325 TAB at 08:51

## 2018-07-21 RX ADMIN — AMLODIPINE BESYLATE SCH MG: 10 TABLET ORAL at 08:51

## 2018-07-21 RX ADMIN — ENOXAPARIN SODIUM SCH MG: 30 INJECTION SUBCUTANEOUS at 16:22

## 2018-07-21 RX ADMIN — ALBUMIN (HUMAN) SCH MG: 5 SOLUTION INTRAVENOUS at 15:41

## 2018-07-21 RX ADMIN — ALBUMIN (HUMAN) SCH MLS: 5 SOLUTION INTRAVENOUS at 15:03

## 2018-07-21 RX ADMIN — COLLAGENASE SANTYL SCH APPL: 250 OINTMENT TOPICAL at 08:53

## 2018-07-21 RX ADMIN — METOPROLOL TARTRATE SCH MG: 25 TABLET ORAL at 08:51

## 2018-07-21 RX ADMIN — ALBUMIN (HUMAN) SCH MLS: 5 SOLUTION INTRAVENOUS at 02:40

## 2018-07-21 RX ADMIN — FUROSEMIDE SCH MG: 40 TABLET ORAL at 08:51

## 2018-07-21 RX ADMIN — ALBUMIN (HUMAN) SCH MLS: 5 SOLUTION INTRAVENOUS at 22:13

## 2018-07-21 RX ADMIN — ALBUMIN (HUMAN) SCH MG: 5 SOLUTION INTRAVENOUS at 03:30

## 2018-07-21 RX ADMIN — GABAPENTIN SCH MG: 300 CAPSULE ORAL at 20:52

## 2018-07-21 RX ADMIN — Medication SCH ML: at 20:53

## 2018-07-21 RX ADMIN — ATORVASTATIN CALCIUM SCH MG: 20 TABLET, FILM COATED ORAL at 20:52

## 2018-07-21 RX ADMIN — SODIUM BICARBONATE TAB 325 MG SCH MG: 325 TAB at 20:52

## 2018-07-21 NOTE — P.PN
Subjective


Date of Service: 07/21/18


Chief Complaint: Anasarca with bilateral upper extremity swelling


Subjective: Improving (Patient is doing better still a swelling of his 

extremities no new complaint he wants to go home)





Review of Systems


General: Weakness


Respiratory: Shortness of Breath


Cardiovascular: Edema (Edema of his extremities)





Physical Examination





- Vital Signs


Temperature: 98.7 F


Blood Pressure: 127/66


Pulse: 67


Respirations: 18


Pulse Ox (%): 100





- Physical Exam


General: Alert, Oriented x3


Neck: Supple


Respiratory: Clear to auscultation bilaterally


Cardiovascular: Normal S1 S2, Edema (Bilateral edema)


Gastrointestinal: Normal bowel sounds, Soft and benign





- Studies


Microbiology Data (last 24 hrs): 








07/18/18 18:20   Catheterized Urine   T[place]stoy Count - Final


                            <10,000 CFU/ML.


07/18/18 18:20   Catheterized Urine    - Final








Assessment & Plan





- Problems (Diagnosis)


(1) Renal failure


Current Visit: Yes   Status: Acute   


Plan: 


Patient is 83 years of age admitted with anasarca kidney function is stable 

patient was also hyperkalemia metabolic acidosis seen by Nephrology no evidence 

of for DVT in his left upper arm patient's lipase was significantly elevated is 

not declining cultures are so far negative Dc Grimes catheter ambulate check 

room air saturation chest x-rays clear


Qualifiers: 


   Chronic kidney disease stage: stage 3 (moderate)

## 2018-07-22 VITALS — SYSTOLIC BLOOD PRESSURE: 131 MMHG | TEMPERATURE: 97 F | DIASTOLIC BLOOD PRESSURE: 57 MMHG

## 2018-07-22 VITALS — OXYGEN SATURATION: 98 %

## 2018-07-22 LAB
ALBUMIN SERPL BCP-MCNC: 3.3 G/DL (ref 3.4–5)
BUN BLD-MCNC: 46 MG/DL (ref 7–18)
GLUCOSE SERPLBLD-MCNC: 159 MG/DL (ref 74–106)
POTASSIUM SERPL-SCNC: 3.4 MMOL/L (ref 3.5–5.1)

## 2018-07-22 RX ADMIN — SODIUM BICARBONATE TAB 325 MG SCH MG: 325 TAB at 08:55

## 2018-07-22 RX ADMIN — COLLAGENASE SANTYL SCH APPL: 250 OINTMENT TOPICAL at 08:57

## 2018-07-22 RX ADMIN — Medication SCH ML: at 08:57

## 2018-07-22 RX ADMIN — METOPROLOL TARTRATE SCH MG: 25 TABLET ORAL at 08:56

## 2018-07-22 RX ADMIN — FUROSEMIDE SCH MG: 40 TABLET ORAL at 08:58

## 2018-07-22 RX ADMIN — AMLODIPINE BESYLATE SCH MG: 10 TABLET ORAL at 08:55

## 2018-07-22 NOTE — PN
Date of Progress Note:  07/21/2018



Subjective:  The patient is slightly doing better.  Still have the swelling especially on the upper e
xtremity.



Physical Examination:

Vital Signs:  Blood pressure 157/68, pulse of 74. 

Chest:  Clear to auscultation. 

Heart:  S1, S2.  Regular. 

Abdomen:  Soft, nontender. 

Extremities:  +1 edema.



Laboratory Data:  H and H 9.9/29.1.  Sodium 141, potassium 4.2, bicarb 19, BUN 43, creatinine down to
 2.  GFR of 32, calcium 8.2, phos 4.4.  The patient had good urine output of 1000.



Current Medications:  The patient on its include;

1.Lasix with albumin, amlodipine.

2.Metoprolol.



Assessment And Plan:  

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
 with biopsy, slightly had anasarca.  I am going to give the patient Lasix with albumin to mobilize t
he fluid and to establish better volume control.

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  Doubt to be any light chain disease as biopsy 
did not support.  After recovery, the patient as outpatient going to be good candidate for ACE inhibi
tor.

3.Hypertension, controlled, optimal.  Continue current medication.  We will consider holding on the 
calcium channel blocker after 

establish better volume and blood pressure control.

4.Pancreatitis as by primary.





MA/YOLA

DD:  07/21/2018 20:55:49Voice ID:  [id number]

DT:  07/22/2018 02:17:07Report ID:  [id number]

## 2018-07-22 NOTE — P.DS
Admission Date: 07/18/18


Discharge Date: 07/22/18


Disposition: ROUTINE DISCHARGE


Discharge Condition: FAIR


Reason for Admission: Anasarca with bilateral upper extremity swelling


Consultations: 





Nephrology





- Problems


(1) Renal failure


Current Visit: Yes   Status: Acute   


Qualifiers: 


   Chronic kidney disease stage: stage 3 (moderate) 


Brief History of Present Illness: 





Patient is 83 years of age admitted with anasarca and hyperkalemia


Hospital Course: 





Patient had an uncomplicated stay seen by Nephrology was diuresed he has 

nephrotic range protein urea lipase was also elevated the time of discharge 

doing well alert oriented x3 vital signs all stable renal function was 

improving room-air oxygen a shin satisfactory renal ultrasound also done 

patient to follow up with Nephrology


On examination is alert oriented x3 chest clear cardiovascular muscles normal 

extremities 1+ edema


Patient's metformin to be discontinued due to lactic acid acidosis


Vital Signs/Physical Exam: 














Temp Pulse Resp BP Pulse Ox


 


 97.2 F   79   18   141/60 H  99 


 


 07/22/18 08:00  07/22/18 08:58  07/22/18 08:00  07/22/18 08:58  07/22/18 08:00








Laboratory Data at Discharge: 














WBC  7.7 K/uL (4.3-10.9)   07/21/18  06:12    


 


Hgb  9.9 g/dL (13.6-17.9)  L  07/21/18  06:12    


 


Hct  29.1 % (39.6-49.0)  L  07/21/18  06:12    


 


Plt Count  479 K/uL (152-406)  H  07/21/18  06:12    


 


PT  12.4 SECONDS (9.5-12.5)   07/18/18  16:52    


 


INR  1.05   07/18/18  16:52    


 


APTT  31.2 SECONDS (24.3-36.9)   07/18/18  16:52    


 


Sodium  139 mmol/L (136-145)   07/22/18  05:36    


 


Potassium  3.4 mmol/L (3.5-5.1)  L  07/22/18  05:36    


 


BUN  46 mg/dL (7-18)  H  07/22/18  05:36    


 


Creatinine  2.40 mg/dL (0.55-1.3)  H  07/22/18  05:36    


 


Glucose  159 mg/dL ()  H  07/22/18  05:36    


 


Phosphorus  4.5 mg/dL (2.5-4.9)   07/22/18  05:36    


 


Total Bilirubin  0.3 mg/dL (0.2-1.0)   07/19/18  08:05    


 


AST  25 U/L (15-37)   07/19/18  08:05    


 


ALT  31 U/L (12-78)   07/19/18  08:05    


 


Alkaline Phosphatase  192 U/L ()  H  07/19/18  08:05    


 


Triglycerides  208 mg/dL (<150)  H  07/19/18  08:05    


 


Cholesterol  120 mg/dL (<200)   07/19/18  08:05    


 


HDL Cholesterol  23 mg/dL (40-60)  L  07/19/18  08:05    


 


Cholesterol/HDL Ratio  5.22   07/19/18  08:05    


 


Amylase  107 U/L ()   07/18/18  16:52    


 


Lipase  718 U/L ()  H  07/20/18  06:05    








Home Medications: 








Metoprolol Tartrate [Lopressor*] 12.5 mg PO BID 12/08/15 


Multivitamin [Multivitamins] 1 each PO DAILY 02/10/16 


Cholecalciferol (Vitamin D3) [Vitamin D 400 IU TAB*] 1,200 unit PO DAILY 02/09/ 17 


Furosemide [Lasix] 40 mg PO DAILY 02/09/17 


Gabapentin [Gralise] 300 mg PO BEDTIME 02/09/17 


Amlodipine [Norvasc*] 10 mg PO DAILY #30 tab 02/11/17 


Atorvastatin Calcium [Lipitor*] 20 mg PO BEDTIME 07/19/18

## 2018-07-22 NOTE — PN
Date of Progress Note:  07/22/2018



Subjective:  The patient doing well.  No nausea.  No vomiting.  The patient being diuresed with album
in to mobilize the fluid.



Objective:  Vital Signs:  Blood pressure of 141/60, pulse of 79. 

Chest:  Clear to auscultation. 

Heart:  S1, S2.  Regular. 

Abdomen:  Soft, nontender. 

Extremities:  No edema on the lower extremity.  More on the upper bilateral, but much better than bef
ore.



Laboratory Data:  WBC 7.7, H and H 9.9/29.1, and platelet 479.  Sodium 139, potassium 3.4, bicarb 20,
 BUN 46, creatinine down to 2.4, GFR 26, calcium of 8.



Current Medications:  The patient is on includes;

1.Amlodipine.

2.Atorvastatin.

3.Metoprolol 12.5 b.i.d.

4.Lovenox.

5.Gabapentin 300 daily.

6.Lasix 40.

7.Sodium bicarb 650 b.i.d.

8.Zofran.



Assessment And Plan:  

1.Acute kidney injury on advanced chronic kidney disease, nephrotic, secondary to diabetes nephropat
hy confirmed with biopsy, with sclerosis of 70%.

2.Still on anasarca side.  Continue diuresis.

3.Hypertension, controlled, optimal.  Continue current medication.

4.Hyperkalemia has been resolved.

5.Pancreatitis, resolved.

6.Cholelithiasis.  No surgical intervention. 

The patient cleared from the renal standpoint for discharge planning.  Follow up in the office in 2-3
 weeks.





SUE

DD:  07/22/2018 10:42:26Voice ID:  [id number]

DT:  07/22/2018 15:05:40Report ID:  [id number]

## 2018-07-28 ENCOUNTER — HOSPITAL ENCOUNTER (INPATIENT)
Dept: HOSPITAL 97 - ER | Age: 83
LOS: 7 days | Discharge: HOME | DRG: 673 | End: 2018-08-04
Attending: FAMILY MEDICINE | Admitting: FAMILY MEDICINE
Payer: COMMERCIAL

## 2018-07-28 VITALS — BODY MASS INDEX: 33.7 KG/M2

## 2018-07-28 DIAGNOSIS — I50.33: ICD-10-CM

## 2018-07-28 DIAGNOSIS — Z66: ICD-10-CM

## 2018-07-28 DIAGNOSIS — N18.6: ICD-10-CM

## 2018-07-28 DIAGNOSIS — I49.1: ICD-10-CM

## 2018-07-28 DIAGNOSIS — N17.9: Primary | ICD-10-CM

## 2018-07-28 DIAGNOSIS — E03.9: ICD-10-CM

## 2018-07-28 DIAGNOSIS — T68.XXXA: ICD-10-CM

## 2018-07-28 DIAGNOSIS — E11.42: ICD-10-CM

## 2018-07-28 DIAGNOSIS — E11.21: ICD-10-CM

## 2018-07-28 DIAGNOSIS — E87.6: ICD-10-CM

## 2018-07-28 DIAGNOSIS — E87.2: ICD-10-CM

## 2018-07-28 DIAGNOSIS — Z91.048: ICD-10-CM

## 2018-07-28 DIAGNOSIS — I95.9: ICD-10-CM

## 2018-07-28 DIAGNOSIS — E87.5: ICD-10-CM

## 2018-07-28 DIAGNOSIS — R00.1: ICD-10-CM

## 2018-07-28 DIAGNOSIS — I46.2: ICD-10-CM

## 2018-07-28 DIAGNOSIS — Z79.84: ICD-10-CM

## 2018-07-28 DIAGNOSIS — E83.42: ICD-10-CM

## 2018-07-28 DIAGNOSIS — E78.5: ICD-10-CM

## 2018-07-28 DIAGNOSIS — Z79.82: ICD-10-CM

## 2018-07-28 DIAGNOSIS — D63.1: ICD-10-CM

## 2018-07-28 DIAGNOSIS — E11.22: ICD-10-CM

## 2018-07-28 DIAGNOSIS — I13.2: ICD-10-CM

## 2018-07-28 LAB
ALBUMIN SERPL BCP-MCNC: 3.1 G/DL (ref 3.4–5)
ALP SERPL-CCNC: 147 U/L (ref 45–117)
ALT SERPL W P-5'-P-CCNC: 39 U/L (ref 12–78)
AST SERPL W P-5'-P-CCNC: 32 U/L (ref 15–37)
BUN BLD-MCNC: 86 MG/DL (ref 7–18)
CKMB CREATINE KINASE MB: 5.9 NG/ML (ref 0.3–3.6)
CKMB CREATINE KINASE MB: 6.5 NG/ML (ref 0.3–3.6)
COHGB MFR BLDA: 0.9 % (ref 0–1.5)
GLUCOSE SERPLBLD-MCNC: 156 MG/DL (ref 74–106)
HCT VFR BLD CALC: 29.8 % (ref 39.6–49)
INR BLD: 1.13
LYMPHOCYTES # SPEC AUTO: 1.7 K/UL (ref 0.7–4.9)
MAGNESIUM SERPL-MCNC: 1.9 MG/DL (ref 1.8–2.4)
MCH RBC QN AUTO: 29.4 PG (ref 27–35)
MCV RBC: 91 FL (ref 80–100)
OXYHGB MFR BLDA: 97.4 % (ref 94–97)
PMV BLD: 7.9 FL (ref 7.6–11.3)
POTASSIUM SERPL-SCNC: 6.4 MMOL/L (ref 3.5–5.1)
RBC # BLD: 3.27 M/UL (ref 4.33–5.43)
SAO2 % BLDA: 99.3 % (ref 92–98.5)
TSH SERPL DL<=0.05 MIU/L-ACNC: 6.46 UIU/ML (ref 0.36–3.74)

## 2018-07-28 PROCEDURE — 96365 THER/PROPH/DIAG IV INF INIT: CPT

## 2018-07-28 PROCEDURE — 06HN33Z INSERTION OF INFUSION DEVICE INTO LEFT FEMORAL VEIN, PERCUTANEOUS APPROACH: ICD-10-PCS

## 2018-07-28 PROCEDURE — 80048 BASIC METABOLIC PNL TOTAL CA: CPT

## 2018-07-28 PROCEDURE — 85018 HEMOGLOBIN: CPT

## 2018-07-28 PROCEDURE — 86317 IMMUNOASSAY INFECTIOUS AGENT: CPT

## 2018-07-28 PROCEDURE — 84100 ASSAY OF PHOSPHORUS: CPT

## 2018-07-28 PROCEDURE — 97163 PT EVAL HIGH COMPLEX 45 MIN: CPT

## 2018-07-28 PROCEDURE — 84550 ASSAY OF BLOOD/URIC ACID: CPT

## 2018-07-28 PROCEDURE — 85025 COMPLETE CBC W/AUTO DIFF WBC: CPT

## 2018-07-28 PROCEDURE — 84439 ASSAY OF FREE THYROXINE: CPT

## 2018-07-28 PROCEDURE — 96375 TX/PRO/DX INJ NEW DRUG ADDON: CPT

## 2018-07-28 PROCEDURE — 80076 HEPATIC FUNCTION PANEL: CPT

## 2018-07-28 PROCEDURE — 82010 KETONE BODYS QUAN: CPT

## 2018-07-28 PROCEDURE — 87340 HEPATITIS B SURFACE AG IA: CPT

## 2018-07-28 PROCEDURE — 99292 CRITICAL CARE ADDL 30 MIN: CPT

## 2018-07-28 PROCEDURE — 80053 COMPREHEN METABOLIC PANEL: CPT

## 2018-07-28 PROCEDURE — 3E043XZ INTRODUCTION OF VASOPRESSOR INTO CENTRAL VEIN, PERCUTANEOUS APPROACH: ICD-10-PCS

## 2018-07-28 PROCEDURE — 06HM33Z INSERTION OF INFUSION DEVICE INTO RIGHT FEMORAL VEIN, PERCUTANEOUS APPROACH: ICD-10-PCS

## 2018-07-28 PROCEDURE — 71045 X-RAY EXAM CHEST 1 VIEW: CPT

## 2018-07-28 PROCEDURE — 86706 HEP B SURFACE ANTIBODY: CPT

## 2018-07-28 PROCEDURE — 5A1D70Z PERFORMANCE OF URINARY FILTRATION, INTERMITTENT, LESS THAN 6 HOURS PER DAY: ICD-10-PCS

## 2018-07-28 PROCEDURE — 85730 THROMBOPLASTIN TIME PARTIAL: CPT

## 2018-07-28 PROCEDURE — 90935 HEMODIALYSIS ONE EVALUATION: CPT

## 2018-07-28 PROCEDURE — 82962 GLUCOSE BLOOD TEST: CPT

## 2018-07-28 PROCEDURE — 80329 ANALGESICS NON-OPIOID 1 OR 2: CPT

## 2018-07-28 PROCEDURE — 99291 CRITICAL CARE FIRST HOUR: CPT

## 2018-07-28 PROCEDURE — 51702 INSERT TEMP BLADDER CATH: CPT

## 2018-07-28 PROCEDURE — 82550 ASSAY OF CK (CPK): CPT

## 2018-07-28 PROCEDURE — 84484 ASSAY OF TROPONIN QUANT: CPT

## 2018-07-28 PROCEDURE — 86704 HEP B CORE ANTIBODY TOTAL: CPT

## 2018-07-28 PROCEDURE — 83735 ASSAY OF MAGNESIUM: CPT

## 2018-07-28 PROCEDURE — 93306 TTE W/DOPPLER COMPLETE: CPT

## 2018-07-28 PROCEDURE — 93005 ELECTROCARDIOGRAM TRACING: CPT

## 2018-07-28 PROCEDURE — 5A1223Z PERFORMANCE OF CARDIAC PACING, CONTINUOUS: ICD-10-PCS

## 2018-07-28 PROCEDURE — 82805 BLOOD GASES W/O2 SATURATION: CPT

## 2018-07-28 PROCEDURE — 85014 HEMATOCRIT: CPT

## 2018-07-28 PROCEDURE — 96366 THER/PROPH/DIAG IV INF ADDON: CPT

## 2018-07-28 PROCEDURE — 83880 ASSAY OF NATRIURETIC PEPTIDE: CPT

## 2018-07-28 PROCEDURE — 85610 PROTHROMBIN TIME: CPT

## 2018-07-28 PROCEDURE — 83605 ASSAY OF LACTIC ACID: CPT

## 2018-07-28 PROCEDURE — 87070 CULTURE OTHR SPECIMN AEROBIC: CPT

## 2018-07-28 PROCEDURE — 87040 BLOOD CULTURE FOR BACTERIA: CPT

## 2018-07-28 PROCEDURE — 82553 CREATINE MB FRACTION: CPT

## 2018-07-28 PROCEDURE — 84443 ASSAY THYROID STIM HORMONE: CPT

## 2018-07-28 PROCEDURE — 36415 COLL VENOUS BLD VENIPUNCTURE: CPT

## 2018-07-28 PROCEDURE — 76000 FLUOROSCOPY <1 HR PHYS/QHP: CPT

## 2018-07-28 RX ADMIN — SODIUM CHLORIDE SCH MLS: 0.45 INJECTION, SOLUTION INTRAVENOUS at 17:35

## 2018-07-28 RX ADMIN — HEPARIN SODIUM PRN UNIT: 1000 INJECTION, SOLUTION INTRAVENOUS; SUBCUTANEOUS at 22:04

## 2018-07-28 RX ADMIN — HUMAN INSULIN SCH: 100 INJECTION, SOLUTION SUBCUTANEOUS at 16:30

## 2018-07-28 RX ADMIN — HUMAN INSULIN SCH UNIT: 100 INJECTION, SOLUTION SUBCUTANEOUS at 22:06

## 2018-07-28 RX ADMIN — Medication SCH ML: at 22:05

## 2018-07-28 RX ADMIN — HUMAN INSULIN SCH UNIT: 100 INJECTION, SOLUTION SUBCUTANEOUS at 22:02

## 2018-07-28 NOTE — RAD REPORT
EXAM DESCRIPTION:  RAD - Chest Single View - 7/28/2018 3:24 pm

 

CLINICAL HISTORY:  AMS;Chest pain

Chest pain.

 

COMPARISON:  Chest Single View dated 7/18/2018; Chest Single View dated 6/26/2018; Chest Single View 
dated 4/20/2018; Chest Single View dated 4/17/2018

 

FINDINGS:  Portable technique limits examination quality.

 

Mild interstitial pulmonary edema is noted. The heart is upper limit normal in size with a tortuous t
horacic aorta. No displaced fractures.

 

IMPRESSION:  Mild CHF versus volume overload pattern.

## 2018-07-28 NOTE — P.HP
Certification for Inpatient


Patient admitted to: Inpatient


With expected LOS: >2 Midnights


Patient will require the following post-hospital care: Other


Practitioner: I am a practitioner with admitting privileges, knowledge of 

patient current condition, hospital course, and medical plan of care.


Services: Services provided to patient in accordance with Admission 

requirements found in Title 42 Section 412.3 of the Code of Federal Regulations





Patient History


Date of Service: 07/28/18


Primary Care Provider: Dr. Ashraf; Nephrology-Dr. Cabezas


Reason for admission: Shortness of breath, edema


History of Present Illness: 





83-year-old  male presented emergency room with increasing shortness of 

breath, edema to the lower extremities and body.





Patient was recently hospitalized from July 18th to 22nd for acute renal 

failure with lactic acidosis, nephrotic syndrome, hyperkalemia.  It appears it 

was related to medication-metformin.  Patient reports being discharge and 

having a renal biopsy in [place] after that time.  Since being discharge he has 

been having increasing shortness of breath, edema to the upper and lower 

extremities.  He also reports increasing fatigue.  The wife called EMS today 

due to increase fatigue.





In the ER patient was hypothermic.  Patient found to be with hyperkalemia.  

Patient was given bicarb, insulin, and calcium.  Patient was also bradycardic 

in the emergency room.  At times his heart rate would drop down below 30.  The 

patient had asystole in the emergency room.  He was paced in the emergency 

room.  Patient was placed on a dopamine drip due to hypotension.  Since that 

time patient remained stable.  Blood pressure 107/43 heart rate 60s.  White 

count 13.4, hemoglobin 9.6.  Sodium 130, potassium 6.4.  Bicarb of 8 BUN of 86, 

creatinine 5.2 with a GFR of 11. On his gases pH is 7.28 with the PCO2 of 20 

and a bicarb of 9.2.  Troponin less 0.02.  BNP at 3700. Patient remains stable 

in the emergency room.  Nurses did address advance directives with the patient 

and wife.  Patient is DNR.





When I came to about weight the patient, patient was alert.  Patient remained 

stable with bear hugger to help with his hypothermia.  Patient on dopamine 

drip.  Patient did not appear in any respiratory distress.


Allergies





adhesive tape Adverse Reaction (Intermediate, Verified 02/09/17 15:25)


 Rash


adhesive tape- Allergy (Uncoded 07/18/18 22:06)


 Unknown


No Allergy (Uncoded 02/14/18 23:14)


 Unknown


No Known Allergies Allergy (Uncoded 04/17/18 21:28)


 Unknown





Home medications list reviewed: Yes


Home Medications: 








Metoprolol Tartrate [Lopressor*] 12.5 mg PO BID 12/08/15 


Multivitamin [Multivitamins] 1 each PO DAILY 02/10/16 


Cholecalciferol (Vitamin D3) [Vitamin D 400 IU TAB*] 1,200 unit PO DAILY 02/09/ 17 


Furosemide [Lasix] 40 mg PO DAILY 02/09/17 


Gabapentin [Gralise] 300 mg PO BEDTIME 02/09/17 


Amlodipine [Norvasc*] 10 mg PO DAILY #30 tab 02/11/17 


Atorvastatin Calcium [Lipitor*] 20 mg PO BEDTIME 07/19/18 


glyBURIDE [Glyburide] 1.25 mg PO BID #60 tablet 07/22/18 








- Past Medical/Surgical History


Diabetic: Yes


-: Hyperlipidemia


-: HTN


-: Diabetes mellitus type 2


-: Chronic renal disease likely with nephrotic syndrome


-: History of myasthenia gravis


-: Peripheral neuropathy


-: Cataracts


-: broken leg 2006


-: finger surgery


-: prostate surgery


Psychosocial/ Personal History: Patient is .  He has 3 children.





- Family History


  ** Father


-: Heart disease





  ** Mother


-: Hypertension





  ** Sister


-: Hypertension, Diabetes, Cancer, Kidney disease





  ** Brother


-: Heart disease, Cancer





- Social History


Smoking Status: Never smoker


Alcohol use: No


CD- Drugs: No


Caffeine use: Yes


Place of Residence: Home





Review of Systems


General: Chills, Weakness, Malaise, As per HPI


Eyes: Unremarkable


ENT: Unremarkable


Respiratory: Shortness of Breath, SOB with Excertion, As per HPI


Cardiovascular: Orthopnea


Gastrointestinal: Nausea, Vomiting, As per HPI


Genitourinary: As per HPI (Poor urinary output)


Musculoskeletal: Pedal edema, As per HPI


Integumentary: As per HPI


Neurological: Weakness, As per HPI


Lymphatics: Unremarkable





Physical Examination





- Physical Exam


General: Alert, In no apparent distress, Oriented x3, Cooperative


HEENT: Atraumatic, Normocephalic


Neck: Supple, Other (There is a black eschar ulcer to the left clavicular 

region.)


Respiratory: Clear to auscultation bilaterally (Clear anteriorly)


Cardiovascular: Normal pulses


Gastrointestinal: Normal bowel sounds, Soft and benign, Non-distended, No 

tenderness, No masses, No rebound, No guarding, Other (Patient morbidly obese)


Musculoskeletal: No tenderness, No warmth


Integumentary: Tenderness/swelling (Anasarca noted to the lower extremities, 

abdomen and upper extremities.)


Neurological: Normal speech, Normal strength at 5/5 x4 extr, Normal tone, 

Normal affect





- Studies


Laboratory Data (last 24 hrs)





07/28/18 14:10: PT 13.4 H, INR 1.13, APTT 32.4


07/28/18 14:10: WBC 13.4 H D, Hgb 9.6 L, Hct 29.8 L, Plt Count 481 H


07/28/18 14:10: Sodium 130 L, Potassium 6.4 H*, BUN 86 H D, Creatinine 5.20 H* D

, Glucose 156 H, Magnesium 1.9, Total Bilirubin 0.4, AST 32, ALT 39, Alkaline 

Phosphatase 147 H








Assessment and Plan





- Problems (Diagnosis)


(1) Renal failure (ARF), acute on chronic


Current Visit: Yes   Status: Acute   


Plan: 


Acute on chronic renal failure with metabolic acidosis, hypothermia, 

hyperkalemia.  Case discussed at length with nephrology.  Patient reports 

recent renal biopsy for possible nephrotic syndrome.  This was done up in 

[place].  Patient will need emergent dialysis.  Agreed to place Alden 

catheter.  This will be done prior to patient going to the ICU.  Patient will 

receive emergent dialysis today.  Patient will need to continue with a bicarb 

drip.  Patient on dopamine to maintain blood pressure.  Case also discussed 

with cardiology.


Qualifiers: 


   Chronic kidney disease stage: stage 5, not on chronic dialysis 





(2) Hyperkalemia


Current Visit: Yes   Status: Acute   


Plan: 


Patient received bicarb, insulin and calcium in the emergency room.  Patient 

will receive emergent dialysis.








(3) Bradycardia


Current Visit: Yes   Status: Acute   


Plan: 


Bradycardia improved.  Will continue with dopamine to maintain blood pressure.








(4) Asystole


Current Visit: Yes   Status: Acute   


Plan: 


Patient was initially paced any emergency room.  Will continue monitor closely.

  Advanced directives address in detail.  Patient wishes to be DNR.  This was 

addressed with nurses present.  This apparently was addressed by the wife to 

the nurse earlier.








(5) Hypothermia


Current Visit: Yes   Status: Acute   


Plan: 


Will continue with bear hugger.


Qualifiers: 


   Encounter type: initial encounter   Qualified Code(s): T68.XXXA - Hypothermia

, initial encounter   





(6) Metabolic acidosis


Current Visit: Yes   Status: Acute   


Plan: 


Secondary to acute renal failure.  Continue as above.  Patient will receive 

dialysis.








(7) Anasarca


Onset Date: 07/23/18   Current Visit: No   Status: Acute   


Plan: 


Patient will receive dialysis.








(8) Diabetes


Onset Date: 04/18/18   Current Visit: No   Status: Chronic   


Plan: 


Will continue Accu-Cheks.  Provide sliding scale.


Qualifiers: 


   Diabetes mellitus type: type 2   Diabetes mellitus long term insulin use: 

without long term use   Diabetes mellitus complication status: with unspecified 

complications   Qualified Code(s): E11.8 - Type 2 diabetes mellitus with 

unspecified complications   





(9) HTN (hypertension)


Onset Date: 04/18/18   Current Visit: No   Status: Chronic   


Plan: 


Will hold blood pressure medication at this time.  Patient currently on dopamine


Qualifiers: 


   Hypertension type: essential hypertension   Qualified Code(s): I10 - 

Essential (primary) hypertension   





(10) Hypotension


Current Visit: Yes   Status: Acute   


Plan: 


Patient with hypotension.  Continue dopamine drip to maintain blood pressure.  

Patient will receive dialysis.  Blood cultures obtained.  Will check pro 

calcitonin and lactic acid.





Discharge Plan: Home


Plan to discharge in: Greater than 2 days





- Advance Directives


Does patient have a Living Will: No


Does patient have a Durable POA for Healthcare: Yes





- Code Status/Comfort Care


Code Status Assessed: Yes (This was addressed in detail.  Patient DNR.)


Time Spent Managing Pts Care (In Minutes): 65

## 2018-07-28 NOTE — OP
Date of Procedure:  07/28/2018



Surgeon:  Bruno Gibson MD



Preoperative Diagnosis:  End-stage renal disease.



Postoperative Diagnosis:  End-stage renal disease.



Procedures:  Placement of a Alden hemodialysis catheter on the right femoral vein.



Anesthesia:  Local.



Indications:  This is the case of an 83-year-old patient in need of emergent dialysis, so they called
 for emergent placement of a catheter here in the ER.  After explaining the benefits, alternatives, a
nd risks which include but are not limited to infection, bleeding, damage to adjacent structures, ane
sthesia complication, deep vein thrombosis, PE, MI, and even death, a consent was obtained.



Procedure In Detail:  The patient was placed in supine position.  Right femoral region was prepped an
d draped in a sterile fashion.  The patient has a central line in that place already in the right ___
_______, but the distal port is not flushing, so we did not feel it was secure enough to use that johanna
e as a guidance, so we removed that line.  Pressure was applied.  I then proceeded to inject the area
 with local anesthetic followed by 18-gauge needle insertion.  On the first attempt, guidewire was pa
ssed through needle was removed.  __________ were placed in and then a catheter was placed in and the
 guidewire was removed.  The area was secured with nylon.  Excellent backflow and 

inflow.  The line was flushed with heparin saline.  The patient tolerated the procedure well.





NOEL/YOLA

DD:  07/28/2018 18:19:08Voice ID:  [id number]

DT:  07/28/2018 18:53:01Report ID:  [id number]

## 2018-07-28 NOTE — XMS REPORT
Patient Summary Document

 Created on:2018



Patient:SUSU PÉREZ

Sex:Male

:1935

External Reference #:[id number]





Demographics







 Address  [address] [place] [address]

 

 Home Phone  [phone]

 

 Email Address  NONE

 

 Preferred Language  Unknown

 

 Marital Status  Unknown

 

 Anabaptism Affiliation  Unknown

 

 Race  Unknown

 

 Additional Race(s)  Unavailable

 

 Ethnic Group  Unknown









Author







 Organization  [facility]

 

 Address  [address] [place] [address] Dr. Agosto [address] [place] [address]

 

 Phone  [phone]









Care Team Providers







 Name  Role  Phone

 

 SHELIA INGRAM  Unavailable  Unavailable









Problems

This patient has no known problems.



Allergies, Adverse Reactions, Alerts

This patient has no known allergies or adverse reactions.



Medications

This patient has no known medications.



Results







 Test Description  Test Time  Test Comments  Text Results  Atomic Results  
Result Comments









 TISSUE EXAM  2018 18:37:00    Surgical Pathology Report  



       Case: [id number]  



       Authorizing Provider:  Guillermo Forte,  



       Collected:           2018 Casandra CLARK  



         



       Ordering Location:     [address]  



       Received:            2018 1115  



                            Service  



         



       Pathologist:           Joana Zepeda MD  



         



       Specimen:    Kidney, NATIVE KIDNEY  



         



       KIDNEY, LEFT, NEEDLE BIOPSIES- [facility]  



       GLOMERULOSCLEROSIS- NEGATIVE FOR IMMUNE MEDIATED  



       GLOMERULONEPHRITIS- DIFFUSE INTERSTITIAL FIBROSIS  



       AND TUBULAR ATROPHY (~70%)- MODERATE TO SEVERE  



       ARTERIAL INTIMAL SCLEROSIS- DIFFUSE ARTERIOLAR  



       HYALINOSIS- SEE COMMENT      Signing Pathologist  



       Direct Phone Line: [phone]  



       signed by Joana Zepeda MD on 2018 at  



       6:37 PMPreliminary result electronically signed  



       by Joana Zepeda MD on 2018 at  7:08 PMNo  



       immune mediated glomerulosclerosis is seen based  



       on negative immunofluorescence and absence of  



       electron dense deposits on electron microscopy.  



       The renal biopsies show mixed features of  



       hypertensive nephrosclerosis, and advanced  



       diabetic glomerulosclerosis, class IV (see ref  



       #1) characterized by nodular lesions and global  



       glomerulosclerosis involving 56% (15/27) of all  



       examined glomeruli, along with marked  



       interstitial fibrosis and tubular atrophy  



       involving about 70% of renal parenchyma.  



       Reference: 1. MALATHI Garcia., NEEMA Givens,  



       KIMBERLI [place]., Brendan, A.H., Gerald, H.T., JESSICA Lopez, CONOR Dye, JOSE Schwarz., NISHA Valencia., de  



       LEIGHTON Garber., et al. Pathologic classification of  



       diabetic nephropathy. J Am Soc Nephrol  



       21:556-563, 2010.The results were communicated to  



       Dr. Caldwell by Dr. Zepeda on [id number],  



       88313 x3, 86416, 88350 x7, [id number] as  



       communicated by Dr. Caldwell: 83 year old with long  



       standing history of DM, now presents with  



       increase in serum creatinine and 3g proteinuria.  



       All serologies negativeNative kidney biopsyThe  



       specimen is received in three parts all labeled  



       with the patient's information and labeled  



       "native kidney biopsy".  Received in formalin are  



       two tan-white core biopsies measuring 0.6 and 1.7  



       cm in length. The specimen is entirely submitted  



       A1. Received fresh is a 1 cm tan core submitted  



       for frozen for immunofluorescence staining and  



       received in glutaraldehyde is a 0.3 cm tan core  



       submitted for electron microscopy. CG/pl LIGHT  



       MICROSCOPY: Sections show two cores of tissue  



       composed of cortex 50% and medulla 50%.  



       Glomeruli: Approximately 19 glomeruli are  



       examined of which 10 glomeruli are globally  



       sclerotic/obsolescent or show near complete  



       obsolescence. The capillary tuft in some  



       sclerotic glomeruli is wrinkled and shrunken. The  



       remaining non-globally sclerotic glomeruli are  



       enlarged and have nodular mesangial expansion by  



       PAS- and silver-positive matrix with normal to  



       mild segmental hypercellularity. There is mild  



       wrinkling of capillary tuft. Focal and segmental  



       sclerosis is seen.  No endocapillary  



       hypercellularity, crescents or thrombi are  



       seen.Tubules and interstitium: There is severe  



       interstitial fibrosis with focal tubular atrophy  



       and mild interstitial inflammatory cell  



       infiltration by lymphocytes admixed with  



       eosinophils and neutrophils, involving about 70%  



       of renal cortex. Non-atrophic proximal tubules  



       are focally ectatic with loss of brush borders.  



       Vessels:   Interlobular arteries show moderate  



       intimal sclerosis and thickening. There is severe  



       diffuse arteriolosclerosis. Congo red stain is  



       negative for amyloid deposition.Special stains:  



       Alan trichrome, PAS and Saldana silver stains  



       were necessary for evaluation of this biopsy and  



       showed expected staining patterns of internal  



       control tissue matrix structures.Direct  



       Immunofluorescence:Histology: H&E-stained  



       sections show 2 non-obsolescent glomeruli and 2  



       obsolescent glomeruli.  Immunofluorescence  



       findings: IgA: negative in open glomeruli, focal  



       weak granular mesangial staining in sclerotic  



       glomerulus, tubular casts are positiveIgG: no  



       significant glomerular, tubulointerstitial or  



       vascular staining. IgM: negative in glomeruli  



       C3: negative in glomeruli,  Macario's capsule  



       +.C1q: no significant glomerular,  



       tubulointerstitial or vascular staining.  



       Fibrinogen: no significant glomerular,  



       tubulointerstitial or vascular staining. Kappa:  



       negative in open glomeruli, focal weak granular  



       mesangial staining in sclerotic glomerulus,  



       tubular casts are positiveLambda:  negative in  



       open glomeruli, focal weak granular mesangial  



       staining in sclerotic glomerulus, tubular casts  



       are positiveAll polyclonal antibodies used for  



       immunofluorescence staining have been previously  



       tested and shown to have appropriate reactivities  



       with positive control specimens. ELECTRON  



       MICROSCOPYToluidine blue-stained sections reveals  



       one non-obsolescent glomerulus and 3 globally  



       sclerotic glomeruli. One open and 2 globally  



       sclerotic glomeruli are examined  



       ultrastructurally.Ultrastructure:  Examination of  



       the glomerular ultrastructure reveals that the  



       glomerular basement membrane shows wrinkling and  



       is variably thickened, focally measuring upto  



       1163 nm (normal male gbkldue=598 - 430 nm nm;  



       Annie CAMERON. Arch Pathol Lab Med 133; 224-232).  



       Subendothelial, subepithelial, and  



       mesangial/paramesangial electron-dense, immune  



       complex-type deposits are not present. Podocyte  



       foot processes are diffusely effaced with  



       microvillous change.  









 POCT-GLUCOSE METER  2018 17:15:00    









   Test Item  Value  Reference Range  Comments









 POC-GLUCOSE METER (BEAKER) (test  229 mg/dL    TESTED AT [address]



 vxmy=9199)      [facility] [address]



POCT-GLUCOSE [id number] 12:01:00





 Test Item  Value  Reference Range  Comments

 

 POC-GLUCOSE METER (BEAKER)  254 mg/dL    TESTED AT [address]



 (test ophy=8562)      [facility] [address]



POCT-GLUCOSE [id number] 07:46:00





 Test Item  Value  Reference Range  Comments

 

 POC-GLUCOSE METER (BEAKER)  233 mg/dL    TESTED AT [address]



 (test kmhw=2590)      [place] [id number]



[id number] 05:56:00





 Test Item  Value  Reference Range  Comments

 

 PHOSPHORUS (BEAKER) (test code=604)  3.8 mg/dL  2.3-4.7  



[id number] 05:56:00





 Test Item  Value  Reference Range  Comments

 

 MAGNESIUM (BEAKER) (test code=627)  1.7 mg/dL  1.6-2.6  



BASIC METABOLIC [id number] 05:56:00





 Test Item  Value  Reference Range  Comments

 

 SODIUM (BEAKER) (test  132 meq/L  136-145  



 lvgr=940)      

 

 POTASSIUM (BEAKER) (test  4.4 meq/L  3.5-5.1  



 code=379)      

 

 CHLORIDE (BEAKER) (test  104 meq/L    



 code=382)      

 

 CO2 (BEAKER) (test  18 meq/L  22-29  



 code=355)      

 

 BLOOD UREA NITROGEN  44 mg/dL  7-21  



 (BEAKER) (test code=354)      

 

 CREATININE (BEAKER) (test  1.90 mg/dL  0.57-1.25  



 code=358)      

 

 GLUCOSE RANDOM (BEAKER)  204 mg/dL    



 (test code=652)      

 

 CALCIUM (BEAKER) (test  8.8 mg/dL  8.4-10.2  



 code=697)      

 

 EGFR (BEAKER) (test  34 mL/min/1.73 sq m    ESTIMATED GFR IS NOT AS



 code=1092)      ACCURATE AS CREATININE



       CLEARANCE IN PREDICTING



       GLOMERULAR FILTRATION



       RATE. ESTIMATED GFR IS



       NOT APPLICABLE FOR



       DIALYSIS PATIENTS.



CBC W/PLT COUNT &amp; AUTO [id number] 05:25:00





 Test Item  Value  Reference Range  Comments

 

 WHITE BLOOD CELL COUNT (BEAKER) (test code=775)  10.6 K/ L  3.5-10.5  

 

 RED BLOOD CELL COUNT (BEAKER) (test code=761)  2.90 M/ L  4.63-6.08  

 

 HEMOGLOBIN (BEAKER) (test code=410)  8.3 GM/DL  13.7-17.5  

 

 HEMATOCRIT (BEAKER) (test code=411)  25.4 %  40.1-51.0  

 

 MEAN CORPUSCULAR VOLUME (BEAKER) (test code=753)  87.6 fL  79.0-92.2  

 

 MEAN CORPUSCULAR HEMOGLOBIN (BEAKER) (test  28.6 pg  25.7-32.2  



 tqlo=895)      

 

 MEAN CORPUSCULAR HEMOGLOBIN CONC (BEAKER) (test  32.7 GM/DL  32.3-36.5  



 code=752)      

 

 RED CELL DISTRIBUTION WIDTH (BEAKER) (test  13.8 %  11.6-14.4  



 code=412)      

 

 PLATELET COUNT (BEAKER) (test code=756)  490 K/CU MM  150-450  

 

 MEAN PLATELET VOLUME (BEAKER) (test code=754)  8.9 fL  9.4-12.4  

 

 NUCLEATED RED BLOOD CELLS (BEAKER) (test  0 /100 WBC  0-0  



 code=413)      

 

 NEUTROPHILS RELATIVE PERCENT (BEAKER) (test  63 %    



 code=429)      

 

 LYMPHOCYTES RELATIVE PERCENT (BEAKER) (test  16 %    



 code=430)      

 

 MONOCYTES RELATIVE PERCENT (BEAKER) (test  12 %    



 code=431)      

 

 EOSINOPHILS RELATIVE PERCENT (BEAKER) (test  6 %    



 code=432)      

 

 BASOPHILS RELATIVE PERCENT (BEAKER) (test  1 %    



 code=437)      

 

 NEUTROPHILS ABSOLUTE COUNT (BEAKER) (test  6.69 K/ L  1.78-5.38  



 code=670)      

 

 LYMPHOCYTES ABSOLUTE COUNT (BEAKER) (test  1.71 K/ L  1.32-3.57  



 code=414)      

 

 MONOCYTES ABSOLUTE COUNT (BEAKER) (test  1.26 K/ L  0.30-0.82  



 code=415)      

 

 EOSINOPHILS ABSOLUTE COUNT (BEAKER) (test  0.68 K/ L  0.04-0.54  



 code=416)      

 

 BASOPHILS ABSOLUTE COUNT (BEAKER) (test  0.12 K/ L  0.01-0.08  



 code=417)      

 

 IMMATURE GRANULOCYTES-RELATIVE PERCENT (BEAKER)  2 %  0-1  



 (test code=2801)      



URINALYSIS W/ MICROSCOPIC2018-07-10 23:14:00





 Test Item  Value  Reference Range  Comments

 

 COLOR (BEAKER) (test code=470)  Light Yellow    

 

 CLARITY (BEAKER) (test code=469)  Clear    

 

 SPECIFIC GRAVITY UA (BEAKER) (test  1.007  1.001-1.035  



 code=468)      

 

 PH UA (BEAKER) (test code=467)  6.5  5.0-8.0  

 

 PROTEIN UA (BEAKER) (test code=464)  600 mg/dL  Negative  

 

 GLUCOSE UA (BEAKER) (test code=365)  500 mg/dL  Negative  

 

 KETONES UA (BEAKER) (test code=371)  Negative  Negative  

 

 BILIRUBIN UA (BEAKER) (test code=462)  Negative  Negative  

 

 BLOOD UA (BEAKER) (test code=461)  Small  Negative  

 

 NITRITE UA (BEAKER) (test code=465)  Negative  Negative  

 

 LEUKOCYTE ESTERASE UA (BEAKER) (test  Negative  Negative  



 oiis=795)      

 

 UROBILINOGEN UA (BEAKER) (test code=463)  0.2 mg/dL  0.2-1.0  

 

 RBC UA (BEAKER) (test code=519)  3 /HPF    

 

 WBC UA (BEAKER) (test code=520)  2 /HPF    

 

 SQUAMOUS EPITHELIAL (BEAKER) (test  < /HPF    



 code=516)      

 

 HYALINE CASTS (BEAKER) (test code=514)  2 /LPF    

 

 AMORPHOUS CRYSTALS (BEAKER) (test  Rare    



 code=1584)      

 

 SOURCE(BEAKER) (test code=2795)  Urine, Clean Catch    



POCT-GLUCOSE METER2018-07-10 21:20:00





 Test Item  Value  Reference Range  Comments

 

 POC-GLUCOSE METER (BEAKER)  206 mg/dL    TESTED AT [address]



 (test shez=1575)      [place] [id number]



POCT-GLUCOSE [id number] 17:20:00





 Test Item  Value  Reference Range  Comments

 

 POC-GLUCOSE METER (BEAKER)  268 mg/dL    TESTED AT [address]



 (test wovq=0333)      [place] [id number]



POCT-GLUCOSE METER2018-07-10 16:20:00





 Test Item  Value  Reference Range  Comments

 

 POC-GLUCOSE METER (BEAKER)  249 mg/dL    TESTED AT [address]



 (test erao=8462)      [place] [id number]



POCT-GLUCOSE [id number] 14:29:00





 Test Item  Value  Reference Range  Comments

 

 POC-GLUCOSE METER (BEAKER)  196 mg/dL    TESTED AT [address]



 (test hczv=8590)      [place] [address]



PHOSPHORUS2018-07-10 08:17:00





 Test Item  Value  Reference Range  Comments

 

 PHOSPHORUS (BEAKER) (test code=604)  3.8 mg/dL  2.3-4.7  



MAGNESIUM2018-07-10 08:17:00





 Test Item  Value  Reference Range  Comments

 

 MAGNESIUM (BEAKER) (test code=627)  1.7 mg/dL  1.6-2.6  



BASIC METABOLIC PANEL2018-07-10 08:17:00





 Test Item  Value  Reference Range  Comments

 

 SODIUM (BEAKER) (test  133 meq/L  136-145  



 xvnt=123)      

 

 POTASSIUM (BEAKER) (test  4.7 meq/L  3.5-5.1  



 code=379)      

 

 CHLORIDE (BEAKER) (test  105 meq/L    



 code=382)      

 

 CO2 (BEAKER) (test  19 meq/L  22-29  



 code=355)      

 

 BLOOD UREA NITROGEN  42 mg/dL  7-21  



 (BEAKER) (test code=354)      

 

 CREATININE (BEAKER) (test  1.90 mg/dL  0.57-1.25  



 code=358)      

 

 GLUCOSE RANDOM (BEAKER)  186 mg/dL    



 (test code=652)      

 

 CALCIUM (BEAKER) (test  9.2 mg/dL  8.4-10.2  



 code=697)      

 

 EGFR (BEAKER) (test  34 mL/min/1.73 sq m    ESTIMATED GFR IS NOT AS



 code=1092)      ACCURATE AS CREATININE



       CLEARANCE IN PREDICTING



       GLOMERULAR FILTRATION



       RATE. ESTIMATED GFR IS



       NOT APPLICABLE FOR



       DIALYSIS PATIENTS.



POCT-GLUCOSE METER2018-07-10 08:15:00





 Test Item  Value  Reference Range  Comments

 

 POC-GLUCOSE METER (BEAKER)  225 mg/dL    TESTED AT [address]



 (test qciu=6127)      [facility] [address]



CBC W/PLT COUNT &amp; AUTO DIFFERENTIAL2018-07-10 08:02:00





 Test Item  Value  Reference Range  Comments

 

 WHITE BLOOD CELL COUNT (BEAKER) (test code=775)  13.4 K/ L  3.5-10.5  

 

 RED BLOOD CELL COUNT (BEAKER) (test code=761)  3.10 M/ L  4.63-6.08  

 

 HEMOGLOBIN (BEAKER) (test code=410)  8.8 GM/DL  13.7-17.5  

 

 HEMATOCRIT (BEAKER) (test code=411)  27.7 %  40.1-51.0  

 

 MEAN CORPUSCULAR VOLUME (BEAKER) (test code=753)  89.4 fL  79.0-92.2  

 

 MEAN CORPUSCULAR HEMOGLOBIN (BEAKER) (test  28.4 pg  25.7-32.2  



 xvvn=324)      

 

 MEAN CORPUSCULAR HEMOGLOBIN CONC (BEAKER) (test  31.8 GM/DL  32.3-36.5  



 code=752)      

 

 RED CELL DISTRIBUTION WIDTH (BEAKER) (test  13.7 %  11.6-14.4  



 code=412)      

 

 PLATELET COUNT (BEAKER) (test code=756)  551 K/CU MM  150-450  

 

 MEAN PLATELET VOLUME (BEAKER) (test code=754)  8.9 fL  9.4-12.4  

 

 NUCLEATED RED BLOOD CELLS (BEAKER) (test  0 /100 WBC  0-0  



 code=413)      

 

 NEUTROPHILS RELATIVE PERCENT (BEAKER) (test  69 %    



 code=429)      

 

 LYMPHOCYTES RELATIVE PERCENT (BEAKER) (test  12 %    



 code=430)      

 

 MONOCYTES RELATIVE PERCENT (BEAKER) (test  11 %    



 code=431)      

 

 EOSINOPHILS RELATIVE PERCENT (BEAKER) (test  6 %    



 code=432)      

 

 BASOPHILS RELATIVE PERCENT (BEAKER) (test  1 %    



 code=437)      

 

 NEUTROPHILS ABSOLUTE COUNT (BEAKER) (test  9.32 K/ L  1.78-5.38  



 code=670)      

 

 LYMPHOCYTES ABSOLUTE COUNT (BEAKER) (test  1.57 K/ L  1.32-3.57  



 code=414)      

 

 MONOCYTES ABSOLUTE COUNT (BEAKER) (test  1.42 K/ L  0.30-0.82  



 code=415)      

 

 EOSINOPHILS ABSOLUTE COUNT (BEAKER) (test  0.84 K/ L  0.04-0.54  



 code=416)      

 

 BASOPHILS ABSOLUTE COUNT (BEAKER) (test  0.09 K/ L  0.01-0.08  



 code=417)      

 

 IMMATURE GRANULOCYTES-RELATIVE PERCENT (BEAKER)  1 %  0-1  



 (test code=2801)      



POCT-GLUCOSE [id number] 21:16:00





 Test Item  Value  Reference Range  Comments

 

 POC-GLUCOSE METER (BEAKER)  302 mg/dL    TESTED AT [address]



 (test klzx=8365)      [place] [address]



POCT-GLUCOSE [id number] 17:13:00





 Test Item  Value  Reference Range  Comments

 

 POC-GLUCOSE METER (BEAKER)  208 mg/dL    TESTED AT [address]



 (test qydt=7423)      [place] [address]



POCT-GLUCOSE [id number] 13:20:00





 Test Item  Value  Reference Range  Comments

 

 POC-GLUCOSE METER (BEAKER)  252 mg/dL    TESTED AT [address]



 (test maxr=0491)      [place] [address]



POCT-GLUCOSE [id number] 08:25:00





 Test Item  Value  Reference Range  Comments

 

 POC-GLUCOSE METER (BEAKER)  229 mg/dL    TESTED AT [address]



 (test ffzr=7967)      [place] [address]



BASIC METABOLIC [id number] 06:57:00





 Test Item  Value  Reference Range  Comments

 

 SODIUM (BEAKER) (test  131 meq/L  136-145  



 ukoa=208)      

 

 POTASSIUM (BEAKER) (test  4.4 meq/L  3.5-5.1  



 code=379)      

 

 CHLORIDE (BEAKER) (test  102 meq/L    



 code=382)      

 

 CO2 (BEAKER) (test  19 meq/L  22-29  



 code=355)      

 

 BLOOD UREA NITROGEN  43 mg/dL  7-21  



 (BEAKER) (test code=354)      

 

 CREATININE (BEAKER) (test  2.24 mg/dL  0.57-1.25  



 code=358)      

 

 GLUCOSE RANDOM (BEAKER)  198 mg/dL    



 (test code=652)      

 

 CALCIUM (BEAKER) (test  8.7 mg/dL  8.4-10.2  



 code=697)      

 

 EGFR (BEAKER) (test  28 mL/min/1.73 sq m    ESTIMATED GFR IS NOT AS



 code=1092)      ACCURATE AS CREATININE



       CLEARANCE IN PREDICTING



       GLOMERULAR FILTRATION



       RATE. ESTIMATED GFR IS



       NOT APPLICABLE FOR



       DIALYSIS PATIENTS.



[id number] 06:55:00





 Test Item  Value  Reference Range  Comments

 

 PHOSPHORUS (BEAKER) (test code=604)  3.8 mg/dL  2.3-4.7  



[id number] 06:55:00





 Test Item  Value  Reference Range  Comments

 

 MAGNESIUM (BEAKER) (test code=627)  1.7 mg/dL  1.6-2.6  



CBC W/PLT COUNT &amp; AUTO [id number] 06:09:00





 Test Item  Value  Reference Range  Comments

 

 WHITE BLOOD CELL COUNT (BEAKER) (test code=775)  14.0 K/ L  3.5-10.5  

 

 RED BLOOD CELL COUNT (BEAKER) (test code=761)  3.12 M/ L  4.63-6.08  

 

 HEMOGLOBIN (BEAKER) (test code=410)  9.0 GM/DL  13.7-17.5  

 

 HEMATOCRIT (BEAKER) (test code=411)  27.6 %  40.1-51.0  

 

 MEAN CORPUSCULAR VOLUME (BEAKER) (test code=753)  88.5 fL  79.0-92.2  

 

 MEAN CORPUSCULAR HEMOGLOBIN (BEAKER) (test  28.8 pg  25.7-32.2  



 hsbf=523)      

 

 MEAN CORPUSCULAR HEMOGLOBIN CONC (BEAKER) (test  32.6 GM/DL  32.3-36.5  



 code=752)      

 

 RED CELL DISTRIBUTION WIDTH (BEAKER) (test  13.6 %  11.6-14.4  



 code=412)      

 

 PLATELET COUNT (BEAKER) (test code=756)  451 K/CU MM  150-450  

 

 MEAN PLATELET VOLUME (BEAKER) (test code=754)  8.8 fL  9.4-12.4  

 

 NUCLEATED RED BLOOD CELLS (BEAKER) (test  0 /100 WBC  0-0  



 code=413)      

 

 NEUTROPHILS RELATIVE PERCENT (BEAKER) (test  70 %    



 code=429)      

 

 LYMPHOCYTES RELATIVE PERCENT (BEAKER) (test  12 %    



 code=430)      

 

 MONOCYTES RELATIVE PERCENT (BEAKER) (test  10 %    



 code=431)      

 

 EOSINOPHILS RELATIVE PERCENT (BEAKER) (test  6 %    



 code=432)      

 

 BASOPHILS RELATIVE PERCENT (BEAKER) (test  1 %    



 code=437)      

 

 NEUTROPHILS ABSOLUTE COUNT (BEAKER) (test  9.86 K/ L  1.78-5.38  



 code=670)      

 

 LYMPHOCYTES ABSOLUTE COUNT (BEAKER) (test  1.63 K/ L  1.32-3.57  



 code=414)      

 

 MONOCYTES ABSOLUTE COUNT (BEAKER) (test  1.40 K/ L  0.30-0.82  



 code=415)      

 

 EOSINOPHILS ABSOLUTE COUNT (BEAKER) (test  0.85 K/ L  0.04-0.54  



 code=416)      

 

 BASOPHILS ABSOLUTE COUNT (BEAKER) (test  0.08 K/ L  0.01-0.08  



 code=417)      

 

 IMMATURE GRANULOCYTES-RELATIVE PERCENT (BEAKER)  1 %  0-1  



 (test code=2801)      



POCT-GLUCOSE [id number] 17:30:00





 Test Item  Value  Reference Range  Comments

 

 POC-GLUCOSE METER (BEAKER)  149 mg/dL    TESTED AT [address]



 (test stbc=4095)      [facility] [address]



POCT-GLUCOSE [id number] 12:10:00





 Test Item  Value  Reference Range  Comments

 

 POC-GLUCOSE METER (BEAKER)  245 mg/dL    TESTED AT [address]



 (test ogcx=7861)      [facility] [address]



CBC W/PLT COUNT &amp; AUTO [id number] 10:30:00





 Test Item  Value  Reference Range  Comments

 

 WHITE BLOOD CELL COUNT (BEAKER) (test code=775)  12.6 K/ L  3.5-10.5  

 

 RED BLOOD CELL COUNT (BEAKER) (test code=761)  2.95 M/ L  4.63-6.08  

 

 HEMOGLOBIN (BEAKER) (test code=410)  8.6 GM/DL  13.7-17.5  

 

 HEMATOCRIT (BEAKER) (test code=411)  26.3 %  40.1-51.0  

 

 MEAN CORPUSCULAR VOLUME (BEAKER) (test code=753)  89.2 fL  79.0-92.2  

 

 MEAN CORPUSCULAR HEMOGLOBIN (BEAKER) (test  29.2 pg  25.7-32.2  



 vwwb=495)      

 

 MEAN CORPUSCULAR HEMOGLOBIN CONC (BEAKER) (test  32.7 GM/DL  32.3-36.5  



 code=752)      

 

 RED CELL DISTRIBUTION WIDTH (BEAKER) (test  13.8 %  11.6-14.4  



 code=412)      

 

 PLATELET COUNT (BEAKER) (test code=756)  428 K/CU MM  150-450  

 

 MEAN PLATELET VOLUME (BEAKER) (test code=754)  9.1 fL  9.4-12.4  

 

 NUCLEATED RED BLOOD CELLS (BEAKER) (test  0 /100 WBC  0-0  



 code=413)      



(CELLAVISION MANUAL DIFF)2018 10:30:00





 Test Item  Value  Reference Range  Comments

 

 NEUTROPHILS - REL (CELLAVISION)(BEAKER) (test  83 %    



 code=2816)      

 

 LYMPHOCYTES - REL (CELLAVISION)(BEAKER) (test  8 %    



 code=2817)      

 

 MONOCYTES - REL (CELLAVISION)(BEAKER) (test  4 %    



 code=2818)      

 

 EOSINOPHILS - REL (CELLAVISION)(BEAKER) (test  4 %    



 code=2819)      

 

 BASOPHILS - REL (CELLAVISION)(BEAKER) (test  1 %    



 code=2820)      

 

 NEUTROPHILS - ABS (CELLAVISION)(BEAKER) (test  10.46 K/ul  1.78-5.38  



 code=2830)      

 

 LYMPHOCYTES - ABS (CELLAVISION)(BEAKER) (test  1.01 K/ul  1.32-3.57  



 code=2831)      

 

 MONOCYTES - ABS (CELLAVISION)(BEAKER) (test  0.50 K/uL  0.30-0.82  



 code=2832)      

 

 EOSINOPHILS - ABS (CELLAVISION)(BEAKER) (test  0.50 K/uL  0.04-0.54  



 code=2834)      

 

 BASOPHILS - ABS (CELLAVISION)(BEAKER) (test  0.13 K/uL  0.01-0.08  



 code=2835)      

 

 TOTAL COUNTED (BEAKER) (test code=1351)  100    

 

 MANUAL NRBC  CELLS (BEAKER) (test  1 /100 WBC  0-0  



 code=1353)      

 

 WBC MORPHOLOGY (BEAKER) (test code=487)  Normal    

 

 PLT MORPHOLOGY (BEAKER) (test code=486)  Normal    

 

 ANISOCYTOSIS (BEAKER) (test code=961)  1+ few    

 

 MICROCYTES (BEAKER) (test code=965)  1+ few    

 

 ARTIFACT (CELLAVISION)(BEAKER) (test code=3432)  Present    

 

 PLATELET CONCENTRATION (CELLAVISION)(BEAKER)  Adequate    



 (test code=3438)      



Received comment: User comments: Slide comments:POCT-GLUCOSE [id number] 08:
03:00





 Test Item  Value  Reference Range  Comments

 

 POC-GLUCOSE METER (BEAKER)  242 mg/dL    TESTED AT [facility] [address] [facility]



 (test gkgk=2919)      [facility] [address]



[id number] 07:26:00





 Test Item  Value  Reference Range  Comments

 

 PHOSPHORUS (BEAKER) (test code=604)  3.6 mg/dL  2.3-4.7  



[id number] 07:26:00





 Test Item  Value  Reference Range  Comments

 

 MAGNESIUM (BEAKER) (test code=627)  1.9 mg/dL  1.6-2.6  



BASIC METABOLIC [id number] 07:26:00





 Test Item  Value  Reference Range  Comments

 

 SODIUM (BEAKER) (test  131 meq/L  136-145  



 rwyz=517)      

 

 POTASSIUM (BEAKER) (test  4.3 meq/L  3.5-5.1  



 code=379)      

 

 CHLORIDE (BEAKER) (test  102 meq/L    



 code=382)      

 

 CO2 (BEAKER) (test  20 meq/L  22-29  



 code=355)      

 

 BLOOD UREA NITROGEN  44 mg/dL  7-21  



 (BEAKER) (test code=354)      

 

 CREATININE (BEAKER) (test  1.98 mg/dL  0.57-1.25  



 code=358)      

 

 GLUCOSE RANDOM (BEAKER)  216 mg/dL    



 (test code=652)      

 

 CALCIUM (BEAKER) (test  8.8 mg/dL  8.4-10.2  



 code=697)      

 

 EGFR (BEAKER) (test  33 mL/min/1.73 sq m    ESTIMATED GFR IS NOT AS



 code=1092)      ACCURATE AS CREATININE



       CLEARANCE IN PREDICTING



       GLOMERULAR FILTRATION



       RATE. ESTIMATED GFR IS



       NOT APPLICABLE FOR



       DIALYSIS PATIENTS.



POCT-GLUCOSE [id number] 21:03:00





 Test Item  Value  Reference Range  Comments

 

 POC-GLUCOSE METER (BEAKER)  414 mg/dL    TESTED AT [address]



 (test vetg=8883)      [facility] [address]



POCT-GLUCOSE [id number] 17:11:00





 Test Item  Value  Reference Range  Comments

 

 POC-GLUCOSE METER (BEAKER)  201 mg/dL    TESTED AT [address]



 (test zimp=4585)      [facility] [address]



POCT-GLUCOSE [id number] 11:55:00





 Test Item  Value  Reference Range  Comments

 

 POC-GLUCOSE METER (BEAKER)  228 mg/dL    TESTED AT [address]



 (test uahx=3845)      [facility] [address]



CALCIUM, [id number] 08:13:00





 Test Item  Value  Reference Range  Comments

 

 CALCIUM IONIZED (BEAKER) (test code=698)  1.08 mmol/L  1.12-1.27  

 

 PH, BLOOD (BEAKER) (test code=1810)  7.32    



CBC W/PLT COUNT &amp; AUTO [id number] 07:52:00





 Test Item  Value  Reference Range  Comments

 

 WHITE BLOOD CELL COUNT (BEAKER) (test code=775)  12.8 K/ L  3.5-10.5  

 

 RED BLOOD CELL COUNT (BEAKER) (test code=761)  3.11 M/ L  4.63-6.08  

 

 HEMOGLOBIN (BEAKER) (test code=410)  9.0 GM/DL  13.7-17.5  

 

 HEMATOCRIT (BEAKER) (test code=411)  28.0 %  40.1-51.0  

 

 MEAN CORPUSCULAR VOLUME (BEAKER) (test code=753)  90.0 fL  79.0-92.2  

 

 MEAN CORPUSCULAR HEMOGLOBIN (BEAKER) (test  28.9 pg  25.7-32.2  



 upre=306)      

 

 MEAN CORPUSCULAR HEMOGLOBIN CONC (BEAKER) (test  32.1 GM/DL  32.3-36.5  



 code=752)      

 

 RED CELL DISTRIBUTION WIDTH (BEAKER) (test  13.8 %  11.6-14.4  



 code=412)      

 

 PLATELET COUNT (BEAKER) (test code=756)  432 K/CU MM  150-450  

 

 MEAN PLATELET VOLUME (BEAKER) (test code=754)  9.0 fL  9.4-12.4  

 

 NUCLEATED RED BLOOD CELLS (BEAKER) (test  0 /100 WBC  0-0  



 code=413)      

 

 NEUTROPHILS RELATIVE PERCENT (BEAKER) (test  71 %    



 code=429)      

 

 LYMPHOCYTES RELATIVE PERCENT (BEAKER) (test  10 %    



 code=430)      

 

 MONOCYTES RELATIVE PERCENT (BEAKER) (test  11 %    



 code=431)      

 

 EOSINOPHILS RELATIVE PERCENT (BEAKER) (test  6 %    



 code=432)      

 

 BASOPHILS RELATIVE PERCENT (BEAKER) (test  1 %    



 code=437)      

 

 NEUTROPHILS ABSOLUTE COUNT (BEAKER) (test  9.05 K/ L  1.78-5.38  



 code=670)      

 

 LYMPHOCYTES ABSOLUTE COUNT (BEAKER) (test  1.33 K/ L  1.32-3.57  



 code=414)      

 

 MONOCYTES ABSOLUTE COUNT (BEAKER) (test  1.42 K/ L  0.30-0.82  



 code=415)      

 

 EOSINOPHILS ABSOLUTE COUNT (BEAKER) (test  0.76 K/ L  0.04-0.54  



 code=416)      

 

 BASOPHILS ABSOLUTE COUNT (BEAKER) (test  0.07 K/ L  0.01-0.08  



 code=417)      

 

 IMMATURE GRANULOCYTES-RELATIVE PERCENT (BEAKER)  1 %  0-1  



 (test code=2801)      



[id number] 07:49:00





 Test Item  Value  Reference Range  Comments

 

 PHOSPHORUS (BEAKER) (test code=604)  3.9 mg/dL  2.3-4.7  



[id number] 07:49:00





 Test Item  Value  Reference Range  Comments

 

 MAGNESIUM (BEAKER) (test code=627)  1.8 mg/dL  1.6-2.6  



COMPREHENSIVE METABOLIC [id number] 07:49:00





 Test Item  Value  Reference Range  Comments

 

 TOTAL PROTEIN (BEAKER)  6.2 gm/dL  6.0-8.3  



 (test code=770)      

 

 ALBUMIN (BEAKER) (test  2.9 g/dL  3.5-5.0  



 code=1145)      

 

 ALKALINE PHOSPHATASE  314 U/L    



 (BEAKER) (test code=346)      

 

 BILIRUBIN TOTAL (BEAKER)  0.3 mg/dL  0.2-1.2  



 (test code=377)      

 

 SODIUM (BEAKER) (test  132 meq/L  136-145  



 rneq=275)      

 

 POTASSIUM (BEAKER) (test  4.2 meq/L  3.5-5.1  



 code=379)      

 

 CHLORIDE (BEAKER) (test  102 meq/L    



 code=382)      

 

 CO2 (BEAKER) (test  19 meq/L  22-29  



 code=355)      

 

 BLOOD UREA NITROGEN  44 mg/dL  7-21  



 (BEAKER) (test code=354)      

 

 CREATININE (BEAKER) (test  2.15 mg/dL  0.57-1.25  



 code=358)      

 

 GLUCOSE RANDOM (BEAKER)  219 mg/dL    



 (test code=652)      

 

 CALCIUM (BEAKER) (test  8.7 mg/dL  8.4-10.2  



 code=697)      

 

 AST (SGOT) (BEAKER) (test  23 U/L  5-34  



 code=353)      

 

 ALT (SGPT) (BEAKER) (test  29 U/L  6-55  



 code=347)      

 

 EGFR (BEAKER) (test  30 mL/min/1.73 sq m    ESTIMATED GFR IS NOT AS



 code=1092)      ACCURATE AS CREATININE



       CLEARANCE IN PREDICTING



       GLOMERULAR FILTRATION



       RATE. ESTIMATED GFR IS



       NOT APPLICABLE FOR



       DIALYSIS PATIENTS.



POCT-GLUCOSE [id number] 07:34:00





 Test Item  Value  Reference Range  Comments

 

 POC-GLUCOSE METER (BEAKER)  251 mg/dL    TESTED AT [address]



 (test yuxr=0911)      [facility] [address]



POCT-GLUCOSE [id number] 20:55:00





 Test Item  Value  Reference Range  Comments

 

 POC-GLUCOSE METER (BEAKER)  196 mg/dL    TESTED AT [address]



 (test fznf=6803)      [facility] [address]



HEMOGLOBIN AND [id number] 17:55:00





 Test Item  Value  Reference Range  Comments

 

 HEMOGLOBIN (BEAKER) (test code=410)  9.0 GM/DL  13.7-17.5  

 

 HEMATOCRIT (BEAKER) (test code=411)  27.6 %  40.1-51.0  



Call [id number] 13:46:00





 Test Item  Value  Reference Range  Comments

 

 POC-GLUCOSE METER (BEAKER)  245 mg/dL    TESTED AT [address]



 (test ojbx=9853)      [place] [address]



U/S, BIOPSY, RENAL (KIDNEY)2018 10:42:00Reason for exam:-&gt;MARIANNE, 
nephrotic proteinuriaFINAL REPORT PATIENT ID:   [id number]  Ultrasound guided 
core biopsy of the left native kidney dated 2018 Procedure: Core biopsy of 
the left native kidney. Clinical Indication: Proteinuria Sedation: Moderate 
sedation was administered. 0.5 mg of Versed and 25 mcg fentanyl IV was used for 
moderate sedation monitored under my direction. Total intraservice time of the 
sedation was 50 minutes. The patient's vital signs were monitored throughout 
the procedure and recorded in the patient's medical recordby the nurse. 
Anesthesia: 1% Xylocaine mixed with sodium bicarbonate local anesthesia. 
Technique: After obtained informed consent, ultrasound guided core biopsy of 
the left native kidney was performed under usual sterile technique. Using an 18 
gauge core biopsy needle, puncture was made posteriorly onthe left with real 
time ultrasound guidance. 2 passes were performed with  sufficient material for 
histology. Patient tolerated procedure well. Complication: None Estimated Blood 
Loss: None The specimen was sent to pathology. Impression: Successful ultrasound
-guided core biopsy of the left native kidney.  Signed: Demetris Costa 
Verified Date/Time:  2018 10:42:49 Reading Location: [address] 
Ultrasound Reading Room      Electronically signed by: DEMETRIS COSTA M.D. on 2018 10:42 AMPOCT-GLUCOSE [id number] 08:29:00





 Test Item  Value  Reference Range  Comments

 

 POC-GLUCOSE METER (BEAKER)  286 mg/dL    TESTED AT [address]



 (test ipka=0160)      [place] [address]



CBC W/PLT COUNT &amp; AUTO [id number] 05:46:00





 Test Item  Value  Reference Range  Comments

 

 WHITE BLOOD CELL COUNT (BEAKER) (test code=775)  11.4 K/ L  3.5-10.5  

 

 RED BLOOD CELL COUNT (BEAKER) (test code=761)  2.96 M/ L  4.63-6.08  

 

 HEMOGLOBIN (BEAKER) (test code=410)  8.8 GM/DL  13.7-17.5  

 

 HEMATOCRIT (BEAKER) (test code=411)  26.4 %  40.1-51.0  

 

 MEAN CORPUSCULAR VOLUME (BEAKER) (test code=753)  89.2 fL  79.0-92.2  

 

 MEAN CORPUSCULAR HEMOGLOBIN (BEAKER) (test  29.7 pg  25.7-32.2  



 ynxa=181)      

 

 MEAN CORPUSCULAR HEMOGLOBIN CONC (BEAKER) (test  33.3 GM/DL  32.3-36.5  



 code=752)      

 

 RED CELL DISTRIBUTION WIDTH (BEAKER) (test  13.7 %  11.6-14.4  



 code=412)      

 

 PLATELET COUNT (BEAKER) (test code=756)  401 K/CU MM  150-450  

 

 MEAN PLATELET VOLUME (BEAKER) (test code=754)  9.0 fL  9.4-12.4  

 

 NUCLEATED RED BLOOD CELLS (BEAKER) (test  0 /100 WBC  0-0  



 code=413)      

 

 NEUTROPHILS RELATIVE PERCENT (BEAKER) (test  67 %    



 code=429)      

 

 LYMPHOCYTES RELATIVE PERCENT (BEAKER) (test  11 %    



 code=430)      

 

 MONOCYTES RELATIVE PERCENT (BEAKER) (test  14 %    



 code=431)      

 

 EOSINOPHILS RELATIVE PERCENT (BEAKER) (test  7 %    



 code=432)      

 

 BASOPHILS RELATIVE PERCENT (BEAKER) (test  1 %    



 code=437)      

 

 NEUTROPHILS ABSOLUTE COUNT (BEAKER) (test  7.70 K/ L  1.78-5.38  



 code=670)      

 

 LYMPHOCYTES ABSOLUTE COUNT (BEAKER) (test  1.26 K/ L  1.32-3.57  



 code=414)      

 

 MONOCYTES ABSOLUTE COUNT (BEAKER) (test  1.55 K/ L  0.30-0.82  



 code=415)      

 

 EOSINOPHILS ABSOLUTE COUNT (BEAKER) (test  0.74 K/ L  0.04-0.54  



 code=416)      

 

 BASOPHILS ABSOLUTE COUNT (BEAKER) (test  0.06 K/ L  0.01-0.08  



 code=417)      

 

 IMMATURE GRANULOCYTES-RELATIVE PERCENT (BEAKER)  1 %  0-1  



 (test code=2801)      



COMPREHENSIVE METABOLIC [id number] 05:41:00





 Test Item  Value  Reference Range  Comments

 

 TOTAL PROTEIN (BEAKER)  6.0 gm/dL  6.0-8.3  



 (test code=770)      

 

 ALBUMIN (BEAKER) (test  2.9 g/dL  3.5-5.0  



 code=1145)      

 

 ALKALINE PHOSPHATASE  346 U/L    



 (BEAKER) (test code=346)      

 

 BILIRUBIN TOTAL (BEAKER)  0.2 mg/dL  0.2-1.2  



 (test code=377)      

 

 SODIUM (BEAKER) (test  131 meq/L  136-145  



 bkez=983)      

 

 POTASSIUM (BEAKER) (test  3.9 meq/L  3.5-5.1  



 code=379)      

 

 CHLORIDE (BEAKER) (test  103 meq/L    



 code=382)      

 

 CO2 (BEAKER) (test  20 meq/L  22-29  



 code=355)      

 

 BLOOD UREA NITROGEN  44 mg/dL  7-21  



 (BEAKER) (test code=354)      

 

 CREATININE (BEAKER) (test  2.41 mg/dL  0.57-1.25  



 code=358)      

 

 GLUCOSE RANDOM (BEAKER)  288 mg/dL    



 (test code=652)      

 

 CALCIUM (BEAKER) (test  8.4 mg/dL  8.4-10.2  



 code=697)      

 

 AST (SGOT) (BEAKER) (test  24 U/L  5-34  



 code=353)      

 

 ALT (SGPT) (BEAKER) (test  29 U/L  6-55  



 code=347)      

 

 EGFR (BEAKER) (test  26 mL/min/1.73 sq m    ESTIMATED GFR IS NOT AS



 code=1092)      ACCURATE AS CREATININE



       CLEARANCE IN PREDICTING



       GLOMERULAR FILTRATION



       RATE. ESTIMATED GFR IS



       NOT APPLICABLE FOR



       DIALYSIS PATIENTS.



[id number] 05:40:00





 Test Item  Value  Reference Range  Comments

 

 PHOSPHORUS (BEAKER) (test code=604)  3.9 mg/dL  2.3-4.7  



[id number] 05:40:00





 Test Item  Value  Reference Range  Comments

 

 MAGNESIUM (BEAKER) (test code=627)  1.6 mg/dL  1.6-2.6  



CALCIUM, [id number] 05:30:00





 Test Item  Value  Reference Range  Comments

 

 CALCIUM IONIZED (BEAKER) (test code=698)  1.05 mmol/L  1.12-1.27  

 

 PH, BLOOD (BEAKER) (test code=1810)  7.36    



POCT-GLUCOSE [id number] 21:53:00





 Test Item  Value  Reference Range  Comments

 

 POC-GLUCOSE METER (BEAKER)  346 mg/dL    Notified RN MD/TESTED AT [facility]



 (test code=1538)      [address] [facility] [address]



POCT-GLUCOSE [id number] 17:08:00





 Test Item  Value  Reference Range  Comments

 

 POC-GLUCOSE METER (BEAKER)  365 mg/dL    Notified RN MD/TESTED AT [facility]



 (test code=1538)      [address] [place] [address]



POCT-GLUCOSE [id number] 17:01:00





 Test Item  Value  Reference Range  Comments

 

 POC-GLUCOSE METER (BEAKER)  294 mg/dL    TESTED AT [facility] [address] [place] [address]



 (test ltir=9461)      [facility] [address]



URINE IMMUNOFIXATION, 24 [id number] 10:11:00





 Test Item  Value  Reference Range  Comments

 

 VOLUME, TOTAL (BEAKER) (test  1700 ml    



 code=1457)      

 

 PROTEIN, URINE (BEAKER)  176 mg/dL  0-14  



 (test code=1569)      

 

 PROTEIN, 24HR URINE (BEAKER)  2992 mg/24hr  0-300  



 (test code=1570)      

 

 ALBUMIN, 24HR URINE (BEAKER)  30.3 %    



 (test code=1609)      

 

 GLOBULIN, 24HR URINE  69.7 %    



 (BEAKER) (test code=1610)      

 

 URINE MOE ID (BEAKER) (test  No monoclonal proteins or    



 code=1587)  monoclonal free light chains    



   detected.    

 

 [id number]  Jocelynn Gonzalez MD    



 (BEAKER) (test code=2611)  (electronic signature)    



RAD, CHEST, 1 VIEW, NON [id number] 08:40:00Reason for exam:-&gt;edemaShould 
this be performed at the bedside?-&gt;YesFINAL REPORT PATIENT ID:   [id number] 
CLINICAL HISTORY: edema  TECHNIQUE: 1 view of the chest. COMPARISON: 2018 
IMPRESSION: The right central line remains in the SVC. Mild bilateral airspace 
opacities have decreased. Trace bilateral pleural effusions remain. The 
cardiomediastinal silhouette is magnified by technique.  Signed: Favian Weiner 
MDReport Verified Date/Time:  2018 08:40:13 Reading Location: [facility] Radiology Reading Room      Electronically signed by: FAVIAN WEINER M.D. 
 08:40 AMPOCT-GLUCOSE [id number] 08:13:00





 Test Item  Value  Reference Range  Comments

 

 POC-GLUCOSE METER (BEAKER)  193 mg/dL    [facility] [address] [facility]



 (test rwdx=8492)      [facility] [address]



COMPREHENSIVE METABOLIC [id number] 06:59:00





 Test Item  Value  Reference Range  Comments

 

 TOTAL PROTEIN (BEAKER)  5.9 gm/dL  6.0-8.3  



 (test code=770)      

 

 ALBUMIN (BEAKER) (test  2.8 g/dL  3.5-5.0  



 code=1145)      

 

 ALKALINE PHOSPHATASE  297 U/L    



 (BEAKER) (test code=346)      

 

 BILIRUBIN TOTAL (BEAKER)  0.3 mg/dL  0.2-1.2  



 (test code=377)      

 

 SODIUM (BEAKER) (test  132 meq/L  136-145  



 msbv=855)      

 

 POTASSIUM (BEAKER) (test  3.7 meq/L  3.5-5.1  



 code=379)      

 

 CHLORIDE (BEAKER) (test  103 meq/L    



 code=382)      

 

 CO2 (BEAKER) (test  19 meq/L  22-29  



 code=355)      

 

 BLOOD UREA NITROGEN  42 mg/dL  7-21  



 (BEAKER) (test code=354)      

 

 CREATININE (BEAKER) (test  2.33 mg/dL  0.57-1.25  



 code=358)      

 

 GLUCOSE RANDOM (BEAKER)  168 mg/dL    



 (test code=652)      

 

 CALCIUM (BEAKER) (test  8.3 mg/dL  8.4-10.2  



 code=697)      

 

 AST (SGOT) (BEAKER) (test  27 U/L  5-34  



 code=353)      

 

 ALT (SGPT) (BEAKER) (test  31 U/L  6-55  



 code=347)      

 

 EGFR (BEAKER) (test  27 mL/min/1.73 sq m    ESTIMATED GFR IS NOT AS



 code=1092)      ACCURATE AS CREATININE



       CLEARANCE IN PREDICTING



       GLOMERULAR FILTRATION



       RATE. ESTIMATED GFR IS



       NOT APPLICABLE FOR



       DIALYSIS PATIENTS.



[id number] 06:57:00





 Test Item  Value  Reference Range  Comments

 

 PHOSPHORUS (BEAKER) (test code=604)  3.9 mg/dL  2.3-4.7  



[id number] 06:57:00





 Test Item  Value  Reference Range  Comments

 

 MAGNESIUM (BEAKER) (test code=627)  1.6 mg/dL  1.6-2.6  



CALCIUM, [id number] 06:56:00





 Test Item  Value  Reference Range  Comments

 

 CALCIUM IONIZED (BEAKER) (test code=698)  1.02 mmol/L  1.12-1.27  

 

 PH, BLOOD (BEAKER) (test code=1810)  7.37    



B-TYPE NATRIURETIC FACTOR (BNP)2018 06:43:00





 Test Item  Value  Reference Range  Comments

 

 B-TYPE NATRIURETIC PEPTIDE (BEAKER) (test  109 pg/mL  0-100  



 code=700)      



CBC W/PLT COUNT &amp; AUTO [id number] 06:32:00





 Test Item  Value  Reference Range  Comments

 

 WHITE BLOOD CELL COUNT (BEAKER) (test code=775)  11.0 K/ L  3.5-10.5  

 

 RED BLOOD CELL COUNT (BEAKER) (test code=761)  2.91 M/ L  4.63-6.08  

 

 HEMOGLOBIN (BEAKER) (test code=410)  8.5 GM/DL  13.7-17.5  

 

 HEMATOCRIT (BEAKER) (test code=411)  25.7 %  40.1-51.0  

 

 MEAN CORPUSCULAR VOLUME (BEAKER) (test code=753)  88.3 fL  79.0-92.2  

 

 MEAN CORPUSCULAR HEMOGLOBIN (BEAKER) (test  29.2 pg  25.7-32.2  



 mygn=585)      

 

 MEAN CORPUSCULAR HEMOGLOBIN CONC (BEAKER) (test  33.1 GM/DL  32.3-36.5  



 code=752)      

 

 RED CELL DISTRIBUTION WIDTH (BEAKER) (test  13.6 %  11.6-14.4  



 code=412)      

 

 PLATELET COUNT (BEAKER) (test code=756)  337 K/CU MM  150-450  

 

 MEAN PLATELET VOLUME (BEAKER) (test code=754)  9.1 fL  9.4-12.4  

 

 NUCLEATED RED BLOOD CELLS (BEAKER) (test  0 /100 WBC  0-0  



 code=413)      

 

 NEUTROPHILS RELATIVE PERCENT (BEAKER) (test  66 %    



 code=429)      

 

 LYMPHOCYTES RELATIVE PERCENT (BEAKER) (test  11 %    



 code=430)      

 

 MONOCYTES RELATIVE PERCENT (BEAKER) (test  13 %    



 code=431)      

 

 EOSINOPHILS RELATIVE PERCENT (BEAKER) (test  8 %    



 code=432)      

 

 BASOPHILS RELATIVE PERCENT (BEAKER) (test  1 %    



 code=437)      

 

 NEUTROPHILS ABSOLUTE COUNT (BEAKER) (test  7.21 K/ L  1.78-5.38  



 code=670)      

 

 LYMPHOCYTES ABSOLUTE COUNT (BEAKER) (test  1.23 K/ L  1.32-3.57  



 code=414)      

 

 MONOCYTES ABSOLUTE COUNT (BEAKER) (test  1.43 K/ L  0.30-0.82  



 code=415)      

 

 EOSINOPHILS ABSOLUTE COUNT (BEAKER) (test  0.89 K/ L  0.04-0.54  



 code=416)      

 

 BASOPHILS ABSOLUTE COUNT (BEAKER) (test  0.07 K/ L  0.01-0.08  



 code=417)      

 

 IMMATURE GRANULOCYTES-RELATIVE PERCENT (BEAKER)  2 %  0-1  



 (test code=2801)      



POCT-GLUCOSE [id number] 21:34:00





 Test Item  Value  Reference Range  Comments

 

 POC-GLUCOSE METER (BEAKER)  244 mg/dL    TESTED AT [address]



 (test pyxh=6573)      [place] [address]



POCT-GLUCOSE [id number] 17:02:00





 Test Item  Value  Reference Range  Comments

 

 POC-GLUCOSE METER (BEAKER)  192 mg/dL    TESTED AT [address]



 (test nxfb=9229)      [place] [id number]



POCT-GLUCOSE [id number] 11:45:00





 Test Item  Value  Reference Range  Comments

 

 POC-GLUCOSE METER (BEAKER)  219 mg/dL    TESTED AT [address]



 (test edcc=5452)      [place] [id number]



COMPREHENSIVE METABOLIC [id number] 08:04:00





 Test Item  Value  Reference Range  Comments

 

 TOTAL PROTEIN (BEAKER)  6.0 gm/dL  6.0-8.3  



 (test code=770)      

 

 ALBUMIN (BEAKER) (test  2.8 g/dL  3.5-5.0  



 code=1145)      

 

 ALKALINE PHOSPHATASE  334 U/L    



 (BEAKER) (test code=346)      

 

 BILIRUBIN TOTAL (BEAKER)  0.3 mg/dL  0.2-1.2  



 (test code=377)      

 

 SODIUM (BEAKER) (test  128 meq/L  136-145  



 lpgd=340)      

 

 POTASSIUM (BEAKER) (test  3.8 meq/L  3.5-5.1  



 code=379)      

 

 CHLORIDE (BEAKER) (test  99 meq/L    



 code=382)      

 

 CO2 (BEAKER) (test  19 meq/L  22-29  



 code=355)      

 

 BLOOD UREA NITROGEN  43 mg/dL  7-21  



 (BEAKER) (test code=354)      

 

 CREATININE (BEAKER) (test  2.60 mg/dL  0.57-1.25  



 code=358)      

 

 GLUCOSE RANDOM (BEAKER)  214 mg/dL    



 (test code=652)      

 

 CALCIUM (BEAKER) (test  8.1 mg/dL  8.4-10.2  



 code=697)      

 

 AST (SGOT) (BEAKER) (test  34 U/L  5-34  



 code=353)      

 

 ALT (SGPT) (BEAKER) (test  38 U/L  6-55  



 code=347)      

 

 EGFR (BEAKER) (test  24 mL/min/1.73 sq m    ESTIMATED GFR IS NOT AS



 code=1092)      ACCURATE AS CREATININE



       CLEARANCE IN PREDICTING



       GLOMERULAR FILTRATION



       RATE. ESTIMATED GFR IS



       NOT APPLICABLE FOR



       DIALYSIS PATIENTS.



[id number] 07:58:00





 Test Item  Value  Reference Range  Comments

 

 PHOSPHORUS (BEAKER) (test code=604)  3.5 mg/dL  2.3-4.7  



[id number] 07:58:00





 Test Item  Value  Reference Range  Comments

 

 MAGNESIUM (BEAKER) (test code=627)  1.5 mg/dL  1.6-2.6  



POCT-GLUCOSE [id number] 07:46:00





 Test Item  Value  Reference Range  Comments

 

 POC-GLUCOSE METER (BEAKER)  239 mg/dL    TESTED AT [facility] [address] [facility]



 (test cygl=4238)      [facility] [address]



CALCIUM, [id number] 07:07:00





 Test Item  Value  Reference Range  Comments

 

 CALCIUM IONIZED (BEAKER) (test code=698)  0.99 mmol/L  1.12-1.27  

 

 PH, BLOOD (BEAKER) (test code=1810)  7.36    



PROTHROMBIN [id number] 06:47:00





 Test Item  Value  Reference Range  Comments

 

 PROTIME (BEAKER) (test code=759)  15.1 seconds  11.7-14.7  

 

 INR (BEAKER) (test code=370)  1.2  <=5.9  



RECOMMENDED COUMADIN/WARFARIN INR THERAPY RANGESSTANDARD DOSE: 2.0 - 3.0   
Includes: PROPHYLAXIS forvenous thrombosis, systemic embolization; TREATMENT 
for venous thrombosis and/or pulmonary embolus.HIGH RISK: Target INR is 2.5-3.5 
for patients with mechanical heart valves.CBC W/PLT COUNT &amp; AUTO 
[id number] 06:46:00





 Test Item  Value  Reference Range  Comments

 

 WHITE BLOOD CELL COUNT (BEAKER) (test code=775)  11.4 K/ L  3.5-10.5  

 

 RED BLOOD CELL COUNT (BEAKER) (test code=761)  3.00 M/ L  4.63-6.08  

 

 HEMOGLOBIN (BEAKER) (test code=410)  8.6 GM/DL  13.7-17.5  

 

 HEMATOCRIT (BEAKER) (test code=411)  26.6 %  40.1-51.0  

 

 MEAN CORPUSCULAR VOLUME (BEAKER) (test code=753)  88.7 fL  79.0-92.2  

 

 MEAN CORPUSCULAR HEMOGLOBIN (BEAKER) (test  28.7 pg  25.7-32.2  



 vkxp=327)      

 

 MEAN CORPUSCULAR HEMOGLOBIN CONC (BEAKER) (test  32.3 GM/DL  32.3-36.5  



 code=752)      

 

 RED CELL DISTRIBUTION WIDTH (BEAKER) (test  13.6 %  11.6-14.4  



 code=412)      

 

 PLATELET COUNT (BEAKER) (test code=756)  337 K/CU MM  150-450  

 

 MEAN PLATELET VOLUME (BEAKER) (test code=754)  9.3 fL  9.4-12.4  

 

 NUCLEATED RED BLOOD CELLS (BEAKER) (test  0 /100 WBC  0-0  



 code=413)      

 

 NEUTROPHILS RELATIVE PERCENT (BEAKER) (test  65 %    



 code=429)      

 

 LYMPHOCYTES RELATIVE PERCENT (BEAKER) (test  12 %    



 code=430)      

 

 MONOCYTES RELATIVE PERCENT (BEAKER) (test  12 %    



 code=431)      

 

 EOSINOPHILS RELATIVE PERCENT (BEAKER) (test  9 %    



 code=432)      

 

 BASOPHILS RELATIVE PERCENT (BEAKER) (test  0 %    



 code=437)      

 

 NEUTROPHILS ABSOLUTE COUNT (BEAKER) (test  7.45 K/ L  1.78-5.38  



 code=670)      

 

 LYMPHOCYTES ABSOLUTE COUNT (BEAKER) (test  1.38 K/ L  1.32-3.57  



 code=414)      

 

 MONOCYTES ABSOLUTE COUNT (BEAKER) (test  1.41 K/ L  0.30-0.82  



 code=415)      

 

 EOSINOPHILS ABSOLUTE COUNT (BEAKER) (test  0.99 K/ L  0.04-0.54  



 code=416)      

 

 BASOPHILS ABSOLUTE COUNT (BEAKER) (test  0.05 K/ L  0.01-0.08  



 code=417)      

 

 IMMATURE GRANULOCYTES-RELATIVE PERCENT (BEAKER)  1 %  0-1  



 (test code=2801)      



POCT-GLUCOSE [id number] 20:40:00





 Test Item  Value  Reference Range  Comments

 

 POC-GLUCOSE METER (BEAKER)  311 mg/dL    TESTED AT [address]



 (test riys=1704)      [place] [address]



RAPID DRUG SCREEN, [id number] 19:24:00





 Test Item  Value  Reference Range  Comments

 

 BARBITURATE URINE (BEAKER) (test code=725)  Negative  Negative  

 

 BENZODIAZEPINE SCREEN URINE (BEAKER) (test  Negative  Negative  



 code=726)      

 

 COCAINE (METAB.) SCREEN (BEAKER) (test code=1164)  Negative  Negative  

 

 METHADONE SCREEN (BEAKER) (test code=1436)  Negative  Negative  

 

 OPIATE SCREEN URINE (BEAKER) (test code=734)  Negative  Negative  

 

 CANNABINOID SCREEN URINE (BEAKER) (test code=727)  Negative  Negative  

 

 AMPH/METHAMPH SCREEN (BEAKER) (test code=1438)  Negative  Negative  

 

 PHENCYCLIDINE SCREEN URINE (BEAKER) (test code=608)  Negative  Negative  

 

 OXYCODONE SCREEN URINE (BEAKER) (test code=2761)  Negative  Negative  



DRUG                CUTOFF CONC.Cocaine               300 ng/mL Cannabinoid    
        50 ng/mL Benzodiazepine        200 ng/mLBarbiturate           200 ng/
mLPhencyclidine          25 ng/mLOpiate         300 ng/mLMethadone             
300 ng/mLAmphetamine/         1000 ng/mL  MethamphetamineOxycodone             
300 ng/mLThis assay provides an unconfirmed qualitative test result for the 
clinical management of patients in emergency situations. Chain of custody not 
maintained. Some over-the-counter medications, as well as adulterants, may 
cause inaccurate results. Clinical correlation should be applied. A more 
comprehensive drug screen or confirmation of a detected drug may be performed 
upon request.POCT-GLUCOSE [id number] 17:47:00





 Test Item  Value  Reference Range  Comments

 

 POC-GLUCOSE METER (BEAKER)  241 mg/dL    TESTED AT [address]



 (test waxo=1618)      [place] [id number]



CBC W/PLT COUNT &amp; AUTO [id number] 12:01:00





 Test Item  Value  Reference Range  Comments

 

 WHITE BLOOD CELL COUNT (BEAKER) (test code=775)  10.3 K/ L  3.5-10.5  

 

 RED BLOOD CELL COUNT (BEAKER) (test code=761)  3.04 M/ L  4.63-6.08  

 

 HEMOGLOBIN (BEAKER) (test code=410)  8.7 GM/DL  13.7-17.5  

 

 HEMATOCRIT (BEAKER) (test code=411)  27.2 %  40.1-51.0  

 

 MEAN CORPUSCULAR VOLUME (BEAKER) (test code=753)  89.5 fL  79.0-92.2  

 

 MEAN CORPUSCULAR HEMOGLOBIN (BEAKER) (test  28.6 pg  25.7-32.2  



 tiwu=638)      

 

 MEAN CORPUSCULAR HEMOGLOBIN CONC (BEAKER) (test  32.0 GM/DL  32.3-36.5  



 code=752)      

 

 RED CELL DISTRIBUTION WIDTH (BEAKER) (test  13.6 %  11.6-14.4  



 code=412)      

 

 PLATELET COUNT (BEAKER) (test code=756)  315 K/CU MM  150-450  

 

 MEAN PLATELET VOLUME (BEAKER) (test code=754)  9.2 fL  9.4-12.4  

 

 NUCLEATED RED BLOOD CELLS (BEAKER) (test  0 /100 WBC  0-0  



 code=413)      



(CELLAVISION MANUAL DIFF)2018 12:01:00





 Test Item  Value  Reference Range  Comments

 

 NEUTROPHILS - REL (CELLAVISION)(BEAKER) (test  75 %    



 code=2816)      

 

 LYMPHOCYTES - REL (CELLAVISION)(BEAKER) (test  9 %    



 code=2817)      

 

 MONOCYTES - REL (CELLAVISION)(BEAKER) (test  9 %    



 code=2818)      

 

 EOSINOPHILS - REL (CELLAVISION)(BEAKER) (test  6 %    



 code=2819)      

 

 BASOPHILS - REL (CELLAVISION)(BEAKER) (test  1 %    



 code=2820)      

 

 NEUTROPHILS - ABS (CELLAVISION)(BEAKER) (test  7.73 K/ul  1.78-5.38  



 code=2830)      

 

 LYMPHOCYTES - ABS (CELLAVISION)(BEAKER) (test  0.93 K/ul  1.32-3.57  



 code=2831)      

 

 MONOCYTES - ABS (CELLAVISION)(BEAKER) (test  0.93 K/uL  0.30-0.82  



 code=2832)      

 

 EOSINOPHILS - ABS (CELLAVISION)(BEAKER) (test  0.62 K/uL  0.04-0.54  



 code=2834)      

 

 BASOPHILS - ABS (CELLAVISION)(BEAKER) (test  0.10 K/uL  0.01-0.08  



 code=2835)      

 

 TOTAL COUNTED (BEAKER) (test code=1351)  100    

 

 RBC MORPHOLOGY (BEAKER) (test code=762)  Normal    

 

 WBC MORPHOLOGY (BEAKER) (test code=487)  Normal    

 

 PLT MORPHOLOGY (BEAKER) (test code=486)  Normal    

 

 ARTIFACT (CELLAVISION)(BEAKER) (test code=3432)  Present    

 

 PLATELET CONCENTRATION (CELLAVISION)(BEAKER) (test  Adequate    



 yrgx=9475)      



Received comment: User comments: Slide comments:POCT-GLUCOSE [id number] 11:
34:00





 Test Item  Value  Reference Range  Comments

 

 POC-GLUCOSE METER (BEAKER)  295 mg/dL    TESTED AT [address]



 (test xonq=6970)      [facility] [address]



POCT-GLUCOSE [id number] 07:38:00





 Test Item  Value  Reference Range  Comments

 

 POC-GLUCOSE METER (BEAKER)  188 mg/dL    TESTED AT [address]



 (test oaum=4264)      [facility] [address]



CALCIUM, [id number] 06:26:00





 Test Item  Value  Reference Range  Comments

 

 CALCIUM IONIZED (BEAKER) (test code=698)  1.00 mmol/L  1.12-1.27  

 

 PH, BLOOD (BEAKER) (test code=1810)  7.35    



COMPREHENSIVE METABOLIC [id number] 06:13:00





 Test Item  Value  Reference Range  Comments

 

 TOTAL PROTEIN (BEAKER)  6.0 gm/dL  6.0-8.3  



 (test code=770)      

 

 ALBUMIN (BEAKER) (test  2.9 g/dL  3.5-5.0  



 code=1145)      

 

 ALKALINE PHOSPHATASE  336 U/L    



 (BEAKER) (test code=346)      

 

 BILIRUBIN TOTAL (BEAKER)  0.3 mg/dL  0.2-1.2  



 (test code=377)      

 

 SODIUM (BEAKER) (test  129 meq/L  136-145  



 vodp=909)      

 

 POTASSIUM (BEAKER) (test  3.4 meq/L  3.5-5.1  



 code=379)      

 

 CHLORIDE (BEAKER) (test  101 meq/L    



 code=382)      

 

 CO2 (BEAKER) (test  19 meq/L  22-29  



 code=355)      

 

 BLOOD UREA NITROGEN  43 mg/dL  7-21  



 (BEAKER) (test code=354)      

 

 CREATININE (BEAKER) (test  2.84 mg/dL  0.57-1.25  



 code=358)      

 

 GLUCOSE RANDOM (BEAKER)  205 mg/dL    



 (test code=652)      

 

 CALCIUM (BEAKER) (test  8.1 mg/dL  8.4-10.2  



 code=697)      

 

 AST (SGOT) (BEAKER) (test  37 U/L  5-34  



 code=353)      

 

 ALT (SGPT) (BEAKER) (test  37 U/L  6-55  



 code=347)      

 

 EGFR (BEAKER) (test  21 mL/min/1.73 sq m    ESTIMATED GFR IS NOT AS



 code=1092)      ACCURATE AS CREATININE



       CLEARANCE IN PREDICTING



       GLOMERULAR FILTRATION



       RATE. ESTIMATED GFR IS



       NOT APPLICABLE FOR



       DIALYSIS PATIENTS.



[id number] 06:12:00





 Test Item  Value  Reference Range  Comments

 

 PHOSPHORUS (BEAKER) (test code=604)  3.6 mg/dL  2.3-4.7  



[id number] 06:12:00





 Test Item  Value  Reference Range  Comments

 

 MAGNESIUM (BEAKER) (test code=627)  1.6 mg/dL  1.6-2.6  



PROTHROMBIN [id number] 05:33:00





 Test Item  Value  Reference Range  Comments

 

 PROTIME (BEAKER) (test code=759)  14.0 seconds  11.7-14.7  

 

 INR (BEAKER) (test code=370)  1.1  <=5.9  



RECOMMENDED COUMADIN/WARFARIN INR THERAPY RANGESSTANDARD DOSE: 2.0 - 3.0   
Includes: PROPHYLAXIS forvenous thrombosis, systemic embolization; TREATMENT 
for venous thrombosis and/or pulmonary embolus.HIGH RISK: Target INR is 2.5-3.5 
for patients with mechanical heart valves.POCT-GLUCOSE [id number] 17:32:00





 Test Item  Value  Reference Range  Comments

 

 POC-GLUCOSE METER (BEAKER)  144 mg/dL    TESTED AT [address]



 (test lnop=1804)      [facility] [address]



URINE PROTEIN ELECTROPHORESIS, 24 [id number] 14:15:00





 Test Item  Value  Reference Range  Comments

 

 PROTEIN, 24HR URINE (BEAKER)  2992 mg/24hr  0-300  



 (test code=1570)      

 

 VOLUME, TOTAL (BEAKER) (test  1700 ml    



 code=1457)      

 

 ALBUMIN, 24HR URINE (BEAKER)  30.3 %    



 (test code=1609)      

 

 GLOBULIN, 24HR URINE  69.7 %    



 (BEAKER) (test code=1610)      

 

 UPEP, ID (BEAKER) (test  Spillage of large amounts of    



 code=1433)  globulin fractions may mask    



   underlying monoclonal proteins;    



   urine MOE ordered and results    



   pending.    

 

 PROTEIN, URINE (BEAKER)  176 mg/dL  0-14  



 (test code=1569)      

 

 [facility] PATHOLOGIST-2204  Jocelynn Gonzalez MD    



 (BEAKER) (test code=2598)  (electronic signature)    



POCT-GLUCOSE [id number] 12:04:00





 Test Item  Value  Reference Range  Comments

 

 POC-GLUCOSE METER (BEAKER)  155 mg/dL    TESTED AT [address]



 (test gxnp=7374)      [facility] [address]



CBC W/PLT COUNT &amp; AUTO [id number] 08:49:00





 Test Item  Value  Reference Range  Comments

 

 WHITE BLOOD CELL COUNT (BEAKER) (test code=775)  10.9 K/ L  3.5-10.5  

 

 RED BLOOD CELL COUNT (BEAKER) (test code=761)  2.96 M/ L  4.63-6.08  

 

 HEMOGLOBIN (BEAKER) (test code=410)  8.6 GM/DL  13.7-17.5  

 

 HEMATOCRIT (BEAKER) (test code=411)  26.2 %  40.1-51.0  

 

 MEAN CORPUSCULAR VOLUME (BEAKER) (test code=753)  88.5 fL  79.0-92.2  

 

 MEAN CORPUSCULAR HEMOGLOBIN (BEAKER) (test  29.1 pg  25.7-32.2  



 bwfm=093)      

 

 MEAN CORPUSCULAR HEMOGLOBIN CONC (BEAKER) (test  32.8 GM/DL  32.3-36.5  



 code=752)      

 

 RED CELL DISTRIBUTION WIDTH (BEAKER) (test  13.5 %  11.6-14.4  



 code=412)      

 

 PLATELET COUNT (BEAKER) (test code=756)  298 K/CU MM  150-450  

 

 MEAN PLATELET VOLUME (BEAKER) (test code=754)  9.2 fL  9.4-12.4  

 

 NUCLEATED RED BLOOD CELLS (BEAKER) (test  0 /100 WBC  0-0  



 code=413)      



(CELLAVISION MANUAL DIFF)2018 08:49:00





 Test Item  Value  Reference Range  Comments

 

 NEUTROPHILS - REL (CELLAVISION)(BEAKER) (test  78 %    



 code=2816)      

 

 LYMPHOCYTES - REL (CELLAVISION)(BEAKER) (test  7 %    



 code=2817)      

 

 MONOCYTES - REL (CELLAVISION)(BEAKER) (test  11 %    



 code=2818)      

 

 EOSINOPHILS - REL (CELLAVISION)(BEAKER) (test  4 %    



 code=2819)      

 

 NEUTROPHILS - ABS (CELLAVISION)(BEAKER) (test  8.50 K/ul  1.78-5.38  



 code=2830)      

 

 LYMPHOCYTES - ABS (CELLAVISION)(BEAKER) (test  0.76 K/ul  1.32-3.57  



 code=2831)      

 

 MONOCYTES - ABS (CELLAVISION)(BEAKER) (test  1.20 K/uL  0.30-0.82  



 code=2832)      

 

 EOSINOPHILS - ABS (CELLAVISION)(BEAKER) (test  0.44 K/uL  0.04-0.54  



 code=2834)      

 

 TOTAL COUNTED (BEAKER) (test code=1351)  100    

 

 RBC MORPHOLOGY (BEAKER) (test code=762)  Normal    

 

 WBC MORPHOLOGY (BEAKER) (test code=487)  Normal    

 

 PLT MORPHOLOGY (BEAKER) (test code=486)  Normal    

 

 ARTIFACT (CELLAVISION)(BEAKER) (test code=3432)  Present    

 

 PLATELET CONCENTRATION (CELLAVISION)(BEAKER) (test  Adequate    



 laws=4714)      



Received comment: User comments: Slide comments:POCT-GLUCOSE [id number] 08:
16:00





 Test Item  Value  Reference Range  Comments

 

 POC-GLUCOSE METER (BEAKER)  166 mg/dL    TESTED AT [facility] [address] [facility]



 (test lkkv=7176)      [facility] [address]



CALCIUM, [id number] 06:10:00





 Test Item  Value  Reference Range  Comments

 

 CALCIUM IONIZED (BEAKER) (test code=698)  0.98 mmol/L  1.12-1.27  

 

 PH, BLOOD (BEAKER) (test code=1810)  7.40    



BASIC METABOLIC [id number] 05:06:00





 Test Item  Value  Reference Range  Comments

 

 SODIUM (BEAKER) (test  131 meq/L  136-145  



 opnr=793)      

 

 POTASSIUM (BEAKER) (test  3.6 meq/L  3.5-5.1  



 code=379)      

 

 CHLORIDE (BEAKER) (test  100 meq/L    



 code=382)      

 

 CO2 (BEAKER) (test  21 meq/L  22-29  



 code=355)      

 

 BLOOD UREA NITROGEN  39 mg/dL  7-21  



 (BEAKER) (test code=354)      

 

 CREATININE (BEAKER) (test  2.74 mg/dL  0.57-1.25  



 code=358)      

 

 GLUCOSE RANDOM (BEAKER)  158 mg/dL    



 (test code=652)      

 

 CALCIUM (BEAKER) (test  8.1 mg/dL  8.4-10.2  



 code=697)      

 

 EGFR (BEAKER) (test  22 mL/min/1.73 sq m    ESTIMATED GFR IS NOT AS



 code=1092)      ACCURATE AS CREATININE



       CLEARANCE IN PREDICTING



       GLOMERULAR FILTRATION



       RATE. ESTIMATED GFR IS



       NOT APPLICABLE FOR



       DIALYSIS PATIENTS.



[id number] 05:03:00





 Test Item  Value  Reference Range  Comments

 

 PHOSPHORUS (BEAKER) (test code=604)  3.4 mg/dL  2.3-4.7  



[id number] 05:03:00





 Test Item  Value  Reference Range  Comments

 

 MAGNESIUM (BEAKER) (test code=627)  1.6 mg/dL  1.6-2.6  



POCT-GLUCOSE [id number] 21:25:00





 Test Item  Value  Reference Range  Comments

 

 POC-GLUCOSE METER (BEAKER)  252 mg/dL    TESTED AT [address]



 (test qjix=0523)      [facility] [address]



POCT-GLUCOSE [id number] 17:44:00





 Test Item  Value  Reference Range  Comments

 

 POC-GLUCOSE METER (BEAKER)  127 mg/dL    TESTED AT [address]



 (test wymg=1808)      [facility] [address]



POCT-GLUCOSE [id number] 11:31:00





 Test Item  Value  Reference Range  Comments

 

 POC-GLUCOSE METER (BEAKER)  221 mg/dL    TESTED AT [facility] [address] [facility]



 (test zioj=0430)      [facility] [address]



BLOOD [id number] 11:00:00





 Test Item  Value  Reference Range  Comments

 

 CULTURE (BEAKER) (test code=1095)  No growth in 5 days    



POCT-GLUCOSE [id number] 08:08:00





 Test Item  Value  Reference Range  Comments

 

 POC-GLUCOSE METER (BEAKER)  118 mg/dL    TESTED AT [address]



 (test yedz=8124)      [facility] [address]



COMPREHENSIVE METABOLIC [id number] 06:57:00





 Test Item  Value  Reference Range  Comments

 

 TOTAL PROTEIN (BEAKER)  5.9 gm/dL  6.0-8.3  



 (test code=770)      

 

 ALBUMIN (BEAKER) (test  2.9 g/dL  3.5-5.0  



 code=1145)      

 

 ALKALINE PHOSPHATASE  276 U/L    



 (BEAKER) (test code=346)      

 

 BILIRUBIN TOTAL (BEAKER)  0.5 mg/dL  0.2-1.2  



 (test code=377)      

 

 SODIUM (BEAKER) (test  130 meq/L  136-145  



 ekat=854)      

 

 POTASSIUM (BEAKER) (test  3.9 meq/L  3.5-5.1  



 code=379)      

 

 CHLORIDE (BEAKER) (test  99 meq/L    



 code=382)      

 

 CO2 (BEAKER) (test  20 meq/L  22-29  



 code=355)      

 

 BLOOD UREA NITROGEN  35 mg/dL  7-21  



 (BEAKER) (test code=354)      

 

 CREATININE (BEAKER) (test  2.39 mg/dL  0.57-1.25  



 code=358)      

 

 GLUCOSE RANDOM (BEAKER)  116 mg/dL    



 (test code=652)      

 

 CALCIUM (BEAKER) (test  8.0 mg/dL  8.4-10.2  



 code=697)      

 

 AST (SGOT) (BEAKER) (test  36 U/L  5-34  



 code=353)      

 

 ALT (SGPT) (BEAKER) (test  28 U/L  6-55  



 code=347)      

 

 EGFR (BEAKER) (test  26 mL/min/1.73 sq m    ESTIMATED GFR IS NOT AS



 code=1092)      ACCURATE AS CREATININE



       CLEARANCE IN PREDICTING



       GLOMERULAR FILTRATION



       RATE. ESTIMATED GFR IS



       NOT APPLICABLE FOR



       DIALYSIS PATIENTS.



[id number] 05:44:00





 Test Item  Value  Reference Range  Comments

 

 PHOSPHORUS (BEAKER) (test code=604)  3.2 mg/dL  2.3-4.7  



[id number] 05:44:00





 Test Item  Value  Reference Range  Comments

 

 MAGNESIUM (BEAKER) (test code=627)  1.7 mg/dL  1.6-2.6  



PROTHROMBIN [id number] 04:53:00





 Test Item  Value  Reference Range  Comments

 

 PROTIME (BEAKER) (test code=759)  15.5 seconds  11.7-14.7  

 

 INR (BEAKER) (test code=370)  1.2  <=5.9  



RECOMMENDED COUMADIN/WARFARIN INR THERAPY RANGESSTANDARD DOSE: 2.0 - 3.0   
Includes: PROPHYLAXIS forvenous thrombosis, systemic embolization; TREATMENT 
for venous thrombosis and/or pulmonary embolus.HIGH RISK: Target INR is 2.5-3.5 
for patients with mechanical heart valves.CBC W/PLT COUNT &amp; AUTO 
[id number] 04:43:00





 Test Item  Value  Reference Range  Comments

 

 WHITE BLOOD CELL COUNT (BEAKER) (test code=775)  11.2 K/ L  3.5-10.5  

 

 RED BLOOD CELL COUNT (BEAKER) (test code=761)  3.22 M/ L  4.63-6.08  

 

 HEMOGLOBIN (BEAKER) (test code=410)  9.2 GM/DL  13.7-17.5  

 

 HEMATOCRIT (BEAKER) (test code=411)  28.9 %  40.1-51.0  

 

 MEAN CORPUSCULAR VOLUME (BEAKER) (test code=753)  89.8 fL  79.0-92.2  

 

 MEAN CORPUSCULAR HEMOGLOBIN (BEAKER) (test  28.6 pg  25.7-32.2  



 jdea=298)      

 

 MEAN CORPUSCULAR HEMOGLOBIN CONC (BEAKER) (test  31.8 GM/DL  32.3-36.5  



 code=752)      

 

 RED CELL DISTRIBUTION WIDTH (BEAKER) (test  13.3 %  11.6-14.4  



 code=412)      

 

 PLATELET COUNT (BEAKER) (test code=756)  327 K/CU MM  150-450  

 

 MEAN PLATELET VOLUME (BEAKER) (test code=754)  9.4 fL  9.4-12.4  

 

 NUCLEATED RED BLOOD CELLS (BEAKER) (test  0 /100 WBC  0-0  



 code=413)      

 

 NEUTROPHILS RELATIVE PERCENT (BEAKER) (test  68 %    



 code=429)      

 

 LYMPHOCYTES RELATIVE PERCENT (BEAKER) (test  11 %    



 code=430)      

 

 MONOCYTES RELATIVE PERCENT (BEAKER) (test  13 %    



 code=431)      

 

 EOSINOPHILS RELATIVE PERCENT (BEAKER) (test  7 %    



 code=432)      

 

 BASOPHILS RELATIVE PERCENT (BEAKER) (test  0 %    



 code=437)      

 

 NEUTROPHILS ABSOLUTE COUNT (BEAKER) (test  7.59 K/ L  1.78-5.38  



 code=670)      

 

 LYMPHOCYTES ABSOLUTE COUNT (BEAKER) (test  1.23 K/ L  1.32-3.57  



 code=414)      

 

 MONOCYTES ABSOLUTE COUNT (BEAKER) (test  1.49 K/ L  0.30-0.82  



 code=415)      

 

 EOSINOPHILS ABSOLUTE COUNT (BEAKER) (test  0.78 K/ L  0.04-0.54  



 code=416)      

 

 BASOPHILS ABSOLUTE COUNT (BEAKER) (test  0.04 K/ L  0.01-0.08  



 code=417)      

 

 IMMATURE GRANULOCYTES-RELATIVE PERCENT (BEAKER)  0 %  0-1  



 (test code=2801)      



CALCIUM, [id number] 04:27:00





 Test Item  Value  Reference Range  Comments

 

 CALCIUM IONIZED (BEAKER) (test code=698)  0.97 mmol/L  1.12-1.27  

 

 PH, BLOOD (BEAKER) (test code=1810)  7.42    



PROTEIN, 24 HOUR [id number] 02:50:00





 Test Item  Value  Reference Range  Comments

 

 PROTEIN, 24HR URINE (BEAKER) (test code=1570)  2992 mg/24hr  0-300  

 

 VOLUME, TOTAL (BEAKER) (test code=1457)  1700 ml    

 

 PROTEIN, URINE (BEAKER) (test code=1569)  176 mg/dL  0-14  



POCT-GLUCOSE [id number] 21:39:00





 Test Item  Value  Reference Range  Comments

 

 POC-GLUCOSE METER (BEAKER)  194 mg/dL    TESTED AT [address]



 (test xeiw=1082)      [facility] [address]



POCT-GLUCOSE [id number] 17:08:00





 Test Item  Value  Reference Range  Comments

 

 POC-GLUCOSE METER (BEAKER)  183 mg/dL    TESTED AT [facility] [address]



 (test ehgo=6048)      [facility] [address]



POCT-GLUCOSE [id number] 12:10:00





 Test Item  Value  Reference Range  Comments

 

 POC-GLUCOSE METER (BEAKER)  157 mg/dL    TESTED AT [facility] [address] [facility]



 (test ksdb=2300)      [facility] [address]



POCT-GLUCOSE [id number] 07:53:00





 Test Item  Value  Reference Range  Comments

 

 POC-GLUCOSE METER (BEAKER)  132 mg/dL    TESTED AT [place] [id number] [facility]



 (test kxkp=1422)      [facility] [address]



CALCIUM, [id number] 07:44:00





 Test Item  Value  Reference Range  Comments

 

 CALCIUM IONIZED (BEAKER) (test code=698)  0.94 mmol/L  1.12-1.27  

 

 PH, BLOOD (BEAKER) (test code=1810)  7.42    



COMPREHENSIVE METABOLIC [id number] 07:05:00





 Test Item  Value  Reference Range  Comments

 

 TOTAL PROTEIN (BEAKER)  5.9 gm/dL  6.0-8.3  



 (test code=770)      

 

 ALBUMIN (BEAKER) (test  2.9 g/dL  3.5-5.0  



 code=1145)      

 

 ALKALINE PHOSPHATASE  216 U/L    



 (BEAKER) (test code=346)      

 

 BILIRUBIN TOTAL (BEAKER)  0.6 mg/dL  0.2-1.2  



 (test code=377)      

 

 SODIUM (BEAKER) (test  132 meq/L  136-145  



 sqes=940)      

 

 POTASSIUM (BEAKER) (test  3.6 meq/L  3.5-5.1  



 code=379)      

 

 CHLORIDE (BEAKER) (test  97 meq/L    



 code=382)      

 

 CO2 (BEAKER) (test  24 meq/L  22-29  



 code=355)      

 

 BLOOD UREA NITROGEN  36 mg/dL  7-21  



 (BEAKER) (test code=354)      

 

 CREATININE (BEAKER) (test  2.43 mg/dL  0.57-1.25  



 code=358)      

 

 GLUCOSE RANDOM (BEAKER)  113 mg/dL    



 (test code=652)      

 

 CALCIUM (BEAKER) (test  7.9 mg/dL  8.4-10.2  



 code=697)      

 

 AST (SGOT) (BEAKER) (test  29 U/L  5-34  



 code=353)      

 

 ALT (SGPT) (BEAKER) (test  25 U/L  6-55  



 code=347)      

 

 EGFR (BEAKER) (test  26 mL/min/1.73 sq m    ESTIMATED GFR IS NOT AS



 code=1092)      ACCURATE AS CREATININE



       CLEARANCE IN PREDICTING



       GLOMERULAR FILTRATION



       RATE. ESTIMATED GFR IS



       NOT APPLICABLE FOR



       DIALYSIS PATIENTS.



[id number] 07:04:00





 Test Item  Value  Reference Range  Comments

 

 PHOSPHORUS (BEAKER) (test code=604)  3.0 mg/dL  2.3-4.7  



[id number] 07:04:00





 Test Item  Value  Reference Range  Comments

 

 MAGNESIUM (BEAKER) (test code=627)  1.7 mg/dL  1.6-2.6  



CBC W/PLT COUNT &amp; AUTO [id number] 06:36:00





 Test Item  Value  Reference Range  Comments

 

 WHITE BLOOD CELL COUNT (BEAKER) (test code=775)  12.1 K/ L  3.5-10.5  

 

 RED BLOOD CELL COUNT (BEAKER) (test code=761)  3.30 M/ L  4.63-6.08  

 

 HEMOGLOBIN (BEAKER) (test code=410)  9.4 GM/DL  13.7-17.5  

 

 HEMATOCRIT (BEAKER) (test code=411)  29.3 %  40.1-51.0  

 

 MEAN CORPUSCULAR VOLUME (BEAKER) (test code=753)  88.8 fL  79.0-92.2  

 

 MEAN CORPUSCULAR HEMOGLOBIN (BEAKER) (test  28.5 pg  25.7-32.2  



 hsug=532)      

 

 MEAN CORPUSCULAR HEMOGLOBIN CONC (BEAKER) (test  32.1 GM/DL  32.3-36.5  



 code=752)      

 

 RED CELL DISTRIBUTION WIDTH (BEAKER) (test  13.7 %  11.6-14.4  



 code=412)      

 

 PLATELET COUNT (BEAKER) (test code=756)  310 K/CU MM  150-450  

 

 MEAN PLATELET VOLUME (BEAKER) (test code=754)  9.7 fL  9.4-12.4  

 

 NUCLEATED RED BLOOD CELLS (BEAKER) (test  0 /100 WBC  0-0  



 code=413)      

 

 NEUTROPHILS RELATIVE PERCENT (BEAKER) (test  69 %    



 code=429)      

 

 LYMPHOCYTES RELATIVE PERCENT (BEAKER) (test  12 %    



 code=430)      

 

 MONOCYTES RELATIVE PERCENT (BEAKER) (test  12 %    



 code=431)      

 

 EOSINOPHILS RELATIVE PERCENT (BEAKER) (test  7 %    



 code=432)      

 

 BASOPHILS RELATIVE PERCENT (BEAKER) (test  0 %    



 code=437)      

 

 NEUTROPHILS ABSOLUTE COUNT (BEAKER) (test  8.28 K/ L  1.78-5.38  



 code=670)      

 

 LYMPHOCYTES ABSOLUTE COUNT (BEAKER) (test  1.40 K/ L  1.32-3.57  



 code=414)      

 

 MONOCYTES ABSOLUTE COUNT (BEAKER) (test  1.43 K/ L  0.30-0.82  



 code=415)      

 

 EOSINOPHILS ABSOLUTE COUNT (BEAKER) (test  0.81 K/ L  0.04-0.54  



 code=416)      

 

 BASOPHILS ABSOLUTE COUNT (BEAKER) (test  0.04 K/ L  0.01-0.08  



 code=417)      

 

 IMMATURE GRANULOCYTES-RELATIVE PERCENT (BEAKER)  1 %  0-1  



 (test code=2801)      



POCT-GLUCOSE [id number] 21:45:00





 Test Item  Value  Reference Range  Comments

 

 POC-GLUCOSE METER (BEAKER)  152 mg/dL    TESTED AT [facility] [address] [facility]



 (test zgby=7090)      [facility] [address]



URINALYSIS W/ REFLEX URINE [id number] 19:43:00





 Test Item  Value  Reference Range  Comments

 

 COLOR (BEAKER) (test code=470)  Light Yellow    

 

 CLARITY (BEAKER) (test code=469)  Clear    

 

 SPECIFIC GRAVITY UA (BEAKER) (test code=468)  1.005  1.001-1.035  

 

 PH UA (BEAKER) (test code=467)  8.5  5.0-8.0  

 

 PROTEIN UA (BEAKER) (test code=464)  200 mg/dL  Negative  

 

 GLUCOSE UA (BEAKER) (test code=365)  500 mg/dL  Negative  

 

 KETONES UA (BEAKER) (test code=371)  Negative  Negative  

 

 BILIRUBIN UA (BEAKER) (test code=462)  Negative  Negative  

 

 BLOOD UA (BEAKER) (test code=461)  Trace  Negative  

 

 NITRITE UA (BEAKER) (test code=465)  Negative  Negative  

 

 LEUKOCYTE ESTERASE UA (BEAKER) (test code=466)  Negative  Negative  

 

 UROBILINOGEN UA (BEAKER) (test code=463)  0.2 mg/dL  0.2-1.0  

 

 RBC UA (BEAKER) (test code=519)  0 /HPF    

 

 WBC UA (BEAKER) (test code=520)  1 /HPF    

 

 BACTERIA (BEAKER) (test swlb=571)  Rare    

 

 SQUAMOUS EPITHELIAL (BEAKER) (test code=516)  < /HPF    

 

 SOURCE(BEAKER) (test code=2795)      



POCT-GLUCOSE [id number] 16:59:00





 Test Item  Value  Reference Range  Comments

 

 POC-GLUCOSE METER (BEAKER)  184 mg/dL    TESTED AT [facility] [address]



 (test yorx=5713)      [facility] [address]



URINE PROTEIN ELECTROPHORESIS, [id number] 16:32:00





 Test Item  Value  Reference Range  Comments

 

 PROTEIN, URINE (BEAKER) (test  101 mg/dL  0-14  



 code=1569)      

 

 ALBUMIN URINE ELP (BEAKER)  45.1 %    



 (test code=1018)      

 

 GAMMA GLOBULIN URINE (BEAKER)  54.9 %    



 (test code=1015)      

 

 UPEP, ID-[id number] (BEAKER) (test  No monoclonal bands detected.    



 code=2604)      

 

 [facility]  Jocelynn Gonzalez MD    



 (test code=2605)  (electronic signature)    



PROTEIN ELECTROPHORESIS, [id number] 16:02:00





 Test Item  Value  Reference Range  Comments

 

 ALBUMIN FRACTION (BEAKER)  2.5 g/dL  3.5-5.5  



 (test code=405)      

 

 ALPHA 1 FRACTION (BEAKER)  0.3 g/dL  0.2-0.4  



 (test code=389)      

 

 ALPHA 2 FRACTION (BEAKER)  0.7 g/dL  0.5-0.9  



 (test code=390)      

 

 BETA FRACTION (BEAKER) (test  0.8 g/dL  0.6-1.1  



 code=392)      

 

 GAMMA GLOBULIN FRACTION  1.1 g/dL  0.7-1.7  



 (BEAKER) (test code=391)      

 

 INTERPRETATION-119 (BEAKER)  Decreased albumin with    



 (test code=2615)  concurrent relative increases    



   in all globulin fractions. No    



   monoclonal bands detected.    

 

 [facility]-PATHOLOGIST-119 (BEAKER)  Jocelynn Gonzalez MD    



 (test code=2616)  (electronic signature)    

 

 PROTEIN TOTAL SERUM, SPEP  5.3 gm/dL  6.0-8.3  



 (BEAKER) (test code=2660)      



POCT-GLUCOSE [id number] 11:58:00





 Test Item  Value  Reference Range  Comments

 

 POC-GLUCOSE METER (BEAKER)  197 mg/dL    TESTED AT [facility] [address] [facility]



 (test xdar=7545)      [facility] [address]



POCT-GLUCOSE [id number] 08:42:00





 Test Item  Value  Reference Range  Comments

 

 POC-GLUCOSE METER (BEAKER)  119 mg/dL    TESTED AT [facility] [address] [facility]



 (test bkyf=4703)      [facility] [address]



HEMOGLOBIN [id number] 08:19:00





 Test Item  Value  Reference Range  Comments

 

 HEMOGLOBIN A1C (BEAKER) (test code=368)  5.9 %  4.3-6.1  



COMPREHENSIVE METABOLIC [id number] 05:26:00





 Test Item  Value  Reference Range  Comments

 

 TOTAL PROTEIN (BEAKER)  5.8 gm/dL  6.0-8.3  



 (test code=770)      

 

 ALBUMIN (BEAKER) (test  2.9 g/dL  3.5-5.0  



 code=1145)      

 

 ALKALINE PHOSPHATASE  132 U/L    



 (BEAKER) (test code=346)      

 

 BILIRUBIN TOTAL (BEAKER)  0.5 mg/dL  0.2-1.2  



 (test code=377)      

 

 SODIUM (BEAKER) (test  133 meq/L  136-145  



 fkjp=650)      

 

 POTASSIUM (BEAKER) (test  3.5 meq/L  3.5-5.1  



 code=379)      

 

 CHLORIDE (BEAKER) (test  97 meq/L    



 code=382)      

 

 CO2 (BEAKER) (test  27 meq/L  22-29  



 code=355)      

 

 BLOOD UREA NITROGEN  32 mg/dL  7-21  



 (BEAKER) (test code=354)      

 

 CREATININE (BEAKER) (test  2.26 mg/dL  0.57-1.25  



 code=358)      

 

 GLUCOSE RANDOM (BEAKER)  103 mg/dL    



 (test code=652)      

 

 CALCIUM (BEAKER) (test  7.9 mg/dL  8.4-10.2  



 code=697)      

 

 AST (SGOT) (BEAKER) (test  24 U/L  5-34  



 code=353)      

 

 ALT (SGPT) (BEAKER) (test  18 U/L  6-55  



 code=347)      

 

 EGFR (BEAKER) (test  28 mL/min/1.73 sq m    ESTIMATED GFR IS NOT AS



 code=1092)      ACCURATE AS CREATININE



       CLEARANCE IN PREDICTING



       GLOMERULAR FILTRATION



       RATE. ESTIMATED GFR IS



       NOT APPLICABLE FOR



       DIALYSIS PATIENTS.



[id number] 04:54:00





 Test Item  Value  Reference Range  Comments

 

 PHOSPHORUS (BEAKER) (test code=604)  2.8 mg/dL  2.3-4.7  



[id number] 04:54:00





 Test Item  Value  Reference Range  Comments

 

 MAGNESIUM (BEAKER) (test code=627)  1.6 mg/dL  1.6-2.6  



CBC W/PLT COUNT &amp; AUTO [id number] 04:30:00





 Test Item  Value  Reference Range  Comments

 

 WHITE BLOOD CELL COUNT (BEAKER) (test code=775)  13.5 K/ L  3.5-10.5  

 

 RED BLOOD CELL COUNT (BEAKER) (test code=761)  3.33 M/ L  4.63-6.08  

 

 HEMOGLOBIN (BEAKER) (test code=410)  9.6 GM/DL  13.7-17.5  

 

 HEMATOCRIT (BEAKER) (test code=411)  29.5 %  40.1-51.0  

 

 MEAN CORPUSCULAR VOLUME (BEAKER) (test code=753)  88.6 fL  79.0-92.2  

 

 MEAN CORPUSCULAR HEMOGLOBIN (BEAKER) (test  28.8 pg  25.7-32.2  



 mwrn=388)      

 

 MEAN CORPUSCULAR HEMOGLOBIN CONC (BEAKER) (test  32.5 GM/DL  32.3-36.5  



 code=752)      

 

 RED CELL DISTRIBUTION WIDTH (BEAKER) (test  14.0 %  11.6-14.4  



 code=412)      

 

 PLATELET COUNT (BEAKER) (test code=756)  277 K/CU MM  150-450  

 

 MEAN PLATELET VOLUME (BEAKER) (test code=754)  9.2 fL  9.4-12.4  

 

 NUCLEATED RED BLOOD CELLS (BEAKER) (test  0 /100 WBC  0-0  



 code=413)      

 

 NEUTROPHILS RELATIVE PERCENT (BEAKER) (test  74 %    



 code=429)      

 

 LYMPHOCYTES RELATIVE PERCENT (BEAKER) (test  9 %    



 code=430)      

 

 MONOCYTES RELATIVE PERCENT (BEAKER) (test  12 %    



 code=431)      

 

 EOSINOPHILS RELATIVE PERCENT (BEAKER) (test  5 %    



 code=432)      

 

 BASOPHILS RELATIVE PERCENT (BEAKER) (test  0 %    



 code=437)      

 

 NEUTROPHILS ABSOLUTE COUNT (BEAKER) (test  9.96 K/ L  1.78-5.38  



 code=670)      

 

 LYMPHOCYTES ABSOLUTE COUNT (BEAKER) (test  1.25 K/ L  1.32-3.57  



 code=414)      

 

 MONOCYTES ABSOLUTE COUNT (BEAKER) (test  1.58 K/ L  0.30-0.82  



 code=415)      

 

 EOSINOPHILS ABSOLUTE COUNT (BEAKER) (test  0.63 K/ L  0.04-0.54  



 code=416)      

 

 BASOPHILS ABSOLUTE COUNT (BEAKER) (test  0.05 K/ L  0.01-0.08  



 code=417)      

 

 IMMATURE GRANULOCYTES-RELATIVE PERCENT (BEAKER)  1 %  0-1  



 (test code=2801)      



CALCIUM, [id number] 04:27:00





 Test Item  Value  Reference Range  Comments

 

 CALCIUM IONIZED (BEAKER) (test code=698)  0.94 mmol/L  1.12-1.27  

 

 PH, BLOOD (BEAKER) (test code=1810)  7.41    



POCT-GLUCOSE [id number] 21:41:00





 Test Item  Value  Reference Range  Comments

 

 POC-GLUCOSE METER (BEAKER)  225 mg/dL    TESTED AT [facility] [address] [facility]



 (test elrq=7313)      [facility] [address]



(CELLAVISION MANUAL DIFF)2018 16:57:00





 Test Item  Value  Reference Range  Comments

 

 NEUTROPHILS - REL (CELLAVISION)(BEAKER) (test  83 %    



 code=2816)      

 

 LYMPHOCYTES - REL (CELLAVISION)(BEAKER) (test  7 %    



 code=2817)      

 

 MONOCYTES - REL (CELLAVISION)(BEAKER) (test  8 %    



 code=2818)      

 

 EOSINOPHILS - REL (CELLAVISION)(BEAKER) (test  2 %    



 code=2819)      

 

 NEUTROPHILS - ABS (CELLAVISION)(BEAKER) (test  8.38 K/ul  1.78-5.38  



 code=2830)      

 

 LYMPHOCYTES - ABS (CELLAVISION)(BEAKER) (test  0.71 K/ul  1.32-3.57  



 code=2831)      

 

 MONOCYTES - ABS (CELLAVISION)(BEAKER) (test  0.81 K/uL  0.30-0.82  



 code=2832)      

 

 EOSINOPHILS - ABS (CELLAVISION)(BEAKER) (test  0.20 K/uL  0.04-0.54  



 code=2834)      

 

 TOTAL COUNTED (BEAKER) (test code=1351)  100    

 

 MANUAL NRBC  CELLS (BEAKER) (test  1 /100 WBC  0-0  



 code=1353)      

 

 SMUDGE CELLS (BEAKER) (test code=1371)  Present    

 

 GIANT PLATELETS (BEAKER) (test code=313)  Present    

 

 HYPOCHROMIA (BEAKER) (test code=963)  1+ few    

 

 ANISOCYTOSIS (BEAKER) (test code=961)  1+ few    

 

 POIKILOCYTES (BEAKER) (test code=966)  1+ few    

 

 PLATELET CONCENTRATION (CELLAVISION)(BEAKER)  Adequate    



 (test code=3438)      



Received comment: User comments: Slide comments:POCT-GLUCOSE [id number] 16:
37:00





 Test Item  Value  Reference Range  Comments

 

 POC-GLUCOSE METER (BEAKER)  190 mg/dL    TESTED AT [facility] [address] [facility]



 (test msfa=1117)      [facility] [address]



PET, CARDIAC PERFUSION MULTIPLE STUDIES, REST AND [id number] 14:59:
00Reason for exam:-&gt;chest painFINAL REPORT PATIENT ID:   [id number] PROCEDURE:
      Rest/Stress MYOCARDIAL PERFUSION PET with regadenoson\XA9\ CPT CODE:     
     76739 INDICATION:      Chest pain, risk factors for CAD HISTORY:  Cardiac 
risk factors: Diabetes, hypertension, tobacco use. Other cardiovascular history
: No reported CAD. Recent cardiac symptoms: Chest pain, dyspnea. Current 
cardiovascular-related medications: Metoprolol. PROTOCOL:      Limited low-dose 
CT imaging was performed for attenuation correction. 40.0 mCi of Rb-82 chloride 
was injected iv at rest, and gated PET (positron emission tomography) images 
wereobtained. Subsequently, 40.1 mCi of Rb-82 chloride was injected iv at 
expected peak pharmacologic effect, and gated PET images were obtained.  
PRELIMINARY STRESS TEST DATA FROM NONINVASIVE CARDIOLOGY:Pharmacologic stress 
was by 10-second iv infusion of 0.4 mg of regadenoson. Radiotracer was 
rksdezdf01 seconds after start of stress. Heart rate was 70 beats/min at rest 
and 85 beats/min (61% of MPHR)at tracer injection. BP was 90/58 mmHg at rest 
and 105/52 mmHg at tracer injection. Stress was stopped for predetermined 
endpoint. The patient experienced abdominal discomfort; treatment was not 
required. Preliminary ECG evaluation revealed normal sinus rhythm, nonspecific 
interventricular conduction delay at rest and no ischemic changes with stress. (
Final ECG interpretation and other stress and monitoring data are reported 
separately by Cardiology.)  IMAGING FINDINGS:        Study quality is 
good.Images obtained after stress injection show decreased tracer uptake in the 
apex. Resting images showmostly improved tracer uptake in the apex. LV volume 
appears normal. RV volume appears normal. Gatedimages obtained immediately 
after stress show normal LV wall motion. Gated images obtained at rest show 
normal LV wall motion. LVEF at rest is 58%. LVEF at stress is 64%. IMPRESSION: 
  1. Abnormal study.  2. Appropriate pharmacologic stress.  3. Abnormal 
myocardial perfusion.  There is a mild severity, medium size, mildly reversible
, perfusion defect in all the apical segments of the LV.  4. Normal resting LV 
function. No deterioration of function is noted with pharmacologic stress.  5. 
Extracardiactracer distribution is normal.  6. No previous [facility] study for 
comparison.   NONINVASIVE RISK STRATIFICATION: The above findings are 
considered intermediate risk (1% to 3% annual mortality rate) based on the 
following criterion: - Mild/moderate resting left ventricular dysfunction (LVEF 
35% to 49%)- Stress-induced moderate perfusion defect without LV dilation or 
increasedlung intake (thallium-201)([facility]. 2012;59(9):857-33.) Signed: Felton Erickson MDRepL[facility] Verified Date/Time:  2018 14:59:03 Reading Location: [address] Reading Room    Electronically signed by: FELTON ERICKSON MD on 2018 02:59 PMPOCT-GLUCOSE [id number] 12:45:00





 Test Item  Value  Reference Range  Comments

 

 POC-GLUCOSE METER ([employer])  138 mg/dL    TESTED AT [facility] [address] [place] [address]



 (test syzf=2150)      [facility] [address]



LACTIC ACID, ARTERIAL, WHOLE [id number] 10:51:00





 Test Item  Value  Reference Range  Comments

 

 LACTATE BLOOD ARTERIAL (2) ([employer]) (test  1.1 mmol/L  0.5-2.2  



 code=2874)      



Effective 2016: Units/Reference Range ChangeNew: 0.5-2.2 mmol/L  Previous: 5
-20 mg/dLANTI-NUCLEAR ANTIBODY (MARSHA)2018 10:51:00





 Test Item  Value  Reference Range  Comments

 

 ANTI-NUCLEAR ANTIBODY (MARSHA) ([employer]) (test  Negative  Negative  



 code=418)      



VITAMIN D, 44-[id number] 06:31:00





 Test Item  Value  Reference Range  Comments

 

 VITAMIN D 25-OH (BEAKER) (test code=2764)  14.3 ng/mL  6.6-49.9  



Effective 10/11/2017: Reference Range ChangeNew: 6.6-49.9 ng/mL   Previous: 13.0
-47.8 ng/mLRecommended Vitamin D Target Range: 30.0-40.0 ng/mLTSH/FREE T4 IF 
[id number] 06:31:00





 Test Item  Value  Reference Range  Comments

 

 THYROID STIMULATING HORMONE (BEAKER) (test  1.55 uIU/mL  0.35-4.94  



 code=772)      



POCT-GLUCOSE [id number] 06:22:00





 Test Item  Value  Reference Range  Comments

 

 POC-GLUCOSE METER (BEAKER)  132 mg/dL    TESTED AT [facility] [address] [facility]



 (test kddm=4037)      [facility] [address]



HEPATITIS PANEL, [id number] 06:07:00





 Test Item  Value  Reference Range  Comments

 

 HEPATITIS A IGM ANTIBODY (BEAKER) (test  Nonreactive  Nonreactive  



 code=498)      

 

 HEPATITIS B CORE IGM ANTIBODY (BEAKER) (test  Nonreactive  Nonreactive  



 code=645)      

 

 HEPATITIS C ANTIBODY (BEAKER) (test code=367)  Nonreactive  Nonreactive  

 

 HEPATITIS B SURFACE ANTIGEN (2) (BEAKER) (test  Nonreactive  Nonreactive  



 code=2585)      



COMPLEMENT COMPONENT [id number] 05:52:00





 Test Item  Value  Reference Range  Comments

 

 C4 COMPLEMENT (BEAKER) (test code=394)  23 mg/dL  15-57  



COMPLEMENT COMPONENT [id number] 05:52:00





 Test Item  Value  Reference Range  Comments

 

 C3 COMPLEMENT (BEAKER) (test code=393)  82 mg/dL    



PTH, [id number] 05:43:00





 Test Item  Value  Reference Range  Comments

 

 PARATHYROID HORMONE INTACT (BEAKER) (test  247.5 pg/mL  8.5-72.5  



 code=577)      



URIC [id number] 05:39:00





 Test Item  Value  Reference Range  Comments

 

 URIC ACID (BEAKER) (test code=773)  3.9 mg/dL  2.6-7.2  



[id number] 05:39:00





 Test Item  Value  Reference Range  Comments

 

 MAGNESIUM (BEAKER) (test code=627)  1.9 mg/dL  1.6-2.6  



[id number] 05:39:00





 Test Item  Value  Reference Range  Comments

 

 PHOSPHORUS (BEAKER) (test code=604)  2.8 mg/dL  2.3-4.7  



COMPREHENSIVE METABOLIC [id number] 05:39:00





 Test Item  Value  Reference Range  Comments

 

 TOTAL PROTEIN (BEAKER)  5.6 gm/dL  6.0-8.3  



 (test code=770)      

 

 ALBUMIN (BEAKER) (test  2.8 g/dL  3.5-5.0  



 code=1145)      

 

 ALKALINE PHOSPHATASE  112 U/L    



 (BEAKER) (test code=346)      

 

 BILIRUBIN TOTAL (BEAKER)  0.5 mg/dL  0.2-1.2  



 (test code=377)      

 

 SODIUM (BEAKER) (test  137 meq/L  136-145  



 mixe=845)      

 

 POTASSIUM (BEAKER) (test  3.4 meq/L  3.5-5.1  



 code=379)      

 

 CHLORIDE (BEAKER) (test  99 meq/L    



 code=382)      

 

 CO2 (BEAKER) (test  29 meq/L  22-29  



 code=355)      

 

 BLOOD UREA NITROGEN  31 mg/dL  7-21  



 (BEAKER) (test code=354)      

 

 CREATININE (BEAKER) (test  1.99 mg/dL  0.57-1.25  



 code=358)      

 

 GLUCOSE RANDOM (BEAKER)  111 mg/dL    



 (test code=652)      

 

 CALCIUM (BEAKER) (test  8.3 mg/dL  8.4-10.2  



 code=697)      

 

 AST (SGOT) (BEAKER) (test  22 U/L  5-34  



 code=353)      

 

 ALT (SGPT) (BEAKER) (test  17 U/L  6-55  



 code=347)      

 

 EGFR (BEAKER) (test  32 mL/min/1.73 sq m    ESTIMATED GFR IS NOT AS



 code=1092)      ACCURATE AS CREATININE



       CLEARANCE IN PREDICTING



       GLOMERULAR FILTRATION



       RATE. ESTIMATED GFR IS



       NOT APPLICABLE FOR



       DIALYSIS PATIENTS.



CREATINE KINASE (CK)2018 05:39:00





 Test Item  Value  Reference Range  Comments

 

 CREATINE KINASE TOTAL (BEAKER) (test code=380)  97 U/L    



CBC W/PLT COUNT &amp; AUTO [id number] 05:07:00





 Test Item  Value  Reference Range  Comments

 

 WHITE BLOOD CELL COUNT (BEAKER) (test code=775)  10.1 K/ L  3.5-10.5  

 

 RED BLOOD CELL COUNT (BEAKER) (test code=761)  3.27 M/ L  4.63-6.08  

 

 HEMOGLOBIN (BEAKER) (test code=410)  9.4 GM/DL  13.7-17.5  

 

 HEMATOCRIT (BEAKER) (test code=411)  28.4 %  40.1-51.0  

 

 MEAN CORPUSCULAR VOLUME (BEAKER) (test code=753)  86.9 fL  79.0-92.2  

 

 MEAN CORPUSCULAR HEMOGLOBIN (BEAKER) (test  28.7 pg  25.7-32.2  



 yxza=957)      

 

 MEAN CORPUSCULAR HEMOGLOBIN CONC (BEAKER) (test  33.1 GM/DL  32.3-36.5  



 code=752)      

 

 RED CELL DISTRIBUTION WIDTH (BEAKER) (test  14.4 %  11.6-14.4  



 code=412)      

 

 PLATELET COUNT (BEAKER) (test code=756)  286 K/CU MM  150-450  

 

 MEAN PLATELET VOLUME (BEAKER) (test code=754)  9.6 fL  9.4-12.4  

 

 NUCLEATED RED BLOOD CELLS (BEAKER) (test  0 /100 WBC  0-0  



 code=413)      



CALCIUM, [id number] 05:06:00





 Test Item  Value  Reference Range  Comments

 

 CALCIUM IONIZED (BEAKER) (test code=698)  1.00 mmol/L  1.12-1.27  

 

 PH, BLOOD (BEAKER) (test code=1810)  7.46    



RHEUMATOID FACTOR [id number] 01:38:00





 Test Item  Value  Reference Range  Comments

 

 RHEUMATOID FACTOR TITER (BEAKER) (test code=2285)  :8    



RHEUMATOID FACTOR AB, REFLEX TO [id number] 01:38:00





 Test Item  Value  Reference Range  Comments

 

 RHEUMATOID FACTOR (BEAKER) (test code=573)  Positive    



POCT-GLUCOSE [id number] 22:04:00





 Test Item  Value  Reference Range  Comments

 

 POC-GLUCOSE METER (BEAKER)  212 mg/dL    TESTED AT [facility] [address] [facility]



 (test vvdn=4085)      [facility] [address]



POCT-GLUCOSE [id number] 18:12:00





 Test Item  Value  Reference Range  Comments

 

 POC-GLUCOSE METER (BEAKER)  155 mg/dL    TESTED AT [facility] [address]



 (test xoey=6891)      [facility] [address]



HIV-1 ANTIGEN WITH HIV-1/2 [id number] 16:21:00





 Test Item  Value  Reference Range  Comments

 

 HIV-1 ANTIGEN WITH HIV 1\T\2 ANTIBODY (2)  Nonreactive  Nonreactive  



 (BEAKER) (test code=2586)      



TROPONIN -38-40 16:04:00





 Test Item  Value  Reference Range  Comments

 

 TROPONIN I (BEAKER) (test code=397)  0.06 ng/mL  0.00-0.03  



Troponin I (TnI) levels must be interpreted in the context of the presenting 
symptoms and the clinical findings. Elevated TnI levels indicate myocardial 
damage, but are not specific for ischemic heart disease. Elevated TnI levels 
are seen in patients with other cardiac conditions (including myocarditis and 
congestive heart failure), and slight TnI elevations occur in patients with 
other conditions, including sepsis, renal failure, acidosis, acute neurological 
disease, and persistent tachyarrhythmia.CREATININE, RANDOM [id number] 16:00
:00





 Test Item  Value  Reference Range  Comments

 

 CREATININE URINE (BEAKER) (test code=375)  < mg/dL    



Reference Range: No NormalsPROTHROMBIN TIME/[id number] 15:58:00





 Test Item  Value  Reference Range  Comments

 

 PROTIME (BEAKER) (test code=759)  16.0 seconds  11.7-14.7  

 

 INR (BEAKER) (test code=370)  1.3  <=5.9  



RECOMMENDED COUMADIN/WARFARIN INR THERAPY RANGESSTANDARD DOSE: 2.0 - 3.0   
Includes: PROPHYLAXIS forvenous thrombosis, systemic embolization; TREATMENT 
for venous thrombosis and/or pulmonary embolus.HIGH RISK: Target INR is 2.5-3.5 
for patients with mechanical heart valves.PROTEIN, RANDOM [id number] 15:56:
00





 Test Item  Value  Reference Range  Comments

 

 PROTEIN, URINE (BEAKER) (test code=1569)  94 mg/dL  0-14  



SODIUM, RANDOM [id number] 15:56:00





 Test Item  Value  Reference Range  Comments

 

 SODIUM URINE (BEAKER) (test code=243)  91 meq/L    



Reference Range: No NormalsURINALYSIS W/ [id number] 15:53:00





 Test Item  Value  Reference Range  Comments

 

 COLOR (BEAKER) (test code=470)  Light Yellow    

 

 CLARITY (BEAKER) (test code=469)  Clear    

 

 SPECIFIC GRAVITY UA (BEAKER) (test code=468)  1.003  1.001-1.035  

 

 PH UA (BEAKER) (test code=467)  8.0  5.0-8.0  

 

 PROTEIN UA (BEAKER) (test code=464)  100 mg/dL  Negative  

 

 GLUCOSE UA (BEAKER) (test code=365)  30 mg/dL  Negative  

 

 KETONES UA (BEAKER) (test code=371)  Negative  Negative  

 

 BILIRUBIN UA (BEAKER) (test code=462)  Negative  Negative  

 

 BLOOD UA (BEAKER) (test code=461)  Small  Negative  

 

 NITRITE UA (BEAKER) (test code=465)  Negative  Negative  

 

 LEUKOCYTE ESTERASE UA (BEAKER) (test code=466)  Negative  Negative  

 

 UROBILINOGEN UA (BEAKER) (test code=463)  0.2 mg/dL  0.2-1.0  

 

 RBC UA (BEAKER) (test code=519)  1 /HPF    

 

 WBC UA (BEAKER) (test code=520)  4 /HPF    

 

 BACTERIA (BEAKER) (test ovgg=423)  Rare    

 

 MUCUS (BEAKER) (test code=1574)  Rare    

 

 SQUAMOUS EPITHELIAL (BEAKER) (test code=516)  < /HPF    

 

 SOURCE(BEAKER) (test code=2795)  Urine, [place]    



BASIC METABOLIC [id number] 15:52:00





 Test Item  Value  Reference Range  Comments

 

 SODIUM (BEAKER) (test  137 meq/L  136-145  



 hmex=026)      

 

 POTASSIUM (BEAKER) (test  3.3 meq/L  3.5-5.1  



 code=379)      

 

 CHLORIDE (BEAKER) (test  100 meq/L    



 code=382)      

 

 CO2 (BEAKER) (test  26 meq/L  22-29  



 code=355)      

 

 BLOOD UREA NITROGEN  23 mg/dL  7-21  



 (BEAKER) (test code=354)      

 

 CREATININE (BEAKER) (test  1.45 mg/dL  0.57-1.25  



 code=358)      

 

 GLUCOSE RANDOM (BEAKER)  120 mg/dL    



 (test code=652)      

 

 CALCIUM (BEAKER) (test  8.1 mg/dL  8.4-10.2  



 code=697)      

 

 EGFR (BEAKER) (test  47 mL/min/1.73 sq m    ESTIMATED GFR IS NOT AS



 code=1092)      ACCURATE AS CREATININE



       CLEARANCE IN PREDICTING



       GLOMERULAR FILTRATION



       RATE. ESTIMATED GFR IS



       NOT APPLICABLE FOR



       DIALYSIS PATIENTS.



POCT-GLUCOSE [id number] 12:09:00





 Test Item  Value  Reference Range  Comments

 

 POC-GLUCOSE METER (BEAKER)  87 mg/dL    TESTED AT [address]



 (test eysp=7277)      [place] [id number]



POCT-GLUCOSE [id number] 11:21:00





 Test Item  Value  Reference Range  Comments

 

 POC-GLUCOSE METER (BEAKER)  90 mg/dL    TESTED AT [address]



 (test fjxs=8150)      [place] [id number]



POCT-GLUCOSE [id number] 11:12:00





 Test Item  Value  Reference Range  Comments

 

 POC-GLUCOSE METER (BEAKER)  111 mg/dL    TESTED AT [address]



 (test opif=1502)      [place] [address]



HEMOGLOBIN [id number] 09:59:00





 Test Item  Value  Reference Range  Comments

 

 HEMOGLOBIN A1C (BEAKER) (test code=368)  6.1 %  4.3-6.1  



CREATINE KINASE (CK), TOTAL AND [id number] 08:02:00





 Test Item  Value  Reference Range  Comments

 

 CREATINE KINASE TOTAL (BEAKER) (test code=380)  141 U/L    

 

 CREATINE KINASE-MB (BEAKER) (test code=750)  7.9 ng/mL  0.0-6.6  

 

 CREATINE KINASE-MB INDEX (BEAKER) (test code=395)  5.6 %    



CK-MB Reference Range:&lt;6.7      Normal6.7-10.0  Borderline&gt;10.0     
AbnormalTROPONIN -14-81 08:02:00





 Test Item  Value  Reference Range  Comments

 

 TROPONIN I (BEAKER) (test code=397)  0.05 ng/mL  0.00-0.03  








 Test Item  Value  Reference Range  Comments

 

 POTASSIUM (BEAKER) (test code=379)  4.6 meq/L  3.5-5.1  



CALCIUM, [id number] 07:49:00





 Test Item  Value  Reference Range  Comments

 

 CALCIUM IONIZED (BEAKER) (test code=698)  0.99 mmol/L  1.12-1.27  

 

 PH, BLOOD (BEAKER) (test code=1810)  7.45    



LACTIC ACID, VENOUS, WHOLE [id number] 06:58:00





 Test Item  Value  Reference Range  Comments

 

 LACTATE BLOOD VENOUS (2)  3.8 mmol/L  0.5-2.2  Specimen slightly hemolyzed



 (BEAKER) (test code=2872)      



Effective 2016: Units/Reference Range ChangeNew: 0.5-2.2 mmol/L  Previous: 5
-20 mg/dLCOMPREHENSIVE METABOLIC [id number] 04:38:00





 Test Item  Value  Reference Range  Comments

 

 TOTAL PROTEIN (BEAKER)  5.7 gm/dL  6.0-8.3  



 (test code=770)      

 

 ALBUMIN (BEAKER) (test  2.9 g/dL  3.5-5.0  



 code=1145)      

 

 ALKALINE PHOSPHATASE  89 U/L    



 (BEAKER) (test code=346)      

 

 BILIRUBIN TOTAL (BEAKER)  0.4 mg/dL  0.2-1.2  



 (test code=377)      

 

 SODIUM (BEAKER) (test  135 meq/L  136-145  



 dkyp=281)      

 

 POTASSIUM (BEAKER) (test  4.6 meq/L  3.5-5.1  



 code=379)      

 

 CHLORIDE (BEAKER) (test  98 meq/L    



 code=382)      

 

 CO2 (BEAKER) (test  20 meq/L  22-29  



 code=355)      

 

 BLOOD UREA NITROGEN  59 mg/dL  7-21  



 (BEAKER) (test code=354)      

 

 CREATININE (BEAKER) (test  2.81 mg/dL  0.57-1.25  



 code=358)      

 

 GLUCOSE RANDOM (BEAKER)  105 mg/dL    



 (test code=652)      

 

 CALCIUM (BEAKER) (test  8.6 mg/dL  8.4-10.2  



 code=697)      

 

 AST (SGOT) (BEAKER) (test  20 U/L  5-34  



 code=353)      

 

 ALT (SGPT) (BEAKER) (test  19 U/L  6-55  



 code=347)      

 

 EGFR (BEAKER) (test  22 mL/min/1.73 sq m    ESTIMATED GFR IS NOT AS



 code=1092)      ACCURATE AS CREATININE



       CLEARANCE IN PREDICTING



       GLOMERULAR FILTRATION



       RATE. ESTIMATED GFR IS



       NOT APPLICABLE FOR



       DIALYSIS PATIENTS.



B-TYPE NATRIURETIC FACTOR (BNP)2018 04:37:00





 Test Item  Value  Reference Range  Comments

 

 B-TYPE NATRIURETIC PEPTIDE (BEAKER) (test  561 pg/mL  0-100  



 code=700)      



[id number] 04:37:00





 Test Item  Value  Reference Range  Comments

 

 PHOSPHORUS (BEAKER) (test code=604)  6.1 mg/dL  2.3-4.7  



[id number] 04:37:00





 Test Item  Value  Reference Range  Comments

 

 MAGNESIUM (BEAKER) (test code=627)  1.8 mg/dL  1.6-2.6  



CBC W/PLT COUNT &amp; AUTO [id number] 04:18:00





 Test Item  Value  Reference Range  Comments

 

 WHITE BLOOD CELL COUNT (BEAKER) (test code=775)  8.2 K/ L  3.5-10.5  

 

 RED BLOOD CELL COUNT (BEAKER) (test code=761)  3.15 M/ L  4.63-6.08  

 

 HEMOGLOBIN (BEAKER) (test code=410)  9.0 GM/DL  13.7-17.5  

 

 HEMATOCRIT (BEAKER) (test code=411)  27.2 %  40.1-51.0  

 

 MEAN CORPUSCULAR VOLUME (BEAKER) (test code=753)  86.3 fL  79.0-92.2  

 

 MEAN CORPUSCULAR HEMOGLOBIN (BEAKER) (test  28.6 pg  25.7-32.2  



 lnlj=321)      

 

 MEAN CORPUSCULAR HEMOGLOBIN CONC (BEAKER) (test  33.1 GM/DL  32.3-36.5  



 code=752)      

 

 RED CELL DISTRIBUTION WIDTH (BEAKER) (test  14.5 %  11.6-14.4  



 code=412)      

 

 PLATELET COUNT (BEAKER) (test code=756)  319 K/CU MM  150-450  

 

 MEAN PLATELET VOLUME (BEAKER) (test code=754)  9.4 fL  9.4-12.4  

 

 NUCLEATED RED BLOOD CELLS (BEAKER) (test  0 /100 WBC  0-0  



 code=413)      

 

 NEUTROPHILS RELATIVE PERCENT (BEAKER) (test  69 %    



 code=429)      

 

 LYMPHOCYTES RELATIVE PERCENT (BEAKER) (test  10 %    



 code=430)      

 

 MONOCYTES RELATIVE PERCENT (BEAKER) (test  19 %    



 code=431)      

 

 EOSINOPHILS RELATIVE PERCENT (BEAKER) (test  2 %    



 code=432)      

 

 BASOPHILS RELATIVE PERCENT (BEAKER) (test  0 %    



 code=437)      

 

 NEUTROPHILS ABSOLUTE COUNT (BEAKER) (test  5.68 K/ L  1.78-5.38  



 code=670)      

 

 LYMPHOCYTES ABSOLUTE COUNT (BEAKER) (test  0.81 K/ L  1.32-3.57  



 code=414)      

 

 MONOCYTES ABSOLUTE COUNT (BEAKER) (test  1.55 K/ L  0.30-0.82  



 code=415)      

 

 EOSINOPHILS ABSOLUTE COUNT (BEAKER) (test  0.14 K/ L  0.04-0.54  



 code=416)      

 

 BASOPHILS ABSOLUTE COUNT (BEAKER) (test  0.02 K/ L  0.01-0.08  



 code=417)      

 

 IMMATURE GRANULOCYTES-RELATIVE PERCENT (BEAKER)  1 %  0-1  



 (test code=2801)      



[id number] 02:41:00





 Test Item  Value  Reference Range  Comments

 

 POTASSIUM (BEAKER) (test code=379)  4.7 meq/L  3.5-5.1  



LACTIC ACID, VENOUS, WHOLE [id number] 00:45:00





 Test Item  Value  Reference Range  Comments

 

 LACTATE BLOOD VENOUS (2)  7.6 mmol/L  0.5-2.2  Specimen slightly hemolyzed



 (BEAKER) (test code=2872)      



Effective 2016: Units/Reference Range ChangeNew: 0.5-2.2 mmol/L  Previous: 5
-20 mg/dLPOCT-GLUCOSE [id number] 00:27:00





 Test Item  Value  Reference Range  Comments

 

 POC-GLUCOSE METER (BEAKER)  159 mg/dL    TESTED AT [facility] [address] [facility]



 (test vlta=8144)      [facility] [address]



CREATINE KINASE (CK), TOTAL AND [id number] 22:57:00





 Test Item  Value  Reference Range  Comments

 

 CREATINE KINASE TOTAL (BEAKER) (test code=380)  175 U/L    

 

 CREATINE KINASE-MB (BEAKER) (test code=750)  12.3 ng/mL  0.0-6.6  

 

 CREATINE KINASE-MB INDEX (BEAKER) (test code=395)  7.0 %    



CK-MB Reference Range:&lt;6.7      Normal6.7-10.0  Borderline&gt;10.0     
AbnormalTROPONIN -68-45 22:57:00





 Test Item  Value  Reference Range  Comments

 

 TROPONIN I (BEAKER) (test code=397)  0.04 ng/mL  0.00-0.03  








 Test Item  Value  Reference Range  Comments

 

 POTASSIUM (BEAKER) (test code=379)  4.7 meq/L  3.5-5.1  



POCT-GLUCOSE [id number] 19:28:00





 Test Item  Value  Reference Range  Comments

 

 POC-GLUCOSE METER (BEAKER)  117 mg/dL    TESTED AT [facility] [address] [facility]



 (test pacw=9283)      [facility] [address]



BASIC METABOLIC [id number] 19:07:00





 Test Item  Value  Reference Range  Comments

 

 SODIUM (BEAKER) (test  133 meq/L  136-145  



 qcez=494)      

 

 POTASSIUM (BEAKER) (test  4.6 meq/L  3.5-5.1  



 code=379)      

 

 CHLORIDE (BEAKER) (test  95 meq/L    



 code=382)      

 

 CO2 (BEAKER) (test  19 meq/L  22-29  



 code=355)      

 

 BLOOD UREA NITROGEN  57 mg/dL  7-21  



 (BEAKER) (test code=354)      

 

 CREATININE (BEAKER) (test  2.64 mg/dL  0.57-1.25  



 code=358)      

 

 GLUCOSE RANDOM (BEAKER)  131 mg/dL    



 (test code=652)      

 

 CALCIUM (BEAKER) (test  9.1 mg/dL  8.4-10.2  



 code=697)      

 

 EGFR (BEAKER) (test  23 mL/min/1.73 sq m    ESTIMATED GFR IS NOT AS



 code=1092)      ACCURATE AS CREATININE



       CLEARANCE IN PREDICTING



       GLOMERULAR FILTRATION



       RATE. ESTIMATED GFR IS



       NOT APPLICABLE FOR



       DIALYSIS PATIENTS.



Call [phone] ACID, VENOUS, WHOLE [id number] 19:00:00





 Test Item  Value  Reference Range  Comments

 

 LACTATE BLOOD VENOUS (2) (BEAKER) (test  5.8 mmol/L  0.5-2.2  



 code=2872)      



Effective 2016: Units/Reference Range ChangeNew: 0.5-2.2 mmol/L  Previous: 5
-20 mg/dLU/S, RENAL, [id number] 16:16:00Reason for exam:-&gt;akiShould 
this be performed at the bedside?-&gt;YesFINAL REPORT PATIENT ID:   [id number] 
Ultrasound of the Kidneys Clinical History:  marianne Discussion: Sonographic 
evaluation of the kidneys is performed. Right kidney:  10 x 4.7 x 4 cm, with 
cortical thickness of 1.1 cm.  Normal cortical echogenicity.  No mass.  No 
shadowing calculus. No hydronephrosis. Left kidney: 10.2 x 4.5 x 4.7 cm, with 
cortical thickness of 1.3 cm.  Normal cortical echogenicity.  [place].  No 
shadowing calculus.  No hydronephrosis. Limited doppler evaluation of right 
main renal artery and vein demonstrate patency. Left renal vessels are poorly 
visualized due to patient's body habitus. Bladder:  Decompressed with Grimes 
catheter. Impression: No hydronephrosis. Signed: [place] Verified Date
/Time:  2018 16:16:10 Reading Location: [address] Ultrasound Reading 
Room      Electronically signed by: RANCHO RASHEED M.D. on 2018 04:16 
PMCREATINE KINASE (CK), TOTAL AND [id number] 15:14:00





 Test Item  Value  Reference Range  Comments

 

 CREATINE KINASE TOTAL (BEAKER) (test code=380)  173 U/L    

 

 CREATINE KINASE-MB (BEAKER) (test code=750)  11.1 ng/mL  0.0-6.6  

 

 CREATINE KINASE-MB INDEX (BEAKER) (test code=395)  6.4 %    



CK-MB Reference Range:&lt;6.7      Normal6.7-10.0  Borderline&gt;10.0     
AbnormalTROPONIN -83-16 15:14:00





 Test Item  Value  Reference Range  Comments

 

 TROPONIN I (BEAKER) (test code=397)  0.04 ng/mL  0.00-0.03  



Troponin I (TnI) levels must be interpreted in the context of the presenting 
symptoms and the clinical findings. Elevated TnI levels indicate myocardial 
damage, but are not specific for ischemic heart disease. Elevated TnI levels 
are seen in patients with other cardiac conditions (including myocarditis and 
congestive heart failure), and slight TnI elevations occur in patients with 
other conditions, including sepsis, renal failure, acidosis, acute neurological 
disease, and persistent tachyarrhythmia.BASIC METABOLIC [id number] 15:11:00





 Test Item  Value  Reference Range  Comments

 

 SODIUM (BEAKER) (test  137 meq/L  136-145  



 mgnb=916)      

 

 POTASSIUM (BEAKER) (test  4.4 meq/L  3.5-5.1  



 code=379)      

 

 CHLORIDE (BEAKER) (test  97 meq/L    



 code=382)      

 

 CO2 (BEAKER) (test  17 meq/L  22-29  



 code=355)      

 

 BLOOD UREA NITROGEN  55 mg/dL  7-21  



 (BEAKER) (test code=354)      

 

 CREATININE (BEAKER) (test  2.48 mg/dL  0.57-1.25  



 code=358)      

 

 GLUCOSE RANDOM (BEAKER)  66 mg/dL    



 (test code=652)      

 

 CALCIUM (BEAKER) (test  9.3 mg/dL  8.4-10.2  



 code=697)      

 

 EGFR (BEAKER) (test  25 mL/min/1.73 sq m    ESTIMATED GFR IS NOT AS



 code=1092)      ACCURATE AS CREATININE



       CLEARANCE IN PREDICTING



       GLOMERULAR FILTRATION



       RATE. ESTIMATED GFR IS



       NOT APPLICABLE FOR



       DIALYSIS PATIENTS.



[id number] 15:06:00





 Test Item  Value  Reference Range  Comments

 

 PHOSPHORUS (BEAKER) (test code=604)  5.4 mg/dL  2.3-4.7  



[id number] 15:06:00





 Test Item  Value  Reference Range  Comments

 

 MAGNESIUM (BEAKER) (test code=627)  2.8 mg/dL  1.6-2.6  



URINALYSIS W/ REFLEX URINE [id number] 14:00:00





 Test Item  Value  Reference Range  Comments

 

 COLOR (BEAKER) (test code=470)  Yellow    

 

 CLARITY (BEAKER) (test code=469)  Hazy    

 

 SPECIFIC GRAVITY UA (BEAKER) (test code=468)  1.013  1.001-1.035  

 

 PH UA (BEAKER) (test code=467)  6.5  5.0-8.0  

 

 PROTEIN UA (BEAKER) (test code=464)  600 mg/dL  Negative  

 

 GLUCOSE UA (BEAKER) (test code=365)  200 mg/dL  Negative  

 

 KETONES UA (BEAKER) (test code=371)  Negative  Negative  

 

 BILIRUBIN UA (BEAKER) (test code=462)  Negative  Negative  

 

 BLOOD UA (BEAKER) (test code=461)  Small  Negative  

 

 NITRITE UA (BEAKER) (test code=465)  Negative  Negative  

 

 LEUKOCYTE ESTERASE UA (BEAKER) (test code=466)  Moderate  Negative  

 

 UROBILINOGEN UA (BEAKER) (test code=463)  0.2 mg/dL  0.2-1.0  

 

 RBC UA (BEAKER) (test code=519)  2 /HPF    

 

 WBC UA (BEAKER) (test code=520)  3 /HPF    

 

 SQUAMOUS EPITHELIAL (BEAKER) (test code=516)  < /HPF    

 

 GRANULAR CASTS (BEAKER) (test code=515)  2 /LPF    

 

 SOURCE(BEAKER) (test code=2795)      



CBC W/PLT COUNT &amp; AUTO [id number] 12:50:00





 Test Item  Value  Reference Range  Comments

 

 WHITE BLOOD CELL COUNT (BEAKER) (test code=775)  14.7 K/ L  3.5-10.5  

 

 RED BLOOD CELL COUNT (BEAKER) (test code=761)  3.47 M/ L  4.63-6.08  

 

 HEMOGLOBIN (BEAKER) (test code=410)  10.2 GM/DL  13.7-17.5  

 

 HEMATOCRIT (BEAKER) (test code=411)  32.0 %  40.1-51.0  

 

 MEAN CORPUSCULAR VOLUME (BEAKER) (test code=753)  92.2 fL  79.0-92.2  

 

 MEAN CORPUSCULAR HEMOGLOBIN (BEAKER) (test  29.4 pg  25.7-32.2  



 iojr=844)      

 

 MEAN CORPUSCULAR HEMOGLOBIN CONC (BEAKER) (test  31.9 GM/DL  32.3-36.5  



 code=752)      

 

 RED CELL DISTRIBUTION WIDTH (BEAKER) (test  14.7 %  11.6-14.4  



 code=412)      

 

 PLATELET COUNT (BEAKER) (test code=756)  450 K/CU MM  150-450  

 

 MEAN PLATELET VOLUME (BEAKER) (test code=754)  9.9 fL  9.4-12.4  

 

 NUCLEATED RED BLOOD CELLS (BEAKER) (test  0 /100 WBC  0-0  



 code=413)      



(CELLAVISION MANUAL DIFF)2018 12:50:00





 Test Item  Value  Reference Range  Comments

 

 NEUTROPHILS - REL (CELLAVISION)(BEAKER) (test  78 %    



 code=2816)      

 

 LYMPHOCYTES - REL (CELLAVISION)(BEAKER) (test  8 %    



 code=2817)      

 

 MONOCYTES - REL (CELLAVISION)(BEAKER) (test  10 %    



 code=2818)      

 

 BANDS - REL (CELLAVISION)(BEAKER) (test  1 %  0-10  



 code=2826)      

 

 ATYPICAL LYMPHOCYTES - REL (CELLAVISION)(BEAKER)  3 %  0-0  



 (test code=2829)      

 

 NEUTROPHILS - ABS (CELLAVISION)(BEAKER) (test  11.47 K/ul  1.78-5.38  



 code=2830)      

 

 LYMPHOCYTES - ABS (CELLAVISION)(BEAKER) (test  1.18 K/ul  1.32-3.57  



 code=2831)      

 

 MONOCYTES - ABS (CELLAVISION)(BEAKER) (test  1.47 K/uL  0.30-0.82  



 code=2832)      

 

 BANDS - ABS (CELLAVISION)(BEAKER) (test  0.15 K/uL  0.00-0.80  



 code=2840)      

 

 ATYPICAL LYMPHOCYTES - ABS (CELLAVISION)(BEAKER)  0.44 K/uL  0.00-0.00  



 (test code=2858)      

 

 TOTAL COUNTED (BEAKER) (test code=1351)  100    

 

 PLT MORPHOLOGY (BEAKER) (test code=486)  Normal    

 

 SMUDGE CELLS (BEAKER) (test code=1371)  Present    

 

 POIKILOCYTES (BEAKER) (test code=966)  2+ moderate    

 

 HI CELLS (BEAKER) (test code=474)  2+ moderate    

 

 PLATELET CONCENTRATION (CELLAVISION)(BEAKER)  Adequate    



 (test code=3438)      



Received comment: User comments: Slide comments:HEPATITIS B SURFACE [id number]
[id number] 12:11:00





 Test Item  Value  Reference Range  Comments

 

 HEPATITIS B SURFACE ANTIGEN (2) (BEAKER) (test  Nonreactive  Nonreactive  



 code=9905)      



HEPATITIS B CORE ANTIBODY, [id number] 12:11:00





 Test Item  Value  Reference Range  Comments

 

 HEPATITIS B CORE IGM ANTIBODY (BEAKER) (test  Nonreactive  Nonreactive  



 code=645)      



HEPATITIS C [id number] 12:11:00





 Test Item  Value  Reference Range  Comments

 

 HEPATITIS C ANTIBODY (BEAKER) (test code=367)  Nonreactive  Nonreactive  



HEPATITIS B CORE ANTIBODY, [id number] 12:11:00





 Test Item  Value  Reference Range  Comments

 

 HEPATITIS B CORE TOTAL ANTIBODY (BEAKER) (test  Nonreactive  Nonreactive  



 code=497)      



POCT-GLUCOSE [id number] 12:05:00





 Test Item  Value  Reference Range  Comments

 

 POC-GLUCOSE METER (BEAKER)  179 mg/dL    TESTED AT [facility] [address] [facility]



 (test bgbt=9537)      [facility] [address]



LACTIC ACID, VENOUS, WHOLE [id number] 11:18:00





 Test Item  Value  Reference Range  Comments

 

 LACTATE BLOOD VENOUS (2) (BEAKER) (test  8.0 mmol/L  0.5-2.2  



 code=2872)      



Effective 2016: Units/Reference Range ChangeNew: 0.5-2.2 mmol/L  Previous: 5
-20 mg/[id number] 11:16:00





 Test Item  Value  Reference Range  Comments

 

 POTASSIUM (BEAKER) (test code=379)  5.3 meq/L  3.5-5.1  



BLOOD GAS, [id number] 10:48:00





 Test Item  Value  Reference Range  Comments

 

 PH VENOUS (BEAKER) (test code=701)  7.49  7.32-7.42  

 

 PCO2 VENOUS (BEAKER) (test code=755)  24 mmHg  41-51  

 

 PO2 VENOUS (BEAKER) (test code=702)  41 mmHg  25-40  

 

 O2 SATURATION VENOUS (BEAKER) (test code=703)  86.8 %  40.0-70.0  

 

 HCO3 VENOUS (BEAKER) (test code=705)  18 mmol/L  21-29  

 

 BASE EXCESS VENOUS (BEAKER) (test code=704)  -4.6 mmol/L  -2.0-3.0  

 

 PATIENT TEMPERATURE (BEAKER) (test code=1818)  34.5 C    

 

 FIO2 (BEAKER) (test code=1819)  36.0 %    



[id number] 10:27:00





 Test Item  Value  Reference Range  Comments

 

 SODIUM (BEAKER) (test code=381)  133 meq/L  136-145  

 

 POTASSIUM (BEAKER) (test code=379)  6.8 meq/L  3.5-5.1  

 

 CHLORIDE (BEAKER) (test code=382)  102 meq/L    

 

 CO2 (BEAKER) (test code=355)  8 meq/L  22-29  



Call [id number] FUNCTION [id number] 10:23:00





 Test Item  Value  Reference Range  Comments

 

 TOTAL PROTEIN (BEAKER) (test code=770)  6.3 gm/dL  6.0-8.3  

 

 ALBUMIN (BEAKER) (test code=1145)  3.2 g/dL  3.5-5.0  

 

 BILIRUBIN TOTAL (BEAKER) (test code=377)  0.4 mg/dL  0.2-1.2  

 

 BILIRUBIN DIRECT (BEAKER) (test code=706)  0.2 mg/dL  0.1-0.5  

 

 ALKALINE PHOSPHATASE (BEAKER) (test code=346)  95 U/L    

 

 AST (SGOT) (BEAKER) (test code=353)  20 U/L  5-34  

 

 ALT (SGPT) (BEAKER) (test code=347)  21 U/L  6-55  



Call [id number] 10:14:00





 Test Item  Value  Reference Range  Comments

 

 PHOSPHORUS (BEAKER) (test code=604)  10.6 mg/dL  2.3-4.7  



[id number] 10:12:00





 Test Item  Value  Reference Range  Comments

 

 MAGNESIUM (BEAKER) (test code=627)  2.2 mg/dL  1.6-2.6  



BLOOD GAS, [id number] 09:10:00





 Test Item  Value  Reference Range  Comments

 

 PH VENOUS (BEAKER) (test code=701)  7.24  7.32-7.42  

 

 PCO2 VENOUS (BEAKER) (test code=755)  24 mmHg  41-51  

 

 PO2 VENOUS (BEAKER) (test code=702)  54 mmHg  25-40  

 

 O2 SATURATION VENOUS (BEAKER) (test code=703)  86.6 %  40.0-70.0  

 

 HCO3 VENOUS (BEAKER) (test code=705)  10 mmol/L  21-29  

 

 BASE EXCESS VENOUS (BEAKER) (test code=704)  -16.0 mmol/L  -2.0-3.0  

 

 PATIENT TEMPERATURE (BEAKER) (test code=1818)  35.6 C    

 

 FIO2 (BEAKER) (test code=1819)  36.0 %    



RAD, CHEST, 1 VIEW, NON [id number] 08:59:00Reason for exam:-&gt;edemaShould 
this be performed at the bedside?-&gt;YesFINAL REPORT PATIENT ID:   [id number] 
Chest one view Discussion: Semiconfluent bilateral pulmonary opacities 
presumably representing edema. There is some suspected scattered nodularity 
that probably represents granulomas. No gross effusion or pneumothorax. Right 
IJ line in place. Heart size normal. Signed: Chente Delarosa Verified Date
/Time:  2018 08:59:05 Reading Location: [facility] Radiology Reading 
Room      Electronically signed by: CHENTE DELAROSA M.D. on 2018 08:59 
[id number] 07:53:00





 Test Item  Value  Reference Range  Comments

 

 PHOSPHORUS (BEAKER) (test code=604)  10.8 mg/dL  2.3-4.7  



CALCIUM, [id number] 07:12:00





 Test Item  Value  Reference Range  Comments

 

 CALCIUM IONIZED (BEAKER) (test code=698)  1.14 mmol/L  1.12-1.27  

 

 PH, BLOOD (BEAKER) (test code=1810)  7.11    



BASIC METABOLIC [id number] 07:04:00





 Test Item  Value  Reference Range  Comments

 

 SODIUM (BEAKER) (test  129 meq/L  136-145  



 swmx=832)      

 

 POTASSIUM (BEAKER) (test  7.3 meq/L  3.5-5.1  



 code=379)      

 

 CHLORIDE (BEAKER) (test  102 meq/L    



 code=382)      

 

 CO2 (BEAKER) (test  6 meq/L  22-29  



 code=355)      

 

 BLOOD UREA NITROGEN  123 mg/dL  7-21  



 (BEAKER) (test code=354)      

 

 CREATININE (BEAKER) (test  4.47 mg/dL  0.57-1.25  



 code=358)      

 

 GLUCOSE RANDOM (BEAKER)  222 mg/dL    



 (test code=652)      

 

 CALCIUM (BEAKER) (test  9.5 mg/dL  8.4-10.2  



 code=697)      

 

 EGFR (BEAKER) (test  13 mL/min/1.73 sq m    ESTIMATED GFR IS NOT AS



 code=1092)      ACCURATE AS CREATININE



       CLEARANCE IN PREDICTING



       GLOMERULAR FILTRATION



       RATE. ESTIMATED GFR IS



       NOT APPLICABLE FOR



       DIALYSIS PATIENTS.



POCT-GLUCOSE [id number] 06:57:00





 Test Item  Value  Reference Range  Comments

 

 POC-GLUCOSE METER (BEAKER)  203 mg/dL    TESTED AT [facility] [address] [facility]



 (test atos=6037)      [facility] [address]



CREATINE KINASE (CK), TOTAL AND [id number] 06:54:00





 Test Item  Value  Reference Range  Comments

 

 CREATINE KINASE TOTAL (BEAKER) (test code=380)  94 U/L    

 

 CREATINE KINASE-MB (BEAKER) (test code=750)  7.4 ng/mL  0.0-6.6  

 

 CREATINE KINASE-MB INDEX (BEAKER) (test code=395)  7.9 %    



CK-MB Reference Range:&lt;6.7      Normal6.7-10.0  Borderline&gt;10.0     
AbnormalTROPONIN -59-65 06:54:00





 Test Item  Value  Reference Range  Comments

 

 TROPONIN I (BEAKER) (test code=397)  0.02 ng/mL  0.00-0.03  








 Test Item  Value  Reference Range  Comments

 

 MAGNESIUM (BEAKER) (test code=627)  2.3 mg/dL  1.6-2.6  



B-TYPE NATRIURETIC FACTOR (BNP)2018 06:38:00





 Test Item  Value  Reference Range  Comments

 

 B-TYPE NATRIURETIC PEPTIDE (BEAKER) (test  866 pg/mL  0-100  



 code=700)      



PROTHROMBIN TIME/[id number] 06:36:00





 Test Item  Value  Reference Range  Comments

 

 PROTIME (BEAKER) (test code=759)  16.1 seconds  11.7-14.7  

 

 INR (BEAKER) (test code=370)  1.3  <=5.9  



RECOMMENDED COUMADIN/WARFARIN INR THERAPY RANGESSTANDARD DOSE: 2.0 - 3.0   
Includes: PROPHYLAXIS forvenous thrombosis, systemic embolization; TREATMENT 
for venous thrombosis and/or pulmonary embolus.HIGH RISK: Target INR is 2.5-3.5 
for patients with mechanical heart valves.LACTIC ACID, VENOUS, WHOLE [id number] 06:15:00





 Test Item  Value  Reference Range  Comments

 

 LACTATE BLOOD VENOUS (2) (BEAKER) (test  8.9 mmol/L  0.5-2.2  



 code=2872)      



Effective 2016: Units/Reference Range ChangeNew: 0.5-2.2 mmol/L  Previous: 5
-20 mg/dL

## 2018-07-28 NOTE — EDPHYS
Physician Documentation                                                                           

 [facility]                                                                

Name: Pinky Avitia                                                                                 

Age: 83 yrs                                                                                       

Sex: Male                                                                                         

: 1935                                                                                   

MRN: [id number]                                                                                   

Arrival Date: 2018                                                                          

Time: 12:54                                                                                       

Account#: [id number]                                                                            

Bed 3                                                                                             

Private MD:                                                                                       

ED Physician Jacky Queen                                                                       

HPI:                                                                                              

                                                                                             

13:54 This 83 yrs old  Male presents to ER via EMS with complaints of poor urine      snw 

      output.                                                                                     

13:54 Spouse called EMS 2nd to poor urine output and hx of hyperkalemia. Onset: The           snw 

      symptoms/episode began/occurred gradually. Severity of symptoms: At their worst the         

      symptoms were moderate severe. The patient has experienced similar episodes in the          

      past. as noted.                                                                             

                                                                                                  

Historical:                                                                                       

- Allergies:                                                                                      

13:53 adhesive tape-silicones;                                                                ss  

- Home Meds:                                                                                      

19:46 amlodipine 10 mg tab 1 tab once daily [Active]; gabapentin 300 mg Oral cap 1 cap at     ph  

      bedtime [Active]; Lasix 40 mg Oral tab once daily [Active]; Lipitor Oral [Active];          

      metformin 1,000 mg Oral tab 2 times per day [Active]; metoprolol tartrate 25 mg Oral        

      tab 0.5 tab 2 times per day [Active]; multivitamin Oral daily [Active]; Vitamin D Oral      

      400 unit daily [Active];                                                                    

- PMHx:                                                                                           

13:53 Diabetes - NIDDM; Hyperlipidemia; Hypertension; muscle atrophy; paralysis; RENAL        ss  

      FAILURE;                                                                                    

                                                                                                  

- Immunization history:: Adult Immunizations unknown.                                             

- Social history:: Smoking status: unknown.                                                       

- Ebola Screening: : No symptoms or risks identified at this time.                                

                                                                                                  

                                                                                                  

ROS:                                                                                              

13:54 Eyes: Negative for injury, pain, redness, and discharge, ENT: Negative for injury,      snw 

      pain, and discharge, Neck: Negative for injury, pain, and swelling, Cardiovascular:         

      Negative for chest pain, palpitations, and edema, Respiratory: Negative for shortness       

      of breath, cough, wheezing, and pleuritic chest pain, Abdomen/GI: Negative for              

      abdominal pain, nausea, vomiting, diarrhea, and constipation, Back: Negative for injury     

      and pain, MS/Extremity: Negative for injury and deformity, Skin: Negative for injury,       

      rash, and discoloration, Neuro: Negative for headache, weakness, numbness, tingling,        

      and seizure.                                                                                

13:54 Constitutional: Positive for malaise.                                                       

13:54 : Positive for poor urine output.                                                         

                                                                                                  

Exam:                                                                                             

13:48 Head/Face:  Normocephalic, atraumatic. Eyes:  Pupils equal round and reactive to light, snw 

      extra-ocular motions intact.  Lids and lashes normal.  Conjunctiva and sclera are           

      non-icteric and not injected.  Cornea within normal limits.  Periorbital areas with no      

      swelling, redness, or edema. ENT:  Nares patent. No nasal discharge, no septal              

      abnormalities noted.  Tympanic membranes are normal and external auditory canals are        

      clear.  Oropharynx with no redness, swelling, or masses, exudates, or evidence of           

      obstruction, uvula midline.  Mucous membranes moist.                                        

13:48 Chest/axilla:  Normal chest wall appearance and motion.  Nontender with no deformity.       

      No lesions are appreciated.                                                                 

13:48 Respiratory:  Lungs have equal breath sounds bilaterally, clear to auscultation and         

      percussion.  No rales, rhonchi or wheezes noted.  No increased work of breathing, no        

      retractions or nasal flaring.                                                               

13:48 Back:  No spinal tenderness.  No costovertebral tenderness.  Full range of motion.          

      Skin:  Warm, dry with normal turgor.  Normal color with no rashes, no lesions, and no       

      evidence of cellulitis.                                                                     

13:48 Constitutional: The patient appears lethargic, obese, obviously ill.                        

13:48 Neck: External neck: Left distal neck, anterior chest area with eschar area s/p             

      pressure sore.                                                                              

13:48 Cardiovascular: Rate: bradycardic, Rhythm: irregular, Pulses: Pulses are 2+ in right        

      brachial artery, right femoral artery, left brachial artery and left femoral artery.        

      Heart sounds: normal, , Edema: lower extremities and some to bilateral arms.                

13:48 Abdomen/GI: Inspection: obese                                                               

13:48 Musculoskeletal/extremity: some hx of paralysis, but not fully assessed.                    

13:48 Skin: noted area of eschar to chest wall.                                                   

13:48 Neuro: seizure activity, is not displayed by the patient, responsive to physical            

      stimuli.                                                                                    

                                                                                                  

Vital Signs:                                                                                      

13:10  / 30; Pulse 64; Resp 12; Pulse Ox 100% on Non-rebreather mask;                   ph  

13:30  / 36; Pulse 64; Resp 16; Pulse Ox 100% on Nebulizer Mask;                        ph  

14:20  / 44; Pulse 65; Temp 94.5(temple temp); Pulse Ox 98% on R/A;                      ss  

14:40  / 48; Pulse 65; Pulse Ox 97% on 2 lpm NC; Pain 0/10;                             ss  

15:00  / 66; Pulse 64; Resp 18; Pulse Ox 98% on 2 lpm NC;                               ph  

15:23  / 47; Pulse 59; Resp 16; Temp 94.5(C); Pulse Ox 97% on 2 lpm NC; Pain 0/10;      ph  

15:45  / 46; Pulse 59; Resp 16; Temp 94.6(C); Pulse Ox 96% on 2 lpm NC;                 ph  

16:00  / 46; Pulse 60; Resp 16; Pulse Ox 97% on 2 lpm NC;                               ph  

16:15  / 49; Pulse 59; Resp 18; Temp 94.7(C); Pulse Ox 96% on 2 lpm NC;                 ph  

16:30 BP 94 / 41; Pulse 59; Resp 18; Temp 94.8(C); Pulse Ox 97% on 2 lpm NC;                  ph  

16:45 BP 96 / 42; Pulse 57; Resp 18; Temp 94.9(TE); Pulse Ox 97% on 2 lpm NC;                 ph  

17:00 BP 97 / 41; Pulse 57; Resp 18; Temp 95.1(C); Pulse Ox 98% on 2 lpm NC;                  ph  

17:18 BP 99 / 40; Pulse 57; Resp 18; Temp 95.3(C); Pulse Ox 97% on 2 lpm NC;                  ph  

17:30 BP 98 / 42; Pulse 57; Resp 18; Temp 95.5(C); Pulse Ox 99% on 2 lpm NC;                  ph  

17:45 BP 91 / 50; Pulse 59; Resp 16; Temp 95.6; Pulse Ox 98% on 2 lpm NC;                     ph  

18:00  / 50; Pulse 58; Resp 16; Temp 95.7(C); Pulse Ox 99% on 2 lpm NC;                 ph  

18:15 BP 99 / 42; Pulse 60; Resp 20; Temp 95.7(C); Pulse Ox 99% on 2 lpm NC;                  ph  

18:30  / 45; Pulse 60; Resp 16; Temp 95.7(C); Pulse Ox 99% on 2 lpm NC;                 ph  

18:45  / 51; Pulse 57; Resp 16; Temp 95.6(C); Pulse Ox 97% on 2 lpm NC;                 ph  

19:00  / 48; Pulse 61; Resp 16; Temp 95.7(C); Pulse Ox 99% on 2 lpm NC;                 ph  

19:15 BP 90 / 48; Pulse 59; Resp 18; Temp 95.7(C); Pulse Ox 99% on 2 lpm NC;                  ph  

19:36 BP 93 / 37; Pulse 58; Resp 18; Temp 95.4(C); Pulse Ox 98% on 2 lpm NC;                  tl2 

19:45 BP 91 / 55; Pulse 57; Resp 20; Pulse Ox 98% on 2 lpm NC;                                lp1 

20:00 BP 96 / 54; Pulse 58; Resp 18; Pulse Ox 99% on 2 lpm NC;                                lp1 

20:15  / 72; Pulse 58; Resp 18; Pulse Ox 99% on 2 lpm NC;                               lp1 

                                                                                                  

Oumar Coma Score:                                                                               

14:26 Eye Response: to voice(3). Verbal Response: oriented(5). Motor Response: obeys          ph  

      commands(6). Total: 14.                                                                     

16:30 Eye Response: spontaneous(4). Verbal Response: oriented(5). Motor Response: obeys       ph  

      commands(6). Total: 15.                                                                     

19:15 Eye Response: spontaneous(4). Verbal Response: oriented(5). Motor Response: obeys       ph  

      commands(6). Total: 15.                                                                     

                                                                                                  

Procedures:                                                                                       

13:55 Central Line: the site was prepped with Betadine, in sterile fashion, a triple lumen    snw 

      catheter was inserted, in the right femoral vein, in 1 attempts. placement was              

      verified, by blood return, the site was dressed with Tegaderm, the patient tolerated        

      the procedure, well, per Dr. Queen.                                                       

                                                                                                  

MDM:                                                                                              

14:41 Patient medically screened.                                                             kdr 

15:14 Data reviewed: vital signs, nurses notes, lab test result(s), radiologic studies.       kdr 

      Counseling: I had a detailed discussion with the patient and/or guardian regarding: the     

      historical points, exam findings, and any diagnostic results supporting the                 

      discharge/admit diagnosis, lab results, radiology results, the need for further work-up     

      and treatment in the hospital.                                                              

                                                                                                  

                                                                                             

13:14 Order name: glucometer results - FOR PT WITH NO ID                                      3 

                                                                                             

13:39 Order name: ABG; Complete Time: 14:33                                                   eb  

                                                                                             

13:42 Order name: Basic Metabolic Panel; Complete Time: 16:10                                 [facility] 

                                                                                             

13:42 Order name: CBC with Diff; Complete Time: 14:33                                         [facility] 

                                                                                             

13:42 Order name: Ckmb; Complete Time: 16:10                                                  [facility] 

                                                                                             

13:42 Order name: CPK; Complete Time: 16:10                                                   [facility] 

                                                                                             

13:42 Order name: LFT's; Complete Time: 16:10                                                 [facility] 

                                                                                             

13:42 Order name: Magnesium; Complete Time: 16:10                                             [facility] 

                                                                                             

13:42 Order name: NT PRO-BNP; Complete Time: 16:10                                            [facility] 

                                                                                             

13:42 Order name: PT-INR; Complete Time: 14:35                                                [facility] 

                                                                                             

13:42 Order name: Ptt, Activated; Complete Time: 14:35                                        [facility] 

                                                                                             

13:42 Order name: Troponin (emerg Dept Use Only); Complete Time: 16:10                        [facility] 

                                                                                             

14:25 Order name: Glucose, Ancillary Testing; Complete Time: 14:33                            [facility]

                                                                                             

17:23 Order name: Salicylates Level                                                           [facility]

                                                                                             

13:42 Order name: XRAY Chest (1 view); Complete Time: 16:10                                   [facility] 

                                                                                             

13:42 Order name: EKG; Complete Time: 13:42                                                   [facility] 

                                                                                             

17:24 Order name: Uric Acid                                                                   [facility]

                                                                                             

17:33 Order name: Lactate                                                                     [facility]

                                                                                             

17:39 Order name: T4 Free                                                                     [facility]

                                                                                             

17:39 Order name: Thyroid Stimulating Hormone                                                 [facility]

                                                                                             

20:57 Order name: Troponin I                                                                  [facility]

                                                                                             

20:57 Order name: Lactate Sepsis 2 HR Follow-up                                               [facility]

                                                                                             

20:58 Order name: Acetone Level                                                               [facility]

                                                                                             

13:42 Order name: Cardiac monitoring; Complete Time: 14:43                                    [facility] 

                                                                                             

13:42 Order name: EKG - Nurse/Tech; Complete Time: 14:43                                      [facility] 

                                                                                             

13:42 Order name: IV Saline Lock; Complete Time: 14:43                                        [facility] 

                                                                                             

13:42 Order name: Labs collected and sent; Complete Time: 14:43                               [facility] 

                                                                                             

13:42 Order name: O2 Per Protocol; Complete Time: 14:43                                       [facility] 

                                                                                             

13:42 Order name: O2 Sat Monitoring; Complete Time: 14:43                                     kdr 

                                                                                             

13:56 Order name: Labs - recollect needed; Complete Time: 14:43                               eb  

                                                                                                  

Administered Medications:                                                                         

13:22 Drug: Calcium Chloride 1 grams Route: IVP; Site: right femoral;                         ph  

19:30 Follow up: Response: No adverse reaction                                                ph  

13:23 Drug: Sodium Bicarbonate 1 amp Route: IVP; Site: right femoral;                         ph  

19:30 Follow up: Response: No adverse reaction                                                ph  

13:23 Drug: Sodium Bicarbonate 1 amp Route: IVP; Site: right femoral;                         ph  

19:29 Follow up: Response: No adverse reaction                                                ph  

13:24 Drug: Insulin Regular Human 10 units {Co-Signature: [id number] (Chico Barker RN).} Route: IVP;  ph  

      Site: right femoral;                                                                        

19:29 Follow up: Response: No adverse reaction                                                ph  

13:25 Drug: D50W 50 ml Route: IVP; Site: right femoral;                                       ph  

19:29 Follow up: Response: No adverse reaction                                                ph  

13:25 Drug: NS 0.9% 500 ml Route: IV; Rate: bolus; Site: right femoral;                       ph  

19:28 Follow up: Response: No adverse reaction; IV Status: Completed infusion; IV Intake:     ph  

      500ml                                                                                       

13:36 Drug: Dopamine drip 5 mcg/kg/min - (DOPamine 400 mg, D5W 250 ml) {Note: initiated at 10 ph  

      mcg/kg/min.} Route: IV; Rate: calculated rate; Site: right femoral;                         

21:00 Follow up: IV Status: Infusion continued upon admission                                 lp1 

                                                                                                  

                                                                                                  

Point of Care Testing:                                                                            

      Blood Glucose:                                                                              

13:14 Blood Glucose: 70 mg/dL;                                                                dh3 

14:26 Blood Glucose: 157 mg/dL;                                                               ph  

      Ranges:                                                                                     

      Critical Glucose Levels:Adult <50 mg/dl or >400 mg/dl  <40 mg/dl or >180 mg/dl       

Disposition:                                                                                      

14:38 Co-signature as Attending Physician, Jacky Queen MD I agree with the assessment and   kdr 

      plan of care.                                                                               

                                                                                                  

Disposition:                                                                                      

18 14:41 Hospitalization ordered by Vinicio Ro for Inpatient Admission. Preliminary     

  diagnosis is Cardiac dysrhythmia, cardiac arrest, altered mental status..                       

- Bed requested for Intensive Care Unit.                                                          

- Status is Inpatient Admission.                                                              lp1 

- Condition is Serious.                                                                           

- Problem is new.                                                                                 

- Symptoms have improved.                                                                         

UTI on Admission? No                                                                              

                                                                                                  

                                                                                                  

                                                                                                  

Signatures:                                                                                       

Dispatcher [employer]                           EDMS                                                 

Jacky Queen MD MD   [facility] [facility]                                                  

Lata Crawley, FNP-C                 FNP-Csnw                                                  

Shruthi Molina, СВЕТЛАНА                      RN                                                      

Silvia Campbell RN                         RN   [id number]                                                  

Brenna Correa RN                      RN                                                      

Jocelynn Mcconnell                                                                              

Chico Barker RN                               [id number]                                                  

                                                                                                  

Corrections: (The following items were deleted from the chart)                                    

16:17 14:41 Hospitalization Ordered by Vinicio Ro DO for Inpatient Admission. Preliminary  eb  

      diagnosis is Cardiac dysrhythmia, cardiac arrest, altered mental status.. Bed requested     

      for Intensive Care Unit. Status is Inpatient Admission. Condition is Serious. Problem       

      is new. Symptoms have improved. UTI on Admission? No. kdr                                   

21:00 13:42 Urine Dipstick-Ancillary ordered. kdr                                             lp1 

21:00 16:17 2018 14:41 Hospitalization Ordered by Vinicio Ro DO for Inpatient        1 

      Admission. Preliminary diagnosis is Cardiac dysrhythmia, cardiac arrest, altered mental     

      status.. Bed requested for Intensive Care Unit. Status is Inpatient Admission.              

      Condition is Serious. Problem is new. Symptoms have improved. UTI on Admission? No. eb      

                                                                                                  

**************************************************************************************************

## 2018-07-28 NOTE — OP
Date of Procedure:  07/28/2018



Surgeon:  Bruno Gibson MD



Procedure:  Emergent left femoral vein central line placement.



Diagnosis:  End-stage renal disease.



Anesthesia:  Local.



Estimated Blood Loss:  Less than 10 cc.



Indications:  This is the case of an 83-year-old patient, who received an emergent hemodialysis rei
ter on the right femoral vein.  Now they asked us to put an emergent central line on the left femoral
 vein since the patient has hypotension and need __________.  The patient explained once again the be
nefits, alternatives, and risks of this procedure which include, but not limited to infection, bleedi
ng, damage to adjacent structures, anesthesia complication, DVT, bleeding and hematomas, MI and even 
death and they signed a consent.



Description Of Procedure:  Left femoral area was prepped and draped in a sterile fashion.  A time-out
 was called.  Local anesthesia was applied.  An 18-gauge needle was placed in left femoral vein.  Dil
ator was placed through followed by putting a central line lumen using Seldinger technique.  The guid
ewire was removed, central line was secured in place and flushed with saline.  The patient tolerated 
the procedure well.  The reason we put a central line on the left side is that the patient on the lef
t neck and chest has an open wound in that region of unknown etiology, large with some devitalized ti
ssue present.  On the right neck and chest the patient is so swollen that 

it is hard to pin point his anatomy.  Eventually this catheters will have to be changed in upper body
 when he is stable in next few days.





NOEL/YOLA

DD:  07/28/2018 19:14:50Voice ID:  [id number]

DT:  07/28/2018 20:17:19Report ID:  [id number]

## 2018-07-28 NOTE — CON
Date of Consultation:  07/28/2018



Reason For Consultation:  Renal failure.



Indications:  This is a case of an 83-year-old patient, who comes to us with multiple medical problem
s including hypotension, renal failure, in need for emergent dialysis, so I was called to put in an e
mergent dialysis catheter.  Currently, the patient is on vasoconstrictors.  The patient is in ER.  Mo
st of the information is obtained from the family and from the chart.



Past Medical History:  Diabetes, hypertension, __________ renal failure.



Allergies:  SILICONE.



Social History:  He does not smoke.  He does not drink alcohol.



Family History:  Noncontributory.



Review of Systems:

Unable to be obtained.



Physical Examination:

General:  The patient is awake, alert, in no distress. 

HEENT:  Pupils are anicteric. 

Neck:  Supple. 

Chest:  Clear. 

Abdomen:  Soft and depressible. 

Extremities:  Over the right groin area, patient has a central line placed by the ER physician previo
usly.  The distal port is not flushing or been able to draw some blood, so the central line will be r
emoved.  No cyanosis.



Laboratory Data:  Blood work shows sodium is 130, potassium 6.4, CO2 __________.  WBC count 13.4, hem
oglobin is 9.6.  INR within normal limits.



Assessment:  This is a male, who comes to us in need of emergent hemodialysis catheter, so he was diana
led to come to the ER emergently to put a Alden catheter.  The patient was explained and the family
 of the benefits, alternatives, and risks of placement which include but not limited to infection, bl
eeding, damage to adjacent structures, anesthesia complication, deep vein thrombosis, myocardial infa
rction, and even death.  He also understands this may not relieve the symptoms.  He might need more t
johnson one surgical intervention.  The patient is hypotensive at this moment and they need this emergent
ly, so we will proceed to obtain the kit immediately.  See my procedure note.





NOEL/YOLA

DD:  07/28/2018 18:17:17Voice ID:  [id number]

DT:  07/28/2018 19:00:52Report ID:  [id number]

## 2018-07-28 NOTE — ER
Nurse's Notes                                                                                     

 [facility]                                                                

Name: Pinky Avitia                                                                                 

Age: 83 yrs                                                                                       

Sex: Male                                                                                         

: 1935                                                                                   

MRN: [id number]                                                                                   

Arrival Date: 2018                                                                          

Time: 12:54                                                                                       

Account#: [id number]                                                                            

Bed 3                                                                                             

Private MD:                                                                                       

Diagnosis: Cardiac dysrhythmia, cardiac arrest, altered mental status.                            

                                                                                                  

Presentation:                                                                                     

                                                                                             

12:53 Transition of care: patient was not received from another setting of care. Onset of     ss  

      symptoms is unknown. Risk Assessment: Do you want to hurt yourself or someone else?         

      Patient reports no desire to harm self or others.                                           

12:53 Method Of Arrival: EMS: [facility]                                                ss  

12:53 Acuity: EVELIA 1                                                                           ss  

12:53 Presenting complaint: EMS states: Pt is from home,wife called EMS for decreased urine   ph  

      output and swelling, pt hx of renal failure but does not receive dialysis, EKG shows        

      intermittent bradycardia w/ rate dropping from 70's to 30's, pt responsive but              

      lethargic, denies pain.                                                                     

13:00 Initial Sepsis Screen: Does the patient meet any 2 criteria? Temp <36.0*C (96.8*F)) or  ph  

      > 38.3*C (100.4*F). Mean Arterial Pressure (MAP) < 65. Yes Does the patient have a          

      suspected source of infection? No. Patient's initial sepsis screen is negative. Care        

      prior to arrival: None.                                                                     

                                                                                                  

Historical:                                                                                       

- Allergies:                                                                                      

13:53 adhesive tape-silicones;                                                                ss  

- Home Meds:                                                                                      

19:46 amlodipine 10 mg tab 1 tab once daily [Active]; gabapentin 300 mg Oral cap 1 cap at     ph  

      bedtime [Active]; Lasix 40 mg Oral tab once daily [Active]; Lipitor Oral [Active];          

      metformin 1,000 mg Oral tab 2 times per day [Active]; metoprolol tartrate 25 mg Oral        

      tab 0.5 tab 2 times per day [Active]; multivitamin Oral daily [Active]; Vitamin D Oral      

      400 unit daily [Active];                                                                    

- PMHx:                                                                                           

13:53 Diabetes - NIDDM; Hyperlipidemia; Hypertension; muscle atrophy; paralysis; RENAL        ss  

      FAILURE;                                                                                    

                                                                                                  

- Immunization history:: Adult Immunizations unknown.                                             

- Social history:: Smoking status: unknown.                                                       

- Ebola Screening: : No symptoms or risks identified at this time.                                

                                                                                                  

                                                                                                  

Screenin:41 Abuse screen: Denies threats or abuse. Denies injuries from another. Nutritional        ph  

      screening: No deficits noted. Tuberculosis screening: No symptoms or risk factors           

      identified. Fall Risk Fall in past 12 months (25 points). No secondary diagnosis (0         

      pts). IV access (20 points). Ambulatory Aid- None/Bed Rest/Nurse Assist (0 pts). Gait-      

      Weak (10 pts.). Mental Status- Oriented to own ability (0 pts). Total Hung Fall Scale      

      indicates High Risk Score (45 or more points). Fall prevention measures have been           

      instituted. Side Rails Up X 2 Placed Close to Nursing Station Frequent Obs/Assessments      

      Occuring Family Present and informed to notify staff if the need to leave the bedside       

      As available patient and family educated on Fall Prevention Program and Strategies.         

                                                                                                  

Assessment:                                                                                       

13:05 Reassessment: Pt having episodes of asystole lasting approx 5-10 seconds, Dr Queen at   

      bedside, pt moved to trauma room 3.                                                         

13:18 Reassessment: Pt placed on external pacer, initiated at 45 joules, rate 66 bpm.         ph  

13:25 Reassessment: External pacing paused per ERP order.                                     ph  

13:30 General: Appears in no apparent distress. uncomfortable, obese, Behavior is             ph  

      cooperative, drowsy, quiet, Denies fever. Pain: Denies pain. Cardiovascular: Denies         

      chest pain, shortness of breath, Capillary refill < 3 seconds Edema is 3+ to right          

      forearm, right wrist, right hand, left forearm, left wrist and left hand Rhythm is          

      sinus rhythm w/ intermittent bradycardia and episodes of asystole lasting approx 5          

      seconds. Respiratory: Airway is patent Respiratory effort is relaxed, pt noted to be        

      gasping when experiencing changes in cardiac rhythm Respiratory pattern is symmetrical,     

      snoring. GI: No signs and/or symptoms were reported involving the gastrointestinal          

      system. Abdomen is round distended, Reports nausea, Patient currently denies abdominal      

      pain, diarrhea, vomiting. : Parent/caregiver report the patient having no urine           

      output >24 hours. Derm: Skin is fragile, has skin tears on small skin tears noted to        

      gena forearms Wound noted left supraclavicular area.                                         

13:30 Neuro: Level of Consciousness is lethargic, listless, Oriented to person, place, time,  ph  

      situation.                                                                                  

14:10 Reassessment: Pt noted to be in distress w/ gasping respirations, episode of asystole   ph  

      nod, pt placed on external pacer at 45 joules, HR 75.                                       

14:20 Reassessment:. Cardiovascular: Rhythm is sinus tachycardia at 120 bpm, pacing paused.   ph  

14:45 Reassessment: Patient appears in no apparent distress at this time. Patient and/or      ph  

      family updated on plan of care and expected duration. Pain level reassessed. Pt             

      asleep,respirations snoring and unlabored, awakens to tactile stimuli, denies pain,         

      maintaining HR of 64 bpm, not pacing at this time.                                          

15:18 Reassessment: Patient appears in no apparent distress at this time. Patient and/or      ph  

      family updated on plan of care and expected duration. Pain level reassessed. Dr Ro      

      at bedside to speak w/ pt, pt awake and responding appropriately.                           

16:15 Reassessment: Patient appears in no apparent distress at this time. Patient and/or      ph  

      family updated on plan of care and expected duration. Pain level reassessed. Patient is     

      alert, oriented x 3, equal unlabored respirations, skin warm/dry/pink. Pt resting           

      quietly, denies pain or nausea at this time, family at bedside, awaiting Dr Gibson        

      for Jacque catheter placement.                                                             

17:08 Reassessment: Patient appears in no apparent distress at this time. Patient and/or      ph  

      family updated on plan of care and expected duration. Pain level reassessed. Patient is     

      alert, oriented x 3, equal unlabored respirations, skin warm/dry/pink. Pt c/o pain in       

      buttocks, repositioned w/ pillow underneath pt, now resting quietly, wife at bedside,       

      awaiting Dr Gibson for catheter placement.                                                

17:55 Reassessment: Patient appears in no apparent distress at this time. Patient and/or      ph  

      family updated on plan of care and expected duration. Pain level reassessed. Patient is     

      alert, oriented x 3, equal unlabored respirations, skin warm/dry/pink. Dr Gibson at       

      bedside, central line to R groin d/c and Jacque catheter placed, pt tolerated well.        

18:15 Reassessment: Attempted to call report to ICU, receiving nurse refused to accept pt     ph  

      w/out additional IV access, charge nurse and ERP notified.                                  

18:45 Reassessment: Patient appears in no apparent distress at this time. Patient and/or      ph  

      family updated on plan of care and expected duration. Pain level reassessed. Patient is     

      alert, oriented x 3, equal unlabored respirations, skin warm/dry/pink. DR Gibson at       

      bedside to place central line to L groin, pt tolerated well.                                

19:10 General: Appears in no apparent distress. comfortable, Behavior is calm, cooperative,   tl2 

      appropriate for age. Pain: Denies pain. Neuro: Level of Consciousness is awake, alert,      

      obeys commands, Oriented to person, place, time, situation. Cardiovascular: Denies          

      chest pain. Cardiovascular: Edema is 3+ to GENA arms, GENA legs. Respiratory: Airway is       

      patent Respiratory effort is even, unlabored, Respiratory pattern is regular,               

      symmetrical. GI: No signs and/or symptoms were reported involving the gastrointestinal      

      system. Derm: Skin is pink, warm \T\ dry.                                                   

19:11 Reassessment: Dr. Gibson at bedside inserting central line. Will call ICU for report  tl2 

      after shift change. Pt is AOx4. BP is stable, all fluids will be transferred to central     

      line. Hep lock administered through tien cath.                                           

20:00 Reassessment: Patient appears in no apparent distress at this time. Patient and/or      lp1 

      family updated on plan of care and expected duration. Pain level reassessed. Patient is     

      alert, oriented x 3, equal unlabored respirations, skin warm/dry/pink. Pt stable,           

      awaiting to transport pt to unit.                                                           

                                                                                                  

Vital Signs:                                                                                      

13:10  / 30; Pulse 64; Resp 12; Pulse Ox 100% on Non-rebreather mask;                   ph  

13:30  / 36; Pulse 64; Resp 16; Pulse Ox 100% on Nebulizer Mask;                        ph  

14:20  / 44; Pulse 65; Temp 94.5(temple temp); Pulse Ox 98% on R/A;                      ss  

14:40  / 48; Pulse 65; Pulse Ox 97% on 2 lpm NC; Pain 0/10;                             ss  

15:00  / 66; Pulse 64; Resp 18; Pulse Ox 98% on 2 lpm NC;                               ph  

15:23  / 47; Pulse 59; Resp 16; Temp 94.5(C); Pulse Ox 97% on 2 lpm NC; Pain 0/10;      ph  

15:45  / 46; Pulse 59; Resp 16; Temp 94.6(C); Pulse Ox 96% on 2 lpm NC;                 ph  

16:00  / 46; Pulse 60; Resp 16; Pulse Ox 97% on 2 lpm NC;                               ph  

16:15  / 49; Pulse 59; Resp 18; Temp 94.7(C); Pulse Ox 96% on 2 lpm NC;                 ph  

16:30 BP 94 / 41; Pulse 59; Resp 18; Temp 94.8(C); Pulse Ox 97% on 2 lpm NC;                  ph  

16:45 BP 96 / 42; Pulse 57; Resp 18; Temp 94.9(TE); Pulse Ox 97% on 2 lpm NC;                 ph  

17:00 BP 97 / 41; Pulse 57; Resp 18; Temp 95.1(C); Pulse Ox 98% on 2 lpm NC;                  ph  

17:18 BP 99 / 40; Pulse 57; Resp 18; Temp 95.3(C); Pulse Ox 97% on 2 lpm NC;                  ph  

17:30 BP 98 / 42; Pulse 57; Resp 18; Temp 95.5(C); Pulse Ox 99% on 2 lpm NC;                  ph  

17:45 BP 91 / 50; Pulse 59; Resp 16; Temp 95.6; Pulse Ox 98% on 2 lpm NC;                     ph  

18:00  / 50; Pulse 58; Resp 16; Temp 95.7(C); Pulse Ox 99% on 2 lpm NC;                 ph  

18:15 BP 99 / 42; Pulse 60; Resp 20; Temp 95.7(C); Pulse Ox 99% on 2 lpm NC;                  ph  

18:30  / 45; Pulse 60; Resp 16; Temp 95.7(C); Pulse Ox 99% on 2 lpm NC;                 ph  

18:45  / 51; Pulse 57; Resp 16; Temp 95.6(C); Pulse Ox 97% on 2 lpm NC;                 ph  

19:00  / 48; Pulse 61; Resp 16; Temp 95.7(C); Pulse Ox 99% on 2 lpm NC;                 ph  

19:15 BP 90 / 48; Pulse 59; Resp 18; Temp 95.7(C); Pulse Ox 99% on 2 lpm NC;                  ph  

19:36 BP 93 / 37; Pulse 58; Resp 18; Temp 95.4(C); Pulse Ox 98% on 2 lpm NC;                  tl2 

19:45 BP 91 / 55; Pulse 57; Resp 20; Pulse Ox 98% on 2 lpm NC;                                lp1 

20:00 BP 96 / 54; Pulse 58; Resp 18; Pulse Ox 99% on 2 lpm NC;                                lp1 

20:15  / 72; Pulse 58; Resp 18; Pulse Ox 99% on 2 lpm NC;                               lp1 

                                                                                                  

[place] Coma Score:                                                                               

14:26 Eye Response: to voice(3). Verbal Response: oriented(5). Motor Response: obeys          ph  

      commands(6). Total: 14.                                                                     

16:30 Eye Response: spontaneous(4). Verbal Response: oriented(5). Motor Response: obeys       ph  

      commands(6). Total: 15.                                                                     

19:15 Eye Response: spontaneous(4). Verbal Response: oriented(5). Motor Response: obeys       ph  

      commands(6). Total: 15.                                                                     

                                                                                                  

ED Course:                                                                                        

12:54 Patient arrived in ED.                                                                  ss  

13:00 Arm band placed on right wrist.                                                         ss  

13:09 Jacky Queen MD is Attending Physician.                                              [facility] 

13:17 Assisted provider with central line placement. Set up central line tray. Triple lumen   ph  

      line placed in right femoral. Line placed by Jacky Queen MD Placement verified by         

      blood return, Dressed with Tegaderm, Blood was collected. Patient tolerated well. line      

      placed for emergent reasons, pt unstable, no consent form signed Before procedure, did      

      Practitioner(s) obtain informed consent? No. Patient \T\ family education about             

      procedure, CLABSI prevention and S/S of infection? No. Time-out/Briefing performed          

      prior to start of procedure? Yes. Was handwashing/sanitizing done immediately prior to      

      procedure? Yes. Was patient positioned to in a way to prevent air embolism? Yes. Was        

      procedure site sterilized? Yes, with Was the site allowed to dry? Yes. Was local            

      anesthetic and/or sedation utilized? Yes. During the procedure, did the Practitioner(s)     

      maintain a sterile field? Yes. Were unused ports clamped during insertion? Yes. Was a       

      2nd qualified MD obtained after 3 unsuccessful insertion attempts? No. Was blood            

      aspirated from each lumen? Yes. After the procedure, did the Practitioner(s) clean the      

      site and apply a sterile dressing? Yes.                                                     

13:18 Patient placed on transcutaneous pacer. Capture obtained as noted by pacing spikes on   ph  

      cardiac monitor. Pacer set at 45mA.                                                         

13:20 Patient placed on transcutaneous pacer. mA changed to 50mA.                             ph  

13:29 Patient placed on transcutaneous pacer. Note: pacing paused.                            ph  

13:35 Temple cath inserted, using sterile technique, 16 Fr., by me, balloon inflated, to       ph  

      gravity drainage, other no urine output noted.                                              

13:53 Triage completed.                                                                       ss  

14:00 Thermoregulation: Eric blanket applied.                                                 ph  

14:05 Oxygen administration via nasal cannula \T\ 2L/min.                                       ph  

14:10 Patient placed on transcutaneous pacer. Capture obtained as noted by pacing spikes on   ph  

      cardiac monitor. Pacer set at 45mA.                                                         

14:20 Patient placed on transcutaneous pacer. Note: pacing paused.                            ph  

14:24 Brenna Correa, RN is Primary Nurse.                                                    ph  

14:39 Vinicio Ro DO is Hospitalizing Provider.                                           kdr 

14:53 Patient has correct armband on for positive identification. Placed in gown. Bed in low  ph  

      position. Call light in reach. Side rails up X2. Cardiac monitor on. Pulse ox on. NIBP      

      on. Warm blanket given.                                                                     

15:21 X-ray completed. Portable x-ray completed in exam room. Patient tolerated procedure     la2 

      well.                                                                                       

15:24 XRAY Chest (1 view) In Process Unspecified.                                             EDMS

17:55 Assisted provider with central line placement. Set up central line tray. double lumen   ph  

      jacque dialysis catheter in right femoral. Line placed by Bruno Gibson MD Placement      

      verified by blood return, Dressed with 4X4s, Tape, Tegaderm, Patient tolerated well.        

      Before procedure, did Practitioner(s) obtain informed consent? Yes. Patient \T\ family      

      education about procedure, CLABSI prevention and S/S of infection? Yes.                     

      Time-out/Briefing performed prior to start of procedure? Yes. Was                           

      handwashing/sanitizing done immediately prior to procedure? Yes. Was patient positioned     

      to in a way to prevent air embolism? Yes. Was procedure site sterilized? Yes, with          

      betadine/ provider preference. Was the site allowed to dry? Yes. Was local anesthetic       

      and/or sedation utilized? Yes. During the procedure, did the Practitioner(s) maintain a     

      sterile field? Yes. Were unused ports clamped during insertion? Yes. Was a 2nd              

      qualified MD obtained after 3 unsuccessful insertion attempts? No. Was blood aspirated      

      from each lumen? Yes. After the procedure, did the Practitioner(s) clean the site and       

      apply a sterile dressing? Yes.                                                              

18:45 Assisted provider with central line placement. Set up central line tray. Triple lumen   ph  

      line placed in left femoral. Line placed by Bruno Gibson MD Placement verified by         

      blood return, Dressed with Tegaderm, Patient tolerated well. Before procedure, did          

      Practitioner(s) obtain informed consent? Yes. Patient \T\ family education about            

      procedure, CLABSI prevention and S/S of infection? Yes. Time-out/Briefing performed         

      prior to start of procedure? Yes. Was handwashing/sanitizing done immediately prior to      

      procedure? Yes. Was patient positioned to in a way to prevent air embolism? Yes. Was        

      procedure site sterilized? Yes, with betadine/provider preference. Was the site allowed     

      to dry? Yes. Was local anesthetic and/or sedation utilized? Yes. During the procedure,      

      did the Practitioner(s) maintain a sterile field? Yes. Were unused ports clamped during     

      insertion? Yes. Was a 2nd qualified MD obtained after 3 unsuccessful insertion              

      attempts? No. Was blood aspirated from each lumen? Yes. After the procedure, did the        

      Practitioner(s) clean the site and apply a sterile dressing? Yes. Patient admitted, IV      

      remains in place.                                                                           

20:15 Patient admitted, IV remains in place.                                                  lp1 

                                                                                                  

Administered Medications:                                                                         

13:22 Drug: Calcium Chloride 1 grams Route: IVP; Site: right femoral;                         ph  

19:30 Follow up: Response: No adverse reaction                                                ph  

13:23 Drug: Sodium Bicarbonate 1 amp Route: IVP; Site: right femoral;                         ph  

19:30 Follow up: Response: No adverse reaction                                                ph  

13:23 Drug: Sodium Bicarbonate 1 amp Route: IVP; Site: right femoral;                         ph  

19:29 Follow up: Response: No adverse reaction                                                ph  

13:24 Drug: Insulin Regular Human 10 units {Co-Signature: [id number] (Chico Barker RN).} Route: IVP;  ph  

      Site: right femoral;                                                                        

19:29 Follow up: Response: No adverse reaction                                                ph  

13:25 Drug: D50W 50 ml Route: IVP; Site: right femoral;                                       ph  

19:29 Follow up: Response: No adverse reaction                                                ph  

13:25 Drug: NS 0.9% 500 ml Route: IV; Rate: bolus; Site: right femoral;                       ph  

19:28 Follow up: Response: No adverse reaction; IV Status: Completed infusion; IV Intake:     ph  

      500ml                                                                                       

13:36 Drug: Dopamine drip 5 mcg/kg/min - (DOPamine 400 mg, D5W 250 ml) {Note: initiated at 10 ph  

      mcg/kg/min.} Route: IV; Rate: calculated rate; Site: right femoral;                         

21:00 Follow up: IV Status: Infusion continued upon admission                                 lp1 

                                                                                                  

                                                                                                  

Point of Care Testing:                                                                            

      Blood Glucose:                                                                              

13:14 Blood Glucose: 70 mg/dL;                                                                dh3 

14:26 Blood Glucose: 157 mg/dL;                                                               ph  

      Ranges:                                                                                     

                                                                                                  

Intake:                                                                                           

14:26 IV: 500ml (IV Fluid); Total: 500ml.                                                     ph  

19:28 IV: 500ml; Total: 1000ml.                                                               ph  

                                                                                                  

Output:                                                                                           

14:26 Urine: 0ml; Total: 0ml.                                                                 ph  

                                                                                                  

Outcome:                                                                                          

14:41 Decision to Hospitalize by Provider.                                                    kdr 

20:15 Admitted to ICU accompanied by nurse, accompanied by tech, via stretcher, room 6, with  lp1 

      oxygen, on monitor, with chart, Report called to  [facility]                               

20:15 Condition: stable                                                                           

20:15 Discharge instructions given to patient, family, Instructed on the need for admit.          

21:00 Patient left the ED.                                                                    lp1 

                                                                                                  

Signatures:                                                                                       

Dispatcher [employer]                           EDMS                                                 

Jacky Queen MD MD kdr Therrien, Shelly, FNP-C                 FNP-Csnw                                                  

Shruthi Molina RN RN ss Pena, Laura, RN RN   lp1                                                  

Brenna Correa RN RN   ph                                                   

Radha Saldana, NP                       NP   1                                                  

Deb Fleming RN                        RN   tl2                                                  

Elena Palencia                              [facility]                                                  

Monalisa Phelps                               [facility]                                                  

Chico Barker RN                               jl7                                                  

                                                                                                  

Corrections: (The following items were deleted from the chart)                                    

14:41 13:45 Neuro: Level of Consciousness is lethargic, listless, Oriented to person, place,  ph  

      time, situation, ph                                                                         

15:14 13:36 Dopamine drip 5 mcg/kg/min - (DOPamine 400 mg, D5W 250 ml) IV at calculated rate  ph  

      in right femoral ph                                                                         

15:25 13:30 Musculoskeletal: Circulation, motion, and sensation intact. Range of motion:      ph  

      intact in all extremities, ph                                                               

19:30 19:10 Reassessment: Dr. Gibson at bedside inserting central line. Will call ICU for   tl2 

      report after shift change. Pt is AOx4. BP is stable, all fluids will be transferred to      

      central line. Hep lock administered through tien cath. tl2                               

                                                                                                  

**************************************************************************************************

## 2018-07-28 NOTE — XMS REPORT
Clinical Summary

 Created on:2018



Patient:Pinky Pérez

Sex:Male

:1935

External Reference #:[id number]





Demographics







 Address  [address] [place] [address]

 

 Home Phone  [phone]

 

 Preferred Language  English

 

 Marital Status  Unknown

 

 Islam Affiliation  Unknown

 

 Race  White

 

 Ethnic Group   or 









Author







 Organization  [facility]

 

 Address  [address] [place] [address]

 

 Phone  [phone]









Support







 Name  Relationship  Address  Phone

 

 Unavailable  [employer]  [address]  [phone]



     [place] [address]  

 

 Unavailable  Unavailable  [address]  [phone]



     [place] [address]  









Care Team Providers







 Name  Role  Phone

 

 Unavailable  Primary Care Provider  Unavailable









Allergies







 Active Allergy  Reactions  Severity  Noted Date  Comments

 

 Adhesive Tape-Silicones  Rash  Medium  2017  







Current Medications







 Prescription  Sig.  Disp.  Refills  Start Date  End Date  Status

 

 aspirin 81 MG  Take 1 tablet  30 tablet  0  2018  Active



 chewable tablet  (81 mg total)          



   by mouth daily.          

 

 atorvastatin  Take 1 tablet  30 tablet  0  2018  Active



 (LIPITOR) 40 MG  (40 mg total)          



 tablet  by mouth          



   nightly.          

 

 gabapentin  Take 1 capsule  30 capsule  0  2018  Active



 (NEURONTIN) 300 MG  (300 mg total)          



 capsule  by mouth          



   nightly.          

 

 metoprolol  Take 1 tablet  30 tablet  0  2018  Active



 (LOPRESSOR) 25 MG  (25 mg total)          



 tablet  by mouth 2          



   (two) times          



   daily.          

 

 sodium bicarbonate  Take 1 tablet  30 tablet  0  2018  Active



 650 MG tablet  (650 mg total)          



   by mouth 2          



   (two) times          



   daily.          

 

 torsemide (DEMADEX)  Take 1 tablet  30 tablet  0  2018  
Active



 20 MG tablet  (20 mg total)          



   by mouth daily.          

 

 traMADol (ULTRAM) 50  Take 1 tablet  30 tablet  0  2018  




 mg tablet  (50 mg total)          



   by mouth every          



   6 (six) hours          



   as needed for          



   up to 10 days.          



   Max Daily          



   Amount: 200 mg          







Active Problems







 Problem  Noted Date

 

 BPH (benign prostatic hyperplasia)  2018

 

 Diabetic neuropathy (HCC)  2018

 

 Hyperlipidemia  2018

 

 Hyperphosphatemia  2018

 

 Anemia of chronic disease  2018

 

 Hyperglycemia  2018

 

 Muscle atrophy  2018

 

 Leukocytosis  2018

 

 Acute exacerbation of CHF (congestive heart failure) ([facility])  2018

 

 Acute cystitis without hematuria  2018

 

 Hyperkalemia  2018

 

 Type 2 diabetes mellitus with complication, without long-term current use  



 of insulin ([facility])  

 

 Bradycardia  2018

 

 Shortness of breath  2018

 

 MARIANNE (acute kidney injury) ([facility])  2018







Encounters







 Date  Type  Specialty  Care Team  Description

 

 2018 -  Hospital Encounter  General Internal  Ever Temple  MARIANNE (acute 
kidney



 2018    Medicine  MD Phong



  injury) ([facility])



       Guillermo Forte MD  

 

 2018  Orders Only  General Internal    



     Medicine    



after 2017



Social History







 Tobacco Use  Types  Packs/Day  Years Used  Date

 

 Never Smoker        









 Smokeless Tobacco: Never Used      









 Sex Assigned at Birth  Date Recorded

 

 Not on file  







Last Filed Vital Signs







 Vital Sign  Reading  Time Taken

 

 Blood Pressure  170/74  2018  2:35 PM CDT

 

 Pulse  65  2018  2:35 PM CDT

 

 Temperature  35.8 C (96.4 F)  2018  2:35 PM CDT

 

 Respiratory Rate  18  2018  2:35 PM CDT

 

 Oxygen Saturation  99%  2018  2:35 PM CDT

 

 Inhaled Oxygen Concentration  -  -

 

 Weight  103.2 kg (227 lb 8.2 oz)  2018  6:01 AM CDT

 

 Height  172.7 cm (5' 8")  2018  6:00 AM CDT

 

 Body Mass Index  34.59  2018  6:01 AM CDT







Plan of Treatment







 Date  Type  Specialty  Care Team  Description

 

 2018  Office Visit  Cardiology  Salvatore Swift, KANDIS



  



       [address]



  



       [place] [address] [phone]  







Procedures







 Procedure Name  Priority  Date/Time  Associated Diagnosis  Comments

 

 US GUIDE, VASCULAR  Routine  2018  2:43 PM  MARIANNE (acute kidney  Results 
for this



 ACCESS    CDT  injury) ([facility])  procedure are in



         the results



         section.

 

 INSERT NON-TUNNEL  Routine  2018  2:43 PM  MARIANNE (acute kidney  Results 
for this



 CV CATH    CDT  injury) (HCC)  procedure are in



         the results



         section.



after 2017



Results

RHYTHM STRIP - SCAN (2018  1:41 PM)POC-Glucose meter (2018  5:13 PM)
Only the most recent of62 resultswithin the time period is included.





 Component  Value  Ref Range

 

 POC-Glucose Meter  229 (H)Comment: TESTED AT [address]  70 - 
110 mg/dL



   [place] [address]  









 Specimen  Performing Laboratory

 

 Blood  [address] [address]



CBC with platelet count + automated diff (2018  4:58 AM)Only the most 
recent of16 resultswithin the time period is included.





 Component  Value  Ref Range

 

 WBC  10.6 (H)  3.5 - 10.5 K/L

 

 RBC  2.90 (L)  4.63 - 6.08 M/L

 

 Hemoglobin  8.3 (L)  13.7 - 17.5 GM/DL

 

 Hematocrit  25.4 (L)  40.1 - 51.0 %

 

 MCV  87.6  79.0 - 92.2 fL

 

 MCH  28.6  25.7 - 32.2 pg

 

 MCHC  32.7  32.3 - 36.5 GM/DL

 

 RDW  13.8  11.6 - 14.4 %

 

 Platelets  490 (H)  150 - 450 K/CU MM

 

 MPV  8.9 (L)  9.4 - 12.4 fL

 

 nRBC  0  0 - 0 /100 WBC

 

 % Neutros  63  %

 

 % Lymphs  16  %

 

 % Monos  12  %

 

 % Eos  6  %

 

 % Baso  1  %

 

 # Neutros  6.69 (H)  1.78 - 5.38 K/L

 

 # Lymphs  1.71  1.32 - 3.57 K/L

 

 # Monos  1.26 (H)  0.30 - 0.82 K/L

 

 # Eos  0.68 (H)  0.04 - 0.54 K/L

 

 # Baso  0.12 (H)  0.01 - 0.08 K/L

 

 Immature Granulocytes-Relative  2 (H)  0 - 1 %









 Specimen  Performing Laboratory

 

 Blood  [address] [address]



CBC with platelet count + automated diff (2018  4:58 AM)Only the most 
recent of16 resultswithin the time period is included.





 Specimen  Performing Laboratory

 

 Blood  









 Narrative

 

 The following orders were created for panel order CBC with platelet count + 
automated



 diff.







 Procedure



 Abnormality Status 







 ---------



 ----------- ------ 







 CBC with platelet count ...[[id number]]AbnormalFinal



 result 







  







 Please view results for these tests on the individual orders.



Phosphorus (2018  4:58 AM)Only the most recent of18 resultswithin the 
time period is included.





 Component  Value  Ref Range

 

 Phosphorus  3.8  2.3 - 4.7 mg/dL









 Specimen  Performing Laboratory

 

 Blood  [address] [address]



Magnesium (2018  4:58 AM)Only the most recent of18 resultswithin the time 
period is included.





 Component  Value  Ref Range

 

 Magnesium  1.7  1.6 - 2.6 mg/dL









 Specimen  Performing Laboratory

 

 Blood  [address] [address]



Basic Metabolic Panel (2018  4:58 AM)Only the most recent of9 
resultswithin the time period is included.





 Component  Value  Ref Range

 

 Sodium  132 (L)  136 - 145 meq/L

 

 Potassium  4.4  3.5 - 5.1 meq/L

 

 Chloride  104  98 - 107 meq/L

 

 CO2  18 (L)  22 - 29 meq/L

 

 BUN  44 (H)  7 - 21 mg/dL

 

 Creatinine  1.90 (H)  0.57 - 1.25 mg/dL

 

 Glucose  204 (H)  70 - 105 mg/dL

 

 Calcium  8.8  8.4 - 10.2 mg/dL

 

 EGFR  34Comment: ESTIMATED GFR IS NOT AS ACCURATE AS  mL/min/1.73 sq m



   CREATININE CLEARANCE IN PREDICTING GLOMERULAR FILTRATION  



   RATE. ESTIMATED GFR IS NOT APPLICABLE FOR DIALYSIS  



   PATIENTS.  









 Specimen  Performing Laboratory

 

 Blood  [address] [address]



Urinalysis w/Microscopic (07/10/2018 10:53 PM)Only the most recent of2 
resultswithin the time period is included.





 Component  Value  Ref Range

 

 Color, UA  Light Yellow  

 

 Clarity, UA  Clear  

 

 Specific Gravity, UA  1.007  1.001 - 1.035

 

 pH, UA  6.5  5.0 - 8.0

 

 Protein, UA  600 mg/dL (A)  Negative

 

 Glucose, UA  500 mg/dL (A)  Negative

 

 Ketones, UA  Negative  Negative

 

 Bilirubin, UA  Negative  Negative

 

 Blood, UA  Small (A)  Negative

 

 Nitrite, UA  Negative  Negative

 

 Leukocytes, UA  Negative  Negative

 

 Urobilinogen, UA  0.2  0.2 - 1.0 mg/dL

 

 RBC, UA  3  /HPF

 

 WBC, UA  2  /HPF

 

 Squam Epithel, UA  <1  /HPF

 

 Hyaline Casts, UA  2  /LPF

 

 Amorphous Crystals  Rare  

 

 Specimen Source  Urine, Clean Catch  









 Specimen  Performing Laboratory

 

 Urine - Urine, Clean Catch  [address] [address]



Manual Differential (2018  5:31 AM)Only the most recent of5 resultswithin 
the time period is included.





 Component  Value  Ref Range

 

 % Neutros  83  %

 

 % Lymphs  8  %

 

 % Monos  4  %

 

 % Eos  4  %

 

 % Baso  1  %

 

 # Neutros  10.46 (H)  1.78 - 5.38 K/ul

 

 # Lymphs  1.01 (L)  1.32 - 3.57 K/ul

 

 # Monos  0.50  0.30 - 0.82 K/uL

 

 # Eos  0.50  0.04 - 0.54 K/uL

 

 # Baso  0.13 (H)  0.01 - 0.08 K/uL

 

 Total Counted  100  

 

 nRBC (manual)  1 (H)  0 - 0 /100 WBC

 

 WBC Morphology  Normal  

 

 Platelet Morphology  Normal  

 

 Anisocytosis  1+ few  

 

 Microcytes  1+ few  

 

 Artifact  Present  

 

 Platelet Conc  Adequate  









 Specimen  Performing Laboratory

 

 Blood  [address] [address]









 Narrative

 

 Received comment: 







 User comments: 







 Slide comments:



Calcium, Ionized (2018  6:44 AM)Only the most recent of12 resultswithin 
the time period is included.





 Component  Value  Ref Range

 

 Calcium, Ion  1.08 (L)  1.12 - 1.27 mmol/L

 

 pH, Blood  7.32  









 Specimen  Performing Laboratory

 

 Blood  [address] [address]



Comprehensive metabolic panel (2018  6:44 AM)Only the most recent of10 
resultswithin the time period is included.





 Component  Value  Ref Range

 

 Protein, Total  6.2  6.0 - 8.3 gm/dL

 

 Albumin  2.9 (L)  3.5 - 5.0 g/dL

 

 Alkaline Phosphatase  314 (H)  40 - 150 U/L

 

 Total Bilirubin  0.3  0.2 - 1.2 mg/dL

 

 Sodium  132 (L)  136 - 145 meq/L

 

 Potassium  4.2  3.5 - 5.1 meq/L

 

 Chloride  102  98 - 107 meq/L

 

 CO2  19 (L)  22 - 29 meq/L

 

 BUN  44 (H)  7 - 21 mg/dL

 

 Creatinine  2.15 (H)  0.57 - 1.25 mg/dL

 

 Glucose  219 (H)  70 - 105 mg/dL

 

 Calcium  8.7  8.4 - 10.2 mg/dL

 

 AST  23  5 - 34 U/L

 

 ALT  29  6 - 55 U/L

 

 EGFR  30Comment: ESTIMATED GFR IS NOT AS ACCURATE AS  mL/min/1.73 sq m



   CREATININE CLEARANCE IN PREDICTING GLOMERULAR  



   FILTRATION RATE. ESTIMATED GFR IS NOT  



   APPLICABLE FOR DIALYSIS PATIENTS.  









 Specimen  Performing Laboratory

 

 Blood  [address] [address]



Hemoglobin and hematocrit (2018  5:20 PM)





 Component  Value  Ref Range

 

 Hemoglobin  9.0 (L)  13.7 - 17.5 GM/DL

 

 Hematocrit  27.6 (L)  40.1 - 51.0 %









 Specimen  Performing Laboratory

 

 Blood - Central Venous Line  [address] [address]









 Narrative

 

 Call [phone]



Tissue Exam (2018 11:15 AM)





 Component  Value  Ref Range

 

 Case Report  Surgical Pathology Report
 Case: [id number]
  



   Authorizing Provider:Guillermo Forte, Collected:
 2018 Casandra CLARK

  



   Ordering Location: [address] Received:
2018 1115  



    Service

  



   Pathologist: Joana Zepeda MD

  



   Specimen:Kidney, NATIVE KIDNEY

  



     

 

 DIAGNOSIS  KIDNEY, LEFT, NEEDLE BIOPSIES  



   - ADVANCED DIABETIC GLOMERULOSCLEROSIS  



   - NEGATIVE FOR IMMUNE MEDIATED GLOMERULONEPHRITIS  



   - DIFFUSE INTERSTITIAL FIBROSIS AND TUBULAR ATROPHY (~70%)  



   - MODERATE TO SEVERE ARTERIAL INTIMAL SCLEROSIS  



   - DIFFUSE ARTERIOLAR HYALINOSIS  



   - SEE COMMENT  



   Signing Pathologist Direct Phone Line: [phone]  

 

 COMMENT  No immune mediated glomerulosclerosis is seen based on negative 
immunofluorescence and absence of electron dense deposits on electron 
microscopy.  



   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 
advanced diabetic glomerulosclerosis, class IV (see ref #1) characterized by 
nodular lesions and global glomerulosclerosis inv  



   olving 56% (15/27) of all examined glomeruli, along with marked interstitial 
fibrosis and tubular atrophy involving about 70% of renal parenchyma.  



     



   Reference:  



   1. MALATHI Garcia., CHANNING Givens., KIMBERLI Collins., Brendan, A.H., Gerald, HXinT., 
MAURICIO Lopez., CONOR Dye, JOSE Schwarz., WILLA Valencia, de LEIGHTON Garber., et al. 
Pathologic classification of diabetic nephropathy. J Am Soc Nephrol 21:556-563, 
2010.  



     



     



   The results were communicated to Dr. Caldwell by Dr. Zepeda on 2018.  

 

 CPT Code(s)  65726, 88313 x3, 98200, 88350 x7, 86787  



     

 

 CLINICAL HISTORY  History as communicated by Dr. Caldwell: 83 year old  



   with long standing history of DM, now presents with  



   increase in serum creatinine and 3g proteinuria.  



   All serologies negative  

 

 SPECIMEN SOURCE  Native kidney biopsy  

 

 GROSS DESCRIPTION  The specimen is received in three parts all labeled  



   with the patient's information and labeled "native  



   kidney biopsy".  Received in formalin are two  



   tan-white core biopsies measuring 0.6 and 1.7 cm in  



   length. The specimen is entirely submitted A1.  



   Received fresh is a 1 cm tan core submitted for  



   frozen for immunofluorescence staining and received  



   in glutaraldehyde is a 0.3 cm tan core submitted  



   for electron microscopy. CG/pl  

 

 MICROSCOPIC DESCRIPTION  LIGHT MICROSCOPY:  



   Sections show two cores of tissue composed of cortex 50% and medulla 50%.
  



   Glomeruli: Approximately 19 glomeruli are examined of which 10 glomeruli are 
globally sclerotic/obsolescent or show near complete obsolescence. The 
capillary tuft in some sclerotic glomeruli is wrinkled  



    and shrunken. The remaining non-globally sclerotic glomeruli are enlarged 
and have nodular mesangial expansion by PAS- and silver-positive matrix with 
normal to mild segmental hypercellularity. There i  



   s mild wrinkling of capillary tuft. Focal and segmental sclerosis is seen.
No endocapillary hypercellularity, crescents or thrombi are seen.  



   Tubules and interstitium: There is severe interstitial fibrosis with focal 
tubular atrophy and mild interstitial inflammatory cell infiltration by 
lymphocytes admixed with eosinophils and neutrophils, i  



   nvolving about 70% of renal cortex. Non-atrophic proximal tubules are 
focally ectatic with loss of brush borders.  



   Vessels: Interlobular arteries show moderate intimal sclerosis and 
thickening. There is severe diffuse arteriolosclerosis.  



     



   Congo red stain is negative for amyloid deposition.  



     



   Special stains:Alan trichrome, PAS and Saldana silver stains were 
necessary for evaluation of this biopsy and showed expected staining patterns 
of internal control tissue matrix structures.  



     



   Direct Immunofluorescence:  



   Histology: H&E-stained sections show 2 non-obsolescent glomeruli and 2 
obsolescent glomeruli.  



     



   Immunofluorescence findings:  



   IgA: negative in open glomeruli, focal weak granular mesangial staining in 
sclerotic glomerulus, tubular casts are positive  



   IgG: no significant glomerular, tubulointerstitial or vascular staining.  



   IgM: negative in glomeruli  



   C3: negative in glomeruli,Macario's capsule +.  



   C1q: no significant glomerular, tubulointerstitial or vascular staining.  



   Fibrinogen: no significant glomerular, tubulointerstitial or vascular 
staining.  



   Kappa: negative in open glomeruli, focal weak granular mesangial 
staining in sclerotic glomerulus, tubular casts are positive  



   Lambda:negative in open glomeruli, focal weak granular mesangial 
staining in sclerotic glomerulus, tubular casts are positive  



     



   All polyclonal antibodies used for immunofluorescence staining have been 
previously tested and shown to have appropriate reactivities with positive 
control specimens.  



     



   ELECTRON MICROSCOPY  



   Toluidine blue-stained sections reveals one non-obsolescent glomerulus and 3 
globally sclerotic glomeruli. One open and 2 globally sclerotic glomeruli are 
examined ultrastructurally.  



   Ultrastructure:Examination of the glomerular ultrastructure reveals that 
the glomerular basement membrane shows wrinkling and is variably thickened, 
focally measuring upto 1163 nm (normal male avera  



   gt=941 - 430 nm nm; Annie CROUCH Arch Pathol Lab Med 133; 224-232). Subendothelial
, subepithelial, and mesangial/paramesangial electron-dense, immune complex-
type deposits are not present. Podocyte foot proc  



   esses are diffusely effaced with microvillous change.  



     



     



     









 Specimen  Performing Laboratory

 

 Tissue - Kidney  [facility] [address] renal biopsy (2018 10:40 AM)





 Specimen  Performing Laboratory

 

   GE RIS









 Narrative

 

 FINAL REPORT







  







 PATIENT ID: [id number]







  







  







 Ultrasound guided core biopsy of the left native kidney dated 2018







  







 Procedure: Core biopsy of the left native kidney.







  







 Clinical Indication: Proteinuria







  







 Sedation: Moderate sedation was administered. 0.5 mg of Versed and 25 







 mcg fentanyl IV was used for moderate sedation monitored under my 







 direction. Total intraservice time of the sedation was 50 minutes. 







 The patient's vital signs were monitored throughout the procedure and 







 recorded in the patient's medical record by the nurse.







  







 Anesthesia: 1% Xylocaine mixed with sodium bicarbonate local 







 anesthesia.







  







 Technique: After obtained informed consent, ultrasound guided core 







 biopsy of the left native kidney was performed under usual sterile 







 technique. Using an 18 gauge core biopsy needle, puncture was made 







 posteriorly on the left with real time ultrasound guidance. 2 passes 







 were performed withsufficient material for histology. Patient 







 tolerated procedure well.







  







 Complication: None







  







 Estimated Blood Loss: None







  







 The specimen was sent to pathology.







  







 Impression: Successful ultrasound-guided core biopsy of the left 







 native kidney. 







  







 Signed: Demetris Costa MD







 Report Verified Date/Time:2018 10:42:49







  







 Reading Location: [address] Ultrasound Reading Room







 Electronically signed by: DEMETRIS COSTA M.D. on 2018 10:42 AM







 









 Procedure Note

 

 Interface, External Ris In - 2018 12:12 PM CDT



FINAL REPORT



 



 PATIENT ID:   [id number]



 



 



 Ultrasound guided core biopsy of the left native kidney dated 2018



 



 Procedure: Core biopsy of the left native kidney.



 



 Clinical Indication: Proteinuria



 



 Sedation: Moderate sedation was administered. 0.5 mg of Versed and 25



 mcg fentanyl IV was used for moderate sedation monitored under my



 direction. Total intraservice time of the sedation was 50 minutes.



 The patient's vital signs were monitored throughout the procedure and



 recorded in the patient's medical record by the nurse.



 



 Anesthesia: 1% Xylocaine mixed with sodium bicarbonate local



 anesthesia.



 



 Technique: After obtained informed consent, ultrasound guided core



 biopsy of the left native kidney was performed under usual sterile



 technique. Using an 18 gauge core biopsy needle, puncture was made



 posteriorly on the left with real time ultrasound guidance. 2 passes



 were performed with  sufficient material for histology. Patient



 tolerated procedure well.



 



 Complication: None



 



 Estimated Blood Loss: None



 



 The specimen was sent to pathology.



 



 Impression: Successful ultrasound-guided core biopsy of the left



 native kidney.



 



 Signed: Demetris Costa MD



 Report Verified Date/Time:  2018 10:42:49



 



 Reading Location: [address] Ultrasound Reading Room



       Electronically signed by: DEMETRIS COSTA M.D. on 2018 10:42 AM



 



XR chest 1 view portable / bedside (2018  8:38 AM)Only the most recent 
of2 resultswithin the time period is included.





 Specimen  Performing Laboratory

 

    RIS









 Narrative

 

 FINAL REPORT







  







 PATIENT ID: [id number]







  







 CLINICAL HISTORY: edema 







  







 TECHNIQUE: 1 view of the chest.







  







 COMPARISON: 2018







  







 IMPRESSION:







  







 The right central line remains in the SVC. Mild bilateral airspace 







 opacities have decreased. Trace bilateral pleural effusions remain. 







 The cardiomediastinal silhouette is magnified by technique. 







  







 Signed: Favian Weiner MD







 Report Verified Date/Time:2018 08:40:13







  







 Reading Location: [facility] Radiology Reading Room







 Electronically signed by: FAVIAN WEINER M.D. on 2018 08:40 
AM







 









 Procedure Note

 

 Interface, External Ris In - 2018  8:42 AM CDT



FINAL REPORT



 



 PATIENT ID:   [id number]



 



 CLINICAL HISTORY: edema



 



 TECHNIQUE: 1 view of the chest.



 



 COMPARISON: 2018



 



 IMPRESSION:



 



 The right central line remains in the SVC. Mild bilateral airspace



 opacities have decreased. Trace bilateral pleural effusions remain.



 The cardiomediastinal silhouette is magnified by technique.



 



 Signed: Favian Weiner MD



 Report Verified Date/Time:  2018 08:40:13



 



 Reading Location: [facility] Radiology Reading Room



       Electronically signed by: FAVIAN WEINER M.D. on 2018 08:40 AM



 



B-type Natriuretic Factor (BNP) (2018  6:01 AM)Only the most recent of3 
resultswithin the time period is included.





 Component  Value  Ref Range

 

 BNP  109 (H)  0 - 100 pg/mL









 Specimen  Performing Laboratory

 

 Blood - Central Venous Line  [address] [address]



Prothrombin time/INR (2018  5:46 AM)Only the most recent of5 
resultswithin the time period is included.





 Component  Value  Ref Range

 

 Protime  15.1 (H)  11.7 - 14.7 seconds

 

 INR  1.2  <=5.9









 Specimen  Performing Laboratory

 

 Blood - Central Venous Line  [address] [address]









 Narrative

 

  







 RECOMMENDED COUMADIN/WARFARIN INR THERAPY RANGES







 STANDARD DOSE: 2.0 - 3.0 Includes: PROPHYLAXIS for venous thrombosis, 
systemic



 embolization; TREATMENT for venous thrombosis and/or pulmonary embolus.







 HIGH RISK: Target INR is 2.5-3.5 for patients with mechanical heart valves.



Rapid drug screen, urine (2018  6:33 PM)





 Component  Value  Ref Range

 

 Barbiturate Screen  Negative  Negative

 

 Benzodiazepine Screen  Negative  Negative

 

 Cocaine (Metab.) Screen  Negative  Negative

 

 Methadone Screen  Negative  Negative

 

 Opiate Screen  Negative  Negative

 

 Cannabinoid Screen  Negative  Negative

 

 Amph/Methamph Screen  Negative  Negative

 

 Phencyclidine Screen  Negative  Negative

 

 Oxycodone Screen  Negative  Negative









 Specimen  Performing Laboratory

 

 Urine  [address] [address]









 Narrative

 

  







 DRUGCUTOFF CONC.







 Cocaine 300 ng/mL 







 Spdtzcjqckw93 ng/mL 







 Aoxeiptfczaovd817 ng/mL







 Barbiturate 200 ng/mL







 Qkzkusiuuayqq44 ng/mL







 Xxzoyb949 ng/mL







 Methadone 300 ng/mL







 Amphetamine/ 1000 ng/mL







 Methamphetamine







 Oxycodone 300 ng/mL







  







 This assay provides an unconfirmed qualitative test result for the clinical



 management of patients in emergency situations. Chain of custody not 
maintained. Some



 over-the-counter medications, as well as adulterants, may cause inaccurate 
results.



 Clinical correlation should be applied. A more comprehensive drug screen or



 confirmation of a detected drug may be performed upon request.



Urine Immunofixation, 24 hour (2018 11:19 PM)





 Component  Value  Ref Range

 

 Volume, Urine  1700  ml

 

 Protein, Urine  176 (H)  0 - 14 mg/dL

 

 Protein, 24hr Urine  2992 (H)  0 - 300 mg/24hr

 

 Albumin, 24hr Urine  30.3  %

 

 Globulin, 24hr Urine  69.7  %

 

 Urine MOE ID  No monoclonal proteins or monoclonal free light  



   chains detected.  

 

 Pathologist:  Jocelynn Gonzalez MD (electronic signature)  









 Specimen  Performing Laboratory

 

 Urine - Urine, Voided  [address] [address]



Urine Protein Electrophoresis, 24 hour (2018 11:19 PM)





 Component  Value  Ref Range

 

 Protein, 24hr Urine  2992 (H)  0 - 300 mg/24hr

 

 Volume, Urine  1700  ml

 

 Albumin, 24hr Urine  30.3  %

 

 Globulin, 24hr Urine  69.7  %

 

 UPEP, ID  Spillage of large amounts of globulin fractions  



   may mask underlying monoclonal proteins; urine  



   MOE ordered and results pending.  

 

 Protein, Urine  176 (H)  0 - 14 mg/dL

 

 Pathologist:  Jocelynn Gonzalez MD (electronic signature)  









 Specimen  Performing Laboratory

 

 Urine - Urine, Voided  [address] [address]



Protein, 24 hour urine (2018 11:19 PM)





 Component  Value  Ref Range

 

 Protein, 24hr Urine  2992 (H)  0 - 300 mg/24hr

 

 Volume, Urine  1700  ml

 

 Protein, Urine  176 (H)  0 - 14 mg/dL









 Specimen  Performing Laboratory

 

 Urine - Urine, Voided  [address] [address]



Urinalysis w/Microscopic + Reflex to Culture (2018  7:08 PM)Only the most 
recent of2 resultswithin the time period is included.





 Component  Value  Ref Range

 

 Color, UA  Light Yellow  

 

 Clarity, UA  Clear  

 

 Specific Gravity, UA  1.005  1.001 - 1.035

 

 pH, UA  8.5 (H)  5.0 - 8.0

 

 Protein, UA  200 mg/dL (A)  Negative

 

 Glucose, UA  500 mg/dL (A)  Negative

 

 Ketones, UA  Negative  Negative

 

 Bilirubin, UA  Negative  Negative

 

 Blood, UA  Trace (A)  Negative

 

 Nitrite, UA  Negative  Negative

 

 Leukocytes, UA  Negative  Negative

 

 Urobilinogen, UA  0.2  0.2 - 1.0 mg/dL

 

 RBC, UA  0  /HPF

 

 WBC, UA  1  /HPF

 

 Bacteria, UA  Rare  

 

 Squam Epithel, UA  <1  /HPF

 

 Specimen Source    









 Specimen  Performing Laboratory

 

 Urine - Urine, Voided  [address] [address]



Hemoglobin A1c (2018  4:08 AM)Only the most recent of2 resultswithin the 
time period is included.





 Component  Value  Ref Range

 

 Hemoglobin A1C  5.9  4.3 - 6.1 %









 Specimen  Performing Laboratory

 

 Blood - Central Venous Line  [address] [address]



NM myocardial perfusion PET (rest and stress) (2018 12:03 PM)





 Specimen  Performing Laboratory

 

   Likez RIS









 Narrative

 

 FINAL REPORT







  







 PATIENT ID: [id number]







  







 PROCEDURE:Rest/Stress MYOCARDIAL PERFUSION PET with 







 regadenoson\XA9\







  







 CPT CODE:26620







  







 INDICATION:Chest pain, risk factors for CAD







  







 HISTORY:Cardiac risk factors: Diabetes, hypertension, 







 tobacco use. Other cardiovascular history: No reported CAD. Recent 







 cardiac symptoms: Chest pain, dyspnea. Current cardiovascular-related 







 medications: Metoprolol.







  







 PROTOCOL:Limited low-dose CT imaging was performed for 







 attenuation correction. 40.0 mCi of Rb-82 chloride was injected iv at 







 rest, and gated PET (positron emission tomography) images were 







 obtained. Subsequently, 40.1 mCi of Rb-82 chloride was injected iv at 







 expected peak pharmacologic effect, and gated PET images were 







 obtained. 







  







 PRELIMINARY STRESS TEST DATA FROM NONINVASIVE CARDIOLOGY:







 Pharmacologic stress was by 10-second iv infusion of 0.4 mg of 







 regadenoson. Radiotracer was injected 30 seconds after start of 







 stress. Heart rate was 70 beats/min at rest and 85 beats/min (61% of 







 MPHR) at tracer injection. BP was 90/58 mmHg at rest and 105/52 mmHg 







 at tracer injection. Stress was stopped for predetermined endpoint. 







 The patient experienced abdominal discomfort; treatment was not 







 required. Preliminary ECG evaluation revealed normal sinus rhythm, 







 nonspecific interventricular conduction delay at rest and no ischemic 







 changes with stress. (Final ECG interpretation and other stress and 







 monitoring data are reported separately by Cardiology.) 







  







 IMAGING FINDINGS:Study quality is good. Images obtained after 







 stress injection show decreased tracer uptake in the apex. Resting 







 images show mostly improved tracer uptake in the apex. LV volume 







 appears normal. RV volume appears normal. Gated images obtained 







 immediately after stress show normal LV wall motion. Gated images 







 obtained at rest show normal LV wall motion. LVEF at rest is 58%. 







 LVEF at stress is 64%.







  







 IMPRESSION: 1. Abnormal study.2. Appropriate pharmacologic 







 stress.3. Abnormal myocardial perfusion.There is a mild severity, 







 medium size, mildly reversible, perfusion defect in all the apical 







 segments of the LV.4. Normal resting LV function. No deterioration 







 of function is noted with pharmacologic stress.5. Extracardiac 







 tracer distribution is normal.6. No previous [facility] study for 







 comparison.







  







 NONINVASIVE RISK STRATIFICATION: 







 The above findings are considered intermediate risk (1% to 3% annual 







 mortality rate) based on the following criterion: 







 - Mild/moderate resting left ventricular dysfunction (LVEF 35% to 49%)







 - Stress-induced moderate perfusion defect without LV dilation or 







 increased







 lung intake (thallium-201)







 (JACC. 2012;59(9):857-66.)







  







 Signed: Felton Erickson MD







 Report Verified Date/Time:2018 14:59:03







  







 Reading Location: [address] Reading Room







 Electronically signed by: FELTON ERICKSON MD on 2018 02:59 
PM







 









 Procedure Note

 

 Interface, External Ris In - 2018  3:01 PM CDT



FINAL REPORT



 



 PATIENT ID:   [id number]



 



 PROCEDURE:      Rest/Stress MYOCARDIAL PERFUSION PET with



 regadenoson\XA9\



 



 CPT CODE:          57769



 



 INDICATION:      Chest pain, risk factors for CAD



 



 HISTORY:          Cardiac risk factors: Diabetes, hypertension,



 tobacco use. Other cardiovascular history: No reported CAD. Recent



 cardiac symptoms: Chest pain, dyspnea. Current cardiovascular-related



 medications: Metoprolol.



 



 PROTOCOL:      Limited low-dose CT imaging was performed for



 attenuation correction. 40.0 mCi of Rb-82 chloride was injected iv at



 rest, and gated PET (positron emission tomography) images were



 obtained. Subsequently, 40.1 mCi of Rb-82 chloride was injected iv at



 expected peak pharmacologic effect, and gated PET images were



 obtained.



 



 PRELIMINARY STRESS TEST DATA FROM NONINVASIVE CARDIOLOGY:



 Pharmacologic stress was by 10-second iv infusion of 0.4 mg of



 regadenoson. Radiotracer was injected 30 seconds after start of



 stress. Heart rate was 70 beats/min at rest and 85 beats/min (61% of



 MPHR) at tracer injection. BP was 90/58 mmHg at rest and 105/52 mmHg



 at tracer injection. Stress was stopped for predetermined endpoint.



 The patient experienced abdominal discomfort; treatment was not



 required. Preliminary ECG evaluation revealed normal sinus rhythm,



 nonspecific interventricular conduction delay at rest and no ischemic



 changes with stress. (Final ECG interpretation and other stress and



 monitoring data are reported separately by Cardiology.)



 



 IMAGING FINDINGS:        Study quality is good. Images obtained after



 stress injection show decreased tracer uptake in the apex. Resting



 images show mostly improved tracer uptake in the apex. LV volume



 appears normal. RV volume appears normal. Gated images obtained



 immediately after stress show normal LV wall motion. Gated images



 obtained at rest show normal LV wall motion. LVEF at rest is 58%.



 LVEF at stress is 64%.



 



 IMPRESSION:   1. Abnormal study.  2. Appropriate pharmacologic



 stress.  3. Abnormal myocardial perfusion.  There is a mild severity,



 medium size, mildly reversible, perfusion defect in all the apical



 segments of the LV.  4. Normal resting LV function. No deterioration



 of function is noted with pharmacologic stress.  5. Extracardiac



 tracer distribution is normal.  6. No previous [facility] study for



 comparison.



 



 NONINVASIVE RISK STRATIFICATION:



 The above findings are considered intermediate risk (1% to 3% annual



 mortality rate) based on the following criterion:



 - Mild/moderate resting left ventricular dysfunction (LVEF 35% to 49%)



 - Stress-induced moderate perfusion defect without LV dilation or



 increased



 lung intake (thallium-201)



 (JACC. 2012;59(9):327-31.)



 



 Signed: Felton Erickson MD



 Report Verified Date/Time:  2018 14:59:03



 



 Reading Location: [address] Reading Room



     Electronically signed by: FELTON ERICKSON MD on 2018 02:59 PM



 



Treadmill tolerance(Non-Nuclear Treadmill) (2018 11:58 AM)





 Specimen  Performing Laboratory

 

   GE [facility]









 Narrative

 

 Protocol Name Regadenoson 







 Time In Exercise Phase 00:01:00 







 Max. Systolic  mmHg







 Max Diastolic BP 52 mmHg







 Max Heart Rate 85 BPM







 Max Predicted Heart Rate 138 BPM







 Reason For Termination Predetermined end point 







 Reason for Test Chest Pain 







 Target HR Formula (220 - Age)*100% 







 Arrhythmias ventricular premature beats-isolated 







 Resting ECG Normal sinus rhythm 







 Nonspecific Intraventricular Conduction Delay:Pos







 ST Changes No Significant Changes 







 Overall Impression Indeterminate due to pharmacological stress 







 Chest Pain CHEST TIGHTNESS 







 HR Response To Exercise







 BP Response To Exercise







  







 metoprolol







 Interpretation:indeterminate gxt/mibi to follow







 Confirmed by fellow Alexy Haile ([id number]) on 2018 1:18:29 PM







 Confirmed by MD AGRAWAL MAJID ([id number]) on 2018 3:56:57 PM









 Procedure Note

 

 Interface, External Ris In - 2018  3:57 PM CDT



Protocol Name Regadenoson



 Time In Exercise Phase 00:01:00



 Max. Systolic  mmHg



 Max Diastolic BP 52 mmHg



 Max Heart Rate 85 BPM



 Max Predicted Heart Rate 138 BPM



 Reason For Termination Predetermined end point



 Reason for Test Chest Pain



 Target HR Formula (220 - Age)*100%



 Arrhythmias ventricular premature beats-isolated



 Resting ECG Normal sinus rhythm



 Nonspecific Intraventricular Conduction Delay:Pos



 ST Changes No Significant Changes



 Overall Impression Indeterminate due to pharmacological stress



 Chest Pain CHEST TIGHTNESS



 HR Response To Exercise



 BP Response To Exercise



 



 metoprolol



 Interpretation:  indeterminate gxt/mibi to follow



 Confirmed by fellow Alexy Haile ([id number]) on 2018 1:18:29 PM



 Confirmed by MD AGRAWAL MAJID ([id number]) on 2018 3:56:57 PM



Lactic acid, arterial, whole blood (2018  9:57 AM)





 Component  Value  Ref Range

 

 Lactate, Art  1.1  0.5 - 2.2 mmol/L









 Specimen  Performing Laboratory

 

 Blood, Arterial - Central Venous Line  [address] [address]









 Narrative

 

  







 Effective 2016: Units/Reference Range Change







 New: 0.5-2.2 mmol/LPrevious: 5-20 mg/dL



TSH/Free T4 If Indicated (2018  4:34 AM)





 Component  Value  Ref Range

 

 TSH  1.55  0.35 - 4.94 uIU/mL









 Specimen  Performing Laboratory

 

 Blood - Central Venous Line  [address] [address]



Hepatitis panel, acute (2018  4:34 AM)





 Component  Value  Ref Range

 

 Hep A IgM  Nonreactive  Nonreactive

 

 Hep B C IgM  Nonreactive  Nonreactive

 

 Hepatitis C Ab  Nonreactive  Nonreactive

 

 hepatitis B Surface Ag  Nonreactive  Nonreactive









 Specimen  Performing Laboratory

 

 Blood - Central Venous Line  [address] [address]



Vitamin D, 25-Hydroxy (2018  4:34 AM)





 Component  Value  Ref Range

 

 Vitamin D 25-Hydroxy  14.3  6.6 - 49.9 ng/mL









 Specimen  Performing Laboratory

 

 Blood - Central Venous Line  [address] [address]









 Narrative

 

  







 Effective 10/11/2017: Reference Range Change







 New: 6.6-49.9 ng/mL Previous: 13.0-47.8 ng/mL







  







 Recommended Vitamin D Target Range: 30.0-40.0 ng/mL



Complement Component C3 (2018  4:34 AM)





 Component  Value  Ref Range

 

 C3 Complement  82  82 - 193 mg/dL









 Specimen  Performing Laboratory

 

 Blood - Central Venous Line  [address] [address]



Complement Component C4 (2018  4:34 AM)





 Component  Value  Ref Range

 

 C4 Complement  23  15 - 57 mg/dL









 Specimen  Performing Laboratory

 

 Blood - Central Venous Line  [address] [address]



Uric acid (2018  4:34 AM)





 Component  Value  Ref Range

 

 Uric Acid  3.9  2.6 - 7.2 mg/dL









 Specimen  Performing Laboratory

 

 Blood - Central Venous Line  [address] [address]



PTH, intact (2018  4:34 AM)





 Component  Value  Ref Range

 

 PTH  247.5 (H)  8.5 - 72.5 pg/mL









 Specimen  Performing Laboratory

 

 Blood - Central Venous Line  [address] [address]



Creatine Kinase (CK) (2018  4:34 AM)





 Component  Value  Ref Range

 

 Total CK  97  29 - 200 U/L









 Specimen  Performing Laboratory

 

 Blood - Central Venous Line  [address] [address]



TRANSFUSION SERVICE REPORT - SCAN (2018  6:01 PM)Venous doppler arms 
bilateral (2018  4:55 PM)





 Component  Value  Ref Range

 

 Ejection Fraction    









 Specimen  Performing Laboratory

 

   SLE ECHO HEARTLAB MKCKESSON CPACS









 Impressions

 

 Right Impression







 1. The jugular vein is not visualized due to invasive line placement.







 2. There is no deep venous obstruction in the subclavian, axillary,







 brachial, radial or ulnar veins.







 3. There is no superficial venous obstruction in the cephalic or basilic







 veins.







 Left Impression







 1. There is no deep venous obstruction in the jugular, subclavian, axillary,







 brachial, radial or ulnar veins.







 2. There is no superficial venous obstruction in the cephalic or basilic







 veins.







  







  Conclusions







  







  Summary







  







  Venous duplex imaging and compression of the bilateral upper extremities







  was performed. The veins were adequately visualized except as noted above.







  The bilateral venous systems were patent and compressible with no evidence







  of thrombus where visualized.







  







  Signature







  







  ----------------------------------------------------------------







  Electronically signed by BRITTANY Feliciano MD,







  RPVI(Interpreting physician) on 2018 04:39 AM







  ----------------------------------------------------------------







  







 Velocities are measured in cm/s ; Diameters are measured in cm







 









 Narrative

 

 [facility] - Upper Extremities Veins







  







  Demographics







  







  Patient Name PINKY PÉREZ Date of Study 2018







  







  [id number]



 Age 83







  







  Visit Number [id number] GenderMale







  







  Accession Number [id number] Date of Birth 1935







  







  [id number]







  Physician







  







  SonographAntonio Prescott InterpretingBRITTANY Feliciano MD,







 RVSPsician




  RPVI







  







 Procedure







 Type of Study:







  







  Veins: Upper Extremities Veins, VENOUS DOPPLER ARMS, BILATERAL.







  







 Indications for Study:Edema.







  







 Patient Status:Routine.







 Study Location:Portable.







 Technical Quality:Adequate visualization.







  







 Risk Factors







 History of Disease







 +------------------+----------+--------------------------------------------+







 !Diagnosis



 !Date!Comments




 !







 +------------------+----------+--------------------------------------------+







 !History/Risk!2018!CHF, DM, MARIANNE, Leukocytosis, Muscle atrophy
, !







 !Factors:!!Morbid



 obesity!







 +------------------+----------+--------------------------------------------+







 









 Procedure Note

 

 Interface, External Ris In - 2018  4:39 AM CDT



[facility] - Upper Extremities Veins



 



  Demographics



 



  Patient Name     PINKY PÉREZ     Date of Study     2018



 



  MRN              [id number]         Age               83



 



  Visit Number     [id number]       Gender            Male



 



  Accession Number [id number]         Date of Birth     1935



 



  Referring        [facility] S    Room Number       6A09



  Physician



 



  Sonographer      Dayday Prescott Interpreting      JXin Feliciano MD,



                   RVS              Physician         RPVI



 



 Procedure



 Type of Study:



 



  Veins: Upper Extremities Veins, VENOUS DOPPLER ARMS, BILATERAL.



 



 Indications for Study:Edema.



 



 Patient Status:Routine.



 Study Location:Portable.



 Technical Quality:Adequate visualization.



 



 Risk Factors



 History of Disease



 +------------------+----------+--------------------------------------------+



 !Diagnosis         !Date      !Comments                                    !



 +------------------+----------+--------------------------------------------+



 !History/Risk      !2018!CHF, DM, MARIANNE, Leukocytosis, Muscle atrophy, !



 !Factors:          !          !Morbid obesity                              !



 +------------------+----------+--------------------------------------------+



 



 Impressions



 Right Impression



 1. The jugular vein is not visualized due to invasive line placement.



 2. There is no deep venous obstruction in the subclavian, axillary,



 brachial, radial or ulnar veins.



 3. There is no superficial venous obstruction in the cephalic or basilic



 veins.



 Left Impression



 1. There is no deep venous obstruction in the jugular, subclavian, axillary,



 brachial, radial or ulnar veins.



 2. There is no superficial venous obstruction in the cephalic or basilic



 veins.



 



  Conclusions



 



  Summary



 



  Venous duplex imaging and compression of the bilateral upper extremities



  was performed. The veins were adequately visualized except as noted above.



  The bilateral venous systems were patent and compressible with no evidence



  of thrombus where visualized.



 



  Signature



 



  ----------------------------------------------------------------



  Electronically signed by BRITTANY Feliciano MD,



  RPVI(Interpreting physician) on 2018 04:39 AM



  ----------------------------------------------------------------



 



 Velocities are measured in cm/s ; Diameters are measured in cm



Urine Protein Electrophoresis, random (2018  4:12 PM)





 Component  Value  Ref Range

 

 Protein, Urine  101 (H)  0 - 14 mg/dL

 

 Albumin %, Urine  45.1  %

 

 Globulin %, Urine  54.9  %

 

 UPEP,ID  No monoclonal bands detected.  

 

 Pathologist:  Jocelynn Gonzalez MD (electronic signature)  









 Specimen  Performing Laboratory

 

 Urine - Urine, [address] [address]



ECG 12 lead (2018  4:03 PM)Only the most recent of2 resultswithin the 
time period is included.





 Specimen  Performing Laboratory

 

   GE [facility]









 Narrative

 

 Ventricular Rate 90 BPM







 Atrial Rate 90 BPM







 P-R Interval 158 ms







 QRS Duration 96 ms







 Q-T Interval 472 ms







 QTC Calculation(Bazett) 577 ms







 P Axis 53 degrees







 R Axis 14 degrees







 T Axis 64 degrees







  







 Normal sinus rhythm







 ST & T wave abnormality, consider inferior ischemia







 Prolonged QT







 Abnormal ECG







 When compared with ECG of 2018 09:59,







 Non-specific change in ST segment in Anterior leads







 T wave inversion now evident in Inferior leads







 QT has lengthened







 Confirmed by MD AKILA, JAMISON CONNER ([id number]) on 2018 5:07:59 PM









 Procedure Note

 

 Interface, External Ris In - 2018  5:08 PM CDT



Ventricular Rate 90 BPM



 Atrial Rate 90 BPM



 P-R Interval 158 ms



 QRS Duration 96 ms



 Q-T Interval 472 ms



 QTC Calculation(Bazett) 577 ms



 P Axis 53 degrees



 R Axis 14 degrees



 T Axis 64 degrees



 



 Normal sinus rhythm



 ST & T wave abnormality, consider inferior ischemia



 Prolonged QT



 Abnormal ECG



 When compared with ECG of 2018 09:59,



 Non-specific change in ST segment in Anterior leads



 T wave inversion now evident in Inferior leads



 QT has lengthened



 Confirmed by MD AKILA, JAMISON CONNER ([id number]) on 2018 5:07:59 PM



Sodium, random urine (2018  3:27 PM)





 Component  Value  Ref Range

 

 Sodium Urine  91  meq/L









 Specimen  Performing Laboratory

 

 Urine - Urine, [address] [address]









 Narrative

 

  







 Reference Range: No Normals



Protein, random urine (2018  3:27 PM)





 Component  Value  Ref Range

 

 Protein, Urine  94 (H)  0 - 14 mg/dL









 Specimen  Performing Laboratory

 

 Urine - Urine, [address] [address]



Creatinine, random urine (2018  3:27 PM)





 Component  Value  Ref Range

 

 Creatinine, Ur  <5.0  mg/dL









 Specimen  Performing Laboratory

 

 Urine - Urine, [address] [address]









 Narrative

 

  







 Reference Range: No Normals



Troponin I (2018  3:24 PM)Only the most recent of5 resultswithin the time 
period is included.





 Component  Value  Ref Range

 

 Troponin I  0.06 (H)  0.00 - 0.03 ng/mL









 Specimen  Performing Laboratory

 

 Blood  [address] [address]









 Narrative

 

  







 Troponin I (TnI) levels must be interpreted in the context of the presenting 
symptoms



 and the clinical findings. Elevated TnI levels indicate myocardial damage, but 
are



 not specific for ischemic heart disease. Elevated TnI levels are seen in 
patients



 with other cardiac conditions (including myocarditis and congestive heart 
failure),



 and slight TnI elevations occur in patients with other conditions, including 
sepsis,



 renal failure, acidosis, acute neurological disease, and persistent 
tachyarrhythmia.



Protein electrophoresis, serum (2018  3:24 PM)





 Component  Value  Ref Range

 

 Albumin Fraction  2.5 (L)  3.5 - 5.5 g/dL

 

 Alpha 1 Fraction  0.3  0.2 - 0.4 g/dL

 

 Alpha 2 Fraction  0.7  0.5 - 0.9 g/dL

 

 Beta Fraction  0.8  0.6 - 1.1 g/dL

 

 Gamma Globulin Fraction  1.1  0.7 - 1.7 g/dL

 

 Interpretation  Decreased albumin with concurrent relative  



   increases in all globulin fractions. No  



   monoclonal bands detected.  

 

 Pathologist:  Jocelynn Gonzalez MD (electronic signature)  

 

 Protein, Total  5.3 (L)  6.0 - 8.3 gm/dL









 Specimen  Performing Laboratory

 

 Blood  [address] [address]



HIV-1 Antigen with HIV-1/2 Antibody (2018  3:21 PM)





 Component  Value  Ref Range

 

 HIV-1 Antigen with HIV 1&2 Antibody  Nonreactive  Nonreactive









 Specimen  Performing Laboratory

 

 Blood - Central Venous Line  [address] [address]



Glomerular basement membrane antibody (2018  3:21 PM)





 Component  Value  Ref Range

 

 GBM Ab  <1.0  <1.0 AI



   Comment:  



     



   Interpretation:  



    < 1.0 AI No Antibody Detected  



   > OR=1.0 AI Antibody Detected  









 Specimen  Performing Laboratory

 

 Blood - Central Venous Line  [facility]







   [facility]







   [id number] [place] [address]









 Narrative

 

 Performing Lab







  EZ







  [facility] [address] [address]







  CLAUDIA Cabrera MD, PhD, ZULAY



Rheumatoid factor Ab, reflex to titer (2018  3:21 PM)





 Component  Value  Ref Range

 

 Rheumatoid Factor  Positive  









 Specimen  Performing Laboratory

 

 Blood - Central Venous Line  [address] [address]



Anti-Neutrophil Cytoplasmic Ab (ANCA) (2018  3:21 PM)





 Component  Value  Ref Range

 

 Proteinase-3 Ab  <1.0  <1.0 AI



   Comment:  



     



   <1.0 AI No Antibody Detected  



   > or=1.0 AI Antibody Detected  



     



   Autoantibodies to proteinase-3 (WI-3) are accepted as characteristic for  



   granulomatosis with polyangiitis (GPA, Wegener's), and are detectable in 95%
  



   of the histologically proven cases. The cytoplasmic IFA pattern, (c-ANCA), 
is  



   based largely on autoantibody to WI-3 which serves as the primary antigen.  



   These autoantibodies are present in active disease.  

 

 Myeloperoxidase Ab  <1.0  <1.0 AI



   Comment:  



     



   <1.0 AI No Antibody Detected  



   > or=1.0 AI Antibody Detected  



     



   Autoantibodies to myeloperoxidase (MPO) are commonly associated with the  



   following small-vessel vasculitides: microscopic polyangiitis, polyarteritis
  



   nodosa, Churg-Salomón syndrome, necrotizing and crescentic 
glomerulonephritis  



   and occasionally granulomatosis with polyangiitis (GPA, Wegener's). The  



   perinuclear IFA pattern, (p-ANCA) is based largely on autoantibody  



   to myeloperoxidase which serves as the primary antigen. These autoantibodies
  



   are present in active disease.  









 Specimen  Performing Laboratory

 

 Blood - Central Venous Line  [facility]







   [facility]







   [address] [place] [address]









 Narrative

 

 Performing Lab







  EZ







  [facility] [place] [id number] [place] [address]







  CLAUDIA Cabrera MD, PhD, ZULAY



Rheumatoid factor titer (2018  3:21 PM)





 Component  Value  Ref Range

 

 Rheumatoid Factor TTR  1:8  









 Specimen  Performing Laboratory

 

 Blood - Central Venous Line  [address] [address]



Anti-Nuclear Antibody (MARSHA) (2018  3:21 PM)





 Component  Value  Ref Range

 

 MARSHA  Negative  Negative









 Specimen  Performing Laboratory

 

 Blood - Central Venous Line  [address] [address]



Lactic acid, venous, whole blood (2018  6:17 AM)Only the most recent of5 
resultswithin the time period is included.





 Component  Value  Ref Range

 

 Lactate, Venous  3.8 (H)Comment: Specimen slightly hemolyzed  0.5 - 2.2 mmol/L









 Specimen  Performing Laboratory

 

 Blood - Central Venous Line  [address] [address]









 Narrative

 

  







 Effective 2016: Units/Reference Range Change







 New: 0.5-2.2 mmol/LPrevious: 5-20 mg/dL



Potassium (2018  6:17 AM)Only the most recent of4 resultswithin the time 
period is included.





 Component  Value  Ref Range

 

 Potassium  4.6  3.5 - 5.1 meq/L









 Specimen  Performing Laboratory

 

 Blood - Central Venous Line  [address] [address]



Creatine Kinase (CK), Total and MB (2018  6:17 AM)Only the most recent 
of4 resultswithin the time period is included.





 Component  Value  Ref Range

 

 Total CK  141  29 - 200 U/L

 

 CK-MB  7.9 (H)  0.0 - 6.6 ng/mL

 

 MB Relative Index  5.6  %









 Specimen  Performing Laboratory

 

 Blood - Central Venous Line  [facility]







   [address] [place] [id number]









 Narrative

 

 CK-MB Reference Range:







 <6.7Normal







 6.7-10.0Borderline







 >10.0 Abnormal



ECHOCARDIOGRAM REPORT - SCAN (2018  5:14 PM)Central Line (2018  2:
43 PM)





 Narrative

 

 José Miguel Alonso [facility] [address]:43 PM







 Central Line







 Date/Time: 2018 8:00 AM







 Performed by: JOSÉ MIGUEL ALONSO







 Authorized by: JOSÉ MIGUEL ALONSO 







 Consent: The procedure was performed in an emergent situation. Verbal 







 consent obtained.







 Risks and benefits: risks, benefits and alternatives were discussed







 Consent given by: patient







 Patient understanding: patient states understanding of the procedure being 







 performed







 Patient consent: the patient's understanding of the procedure matches 







 consent given







 Procedure consent: procedure consent matches procedure scheduled







 Relevant documents: relevant documents present and verified







 Test results: test results available and properly labeled







 Site marked: the operative site was marked







 Imaging studies: imaging studies available







 Required items: required blood products, implants, devices, and special 







 equipment available







 Patient identity confirmed: verbally with patient, arm band and 







 hospital-assigned identification number







 Time out: Immediately prior to procedure a "time out" was called to verify 







 the correct patient, procedure, equipment, support staff and site/side 







 marked as required.







 Indications: vascular access







 Anesthesia: local infiltration







  







 Anesthesia:







 Local Anesthetic: lidocaine 2% without epinephrine







 Anesthetic total: 5 mL







  







 Sedation:







 Patient sedated: no







 Preparation: skin prepped with 2% chlorhexidine and skin prepped with 







 ChloraPrep







 Skin prep agent dried: skin prep agent completely dried prior to procedure







 Sterile barriers: all five maximum sterile barriers used - cap, mask, 







 sterile gown, sterile gloves, and large sterile sheet







 Hand hygiene: hand hygiene performed prior to central venous catheter 







 insertion







 Location details: right internal jugular







 Patient position: Trendelenburg







 Catheter type: triple lumen







 Catheter size: 14 Fr







 Pre-procedure: landmarks identified







 Ultrasound guidance: yes







 Sterile ultrasound techniques: sterile gel and sterile probe covers were 







 used







 Number of attempts: 1







 Successful placement: yes







 Post-procedure: line sutured and dressing applied







 Assessment: blood return through all ports,free fluid flow,placement 







 verified by x-ray and no pneumothorax on x-ray







 Patient tolerance: Patient tolerated the procedure well with no immediate 







 complications







 Immediate Post-Procedure Note







  







 Date/Time: 2018 8:00 AM







 Assistants to the procedure: None







 Pre-procedure diagnosis: MARIANNE with symptomatic hyperkalemia







 Post-procedure diagnosis: MARIANNE with symptomatic Hyperkalemia







 Procedures Performed: Central Line







 Specimens removed: None







 Estimated blood loss (mL): None







 Complications: None







 Type of anesthesia: None







 Grafts or Implants: None







  







 Comments: Wire removed from patient, all sharps accounted for and disposed 







 of properly.







  







 



2D Echo W/Doppler(CW/PW/Color) (2018  2:12 PM)





 Component  Value  Ref Range

 

 Ejection Fraction    









 Specimen  Performing Laboratory

 

   [facility] ECHO HEARTLAB [facility]









 Narrative

 

 Transthoracic Echocardiography Report (TTE)







  







  Demographics







  







  Patient Name PINKY PÉREZ Date of Study 2018







  







  [id number]



 GenderMale







  







  Visit Number [id number] Race
Unknown







  







  [id number] Number [id number]







  Number







  







  Date of Birth1935 Referring Physician Jumana Montez MD







  







  Age84 year(s) Sonographer 
ISHA Vallejo,Interpreting
Jamison Anthony MD







 [facility] Physician







  







 Procedure







  







  Type of







  Study TTE procedure:2DECHO W DOPPLER(CW/PW/COLOR) (STAT)







  







 Indications:Known or suspected heart failure.







  







 Clinical History







 DIABETES, BRADYCARDIA, OBESITY







  







 Contrast Medium: Definity. Amount - 2 ml







  







 Height: 68 inches Weight: 106.59 kg (235 lbs) BSA: 2.19 m^2 BMI: 35.73







 kg/m^2







  







 HR: 91 bpm BP: 121/57 mmHg







  







  Summary







  The left ventricle is chamber size (by vol index) is normal (male - LVED







  vol - 34-74ml/m2). Normal LV wall thickness. All of the LV segments are







  hyperkinetic . Global LV systolic function hyperdynamic . LVEF by







  Petit's method of disk assessment is increased (>70%) .







  Grade 1 diastolic dysfunction (impaired relaxation and low-normal LA







  pressure).







  Estimated peak systolic PA pressure is 40-45 mmHg .







  







  Previous Study







  No prior exam available for comparison.







  







  Signature







  







  ----------------------------------------------------------------







  Electronically signed by Jamison Anthony MD(Interpreting







  physician) on 2018 04:24 PM







  ----------------------------------------------------------------







  







  Findings







  







 Technical Quality: Technically difficult exam.







  







  Left Ventricle LV endocardium is adequately visualized with IV







 ultrasound enhancing agent.







 The left ventricle is chamber 
size



 (by vol index)







 is normal (male - LVED vol -



 34-74ml/m2).







 Normal LV wall thickness.







 All of the LV segments are



 hyperkinetic .







 Global LV systolic function



 hyperdynamic .







 LVEF by Petit's method of 
disk



 assessment is







 increased (>70%) .







 The LVEF was measured using 
Petit's



 bi-plane







 method of disk .







 High (cardiac index >4 L/min/m2
)



 cardiac output







 state at rest is noted.







 Grade 1 diastolic dysfunction



 (impaired relaxation







 and low-normal LA pressure).







  







  Left AtriumLA size is normal (16-34 ml/m2) .







  







  Right VentricleThe right ventricular chamber size and systolic







 function are within normal 
limits.







  







  Right Atrium RA cavity size is normal .







  







  Aortic Valve Normal AoV structure and function.







  







  Mitral Valve Normal MV structure and function.







  







  Tricuspid ValveTV structure is normal.







 Mild tricuspid regurgitation.







 Estimated peak systolic PA 
pressure



 is 40-45 mmHg .







  







  Pulmonic Valve Normal PV structure and function by limited 
views







 and Doppler.







  







  AortaAortic root size (SInus of Valsalva



 diameter) is







 normal .







  







  PericardiumNo pericardial effusion is visualized.







  







  IVC/SVC/PA/PV/PleuralThe estimated RA pressure by IVC dynamics 0-5mmHg .







 The inferior vena cava size is 
normal



 .







 The inferior vena cava is 
partially



 visualized.







  







 Chambers/Structures







  







  Left Atrium







  







  LA Volume: 58.11 ml LA Area: 18.55 cm^
2







  LA Vol. Index: 27 ml/m^2







  







  Left Ventricle







  







  LVIDd: 3.57 cm







  LV Septum Diastolic: 1.02 cm







  LV PW Diastolic: 1.12 cm







  LVEDV Petit's:135.21 ml







  LVESV Petit's:36.43 ml







  LVEF Petit's: 73 %LVEDVI: 62



 ml/m^2







 




  LVESVI: 17 ml/m^2







  LVOT Diameter: 2.38 cm







  







 Doppler/Quantitative Measurements







  







  Mitral Valve







  







  MV Peak E-Wave: 0.83 m/sMV Peak A-Wave: 1.05 m/s







  E/
A



 Ratio: 0.79







  
Peak



 Gradient: 2.73 mmHg







 



 Deceleration Time: 235.7 msec







  







  MV Gerson. Peak:







  







  Tissue Doppler







  







  E' Septal Velocity: 0.06 m/sE/E': 14.35







  







  Aortic Valve







  







  Peak Velocity: 1.51 m/sMean Velocity: 
1.07 m/s







  Peak Gradient: 9.18 mmHg Mean Gradient: 5.05 
mmHg







  AV Area (continuity): 3.87 cm^2







  AV VTI: 27.63 cm







  







  AV DVI: 0.87







  







  LVOT







  







  Peak Velocity: 1.26 m/s Peak Gradient: 6.39 mmHg







  Mean Velocity: 0.87 m/s Mean Gradient: 3.37 mmHg







  LVOT Diameter: 2.38 cmLVOT VTI: 24.02 cm







  LVOT Area: 4.45 cm^2LVOT SV:106.81 ml







  LVOT CO: 9.72 l/min LVOT CI: 4.44 l/min/m^2







  







  Tricuspid Valve







  







  TR Velocity: 3.05 m/s







  TR Gradient: 37.24 mmHg







 









 Procedure Note

 

 Interface, External Ris In - 2018  4:24 PM CDT



Transthoracic Echocardiography Report (TTE)



 



  Demographics



 



  Patient Name   PINKY PÉREZ     Date of Study       2018



 



  MRN            [id number]         Gender              Male



 



  Visit Number   [id number]       Race                Unknown



 



  Accession      [id number]        Room Number         6A09



  Number



 



  Date of Birth  1935       Referring Physician Jumana Montez MD



 



  Age            83 year(s)       Sonographer         Noe Rosario, [facility]



 



  Analyst        Nupur Lynn,    Maricarmen Anthony MD



                 [facility]             Physician



 



 Procedure



 



  Type of



  Study     TTE procedure:2DECHO W DOPPLER(CW/PW/COLOR) (STAT)



 



 Indications:Known or suspected heart failure.



 



 Clinical History



 DIABETES, BRADYCARDIA, OBESITY



 



 Contrast Medium: Definity. Amount - 2 ml



 



 Height: 68 inches Weight: 106.59 kg (235 lbs) BSA: 2.19 m^2 BMI: 35.73



 kg/m^2



 



 HR: 91 bpm BP: 121/57 mmHg



 



  Summary



  The left ventricle is chamber size (by vol index) is normal (male - LVED



  vol - 34-74ml/m2). Normal LV wall thickness. All of the LV segments are



  hyperkinetic . Global LV systolic function hyperdynamic . LVEF by



  Petit's method of disk assessment is increased (>70%) .



  Grade 1 diastolic dysfunction (impaired relaxation and low-normal LA



  pressure).



  Estimated peak systolic PA pressure is 40-45 mmHg .



 



  Previous Study



  No prior exam available for comparison.



 



  Signature



 



  ----------------------------------------------------------------



  Electronically signed by Jamison Anthony MD(Interpreting



  physician) on 2018 04:24 PM



  ----------------------------------------------------------------



 



  Findings



 



 Technical Quality: Technically difficult exam.



 



  Left Ventricle         LV endocardium is adequately visualized with IV



                         ultrasound enhancing agent.



                         The left ventricle is chamber size (by vol index)



                         is normal (male - LVED vol - 34-74ml/m2).



                         Normal LV wall thickness.



                         All of the LV segments are hyperkinetic .



                         Global LV systolic function hyperdynamic .



                         LVEF by Petit's method of disk assessment is



                         increased (>70%) .



                         The LVEF was measured using Petit's bi-plane



                         method of disk .



                         High (cardiac index >4 L/min/m2) cardiac output



                         state at rest is noted.



                         Grade 1 diastolic dysfunction (impaired relaxation



                         and low-normal LA pressure).



 



  Left Atrium            LA size is normal (16-34 ml/m2) .



 



  Right Ventricle        The right ventricular chamber size and systolic



                         function are within normal limits.



 



  Right Atrium           RA cavity size is normal .



 



  Aortic Valve           Normal AoV structure and function.



 



  Mitral Valve           Normal MV structure and function.



 



  Tricuspid Valve        TV structure is normal.



                         Mild tricuspid regurgitation.



                         Estimated peak systolic PA pressure is 40-45 mmHg .



 



  Pulmonic Valve         Normal PV structure and function by limited views



                         and Doppler.



 



  Aorta                  Aortic root size (SInus of Valsalva diameter) is



                         normal .



 



  Pericardium            No pericardial effusion is visualized.



 



  IVC/SVC/PA/PV/Pleural  The estimated RA pressure by IVC dynamics 0-5mmHg .



                         The inferior vena cava size is normal .



                         The inferior vena cava is partially visualized.



 



 Chambers/Structures



 



  Left Atrium



 



  LA Volume: 58.11 ml                     LA Area: 18.55 cm^2



  LA Vol. Index: 27 ml/m^2



 



  Left Ventricle



 



  LVIDd: 3.57 cm



  LV Septum Diastolic: 1.02 cm



  LV PW Diastolic: 1.12 cm



  LVEDV Petit's:135.21 ml



  LVESV Petit's:36.43 ml



  LVEF Petit's: 73 %                        LVEDVI: 62 ml/m^2



                                              LVESVI: 17 ml/m^2



  LVOT Diameter: 2.38 cm



 



 Doppler/Quantitative Measurements



 



  Mitral Valve



 



  MV Peak E-Wave: 0.83 m/s              MV Peak A-Wave: 1.05 m/s



                                        E/A Ratio: 0.79



                                        Peak Gradient: 2.73 mmHg



                                        Deceleration Time: 235.7 msec



 



  MV Gerson. Peak:



 



  Tissue Doppler



 



  E' Septal Velocity: 0.06 m/s          E/E': 14.35



 



  Aortic Valve



 



  Peak Velocity: 1.51 m/s                  Mean Velocity: 1.07 m/s



  Peak Gradient: 9.18 mmHg                 Mean Gradient: 5.05 mmHg



  AV Area (continuity): 3.87 cm^2



  AV VTI: 27.63 cm



 



  AV DVI: 0.87



 



  LVOT



 



  Peak Velocity: 1.26 m/s             Peak Gradient: 6.39 mmHg



  Mean Velocity: 0.87 m/s             Mean Gradient: 3.37 mmHg



  LVOT Diameter: 2.38 cm              LVOT VTI: 24.02 cm



  LVOT Area: 4.45 cm^2                LVOT SV:106.81 ml



  LVOT CO: 9.72 l/min                 LVOT CI: 4.44 l/min/m^2



 



  Tricuspid Valve



 



  TR Velocity: 3.05 m/s



  TR Gradient: 37.24 mmHg



 



US renal complete (2018  1:48 PM)





 Specimen  Performing Laboratory

 

   [employer]

 

 FINAL REPORT







  







 PATIENT ID: [id number]







  







 Ultrasound of the Kidneys







  







 Clinical History:marianne







  







 Discussion:







  







 Sonographic evaluation of the kidneys is performed.







  







 Right kidney:10 x 4.7 x 4 cm, with cortical thickness of 1.1 cm.







 Normal cortical echogenicity.No mass.No shadowing calculus. No 







 hydronephrosis.







  







 Left kidney: 10.2 x 4.5 x 4.7 cm, with cortical thickness of 1.3 cm.







 Normal cortical echogenicity.No mass.No shadowing calculus.No 







 hydronephrosis.







  







 Limited doppler evaluation of right main renal artery and vein 







 demonstrate patency. Left renal vessels are poorly visualized due to 







 patient's body habitus.







  







 Bladder:Decompressed with Grimes catheter.







  







 Impression:







  







 No hydronephrosis.







  







 Signed: Rancho Rasheed MD







 Report Verified Date/Time:2018 16:16:10







  







 Reading Location: [address] Ultrasound Reading Room







 Electronically signed by: RANCHO RASHEED M.D. on 2018 04:16 
PM







 









 Procedure Note

 

 Interface, External Ris In - 2018  4:18 PM CDT



FINAL REPORT



 



 PATIENT ID:   [id number]



 



 Ultrasound of the Kidneys



 



 Clinical History:  marianne



 



 Discussion:



 



 Sonographic evaluation of the kidneys is performed.



 



 Right kidney:  10 x 4.7 x 4 cm, with cortical thickness of 1.1 cm.



 Normal cortical echogenicity.  No mass.  No shadowing calculus. No



 hydronephrosis.



 



 Left kidney: 10.2 x 4.5 x 4.7 cm, with cortical thickness of 1.3 cm.



 Normal cortical echogenicity.  No mass.  No shadowing calculus.  No



 hydronephrosis.



 



 Limited doppler evaluation of right main renal artery and vein



 demonstrate patency. Left renal vessels are poorly visualized due to



 patient's body habitus.



 



 Bladder:  Decompressed with Grimes catheter.



 



 Impression:



 



 No hydronephrosis.



 



 Signed: Rancho Rasheed MD



 Report Verified Date/Time:  2018 16:16:10



 



 Reading Location: [address] Ultrasound Reading Room



         Electronically signed by: RANCHO RASHEED M.D. on 2018 04:16 PM



 



Blood gas, venous (2018 10:38 AM)Only the most recent of2 resultswithin 
the time period is included.





 Component  Value  Ref Range

 

 pH, Sedrick  7.49 (H)  7.32 - 7.42

 

 pCO2, Sedrick  24 (L)  41 - 51 mmHg

 

 pO2, Sedrick  41 (H)  25 - 40 mmHg

 

 O2 Sat, Sedrick  86.8 (H)  40.0 - 70.0 %

 

 HCO3, Sedrick  18 (L)  21 - 29 mmol/L

 

 Base Excess, Sedrick  -4.6 (L)  -2.0 - 3.0 mmol/L

 

 Patient Temperature  34.5  C

 

 FIO2  36.0  %









 Specimen  Performing Laboratory

 

 Blood  [address] [address]



Hepatitis C antibody (2018 10:15 AM)





 Component  Value  Ref Range

 

 Hepatitis C Ab  Nonreactive  Nonreactive









 Specimen  Performing Laboratory

 

 Blood  [address] [address]



Hepatitis B core antibody, IgM (2018 10:15 AM)





 Component  Value  Ref Range

 

 Hep B C IgM  Nonreactive  Nonreactive









 Specimen  Performing Laboratory

 

 Blood  [address] [address]



Hepatitis B core antibody, total (2018 10:15 AM)





 Component  Value  Ref Range

 

 Hep B Core Total Ab  Nonreactive  Nonreactive









 Specimen  Performing Laboratory

 

 Blood  [address] [address]



Hepatitis B surface antigen (2018 10:15 AM)





 Component  Value  Ref Range

 

 hepatitis B Surface Ag  Nonreactive  Nonreactive









 Specimen  Performing Laboratory

 

 Blood  [address] [address]



Hepatic function panel (2018  8:57 AM)





 Component  Value  Ref Range

 

 Protein, Total  6.3  6.0 - 8.3 gm/dL

 

 Albumin  3.2 (L)  3.5 - 5.0 g/dL

 

 Total Bilirubin  0.4  0.2 - 1.2 mg/dL

 

 Bilirubin, Direct  0.2  0.1 - 0.5 mg/dL

 

 Alkaline Phosphatase  95  40 - 150 U/L

 

 AST  20  5 - 34 U/L

 

 ALT  21  6 - 55 U/L









 Specimen  Performing Laboratory

 

 Blood - Central Venous Line  [address] [address]









 Narrative

 

 Call [phone]



Electrolytes (2018  8:57 AM)





 Component  Value  Ref Range

 

 Sodium  133 (L)  136 - 145 meq/L

 

 Potassium  6.8 (HH)  3.5 - 5.1 meq/L

 

 Chloride  102  98 - 107 meq/L

 

 CO2  8 (LL)  22 - 29 meq/L









 Specimen  Performing Laboratory

 

 Blood - Central Venous Line  [address] [address]









 Narrative

 

 Call [phone]



Type and screen, automated (2018  5:47 AM)





 Component  Value  Ref Range

 

 ABO/RH AUTOMATED (BEAKER)  O POSITIVE  

 

 Ab Scrn  NEGATIVE  









 Specimen  Performing Laboratory

 

 Blood  [address] [address]



Blood culture (2018  5:46 AM)





 Component  Value  Ref Range

 

 Result  No growth in 5 days  









 Specimen  Performing Laboratory

 

 Blood - Central Venous Line  [address] [address]



after 2017

## 2018-07-29 LAB
ALBUMIN SERPL BCP-MCNC: 2.7 G/DL (ref 3.4–5)
ALP SERPL-CCNC: 118 U/L (ref 45–117)
ALT SERPL W P-5'-P-CCNC: 32 U/L (ref 12–78)
AST SERPL W P-5'-P-CCNC: 25 U/L (ref 15–37)
BUN BLD-MCNC: 51 MG/DL (ref 7–18)
BUN BLD-MCNC: 54 MG/DL (ref 7–18)
CKMB CREATINE KINASE MB: 5.8 NG/ML (ref 0.3–3.6)
GLUCOSE SERPLBLD-MCNC: 156 MG/DL (ref 74–106)
GLUCOSE SERPLBLD-MCNC: 185 MG/DL (ref 74–106)
HCT VFR BLD CALC: 22.8 % (ref 39.6–49)
HCT VFR BLD CALC: 22.8 % (ref 39.6–49)
LYMPHOCYTES # SPEC AUTO: 1.2 K/UL (ref 0.7–4.9)
MAGNESIUM SERPL-MCNC: 1.7 MG/DL (ref 1.8–2.4)
MCH RBC QN AUTO: 29.8 PG (ref 27–35)
MCV RBC: 85.7 FL (ref 80–100)
PMV BLD: 7.5 FL (ref 7.6–11.3)
POTASSIUM SERPL-SCNC: 4.1 MMOL/L (ref 3.5–5.1)
POTASSIUM SERPL-SCNC: 4.4 MMOL/L (ref 3.5–5.1)
RBC # BLD: 2.66 M/UL (ref 4.33–5.43)

## 2018-07-29 RX ADMIN — PANTOPRAZOLE SODIUM SCH MG: 40 TABLET, DELAYED RELEASE ORAL at 06:22

## 2018-07-29 RX ADMIN — HUMAN INSULIN SCH: 100 INJECTION, SOLUTION SUBCUTANEOUS at 07:30

## 2018-07-29 RX ADMIN — Medication SCH ML: at 08:54

## 2018-07-29 RX ADMIN — SODIUM CHLORIDE SCH: 0.45 INJECTION, SOLUTION INTRAVENOUS at 04:30

## 2018-07-29 RX ADMIN — DOPAMINE HYDROCHLORIDE PRN MLS: 160 INJECTION, SOLUTION INTRAVENOUS at 17:56

## 2018-07-29 RX ADMIN — DOPAMINE HYDROCHLORIDE PRN MLS: 160 INJECTION, SOLUTION INTRAVENOUS at 01:48

## 2018-07-29 RX ADMIN — Medication SCH ML: at 20:52

## 2018-07-29 RX ADMIN — DOPAMINE HYDROCHLORIDE PRN MLS: 160 INJECTION, SOLUTION INTRAVENOUS at 23:34

## 2018-07-29 RX ADMIN — HUMAN INSULIN SCH: 100 INJECTION, SOLUTION SUBCUTANEOUS at 20:53

## 2018-07-29 RX ADMIN — ALBUMIN (HUMAN) SCH MLS: 5 SOLUTION INTRAVENOUS at 17:44

## 2018-07-29 RX ADMIN — HUMAN INSULIN SCH: 100 INJECTION, SOLUTION SUBCUTANEOUS at 11:30

## 2018-07-29 RX ADMIN — HUMAN INSULIN SCH: 100 INJECTION, SOLUTION SUBCUTANEOUS at 16:14

## 2018-07-29 RX ADMIN — ALBUMIN (HUMAN) SCH MLS: 5 SOLUTION INTRAVENOUS at 18:06

## 2018-07-29 NOTE — CON
History Of Present Illness:  Mr. Avitia came to our hospital with weakness, dizziness, nausea, vomitin
g, basically symptoms of uremia.  He had profound metabolic abnormalities.  I am consulted because he
 was having some bradycardia and hypotension.  Overnight, he has had dialysis.  He feels much better.
  His hemoglobin has fallen from 9.6 to 7.9.  His initial potassium was 6.4, creatinine 5.2.  Now, po
tassium is 4.1, creatinine 3.5.  Blood sugars are all elevated.  Troponins are normal.  N-terminal pr
oBNP is 4126, not surprising with renal failure.  He had some bradycardia with severe sinus bradycard
ias and pauses that were caused by blocked PACs.  All that seems to have resolved since he got dialys
is last night.



Past Medical History:  Significant for hypertension, diabetes, dyslipidemia, renal failure, recently 
had a renal biopsy.  He was told everything was normal, but I think what they meant was that he did n
ot have any renal injury that would respond to steroids or other immune system modulation that was ju
st related to the underlying diabetes and hypertension.



Medications:  We do not have an accurate list of home medicines at this point.  He has been on a dopa
mine drip that is titrated down to 0.



Physical Examination:

General:  He is alert, oriented, pleasant, appears to be at his stated age of 83, 5 feet 8 inches, 22
6 pounds, obese, alert, oriented, pleasant. 

Lungs:  Clear. 

Heart:  S4 gallop. 

Abdomen:  Soft. 

Extremities:  Revealed a lot of abnormalities.  It looks like there are chronic venous stasis changes
 and lot of cutaneous changes.  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  SALBADOR deng has a wound above his left clavicle, supraclavicular fossa.  It looks like it is starting to heal a
nd granulate, and apparently is from another central line placement that soured. 



He has been a patient at the [facility].  He now seems to understand and grasp that he is going to n
eed chronic hemodialysis to stay healthy and he believe he wants to work with nephrologists here.  Th
e only cardiac workup I would like to do on him is an echocardiogram that is already scheduled for to
jonny.  It will be fine we will do it here at the bedside. 



Thank you very much for your kind referral of Mr. Avitia.  I will follow him with you.





SH/YOLA KAUFFMAN:  07/29/2018 10:21:03Voice ID:  [id number]

DT:  07/29/2018 10:45:27Report ID:  [id number]

## 2018-07-29 NOTE — RAD REPORT
EXAM DESCRIPTION:  RAD - Chest Single View - 7/29/2018 3:49 pm

 

CLINICAL HISTORY:  CHF, dialysis patient

 

COMPARISON:  July 20

 

TECHNIQUE:  AP portable chest image was obtained 1529 hours .

 

FINDINGS:  Lung volumes are low. Interstitial and alveolar opacities have improved. Vasculature is sl
ightly less prominent. Heart size is also diminished slightly in size. Resuscitation paddles have bee
n removed. No new tube or line. No measurable pleural effusion and no pneumothorax. No gross bony abn
ormality seen. No acute aortic findings suspected.

 

IMPRESSION:  Improvement but incomplete resolution of CHF/ volume overload pattern.

## 2018-07-29 NOTE — P.PN
Subjective


Date of Service: 07/29/18


Primary Care Provider: Dr. Ashraf; Nephrology-Dr. Cabezas


Chief Complaint: Shortness of breath, edema


Subjective: Improving (Patient has significantly improved since yesterday.  

Patient received emergent dialysis yesterday.)





Physical Examination





- Vital Signs


Temperature: 98 F


Blood Pressure: 113/39


Pulse: 77


Respirations: 15


Pulse Ox (%): 100





- Physical Exam


General: Alert, In no apparent distress, Oriented x3, Cooperative


HEENT: Atraumatic


Neck: Supple


Respiratory: Clear to auscultation bilaterally, Normal air movement


Cardiovascular: Normal pulses, Regular rate/rhythm


Gastrointestinal: Normal bowel sounds, Soft and benign, Non-distended, No masses

, No rebound, No guarding


Integumentary: Tenderness/swelling (Anasarca noted throughout.)


Neurological: Normal speech, Normal strength at 5/5 x4 extr, Normal tone, 

Normal affect





- Studies


Laboratory Data (last 24 hrs)





07/28/18 14:10: PT 13.4 H, INR 1.13, APTT 32.4


07/28/18 14:10: WBC 13.4 H D, Hgb 9.6 L, Hct 29.8 L, Plt Count 481 H


07/28/18 14:10: Sodium 130 L, Potassium 6.4 H*, BUN 86 H D, Creatinine 5.20 H* D

, Glucose 156 H, Magnesium 1.9, Total Bilirubin 0.4, AST 32, ALT 39, Alkaline 

Phosphatase 147 H





Medications List Reviewed: Yes





Assessment & Plan





- Problems (Diagnosis)


(1) Renal failure (ARF), acute on chronic


Current Visit: Yes   Status: Acute   


Plan: 


Acute on chronic renal failure with metabolic acidosis, hypothermia, 

hyperkalemia.  Overall improved with dialysis.  Will try to obtain recent 

biopsy done in [place].  Case discussed with his son.  Patient will likely need 

chronic dialysis.  This was addressed in detail with the patient.  Patient 

understands this.  He seems willing to continue with this.  Will discuss with 

nephrology.  Renal function unchanged.  Hyperkalemia resolved. 


Qualifiers: 


   Chronic kidney disease stage: stage 5, not on chronic dialysis 





(2) Hyperkalemia


Current Visit: Yes   Status: Acute   


Plan: 


Resolved with dialysis.  Will continue to monitor closely.








(3) Bradycardia


Current Visit: Yes   Status: Acute   


Plan: 


Bradycardia improved.  Will continue with dopamine to maintain blood pressure.  

Cardiology recommends echocardiogram.








(4) Asystole


Current Visit: Yes   Status: Acute   


Plan: 


Patient was initially paced any emergency room.  Will continue monitor closely.

  Advanced directives address in detail.  Patient wishes to be DNR.  This was 

addressed with nurses present.  This apparently was addressed by the wife to 

the nurse earlier.








(5) Hypothermia


Current Visit: Yes   Status: Acute   


Plan: 


Will continue with bear hugger.


Qualifiers: 


   Encounter type: initial encounter   Qualified Code(s): T68.XXXA - Hypothermia

, initial encounter   





(6) Metabolic acidosis


Current Visit: Yes   Status: Acute   


Plan: 


Secondary to acute renal failure.  Improved with dialysis.  Await further 

recommendations from nephrology.








(7) Anasarca


Onset Date: 07/23/18   Current Visit: No   Status: Acute   


Plan: 


Patient will continue with dialysis.








(8) Diabetes


Onset Date: 04/18/18   Current Visit: No   Status: Chronic   


Plan: 


Will continue Accu-Cheks.  Provide sliding scale.


Qualifiers: 


   Diabetes mellitus type: type 2   Diabetes mellitus long term insulin use: 

without long term use   Diabetes mellitus complication status: with unspecified 

complications   Qualified Code(s): E11.8 - Type 2 diabetes mellitus with 

unspecified complications   





(9) HTN (hypertension)


Onset Date: 04/18/18   Current Visit: No   Status: Chronic   


Plan: 


Will hold blood pressure medication at this time.  Blood pressure stable at 

this time


Qualifiers: 


   Hypertension type: essential hypertension   Qualified Code(s): I10 - 

Essential (primary) hypertension   





(10) Hypotension


Current Visit: Yes   Status: Acute   


Plan: 


Patient with hypotension.  Will wean off dopamine.








(11) Anemia


Current Visit: Yes   Status: Chronic   


Plan: 


Likely of chronic disease. Will check and monitor hemoglobin.  Patient may 

require blood transfusion if hemoglobin below 7.0.


Qualifiers: 


   Anemia type: due to chronic kidney disease   Chronic kidney disease stage: 

stage 5, not on chronic dialysis   Qualified Code(s): N18.5 - Chronic kidney 

disease, stage 5; D63.1 - Anemia in chronic kidney disease   


Discharge Plan: Other (Patient will likely need skilled placement at discharge)


Plan to discharge in: Greater than 2 days


Time Spent Managing Pts Care (In Minutes): 55

## 2018-07-29 NOTE — EKG
Test Date:    2018-07-28               Test Time:    13:28:59

Technician:   GABO                                     

                                                     

MEASUREMENT RESULTS:                                       

Intervals:                                           

Rate:         61                                     

AL:                                                  

QRSD:         102                                    

QT:           452                                    

QTc:          455                                    

Axis:                                                

P:                                                   

AL:                                                  

QRS:          5                                      

T:            32                                     

                                                     

INTERPRETIVE STATEMENTS:                                       

                                                     

Sinus rhythm

Normal ECG

Compared to ECG 07/18/2018 17:44:19

no significant change from previous ECG

Electronically Signed On 07-29-18 09:33:31 CDT by Sukumar Lares

## 2018-07-30 LAB
ALBUMIN SERPL BCP-MCNC: 2.9 G/DL (ref 3.4–5)
ALP SERPL-CCNC: 111 U/L (ref 45–117)
ALT SERPL W P-5'-P-CCNC: 26 U/L (ref 12–78)
AST SERPL W P-5'-P-CCNC: 21 U/L (ref 15–37)
BLD SMEAR INTERP: (no result)
BUN BLD-MCNC: 56 MG/DL (ref 7–18)
GLUCOSE SERPLBLD-MCNC: 98 MG/DL (ref 74–106)
HCT VFR BLD CALC: 22.3 % (ref 39.6–49)
LYMPHOCYTES # SPEC AUTO: 1.1 K/UL (ref 0.7–4.9)
MAGNESIUM SERPL-MCNC: 2 MG/DL (ref 1.8–2.4)
MCH RBC QN AUTO: 30.4 PG (ref 27–35)
MCV RBC: 86.2 FL (ref 80–100)
MORPHOLOGY BLD-IMP: (no result)
PMV BLD: 7.4 FL (ref 7.6–11.3)
POTASSIUM SERPL-SCNC: 4.3 MMOL/L (ref 3.5–5.1)
RBC # BLD: 2.59 M/UL (ref 4.33–5.43)

## 2018-07-30 RX ADMIN — HUMAN INSULIN SCH: 100 INJECTION, SOLUTION SUBCUTANEOUS at 21:00

## 2018-07-30 RX ADMIN — HUMAN INSULIN SCH: 100 INJECTION, SOLUTION SUBCUTANEOUS at 16:02

## 2018-07-30 RX ADMIN — PANTOPRAZOLE SODIUM SCH MG: 40 TABLET, DELAYED RELEASE ORAL at 06:21

## 2018-07-30 RX ADMIN — HUMAN INSULIN SCH: 100 INJECTION, SOLUTION SUBCUTANEOUS at 11:08

## 2018-07-30 RX ADMIN — Medication SCH ML: at 07:56

## 2018-07-30 RX ADMIN — Medication SCH ML: at 21:17

## 2018-07-30 RX ADMIN — HUMAN INSULIN SCH: 100 INJECTION, SOLUTION SUBCUTANEOUS at 07:30

## 2018-07-30 NOTE — RAD REPORT
EXAM DESCRIPTION:  RAD - Chest Single View - 7/30/2018 6:43 am

 

CLINICAL HISTORY:  CHF

 

COMPARISON:  July 29, July 28

 

TECHNIQUE:  AP portable chest image was obtained 0630 hours .

 

FINDINGS:  Lung markings are fractionally improved from the prior day study. No new or progressive pr
ocess. Heart size and vasculature are stable. No pneumothorax or enlarging pleural effusion.

 

IMPRESSION:  Minimal improvement since prior day study.

## 2018-07-30 NOTE — CON
Date of Consultation:  07/29/2018



Chief Complaint/history Of Present Illness:  Acute kidney injury on advanced 
chronic kidney disease.  The patient has chronic kidney disease stage 4 
advancing to stage 5.  Recently, he underwent renal biopsy in [facility].  
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 



The patient presented to the hospital because of generalized weakness.  He was 
found to have hypotension.  He required pressors.  He was found to have 
hyperkalemia, severe metabolic acidosis, and lactic acid and ketones were 
evaluated and acetone was positive.  The patient has history of diabetes 
mellitus, insulin dependent. 



The patient required emergent dialysis to control metabolic acidosis associated 
with hyperkalemia and bradycardia.  The patient was admitted to ICU for 
hypotension and was started on pressor.  Lab work obtained in the emergency 
room showed severe electrolyte abnormalities.  Potassium was 6.4, chloride 98, 
carbon dioxide 18, and BUN 86, creatinine 5.2, glucose 156, lactic acid was 9.0
, calcium 7.9, magnesium 1.9.  CK level was 81.  Troponin less than 0.02.  BNP 
was 3723.  The patient was found to have severe fluid overload, anasarca, and 
had dopamine started for hypotension.  Emergent procedure with temporary 
dialysis catheter was ordered, and the patient received dialysis and 
ultrafiltration was done for congestive heart failure. 



Chest x-ray shows congestive heart failure with volume overload, interstitial 
alveolar opacities were present. 



The patient is an 83-year-old man who presented to the hospital with increasing 
shortness of breath, edema of upper and lower extremities.  He was previously 
hospitalized this month and was treated for acute on chronic kidney injury.  
Renal function slightly stabilized.  The patient was discharged.  Prior to that
, he had renal biopsy in [place] which showed advanced chronic kidney disease.  
The patient was found to have hypothermia when he was evaluated in the 
emergency room.  For hyperkalemia, received IV calcium gluconate, albuterol 
inhalers, and IV sodium bicarbonate to stabilize metabolic acidosis, although 
the patient subsequently had an emergent procedure with dialysis catheter 
placement and dialysis was done with 1 potassium dialysate to treat severe 
hyperkalemia.



Review of Systems:

Constitutional:  The patient denies fever or chills. 

Eyes:  Denies vision changes. 

Ears, Nose, Mouth, and Throat:  Denies sore throat or earache. 

Respiratory:  Has shortness of breath and cough.  Denies hemoptysis. 

GI:  Denies nausea, vomiting. 

:  Denies dysuria, hematuria. 

Musculoskeletal:  Denies muscle aches.  Denies gout. 



All other systems reviewed and all are negative.



Past Medical History:  Hyperlipidemia, hypertension, diabetes mellitus, 
diabetic kidney disease, hypertensive heart and kidney disease, history of 
proteinuria associated with diabetic kidney disease, history of myasthenia 
gravis, peripheral neuropathy, cataract, finger surgery, prostate surgery.



Family History:  Father had heart disease.  Mother; hypertension.  Sister; 
hypertension, diabetes, cancer, kidney disease.  Brother; heart disease, cancer.



Social History:  Denies tobacco, alcohol, or illicit drugs.



Physical Examination:

General:  The patient is awake, alert. 

Eyes:  Anicteric sclerae.  EOMI. 

Ears, Nose, Mouth, and Throat:  Oral mucosa moist.  No pallor. 

Neck:  Supple.  No bruits. 

Lungs:  Crackles bilaterally present. 

Heart:  S1, S2.  No pericardial friction rub. 

Abdomen:  Obese, soft, nontender.  No rebound.  No guarding. 

Extremities:  Edema, anasarca, some erythema in upper and lower extremities. 

Neurological:  Moving extremities.  Cranial nerves intact. 

Psychiatric:  Alert, oriented x3.  Normal affect.



Laboratory Data:  PT 13.4, INR 1.13, APTT 32.4.  WBC 13.4, hemoglobin 9.6, 
hematocrit 29.8, platelet count 481.  Sodium 130, potassium 6.4, BUN 86, 
creatinine 5.20, glucose 156, magnesium 1.9.  AST 32, ALT 39.



Impression And Plan:  

1.   Acute kidney injury on advanced chronic kidney disease, hypothermia, 
hyperkalemia, metabolic acidosis.  The patient has nephrotic syndrome secondary 
to diabetes mellitus.  Renal biopsy showed advanced chronic kidney disease.  
Records will be obtained regarding kidney biopsy results.

2.   Hyperkalemia.  The patient was treated with IV bicarbonate, calcium 
gluconate in the emergency room and emergent dialysis was done after the 
patient had a catheter placed for dialysis access.

3.   Hypotension.  Continue dopamine.

4.   Congestive heart failure, diastolic dysfunction with fluid overload.  
Continue ultrafiltration with dialysis as tolerated.  The patient will have IV 
dopamine, pressors for blood pressure support as needed.

5.   Diabetes mellitus.  Continue insulin.

6.   Hypotension.  Workup pending to rule out bacteremia, sepsis.

7.   Congestive heart failure.  Cardiology consultation appreciated.

8.   Anemia.  Hemoglobin level is in low range.  The patient will have blood 
transfusion as needed.

9.   Acute on chronic kidney injury, advanced kidney disease secondary to 
diabetes and hypertension.  Likely, the patient has at this point end-stage 
renal disease.  He will need to continue dialysis 3 times per week.  The 
patient agreed to have dialysis outpatient and  is consulted for 
dialysis arrangement for outpatient.  Currently, the patient is hemodynamically 
stable, will require dopamine for blood pressure support during dialysis.  He 
remains in ICU.





JOELLEN/YOLA

DD:  07/29/2018 23:12:40   Voice ID:  [id number]

DT:  07/30/2018 03:42:06   Report ID:  [id number]

STEPAN

## 2018-07-30 NOTE — P.PN
Subjective


Date of Service: 07/30/18


Primary Care Provider: Dr. Ashraf; Nephrology-Dr. Cabezas


Chief Complaint: Shortness of breath, edema


Subjective: Other (Patient feeling better.  Patient still on dopamine drip.)





Physical Examination





- Vital Signs


Temperature: 96.8 F


Blood Pressure: 109/43


Pulse: 62


Respirations: 12


Pulse Ox (%): 100





- Physical Exam


General: Alert, In no apparent distress, Cooperative


HEENT: Atraumatic


Neck: Supple


Respiratory: Clear to auscultation bilaterally, Normal air movement


Cardiovascular: Normal pulses, Regular rate/rhythm


Gastrointestinal: Normal bowel sounds, Non-distended, No tenderness, No masses, 

No rebound, No guarding


Musculoskeletal: No tenderness, No warmth


Integumentary: Tenderness/swelling (Anasarca noted, slightly improved)


Neurological: Normal speech, Normal strength at 5/5 x4 extr, Normal tone, 

Normal affect





- Studies


Medications List Reviewed: Yes





Assessment & Plan





- Problems (Diagnosis)


(1) Renal failure (ARF), acute on chronic


Onset Date: 07/30/18   Current Visit: Yes   Status: Acute   


Plan: 


Acute on chronic renal failure with metabolic acidosis, hypothermia, 

hyperkalemia.  Overall improved with dialysis.  Patient received dialysis 

yesterday.  Patient will receive dialysis again today.  Will need to wean off 

dopamine drip.  Still trying to obtain previous information from [place].  

Apparently patient had renal biopsy done.  Will discuss with nephrology about 

possible plan of care.  Patient will likely need long-term acute care facility 

placement or skilled placement at discharge. Patient will also probably need 

chronic dialysis.





I will turn the service over to Dr. Tompkins tomorrow.  I will go over the plan of 

care with her.  


Qualifiers: 


   Chronic kidney disease stage: stage 5, not on chronic dialysis 





(2) Hyperkalemia


Onset Date: 07/30/18   Current Visit: Yes   Status: Acute   


Plan: 


Resolved with dialysis.  Will continue to monitor closely.








(3) Bradycardia


Onset Date: 07/30/18   Current Visit: Yes   Status: Acute   


Plan: 


Bradycardia improved.  Will continue with dopamine to maintain blood pressure.  

Cardiology recommends echocardiogram.








(4) Asystole


Onset Date: 07/30/18   Current Visit: Yes   Status: Acute   


Plan: 


Patient was initially paced any emergency room.  Will continue monitor closely.

  Advanced directives address in detail.  Patient wishes to be DNR.  This was 

addressed with nurses present.  This apparently was addressed by the wife to 

the nurse earlier.








(5) Hypothermia


Onset Date: 07/30/18   Current Visit: Yes   Status: Acute   


Plan: 


Will continue with bear raphaelgger.  This has improved.


Qualifiers: 


   Encounter type: initial encounter   Qualified Code(s): T68.XXXA - Hypothermia

, initial encounter   





(6) Metabolic acidosis


Onset Date: 07/30/18   Current Visit: Yes   Status: Acute   


Plan: 


Secondary to acute renal failure.  Improved with dialysis.  Renal function 

still compromise.  Patient expected to get dialysis again today.








(7) Anasarca


Onset Date: 07/23/18   Current Visit: No   Status: Acute   


Plan: 


Patient will continue with dialysis.








(8) Diabetes


Onset Date: 04/18/18   Current Visit: No   Status: Chronic   


Plan: 


Will continue Accu-Cheks.  Provide sliding scale.


Qualifiers: 


   Diabetes mellitus type: type 2   Diabetes mellitus long term insulin use: 

without long term use   Diabetes mellitus complication status: with unspecified 

complications   Qualified Code(s): E11.8 - Type 2 diabetes mellitus with 

unspecified complications   





(9) HTN (hypertension)


Onset Date: 04/18/18   Current Visit: No   Status: Chronic   


Plan: 


Will continue to hold blood pressure medication at this time.  Blood pressure 

stable at this time.  Patient on dopamine drip


Qualifiers: 


   Hypertension type: essential hypertension   Qualified Code(s): I10 - 

Essential (primary) hypertension   





(10) Hypotension


Onset Date: 07/30/18   Current Visit: Yes   Status: Acute   


Plan: 


Patient with hypotension.  Patient on dopamine drip.  This will need to be 

weaned off


Qualifiers: 


   Hypotension type: other hypotension type   Qualified Code(s): I95.89 - Other 

hypotension   





(11) Anemia


Onset Date: 07/30/18   Current Visit: Yes   Status: Chronic   


Plan: 


Likely of chronic disease. Will continue to monitor hemoglobin.  Hemoglobin 

stable this time.  May need transfusion if hemoglobin less than 7.0.


Qualifiers: 


   Anemia type: due to chronic kidney disease   Chronic kidney disease stage: 

stage 5, not on chronic dialysis   Qualified Code(s): N18.5 - Chronic kidney 

disease, stage 5; D63.1 - Anemia in chronic kidney disease   


Discharge Plan: Other (Will need to consider long-term acute care facility 

verses skilled placement at discharge)


Time Spent Managing Pts Care (In Minutes): 55

## 2018-07-31 LAB
ALBUMIN SERPL BCP-MCNC: 3.4 G/DL (ref 3.4–5)
ALP SERPL-CCNC: 126 U/L (ref 45–117)
ALT SERPL W P-5'-P-CCNC: 24 U/L (ref 12–78)
AST SERPL W P-5'-P-CCNC: 20 U/L (ref 15–37)
BUN BLD-MCNC: 22 MG/DL (ref 7–18)
GLUCOSE SERPLBLD-MCNC: 111 MG/DL (ref 74–106)
HCT VFR BLD CALC: 25.6 % (ref 39.6–49)
LYMPHOCYTES # SPEC AUTO: 1.2 K/UL (ref 0.7–4.9)
MAGNESIUM SERPL-MCNC: 2.1 MG/DL (ref 1.8–2.4)
MCH RBC QN AUTO: 30.2 PG (ref 27–35)
MCV RBC: 86.2 FL (ref 80–100)
PMV BLD: 7.6 FL (ref 7.6–11.3)
POTASSIUM SERPL-SCNC: 3.7 MMOL/L (ref 3.5–5.1)
RBC # BLD: 2.97 M/UL (ref 4.33–5.43)

## 2018-07-31 RX ADMIN — ACETAMINOPHEN PRN MG: 500 TABLET, FILM COATED ORAL at 09:22

## 2018-07-31 RX ADMIN — Medication SCH ML: at 07:54

## 2018-07-31 RX ADMIN — HUMAN INSULIN SCH: 100 INJECTION, SOLUTION SUBCUTANEOUS at 07:30

## 2018-07-31 RX ADMIN — Medication SCH ML: at 21:34

## 2018-07-31 RX ADMIN — PANTOPRAZOLE SODIUM SCH MG: 40 TABLET, DELAYED RELEASE ORAL at 06:08

## 2018-07-31 RX ADMIN — HUMAN INSULIN SCH: 100 INJECTION, SOLUTION SUBCUTANEOUS at 16:30

## 2018-07-31 RX ADMIN — HUMAN INSULIN SCH: 100 INJECTION, SOLUTION SUBCUTANEOUS at 11:06

## 2018-07-31 RX ADMIN — HUMAN INSULIN SCH: 100 INJECTION, SOLUTION SUBCUTANEOUS at 21:00

## 2018-07-31 NOTE — ECHO
HEIGHT: 5 ft 8 in   WEIGHT: 222 lb 4.8 oz   DATE OF STUDY: 07/31/2018   REFER DR: Avery Sanchez MD

2-DIMENSIONAL: YES

     M.MODE: YES

 DOPPLER: YES

COLOR FLOW: YES



                    TDS:  NO

PORTABLE: YES

 DEFINITY:  NO

BUBBLE STUDY: NO





DIAGNOSIS:  CHEST PAIN



CARDIAC HISTORY:  

CATHERIZATION: NO

SURGERY: NO

PROSTHETIC VALVE: NO

PACEMAKER: NO





MEASUREMENTS (cm)

    DIASTOLIC (NORMALS)                 SYSTOLIC (NORMALS)

IVSd                 1.2 (0.6-1.2)                    LA Diam 3.6 (1.9-4.0)     LVEF       
  67%  

LVIDd               4.4 (3.5-5.7)                        LVIDs      2.3 (2.0-3.5)     %FS  
        47%

LVPWd             1.3 (0.6-1.2)

Ao Diam           3.1 (2.0-3.7)



2 DIMENSIONAL ASSESSMENT:

RIGHT ATRIUM:                   NORMAL

LEFT ATRIUM:       NORMAL



RIGHT VENTRICLE:            NORMAL

LEFT VENTRICLE: NORMAL



TRICUSPID VALVE:             NORMAL

MITRAL VALVE:     NORMAL



PULMONIC VALVE:             NORMAL

AORTIC VALVE:     NORMAL



PERICARDIAL EFFUSION: NONE

AORTIC ROOT:      NORMAL





LEFT VENTRICULAR WALL MOTION:     NORMAL



DOPPLER/COLOR FLOW:     MILD TRICUSPID REGURGITATION.



COMMENTS:      MILD TRICUSPID REGURGITATION. NORMAL LEFT VENTRICULAR SIZE AND FUNCTION. NO 
WALL MOTION ABNORMALITY. NO EFFUSION.



TECHNOLOGIST:   LARISSA JEAN

## 2018-07-31 NOTE — PN
Date of Progress Note:  07/30/2018



Chief Complaint:  Acute kidney injury, oliguric, on advanced chronic kidney 
disease.



History Of Present Illness:  The patient recently had renal biopsy done which 
showed advanced chronic kidney disease with interstitial fibrosis and 
glomerulosclerosis.  The patient has underlying diabetes mellitus, hypertensive 
heart and kidney disease.  He presented to the hospital because of severe 
weakness.  He was found to have metabolic acidosis related to acute kidney 
injury and lactic acid was evaluated. 



The patient received an urgent dialysis with ultrafiltration, control of 
anasarca, and to correct metabolic acidosis, as well as to control hyperkalemia.



Review of Systems:

The patient is feeling better today.  He remains fluid overloaded and has 
anasarca.  Blood pressure is on side and the patient was resumed on dopamine 
for blood pressure support.  Denies chest pain, headache, or vision changes.



Physical Examination:

Lungs:  Few crackles at bases. 

Heart:  S1, S2.  

Abdomen:  Soft, benign. 

Extremities:  Severe edema in upper and lower extremities.



Laboratory Data:  Hemoglobin 7.9, WBC 6.3, platelet count is 300,000.  Sodium 
133, potassium 4.3, chloride 96, CO2 of 25, BUN 56, creatinine 3.80, calcium 7.1
, magnesium 2.0.



Impression And Plan:  

1.   Acute kidney injury on advanced chronic kidney disease.  The patient has a 
temporary dialysis catheter.  Plan is to replace cathether with tunneled 
dialysis catheter when the patient is stable.  Currently, he has hypotension 
and has fluid overload, congestive heart failure with diastolic dysfunction.  
Continue dialysis with ultrafiltration.  IV albumin will be used for blood 
pressure control to facilitate ultrafiltration and fluid removal.

2.   Hypotension.  Plan is to wean him off dopamine.  Blood pressure is 
currently on low side.  Continue to monitor in ICU.

3.   Anemia.  The patient may require blood transfusion.  Continue MARSHALL for 
anemia due to chronic kidney disease.

4.   History of proteinuria.  The patient has advanced chronic kidney disease.  
The patient may be a candidate for ACE inhibitor when blood pressure is 
controlled.  Monitor albumin level, adjust p.o. protein replacement.



I spent total 36 min including 26 min to coordinate care plan.

JOELLEN/YOLA

DD:  07/30/2018 22:39:10   Voice ID:  [id number]

DT:  07/31/2018 03:24:47   Report ID:  [id number]

STEPAN

## 2018-07-31 NOTE — PN
Date of Progress Note:  07/31/2018



Subjective:  The patient was admitted with anasarca.  The patient was started on dialysis, being dial
yzed every day up to yesterday, tolerated the dialysis very well.  Still has anasarca.



Physical Examination:

Vital Signs:  When I saw the patient, blood pressure 116/44, pulse of 71.  The patient had urine outp
ut of 475 yesterday, managed to remove 2200 on dialysis. 

Chest:  Decreased air entry bilateral base. 

Heart:  S1, S2.  Regular. 

Abdomen:  Soft, nontender. 

Extremities:  +2 edema.



Laboratory Data:  H and H 9/25.6.  Sodium 136, potassium 3.7, bicarb 26, BUN is 22, creatinine 2.2, t
his is post dialysis, calcium 8.5.



Current Medications:  The patient on its include:

1.Breathing treatment.

2.Albumin.

3.Tylenol.

4.Zofran.

5.Pantoprazole.



Assessment And Plan:  

1.Acute kidney injury on advanced chronic kidney disease, confirmed with biopsy, secondary to diabet
es nephropathy with 70% sclerosis.  Proteinuric, nephrotic all the workup including the biopsy did no
t show any autoimmune disease.  I had long discussion with the patient regarding the need for permane
nt access and arrangement for dialysis.  The patient still did not make his mind and I discussed it w
ith his family before he makes the final decision.  We will follow up.  We will continue ultrafiltrat
e with the dialysis.  We will arrange for dialysis Monday, Wednesday, Friday.

2.Hypertension, currently blood pressure on the lower side.  We will utilize blood pressure for more
 ultrafiltration.

3.Anasarca secondary to cardiorenal and hypothyroidism.  I am going 

to start the patient on low dose of levothyroxine and we will follow up with primary.





MA/YOLA

DD:  07/31/2018 15:36:31Voice ID:  [id number]

DT:  07/31/2018 18:13:06Report ID:  [id number]

## 2018-08-01 LAB
ALBUMIN SERPL BCP-MCNC: 3.1 G/DL (ref 3.4–5)
ALP SERPL-CCNC: 129 U/L (ref 45–117)
ALT SERPL W P-5'-P-CCNC: 21 U/L (ref 12–78)
AST SERPL W P-5'-P-CCNC: 15 U/L (ref 15–37)
BUN BLD-MCNC: 27 MG/DL (ref 7–18)
GLUCOSE SERPLBLD-MCNC: 113 MG/DL (ref 74–106)
HCT VFR BLD CALC: 26.5 % (ref 39.6–49)
LYMPHOCYTES # SPEC AUTO: 1.4 K/UL (ref 0.7–4.9)
MAGNESIUM SERPL-MCNC: 1.8 MG/DL (ref 1.8–2.4)
MCH RBC QN AUTO: 29.6 PG (ref 27–35)
MCV RBC: 86.4 FL (ref 80–100)
PMV BLD: 8 FL (ref 7.6–11.3)
POTASSIUM SERPL-SCNC: 3.4 MMOL/L (ref 3.5–5.1)
RBC # BLD: 3.06 M/UL (ref 4.33–5.43)

## 2018-08-01 PROCEDURE — 0JH63XZ INSERTION OF TUNNELED VASCULAR ACCESS DEVICE INTO CHEST SUBCUTANEOUS TISSUE AND FASCIA, PERCUTANEOUS APPROACH: ICD-10-PCS

## 2018-08-01 PROCEDURE — 02HV33Z INSERTION OF INFUSION DEVICE INTO SUPERIOR VENA CAVA, PERCUTANEOUS APPROACH: ICD-10-PCS

## 2018-08-01 RX ADMIN — HUMAN INSULIN SCH: 100 INJECTION, SOLUTION SUBCUTANEOUS at 21:00

## 2018-08-01 RX ADMIN — PANTOPRAZOLE SODIUM SCH MG: 40 TABLET, DELAYED RELEASE ORAL at 07:30

## 2018-08-01 RX ADMIN — HUMAN INSULIN SCH: 100 INJECTION, SOLUTION SUBCUTANEOUS at 07:30

## 2018-08-01 RX ADMIN — HEPARIN SODIUM ONE UNIT: 5000 INJECTION, SOLUTION INTRAVENOUS; SUBCUTANEOUS at 13:15

## 2018-08-01 RX ADMIN — HEPARIN SODIUM ONE UNIT: 5000 INJECTION, SOLUTION INTRAVENOUS; SUBCUTANEOUS at 13:20

## 2018-08-01 RX ADMIN — Medication SCH ML: at 09:56

## 2018-08-01 RX ADMIN — LIDOCAINE HYDROCHLORIDE ONE ML: 10 INJECTION, SOLUTION EPIDURAL; INFILTRATION; INTRACAUDAL; PERINEURAL at 13:15

## 2018-08-01 RX ADMIN — LEVOTHYROXINE SODIUM SCH MG: 25 TABLET ORAL at 06:59

## 2018-08-01 RX ADMIN — HUMAN INSULIN SCH: 100 INJECTION, SOLUTION SUBCUTANEOUS at 11:30

## 2018-08-01 RX ADMIN — HEPARIN SODIUM ONE UNIT: 1000 INJECTION, SOLUTION INTRAVENOUS; SUBCUTANEOUS at 13:15

## 2018-08-01 RX ADMIN — HEPARIN SODIUM ONE: 1000 INJECTION, SOLUTION INTRAVENOUS; SUBCUTANEOUS at 12:11

## 2018-08-01 RX ADMIN — LIDOCAINE HYDROCHLORIDE ONE ML: 10 INJECTION, SOLUTION EPIDURAL; INFILTRATION; INTRACAUDAL; PERINEURAL at 13:20

## 2018-08-01 RX ADMIN — Medication SCH ML: at 21:00

## 2018-08-01 RX ADMIN — HUMAN INSULIN SCH: 100 INJECTION, SOLUTION SUBCUTANEOUS at 15:37

## 2018-08-01 NOTE — RAD REPORT
EXAM DESCRIPTION:  RAD - Chest Single View - 8/1/2018 2:29 pm

 

CLINICAL HISTORY:   Device placement central venous line placement

 

COMPARISON:  none

 

FINDINGS:   A central venous line has been inserted with its tip in the proximal superior vena cava. 
A pneumothorax is not seen

 

The lungs appear clear of acute infiltrate. The heart is normal size.

 

IMPRESSION:  Central venous line with its limbs in the superior vena cava

## 2018-08-01 NOTE — EKG
Test Date:    2018-08-01               Test Time:    11:01:45

Technician:   LINDA                                     

                                                     

MEASUREMENT RESULTS:                                       

Intervals:                                           

Rate:         139                                    

NH:                                                  

QRSD:         100                                    

QT:           352                                    

QTc:          535                                    

Axis:                                                

P:                                                   

NH:                                                  

QRS:          23                                     

T:            52                                     

                                                     

INTERPRETIVE STATEMENTS:                                       

                                                     

Supraventricular tachycardia, possible atrial flutter with 2 to 1 av block

Nonspecific ST and T wave abnormality

Abnormal ECG

Compared to ECG 07/31/2018 07:37:15

ST (T wave) deviation now present

Sinus rhythm no longer present

Sinus arrhythmia no longer present



Electronically Signed On 08-01-18 14:34:05 CDT by Sukumar Lares

## 2018-08-01 NOTE — P.PN
Subjective


Date of Service: 08/01/18


Primary Care Provider: Dr. Ashraf; Nephrology-Dr. Cabezas


Chief Complaint: Shortness of breath, edema





Patient seen and examined at bedside with RN.  Chart reviewed.  Case discussed 

with cardiology, nephrology, general surgery.  Patient has no complaints to 

offer at this time.  Has been doing well overall.  Is scheduled for HD Cath 

today 





Review of Systems


General: As per HPI





Physical Examination





- Vital Signs


Temperature: 97.9 F


Blood Pressure: 147/78


Pulse: 86


Respirations: 19


Pulse Ox (%): 100





- Physical Exam


General: Alert, Oriented x3


HEENT: Atraumatic


Neck: Supple, JVD not distended


Respiratory: Normal air movement, Crackles/rales


Cardiovascular: Regular rate/rhythm, Normal S1 S2


Gastrointestinal: Normal bowel sounds, Soft and benign, Non-distended, No 

tenderness


Musculoskeletal: No tenderness


Integumentary: No rashes


Neurological: Normal speech, Normal tone, Normal affect


Lymphatics: No axilla or inguinal lymphadenopathy





- Studies


Medications List Reviewed: Yes





Assessment & Plan





- Problems (Diagnosis)


(1) Renal failure (ARF), acute on chronic


Onset Date: 07/30/18   Current Visit: Yes   Status: Acute   


Plan: 


Patient with acute kidney injury.  Most likely secondary to possible nephrotic 

syndrome


-awaiting paperwork from Joshua Pan


-nephrology consulted here.  Appreciated recommendations.


-scheduled for Perm HD cath today


-continue to monitor at this time.


-hyperkalemia and metabolic acidosis related to the acute kidney injury has 

resolved now.


Qualifiers: 


   Chronic kidney disease stage: stage 5, not on chronic dialysis 





(2) Bradycardia


Onset Date: 07/30/18   Current Visit: Yes   Status: Acute   


Plan: 


Resolved now.


-status post pacing in the ER.


-S/p dopamine drip. Currently heart rate between 80s to 90s


-continuous cardiac monitoring.


-cardiology consulted.  Appreciated Reccs








(3) Anasarca


Onset Date: 07/23/18   Current Visit: No   Status: Acute   


Plan: 


Most likely secondary to acute renal failure.








(4) Diabetes


Onset Date: 04/18/18   Current Visit: No   Status: Chronic   


Qualifiers: 


   Diabetes mellitus type: type 2   Diabetes mellitus long term insulin use: 

without long term use   Diabetes mellitus complication status: with unspecified 

complications   Qualified Code(s): E11.8 - Type 2 diabetes mellitus with 

unspecified complications   





(5) HTN (hypertension)


Onset Date: 04/18/18   Current Visit: No   Status: Chronic   


Qualifiers: 


   Hypertension type: essential hypertension   Qualified Code(s): I10 - 

Essential (primary) hypertension   


Discharge Plan: Home


Plan to discharge in: 48 Hours





- Code Status/Comfort Care


Code Status Assessed: Yes


Critical Care: Yes

## 2018-08-01 NOTE — PN
Date of Progress Note:  08/01/2018



Subjective:  The patient in the dialysis today, developed sinus tachycardia to 160.



Objective:  Vital Signs:  When I saw the patient, blood pressure 147/78, pulse of 98, afebrile. 

Chest:  Clear crackles on the up zone. 

Heart:  S1, S2.  Systolic murmur. 

Abdomen:  Soft, nontender. 

Extremities:  +2 edema.



Laboratory Data:  H and H 9.1/26.5.  Sodium 136, potassium 3.4, bicarb 26, BUN 27, creatinine 2.6, ca
lcium 7.8.



Current Medications:  The patient is on include;

1.Tylenol.

2.Zofran.

3.Pantoprazole.

4.Levothyroxine.



Assessment And Plan:  

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
 with biopsy, oliguric with over volume.  I am going to continue the patient on dialysis.  The patien
t agreed for PermCath insertion.  We will follow up.

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  I had long discussion with the patient in t
he presence of the son regarding the results of the biopsy.  We will continue current medication.  We
 will follow up.  We will consider ACE inhibitor after stabilizing the blood pressure.

3.Anasarca secondary to renal failure, hypothyroidism.  We will 

challenge the patient.

4.Diabetes, as by primary.





MA/YOLA

DD:  08/01/2018 11:37:44Voice ID:  [id number]

DT:  08/01/2018 12:11:45Report ID:  [id number]

## 2018-08-01 NOTE — EKG
Test Date:    2018-07-31               Test Time:    07:37:15

Technician:   SATNAM                                   

                                                     

MEASUREMENT RESULTS:                                       

Intervals:                                           

Rate:         64                                     

MN:           160                                    

QRSD:         102                                    

QT:           418                                    

QTc:          431                                    

Axis:                                                

P:            84                                     

MN:           160                                    

QRS:          23                                     

T:            65                                     

                                                     

INTERPRETIVE STATEMENTS:                                       

                                                     

Normal sinus rhythm with sinus arrhythmia

Normal ECG

Compared to ECG 07/28/2018 13:28:59

No significant changes



Electronically Signed On 08-01-18 14:36:17 CDT by Sukumar Lares

## 2018-08-01 NOTE — RAD REPORT
EXAM DESCRIPTION:

RAD - Fluoroscopy <1 Hour - 8/1/2018 1:39 pm

 

CLINICAL HISTORY:  Device placement central venous catheter placement

 

FINDINGS:  A central venous catheter was placed into the superior vena cava. Thirteen fluoroscopic sp
ot images are submitted. The examination was performed by Dr. Gibson

## 2018-08-01 NOTE — OP
Date of Procedure:  08/01/2018



Surgeon:  Bruno Gibson MD



Assistant:  None.



Preoperative Diagnosis:  End-stage renal disease.



Postoperative Diagnosis:  End-stage renal disease.



Procedures:  

1.Placement of a HemoSplit hemodialysis catheter in the right internal jugular vein.

2.Interpretation of fluoroscopy.

3.Right neck ultrasound.



Implant:  HemoSplit, 24 in length.



Indications:  This is the case of an 83-year-old patient needing hemodialysis.  He has an emergent he
modialysis placed in the right femoral recently.  He is receiving treatment, but they want more perma
nent access, so they asked for the HemoSplit tunnel cuff hemodialysis catheter.  The benefits, altern
atives, and risks were explained to him and family which include, but are not limited to infection, b
leeding, damage to adjacent structures, anesthesia complication, pneumothorax, hemothorax, cardiac ta
mponade, DVTs, MI, and even death.  They also understands this may not relieve any symptoms.  He migh
t need more than one surgical intervention.  He understood.  Signed a consent.



Description Of Procedure:  The patient was brought to the OR, placed supine in position.  Anesthesia 
was done without complication.  Right neck and chest were prepped and draped in a sterile fashion.  W
e did not use the left neck as patient has an open wound in that area.  He came to this hospital like
 that.  There is about 12 by at least 5 cm ulcer on the supraclavicular and left jugular region.  So,
 we covered that area prior to prepping the area in the usual sterile fashion on the right side.  We 
placed the patient in Trendelenburg position.  Right neck ultrasound which shows a compressible and p
atent right internal jugular vein.  We placed an 18-gauge needle Right in the internal jugular vein u
nder direct visualization.  I put a guidewire through and got into the superior vena cava using fluor
oscopy.  The needle was removed.  A small incision was made in the right upper chest and we tunneled 
THE catheter through that incision into the right neck incision.  We placed dilators through the guid
ewire under direct visualization of fluoroscopy until we ended up with the introducer, removed the gu
idewire, and the inner cannula, vein introducer, and then put the catheter through the introducer, pe
eled off the introducer.  This was done under fluoroscopy guidance.  The catheter looked in well posi
tion, excellent backflow and inflow.  The catheter was secured in place with 3-0 nylon and subcutaneo
us tissue closed with 3-0 chromic.  The patient tolerated the procedure well.  The patient was yannick
t back to normal position.  Sent to recovery in stable condition.  An x-ray was ordered in recovery r
oom stat.





NOEL/YOLA

DD:  08/01/2018 13:46:11Voice ID:  [id number]

DT:  08/01/2018 14:10:33Report ID:  [id number]

## 2018-08-01 NOTE — PN
Date of Progress Note:  07/31/2018



Mr. Avitia had been seen by Dr. Lares for acute renal failure, elevated troponin, echo ordered that w
as supposed to be done on 07/30/2018, but that did not happen, we reordered the echo for 07/31/2018. 
 The echo did not show any wall motion abnormalities or effusions, normal ejection fraction.  No soto
ge in treatment plan from a cardiac standpoint.  We will be available for questions if the need arise
.  The patient remains in sinus rhythm.





NB/YOLA

DD:  08/01/2018 06:20:32Voice ID:  [id number]

DT:  08/01/2018 11:05:17Report ID:  [id number]

## 2018-08-02 LAB
ALBUMIN SERPL BCP-MCNC: 3.2 G/DL (ref 3.4–5)
ALP SERPL-CCNC: 130 U/L (ref 45–117)
ALT SERPL W P-5'-P-CCNC: 18 U/L (ref 12–78)
AST SERPL W P-5'-P-CCNC: 15 U/L (ref 15–37)
BUN BLD-MCNC: 29 MG/DL (ref 7–18)
GLUCOSE SERPLBLD-MCNC: 154 MG/DL (ref 74–106)
HCT VFR BLD CALC: 26.1 % (ref 39.6–49)
LYMPHOCYTES # SPEC AUTO: 0.8 K/UL (ref 0.7–4.9)
MAGNESIUM SERPL-MCNC: 1.7 MG/DL (ref 1.8–2.4)
MCH RBC QN AUTO: 29.8 PG (ref 27–35)
MCV RBC: 87.1 FL (ref 80–100)
PMV BLD: 7 FL (ref 7.6–11.3)
POTASSIUM SERPL-SCNC: 3.3 MMOL/L (ref 3.5–5.1)
RBC # BLD: 2.99 M/UL (ref 4.33–5.43)

## 2018-08-02 RX ADMIN — PANTOPRAZOLE SODIUM SCH MG: 40 TABLET, DELAYED RELEASE ORAL at 08:53

## 2018-08-02 RX ADMIN — HUMAN INSULIN SCH: 100 INJECTION, SOLUTION SUBCUTANEOUS at 21:00

## 2018-08-02 RX ADMIN — HEPARIN SODIUM PRN UNIT: 1000 INJECTION, SOLUTION INTRAVENOUS; SUBCUTANEOUS at 19:10

## 2018-08-02 RX ADMIN — ACETAMINOPHEN PRN MG: 500 TABLET, FILM COATED ORAL at 03:37

## 2018-08-02 RX ADMIN — HUMAN INSULIN SCH: 100 INJECTION, SOLUTION SUBCUTANEOUS at 11:30

## 2018-08-02 RX ADMIN — Medication SCH ML: at 22:25

## 2018-08-02 RX ADMIN — HUMAN INSULIN SCH: 100 INJECTION, SOLUTION SUBCUTANEOUS at 07:30

## 2018-08-02 RX ADMIN — LEVOTHYROXINE SODIUM SCH MG: 25 TABLET ORAL at 06:12

## 2018-08-02 RX ADMIN — Medication SCH ML: at 08:54

## 2018-08-02 RX ADMIN — HUMAN INSULIN SCH: 100 INJECTION, SOLUTION SUBCUTANEOUS at 16:30

## 2018-08-02 NOTE — P.PN
Subjective


Date of Service: 08/02/18


Primary Care Provider: Dr. Ashraf; Nephrology-Dr. Cabezas


Chief Complaint: Shortness of breath, edema





Patient seen and examined at bedside with RN.  Chart reviewed.  Case discussed 

with cardiology, nephrology, general surgery.  Patient has no complaints to 

offer at this time.  Has been doing well overall.  S/p HD cath Placement. 

Scheduled for dialysis today 


 





Review of Systems


General: As per HPI





Physical Examination





- Vital Signs


Temperature: 96.9 F


Blood Pressure: 130/60


Pulse: 76


Respirations: 17


Pulse Ox (%): 95





- Physical Exam


General: Alert, In no apparent distress


HEENT: Atraumatic


Neck: Supple, Other (HD cath on right Chest inplace. )


Respiratory: Normal air movement, Rhonchi/gurgles


Cardiovascular: Regular rate/rhythm, Normal S1 S2


Gastrointestinal: Normal bowel sounds, Soft and benign, Non-distended, No 

tenderness


Integumentary: No rashes


Neurological: Normal speech, Normal tone, Normal affect


Lymphatics: No axilla or inguinal lymphadenopathy





- Studies


Medications List Reviewed: Yes





Assessment & Plan





- Problems (Diagnosis)


(1) Renal failure (ARF), acute on chronic


Onset Date: 07/30/18   Current Visit: Yes   Status: Acute   


Plan: 


Patient with acute kidney injury.  Most likely secondary to possible nephrotic 

syndrome


-nephrology consulted here.  Appreciated recommendations.


-S.P HD perm cath placement. No complication noted. 


-Will get HD today and if Cath working appropriately. Will remove Femoral Cath. 


-Setup for OP HD at [facility] 


-hyperkalemia and metabolic acidosis related to the acute kidney injury has 

resolved now.


Qualifiers: 


   Chronic kidney disease stage: stage 5, not on chronic dialysis 





(2) Bradycardia


Onset Date: 07/30/18   Current Visit: Yes   Status: Acute   


Plan: 


Resolved now.


-status post pacing in the ER.


-S/p dopamine drip. Currently heart rate between 80s to 90s


-continuous cardiac monitoring.


-cardiology consulted.  Appreciated Reccs








(3) Anasarca


Onset Date: 07/23/18   Current Visit: No   Status: Acute   


Plan: 


Most likely secondary to acute renal failure.








(4) Diabetes


Onset Date: 04/18/18   Current Visit: No   Status: Chronic   


Qualifiers: 


   Diabetes mellitus type: type 2   Diabetes mellitus long term insulin use: 

without long term use   Diabetes mellitus complication status: with unspecified 

complications   Qualified Code(s): E11.8 - Type 2 diabetes mellitus with 

unspecified complications   





(5) HTN (hypertension)


Onset Date: 04/18/18   Current Visit: No   Status: Chronic   


Qualifiers: 


   Hypertension type: essential hypertension   Qualified Code(s): I10 - 

Essential (primary) hypertension   


Discharge Plan: Home


Plan to discharge in: 48 Hours





- Code Status/Comfort Care


Code Status Assessed: No


Critical Care: No

## 2018-08-03 LAB
BUN BLD-MCNC: 16 MG/DL (ref 7–18)
GLUCOSE SERPLBLD-MCNC: 141 MG/DL (ref 74–106)
MAGNESIUM SERPL-MCNC: 2 MG/DL (ref 1.8–2.4)
POTASSIUM SERPL-SCNC: 3 MMOL/L (ref 3.5–5.1)

## 2018-08-03 RX ADMIN — HUMAN INSULIN SCH: 100 INJECTION, SOLUTION SUBCUTANEOUS at 07:30

## 2018-08-03 RX ADMIN — HUMAN INSULIN SCH: 100 INJECTION, SOLUTION SUBCUTANEOUS at 11:30

## 2018-08-03 RX ADMIN — METOPROLOL TARTRATE SCH MG: 25 TABLET ORAL at 21:14

## 2018-08-03 RX ADMIN — HUMAN INSULIN SCH UNIT: 100 INJECTION, SOLUTION SUBCUTANEOUS at 21:13

## 2018-08-03 RX ADMIN — Medication SCH ML: at 09:05

## 2018-08-03 RX ADMIN — PANTOPRAZOLE SODIUM SCH MG: 40 TABLET, DELAYED RELEASE ORAL at 09:05

## 2018-08-03 RX ADMIN — HUMAN INSULIN SCH: 100 INJECTION, SOLUTION SUBCUTANEOUS at 16:30

## 2018-08-03 RX ADMIN — LEVOTHYROXINE SODIUM SCH MG: 25 TABLET ORAL at 05:38

## 2018-08-03 RX ADMIN — Medication SCH ML: at 21:15

## 2018-08-03 NOTE — P.PN
Date of Service: 08/03/18





Held Discharge as pt will be getting dialysis esdras and then Discharge home.

## 2018-08-03 NOTE — PN
Date of Progress Note:  08/03/2018



Subjective:  The patient doing better, status post dialysis yesterday.  The patient started having go
od urine output.



Physical Examination:

Vital Signs:  Blood pressure 103/56, pulse of 91, afebrile. 

Chest:  Faint crackles on the base. 

Heart:  S1, S2.  Regular. 

Abdomen:  Soft, nontender. 

Extremities:  Plus edema more on the upper extremity.



Laboratory Data:  WBC 10.7, H and H 8.9/26.1, platelets 308.  Sodium of 138, potassium 3, bicarb 26, 
BUN 16, creatinine 1.8 after dialysis, calcium 8.3, magnesium of 2.



Current Medications:  The patient on its include:

1.Tylenol.

2.Lasix 40 daily.

3.Gabapentin.

4.Levothyroxine.

5.Metoprolol.

6.Zofran.

7.Pantoprazole.



Assessment And Plan:  

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
c range of proteinuria, biopsy with 70% nephrosclerosis.  I am going to continue the dialysis for the
 patient.  We will arrange for dialysis tomorrow, then hopefully the patient can be discharged after 
dialysis.  We will keep watching for recovery.

2.Hypertension, controlled, optimal.  Currently blood pressure on the lower side.  I am going to hol
d the [facility], place the patient on Lasix, and we will monitor.

3.Anemia of chronic kidney disease.  Light chain disease has been 

ruled out.  Status post transfusion.  Continue MARSHALL.

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





MA/YOLA

DD:  08/03/2018 18:48:07Voice ID:  [id number]

DT:  08/03/2018 19:17:52Report ID:  [id number]

## 2018-08-03 NOTE — PN
Date of Progress Note:  08/02/2018



Chief Complaint:  End-stage renal disease.



Subjective:  The patient was initiated on dialysis.  He recently had renal 
biopsy done it showed severe interstitial fibrosis and glomerulosclerosis, 
advanced chronic kidney disease.  The patient is tolerating dialysis.  The 
patient has tunneled dialysis catheter placed during this admission.



Review of Systems:

Denies fever or chills.



Physical Examination:

Lungs:  Clear to auscultation bilaterally. 

Heart:  S1, S2.  

Abdomen:  Soft, benign. 

Extremities:  Slight edema in both legs. 

Vital Signs:  Blood pressure 142/67, heart rate 105, respiratory rate 18, 
temperature 98.3.



Laboratory Data:  Hemoglobin 8.9, WBC 10.7, platelet count is 308,000.  Sodium 
134, potassium 3.3, chloride 97, CO2 27, glucose 154, calcium 8.2, magnesium 
1.7.



Impression And Plan:  

1.   End-stage renal disease.  Continue dialysis 3 times per week.

2.   Anemia in chronic kidney disease.  Continue MARSHALL.

3.   Hypokalemia.  Potassium replacement as needed.

4.   Hypomagnesemia.  Magnesium replacement ordered.

5.   Hypertension.  Blood pressure in acceptable control.

6.   Fluid overload.  Continue low-sodium diet.  Adjust ultrafiltration with 
dialysis.





JOELLEN/YOLA

DD:  08/02/2018 22:29:40   Voice ID:  [id number]

DT:  08/03/2018 01:11:25   Report ID:  [id number]

STEPAN

## 2018-08-03 NOTE — EKG
Test Date:    2018-08-03               Test Time:    12:37:38

Technician:   LINDA                                     

                                                     

MEASUREMENT RESULTS:                                       

Intervals:                                           

Rate:         87                                     

AK:           142                                    

QRSD:         94                                     

QT:           396                                    

QTc:          476                                    

Axis:                                                

P:            36                                     

AK:           142                                    

QRS:          9                                      

T:            76                                     

                                                     

INTERPRETIVE STATEMENTS:                                       

                                                     

Normal sinus rhythm

Nonspecific ST and T wave abnormality

Prolonged QT

Abnormal ECG

Compared to ECG 08/01/2018 11:01:45

Prolonged QT interval now present

ST (T wave) deviation still present



Electronically Signed On 08-03-18 19:09:36 CDT by Sukumar Lares

## 2018-08-04 VITALS — TEMPERATURE: 97.8 F | DIASTOLIC BLOOD PRESSURE: 67 MMHG | SYSTOLIC BLOOD PRESSURE: 140 MMHG

## 2018-08-04 VITALS — OXYGEN SATURATION: 97 %

## 2018-08-04 LAB
ALBUMIN SERPL BCP-MCNC: 2.9 G/DL (ref 3.4–5)
ALP SERPL-CCNC: 141 U/L (ref 45–117)
ALT SERPL W P-5'-P-CCNC: 11 U/L (ref 12–78)
AST SERPL W P-5'-P-CCNC: 20 U/L (ref 15–37)
BUN BLD-MCNC: 14 MG/DL (ref 7–18)
GLUCOSE SERPLBLD-MCNC: 142 MG/DL (ref 74–106)
POTASSIUM SERPL-SCNC: 2.9 MMOL/L (ref 3.5–5.1)

## 2018-08-04 RX ADMIN — HUMAN INSULIN SCH: 100 INJECTION, SOLUTION SUBCUTANEOUS at 11:30

## 2018-08-04 RX ADMIN — PANTOPRAZOLE SODIUM SCH MG: 40 TABLET, DELAYED RELEASE ORAL at 09:02

## 2018-08-04 RX ADMIN — HUMAN INSULIN SCH: 100 INJECTION, SOLUTION SUBCUTANEOUS at 07:30

## 2018-08-04 RX ADMIN — METOPROLOL TARTRATE SCH MG: 25 TABLET ORAL at 09:02

## 2018-08-04 RX ADMIN — LEVOTHYROXINE SODIUM SCH MG: 25 TABLET ORAL at 05:55

## 2018-08-04 RX ADMIN — Medication SCH ML: at 09:03

## 2018-08-04 RX ADMIN — HEPARIN SODIUM PRN UNIT: 1000 INJECTION, SOLUTION INTRAVENOUS; SUBCUTANEOUS at 10:57

## 2018-08-04 NOTE — P.PN
Subjective


Date of Service: 08/04/18


Primary Care Provider: Dr. Ashraf; Nephrology-Dr. Cabezas


Chief Complaint: Shortness of breath, edema





Patient seen and examined at bedside with RN.  Chart reviewed.  Case discussed 

with cardiology, nephrology, general surgery.  Pt getting dialysis right now. 

Will be Discharged home post dialysis 








Review of Systems


General: As per HPI





Physical Examination





- Vital Signs


Temperature: 97.6 F


Blood Pressure: 127/62


Pulse: 79


Respirations: 18


Pulse Ox (%): 98





- Physical Exam


General: Alert, In no apparent distress


HEENT: Atraumatic, PERRLA, EOMI


Neck: Supple, JVD not distended


Respiratory: Clear to auscultation bilaterally, Normal air movement


Cardiovascular: Regular rate/rhythm, Normal S1 S2


Gastrointestinal: Normal bowel sounds, No tenderness


Musculoskeletal: No tenderness


Integumentary: No rashes


Neurological: Normal speech, Normal tone, Normal affect


Lymphatics: No axilla or inguinal lymphadenopathy





- Studies


Medications List Reviewed: Yes





Assessment & Plan





- Problems (Diagnosis)


(1) Renal failure (ARF), acute on chronic


Onset Date: 07/30/18   Current Visit: Yes   Status: Acute   


Plan: 


Patient with acute kidney injury.  Most likely secondary to possible nephrotic 

syndrome


-nephrology consulted here.  Appreciated recommendations.


-S.P HD perm cath placement. No complication noted. 


-Will get HD today and then DC home


-Setup for OP HD at [facility] done by AISLINN 


-hyperkalemia and metabolic acidosis related to the acute kidney injury has 

resolved now.


Qualifiers: 


   Chronic kidney disease stage: stage 5, not on chronic dialysis 





(2) Bradycardia


Onset Date: 07/30/18   Current Visit: Yes   Status: Acute   


Plan: 


Resolved now.


-status post pacing in the ER.


-S/p dopamine drip. Currently heart rate between 80s to 90s











(3) Anasarca


Onset Date: 07/23/18   Current Visit: No   Status: Acute   


Plan: 


Most likely secondary to acute renal failure.Resolved 











(4) Diabetes


Onset Date: 04/18/18   Current Visit: No   Status: Chronic   


Qualifiers: 


   Diabetes mellitus type: type 2   Diabetes mellitus long term insulin use: 

without long term use   Diabetes mellitus complication status: with unspecified 

complications   Qualified Code(s): E11.8 - Type 2 diabetes mellitus with 

unspecified complications   





(5) HTN (hypertension)


Onset Date: 04/18/18   Current Visit: No   Status: Chronic   


Qualifiers: 


   Hypertension type: essential hypertension   Qualified Code(s): I10 - 

Essential (primary) hypertension   


Discharge Plan: Home


Plan to discharge in: 24 Hours





- Code Status/Comfort Care


Code Status Assessed: Yes


Critical Care: No

## 2018-08-04 NOTE — PN
Date of Progress Note:  08/04/2018



Subjective:  The patient is status post dialysis, today, we managed to remove 27,000.



Objective:  Vital Signs:  Blood pressure 140/67, pulse of 88. 

Chest:  Crackles bilateral base. 

Heart:  S1, S2.  Regular.  

Abdomen:  Soft, nontender. 

Extremities:  Plus edema.



Laboratory Data:  WBC 10.7, H and H 8.10/26.1, platelets 308.  Sodium 139, potassium 2.9, bicarb 28, 
BUN 14, creatinine 1.6, calcium 8.5.



Current Medications:  The patient on include:

1.Tylenol.

2.Amlodipine 2.5.

3.Lasix.

4.Gabapentin.

5.Levothyroxine.

6.Metoprolol.

7.Pantoprazole.



Assessment And Plan:  

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
d with biopsy, showing some recovery, still on the over volume side.  Oliguric, I again continue dial
ysis.  We will watch the patient's recovery as outpatient.

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

3.Hypokalemia.  The patient dialyzed on high potassium bath.

4.Anemia of chronic kidney disease.  Continue Epogen.

5.Anasarca secondary to nephrotic renal failure, continue challenging.





SUE

DD:  08/04/2018 18:40:45Voice ID:  [id number]

DT:  08/04/2018 20:32:00Report ID:  [id number]

## 2018-09-05 ENCOUNTER — HOSPITAL ENCOUNTER (INPATIENT)
Dept: HOSPITAL 97 - ER | Age: 83
LOS: 12 days | Discharge: HOME | DRG: 853 | End: 2018-09-17
Attending: FAMILY MEDICINE | Admitting: FAMILY MEDICINE
Payer: COMMERCIAL

## 2018-09-05 VITALS — BODY MASS INDEX: 30.9 KG/M2

## 2018-09-05 DIAGNOSIS — Z74.01: ICD-10-CM

## 2018-09-05 DIAGNOSIS — G70.00: ICD-10-CM

## 2018-09-05 DIAGNOSIS — E87.5: ICD-10-CM

## 2018-09-05 DIAGNOSIS — E87.6: ICD-10-CM

## 2018-09-05 DIAGNOSIS — R57.8: ICD-10-CM

## 2018-09-05 DIAGNOSIS — E11.22: ICD-10-CM

## 2018-09-05 DIAGNOSIS — I12.0: ICD-10-CM

## 2018-09-05 DIAGNOSIS — G62.9: ICD-10-CM

## 2018-09-05 DIAGNOSIS — T68.XXXA: ICD-10-CM

## 2018-09-05 DIAGNOSIS — E11.649: ICD-10-CM

## 2018-09-05 DIAGNOSIS — G93.41: ICD-10-CM

## 2018-09-05 DIAGNOSIS — E66.9: ICD-10-CM

## 2018-09-05 DIAGNOSIS — E78.5: ICD-10-CM

## 2018-09-05 DIAGNOSIS — E11.9: ICD-10-CM

## 2018-09-05 DIAGNOSIS — D63.1: ICD-10-CM

## 2018-09-05 DIAGNOSIS — E83.42: ICD-10-CM

## 2018-09-05 DIAGNOSIS — Z99.2: ICD-10-CM

## 2018-09-05 DIAGNOSIS — A41.9: Primary | ICD-10-CM

## 2018-09-05 DIAGNOSIS — L98.491: ICD-10-CM

## 2018-09-05 DIAGNOSIS — L03.221: ICD-10-CM

## 2018-09-05 DIAGNOSIS — N17.9: ICD-10-CM

## 2018-09-05 DIAGNOSIS — E87.2: ICD-10-CM

## 2018-09-05 DIAGNOSIS — N18.6: ICD-10-CM

## 2018-09-05 DIAGNOSIS — E83.39: ICD-10-CM

## 2018-09-05 DIAGNOSIS — B95.62: ICD-10-CM

## 2018-09-05 LAB
ALBUMIN SERPL BCP-MCNC: 3.2 G/DL (ref 3.4–5)
ALP SERPL-CCNC: 134 U/L (ref 45–117)
ALT SERPL W P-5'-P-CCNC: 25 U/L (ref 12–78)
AST SERPL W P-5'-P-CCNC: 31 U/L (ref 15–37)
BUN BLD-MCNC: 38 MG/DL (ref 7–18)
CKMB CREATINE KINASE MB: 6.8 NG/ML (ref 0.3–3.6)
COHGB MFR BLDA: 0.8 % (ref 0–1.5)
GLUCOSE SERPLBLD-MCNC: 49 MG/DL (ref 74–106)
HCT VFR BLD CALC: 31.1 % (ref 39.6–49)
INR BLD: 1.12
LIPASE SERPL-CCNC: 325 U/L (ref 73–393)
LYMPHOCYTES # SPEC AUTO: 1.9 K/UL (ref 0.7–4.9)
MAGNESIUM SERPL-MCNC: 2 MG/DL (ref 1.8–2.4)
MCH RBC QN AUTO: 29.9 PG (ref 27–35)
MCV RBC: 93.6 FL (ref 80–100)
NT-PROBNP SERPL-MCNC: 5645 PG/ML (ref ?–450)
OXYHGB MFR BLDA: 96.2 % (ref 94–97)
PMV BLD: 7.3 FL (ref 7.6–11.3)
POTASSIUM SERPL-SCNC: 5.1 MMOL/L (ref 3.5–5.1)
RBC # BLD: 3.32 M/UL (ref 4.33–5.43)
SAO2 % BLDA: 98.1 % (ref 92–98.5)
TROPONIN (EMERG DEPT USE ONLY): < 0.02 NG/ML (ref 0–0.04)

## 2018-09-05 PROCEDURE — 82570 ASSAY OF URINE CREATININE: CPT

## 2018-09-05 PROCEDURE — 83880 ASSAY OF NATRIURETIC PEPTIDE: CPT

## 2018-09-05 PROCEDURE — 80069 RENAL FUNCTION PANEL: CPT

## 2018-09-05 PROCEDURE — 85025 COMPLETE CBC W/AUTO DIFF WBC: CPT

## 2018-09-05 PROCEDURE — 87088 URINE BACTERIA CULTURE: CPT

## 2018-09-05 PROCEDURE — 84100 ASSAY OF PHOSPHORUS: CPT

## 2018-09-05 PROCEDURE — 81015 MICROSCOPIC EXAM OF URINE: CPT

## 2018-09-05 PROCEDURE — 36415 COLL VENOUS BLD VENIPUNCTURE: CPT

## 2018-09-05 PROCEDURE — 80048 BASIC METABOLIC PNL TOTAL CA: CPT

## 2018-09-05 PROCEDURE — 93005 ELECTROCARDIOGRAM TRACING: CPT

## 2018-09-05 PROCEDURE — 83735 ASSAY OF MAGNESIUM: CPT

## 2018-09-05 PROCEDURE — 71045 X-RAY EXAM CHEST 1 VIEW: CPT

## 2018-09-05 PROCEDURE — 87205 SMEAR GRAM STAIN: CPT

## 2018-09-05 PROCEDURE — 80202 ASSAY OF VANCOMYCIN: CPT

## 2018-09-05 PROCEDURE — 86706 HEP B SURFACE ANTIBODY: CPT

## 2018-09-05 PROCEDURE — 85730 THROMBOPLASTIN TIME PARTIAL: CPT

## 2018-09-05 PROCEDURE — 84484 ASSAY OF TROPONIN QUANT: CPT

## 2018-09-05 PROCEDURE — 87075 CULTR BACTERIA EXCEPT BLOOD: CPT

## 2018-09-05 PROCEDURE — 82553 CREATINE MB FRACTION: CPT

## 2018-09-05 PROCEDURE — 82962 GLUCOSE BLOOD TEST: CPT

## 2018-09-05 PROCEDURE — 87040 BLOOD CULTURE FOR BACTERIA: CPT

## 2018-09-05 PROCEDURE — 87086 URINE CULTURE/COLONY COUNT: CPT

## 2018-09-05 PROCEDURE — 80076 HEPATIC FUNCTION PANEL: CPT

## 2018-09-05 PROCEDURE — 87186 SC STD MICRODIL/AGAR DIL: CPT

## 2018-09-05 PROCEDURE — 82805 BLOOD GASES W/O2 SATURATION: CPT

## 2018-09-05 PROCEDURE — 83690 ASSAY OF LIPASE: CPT

## 2018-09-05 PROCEDURE — 87340 HEPATITIS B SURFACE AG IA: CPT

## 2018-09-05 PROCEDURE — 90935 HEMODIALYSIS ONE EVALUATION: CPT

## 2018-09-05 PROCEDURE — 84132 ASSAY OF SERUM POTASSIUM: CPT

## 2018-09-05 PROCEDURE — 81003 URINALYSIS AUTO W/O SCOPE: CPT

## 2018-09-05 PROCEDURE — 80053 COMPREHEN METABOLIC PANEL: CPT

## 2018-09-05 PROCEDURE — 83970 ASSAY OF PARATHORMONE: CPT

## 2018-09-05 PROCEDURE — 85610 PROTHROMBIN TIME: CPT

## 2018-09-05 PROCEDURE — 87070 CULTURE OTHR SPECIMN AEROBIC: CPT

## 2018-09-05 PROCEDURE — 82550 ASSAY OF CK (CPK): CPT

## 2018-09-05 PROCEDURE — 99285 EMERGENCY DEPT VISIT HI MDM: CPT

## 2018-09-05 PROCEDURE — 86704 HEP B CORE ANTIBODY TOTAL: CPT

## 2018-09-05 PROCEDURE — 88304 TISSUE EXAM BY PATHOLOGIST: CPT

## 2018-09-05 PROCEDURE — 86317 IMMUNOASSAY INFECTIOUS AGENT: CPT

## 2018-09-05 PROCEDURE — 5A1D70Z PERFORMANCE OF URINARY FILTRATION, INTERMITTENT, LESS THAN 6 HOURS PER DAY: ICD-10-PCS

## 2018-09-05 PROCEDURE — 82533 TOTAL CORTISOL: CPT

## 2018-09-05 PROCEDURE — 87077 CULTURE AEROBIC IDENTIFY: CPT

## 2018-09-05 PROCEDURE — 97163 PT EVAL HIGH COMPLEX 45 MIN: CPT

## 2018-09-05 PROCEDURE — 84145 PROCALCITONIN (PCT): CPT

## 2018-09-05 PROCEDURE — 93971 EXTREMITY STUDY: CPT

## 2018-09-05 PROCEDURE — 83036 HEMOGLOBIN GLYCOSYLATED A1C: CPT

## 2018-09-05 PROCEDURE — 74176 CT ABD & PELVIS W/O CONTRAST: CPT

## 2018-09-05 RX ADMIN — HEPARIN SODIUM PRN UNIT: 1000 INJECTION, SOLUTION INTRAVENOUS; SUBCUTANEOUS at 23:35

## 2018-09-05 RX ADMIN — Medication SCH ML: at 21:09

## 2018-09-05 RX ADMIN — ENOXAPARIN SODIUM SCH MG: 30 INJECTION SUBCUTANEOUS at 18:33

## 2018-09-05 RX ADMIN — LEVOFLOXACIN SCH MLS: 250 INJECTION, SOLUTION INTRAVENOUS at 18:32

## 2018-09-05 RX ADMIN — HUMAN INSULIN SCH: 100 INJECTION, SOLUTION SUBCUTANEOUS at 21:00

## 2018-09-05 RX ADMIN — HUMAN INSULIN SCH: 100 INJECTION, SOLUTION SUBCUTANEOUS at 11:30

## 2018-09-05 RX ADMIN — DEXTROSE MONOHYDRATE SCH: 100 INJECTION, SOLUTION INTRAVENOUS at 21:45

## 2018-09-05 RX ADMIN — HYDROCORTISONE SODIUM SUCCINATE SCH MG: 100 INJECTION, POWDER, FOR SOLUTION INTRAMUSCULAR; INTRAVENOUS at 21:09

## 2018-09-05 RX ADMIN — SODIUM CHLORIDE SCH: 0.45 INJECTION, SOLUTION INTRAVENOUS at 13:00

## 2018-09-05 RX ADMIN — HUMAN INSULIN SCH: 100 INJECTION, SOLUTION SUBCUTANEOUS at 16:30

## 2018-09-05 NOTE — ER
Nurse's Notes                                                                                     

 [facility]                                                                

Name: Pinky Avitia                                                                                 

Age: 83 yrs                                                                                       

Sex: Male                                                                                         

: 1935                                                                                   

MRN: [id number]                                                                                   

Arrival Date: 2018                                                                          

Time: 09:19                                                                                       

Account#: [id number]                                                                            

Bed 5                                                                                             

Private MD:                                                                                       

Diagnosis: Hypoglycemia, unspecified;Obesity, unspecified;End stage renal disease;Acidosis        

                                                                                                  

Presentation:                                                                                     

                                                                                             

09:20 Presenting complaint: EMS states: pt has not had dialysis since Friday, pts family said tw2 

      he was altered, for us he was answering appropriatly A\T\Ox3, blood sugar was 30's we       

      gave glucose tab, then glucagon now its in the 50's, vs stable hypertensive in 130's.       

      Transition of care: patient was not received from another setting of care. Onset of         

      symptoms was 2018. Risk Assessment: Do you want to hurt yourself or           

      someone else? Patient reports no desire to harm self or others. Initial Sepsis Screen:      

      Does the patient have a suspected source of infection? No. Patient's initial sepsis         

      screen is negative. Care prior to arrival: Glucose check: 50.                               

09:20 Method Of Arrival: EMS: [facility]                                                tw2 

09:20 Acuity: EVELIA 2                                                                           tw2 

                                                                                                  

Historical:                                                                                       

- Allergies:                                                                                      

09:50 adhesive tape-silicones;                                                                tw2 

- Home Meds:                                                                                      

09:50 amlodipine 10 mg tab 1 tab once daily [Active]; Lasix 40 mg Oral tab once daily         tw2 

      [Active]; gabapentin 300 mg Oral cap 1 cap at bedtime [Active]; Lipitor Oral [Active];      

      metformin 1,000 mg Oral tab 2 times per day [Active]; metoprolol tartrate 25 mg Oral        

      tab 0.5 tab 2 times per day [Active]; multivitamin Oral daily [Active]; Vitamin D Oral      

      400 unit daily [Active];                                                                    

- PMHx:                                                                                           

09:50 RENAL FAILURE; paralysis; muscle atrophy; Hypertension; Hyperlipidemia; Diabetes -      tw2 

      NIDDM;                                                                                      

                                                                                                  

- Immunization history:: Adult Immunizations.                                                     

- Social history:: Smoking status: .                                                              

- Family history:: not pertinent.                                                                 

- Ebola Screening: : Patient denies travel to an Ebola-affected area in the 21 days               

  before illness onset.                                                                           

                                                                                                  

                                                                                                  

Screenin:27 Abuse screen: Denies threats or abuse. Nutritional screening: No deficits noted.        tw2 

      Tuberculosis screening: No symptoms or risk factors identified. Fall Risk None              

      identified.                                                                                 

                                                                                                  

Assessment:                                                                                       

09:20 General: Appears in no apparent distress. comfortable, well developed, well nourished,  sg  

      Behavior is cooperative, appropriate for age. Pain: Denies pain. Neuro: Level of            

      Consciousness is awake, alert, obeys commands, Oriented to person, place, time, Full        

      function Speech is normal, Facial symmetry appears normal. Cardiovascular: Heart tones      

      S1 S2 present Capillary refill is sluggish in bilateral fingers toes Patient's skin is      

      warm and dry. Chest pain is denied. Respiratory: Airway is patent Respiratory effort is     

      even, unlabored, Respiratory pattern is regular, symmetrical. GI: Abdomen is round          

      non-distended, obese. : No signs and/or symptoms were reported regarding the              

      genitourinary system. EENT: No signs and/or symptoms were reported regarding the EENT       

      system. Derm: Skin is fragile, is thin, Skin is dry, Skin is normal, Skin temperature       

      is warm. Musculoskeletal: Circulation, motion, and sensation intact. Range of motion:       

      limited in right shoulder, right elbow and right wrist.                                     

10:24 Reassessment: Reassessment: Patient appears in no apparent distress at this time.       sg  

      Patient and/or family updated on plan of care and expected duration. Pain level             

      reassessed. Patient is alert, oriented x 3, equal unlabored respirations, skin              

      warm/dry/pink. pt son at bedside at this time, srx2, call light within reach, bed in        

      low and locked position, pt remains on monitors at this time, awaiting lab results,         

      awaiting new orders at this time.                                                           

12:30 Reassessment: Patient appears in no apparent distress at this time. Patient and/or      sg  

      family updated on plan of care and expected duration. Pain level reassessed. Patient is     

      alert, oriented x 3, equal unlabored respirations, skin warm/dry/pink. Patient states       

      symptoms have not improved.                                                                 

                                                                                                  

Vital Signs:                                                                                      

09:19  / 130; Pulse 74; Resp 17; Pulse Ox 99% on R/A; Pain 0/10;                        tw2 

10:41  / 60; Pulse 56; Resp 16 S; Temp 97.1; Pulse Ox 100% on R/A; Pain 0/10;           sg  

11:56 BP 93 / 53; Pulse 54; Resp 17; Pulse Ox 100% on R/A;                                    tw2 

12:55  / 60; Pulse 55; Resp 17; Pulse Ox 100% on R/A; Pain 0/10;                        sg  

                                                                                                  

ED Course:                                                                                        

09:19 Patient arrived in ED.                                                                  tw2 

09:22 Triage completed.                                                                       tw2 

09:27 Arm band placed on.                                                                     tw2 

09:27 Bed in low position. Side rails up X2. Cardiac monitor on. Pulse ox on. NIBP on.        tw2 

09:29 Nikita Leger MD is Attending Physician.                                             paulino 

09:51 EKG done, by EKG tech. reviewed by Nikita Leger MD.                                   at1 

10:00 Missed attempt(s): 20 gauge in right antecubital area. Bleeding controlled, band aid    sg  

      applied, catheter tip intact.                                                               

10:19 Everett Reno, СВЕТЛАНА is Primary Nurse.                                                       sg  

10:23 X-ray completed. Portable x-ray completed in exam room. Patient tolerated procedure     jb2 

      well.                                                                                       

10:25 XRAY Chest (1 view) In Process Unspecified.                                             EDMS

10:27 Vinicio Ro DO is Hospitalizing Provider.                                           paulino 

10:45 Inserted saline lock: 20 gauge in left antecubital area, using aseptic technique. IV    sg  

      inserted by Ximena Palacios RN.                                                              

12:55 Patient admitted, IV remains in place. intact, No redness/swelling at site.             sg  

                                                                                                  

Administered Medications:                                                                         

09:45 Drug: D50W 25 ml {Note: 1/2 amp ordered at this time per Dr. Leger via dialysis blue tw2 

      port .} Route: IVP; Site: Other;                                                            

11:00 Follow up: Response: No adverse reaction; Blood sugar is elevated                       sg  

11:15 Not Given (Duplicate Order): HEParin 3000 units IV at calculated rate See               iw  

      Administration Instructions; pack blue dialysis port                                        

12:28 Drug: Sodium Bicarbonate 1 amp Route: IVP; Site: left antecubital;                      sg  

13:10 Follow up: Response: No adverse reaction                                                sg  

12:29 Drug: D5W 1000 ml, Sodium Bicarbonate 150 mEq Route: IV; Rate: 50 ml/hr; Site: left     sg  

      antecubital;                                                                                

13:10 Follow up: Response: No adverse reaction; IV Status: Infusion continued upon admission  sg  

                                                                                                  

                                                                                                  

Point of Care Testing:                                                                            

      Blood Glucose:                                                                              

09:20 Blood Glucose: 44 mg/dL;                                                                tw2 

10:21 Blood Glucose: 100 mg/dL;                                                               sg  

09:20 PROVIDER NOTIFIED                                                                       tw2 

      Ranges:                                                                                     

                                                                                                  

Outcome:                                                                                          

10:28 Decision to Hospitalize by Provider.                                                    paulino 

12:54 Admitted to Med/surg accompanied by tech, family with patient, via stretcher, room 202,   

      with chart, Report called to  Kate SOTOMAYOR                                                       

12:54 Condition: stable                                                                           

12:54 Instructed on the need for admit, Demonstrated understanding of instructions.               

13:20 Patient left the ED.                                                                    iw  

                                                                                                  

Signatures:                                                                                       

Dispatcher [employer]                           EDMS                                                 

Everett Reno, СВЕТЛАНА                         RN   Nikita Carias MD MD cha Buechter, Jesse                              jb2                                                  

Ximena Palacios RN                     СВЕТЛАНА                                                      

Daisy Tellez, EKG Tech              EKG Tat1                                                  

Lore Reynaga RN                          RN   tw2                                                  

                                                                                                  

Corrections: (The following items were deleted from the chart)                                    

09:20 09:19 Blood Glucose: Blood Glucose Reading=44 mg/dL. tw2                                tw2 

10:25 10:19 Reassessment: sg                                                                  sg  

10:44 09:50 D50W 50 ml IVP in Other sg                                                        sg  

13:13 13:12 Response: No adverse reaction; Blood sugar is elevated sg                         sg  

                                                                                                  

**************************************************************************************************

## 2018-09-05 NOTE — RAD REPORT
EXAM DESCRIPTION:  RAD - Chest Single View - 9/5/2018 10:26 am

 

CLINICAL HISTORY:  COUGH

Chest pain.

 

COMPARISON:  Chest Single View dated 8/1/2018; Chest Single View dated 7/30/2018; Chest Single View d
ated 7/29/2018; Chest Single View dated 7/28/2018

 

FINDINGS:  Portable technique limits examination quality.

 

Small calcified granulomas present left upper lobe. The lungs are clear of acute infiltrate. Right-si
ded venous catheter has tip in the SVC. The heart is upper limit normal in size. No displaced fractur
es.

 

IMPRESSION:  No acute intrathoracic process suspected.

## 2018-09-05 NOTE — XMS REPORT
Patient Summary Document

 Created on:2018



Patient:SUSU PÉREZ

Sex:Male

:1935

External Reference #:[id number]





Demographics







 Address  [address] [place] [address]

 

 Home Phone  [phone]

 

 Preferred Language  Unknown

 

 Marital Status  Unknown

 

 Latter-day Affiliation  Unknown

 

 Race  Unknown

 

 Additional Race(s)  Unavailable

 

 Ethnic Group  Unknown









Author







 Organization  [facility]

 

 Address  [address] [place] [address] Dr. Agosto [address] [place] [address]

 

 Phone  [phone]









Care Team Providers







 Name  Role  Phone

 

 SHELIA INGRAM  Unavailable  Unavailable









Problems

This patient has no known problems.



Allergies, Adverse Reactions, Alerts

This patient has no known allergies or adverse reactions.



Medications

This patient has no known medications.



Results







 Test Description  Test Time  Test Comments  Text Results  Atomic Results  
Result Comments









 TISSUE EXAM  2018 18:37:00    Surgical Pathology Report  



       Case: [id number]  



       Authorizing Provider:  Guillermo Forte,  



       Collected:           2018 Giselle5  



                            MD  



         



       Ordering Location:     [address]  



       Received:            2018 1115  



                            Service  



         



       Pathologist:           Joana Zepeda MD  



         



       Specimen:    Kidney, NATIVE KIDNEY  



         



       KIDNEY, LEFT, NEEDLE BIOPSIES- [facility]  



       GLOMERULOSCLEROSIS- NEGATIVE FOR IMMUNE MEDIATED  



       GLOMERULONEPHRITIS- DIFFUSE INTERSTITIAL FIBROSIS  



       AND TUBULAR ATROPHY (~70%)- MODERATE TO SEVERE  



       ARTERIAL INTIMAL SCLEROSIS- DIFFUSE ARTERIOLAR  



       HYALINOSIS- SEE COMMENT      Signing Pathologist  



       Direct Phone Line: [phone]  



       signed by Joana Zepeda MD on 2018 at  



       6:37 PMPreliminary result electronically signed  



       by Joana Zepeda MD on 2018 at  7:08 PMNo  



       immune mediated glomerulosclerosis is seen based  



       on negative immunofluorescence and absence of  



       electron dense deposits on electron microscopy.  



       The renal biopsies show mixed features of  



       hypertensive nephrosclerosis, and advanced  



       diabetic glomerulosclerosis, class IV (see ref  



       #1) characterized by nodular lesions and global  



       glomerulosclerosis involving 56% (15/27) of all  



       examined glomeruli, along with marked  



       interstitial fibrosis and tubular atrophy  



       involving about 70% of renal parenchyma.  



       Reference: 1. MALATHI Garcia., NEEMA Givens,  



       KIMBERLI Collins., Brendan, A.H., Gerald, H.T., JESSICA Lopez, CONOR Dye, JOSE Schwarz., WILLA Valencia, de  



       LEIGHTON Garber., et al. Pathologic classification of  



       diabetic nephropathy. J Am Soc Nephrol  



       21:556-563, 2010.The results were communicated to  



       Dr. Caldwell by Dr. Zepeda on [id number],  



       88313 x3, 78850, 88350 x7, [id number] as  



       communicated by Dr. Caldwell: 83 year old with long  



       standing history of DM, now presents with  



       increase in serum creatinine and 3g proteinuria.  



       All serologies negativeNative kidney biopsyThe  



       specimen is received in three parts all labeled  



       with the patient's information and labeled  



       "native kidney biopsy".  Received in formalin are  



       two tan-white core biopsies measuring 0.6 and 1.7  



       cm in length. The specimen is entirely submitted  



       A1. Received fresh is a 1 cm tan core submitted  



       for frozen for immunofluorescence staining and  



       received in glutaraldehyde is a 0.3 cm tan core  



       submitted for electron microscopy. CG/pl LIGHT  



       MICROSCOPY: Sections show two cores of tissue  



       composed of cortex 50% and medulla 50%.  



       Glomeruli: Approximately 19 glomeruli are  



       examined of which 10 glomeruli are globally  



       sclerotic/obsolescent or show near complete  



       obsolescence. The capillary tuft in some  



       sclerotic glomeruli is wrinkled and shrunken. The  



       remaining non-globally sclerotic glomeruli are  



       enlarged and have nodular mesangial expansion by  



       PAS- and silver-positive matrix with normal to  



       mild segmental hypercellularity. There is mild  



       wrinkling of capillary tuft. Focal and segmental  



       sclerosis is seen.  No endocapillary  



       hypercellularity, crescents or thrombi are  



       seen.Tubules and interstitium: There is severe  



       interstitial fibrosis with focal tubular atrophy  



       and mild interstitial inflammatory cell  



       infiltration by lymphocytes admixed with  



       eosinophils and neutrophils, involving about 70%  



       of renal cortex. Non-atrophic proximal tubules  



       are focally ectatic with loss of brush borders.  



       Vessels:   Interlobular arteries show moderate  



       intimal sclerosis and thickening. There is severe  



       diffuse arteriolosclerosis. Congo red stain is  



       negative for amyloid deposition.Special stains:  



       Alan trichrome, PAS and Saldana silver stains  



       were necessary for evaluation of this biopsy and  



       showed expected staining patterns of internal  



       control tissue matrix structures.Direct  



       Immunofluorescence:Histology: H&E-stained  



       sections show 2 non-obsolescent glomeruli and 2  



       obsolescent glomeruli.  Immunofluorescence  



       findings: IgA: negative in open glomeruli, focal  



       weak granular mesangial staining in sclerotic  



       glomerulus, tubular casts are positiveIgG: no  



       significant glomerular, tubulointerstitial or  



       vascular staining. IgM: negative in glomeruli  



       C3: negative in glomeruli,  Macario's capsule  



       +.C1q: no significant glomerular,  



       tubulointerstitial or vascular staining.  



       Fibrinogen: no significant glomerular,  



       tubulointerstitial or vascular staining. Kappa:  



       negative in open glomeruli, focal weak granular  



       mesangial staining in sclerotic glomerulus,  



       tubular casts are positiveLambda:  negative in  



       open glomeruli, focal weak granular mesangial  



       staining in sclerotic glomerulus, tubular casts  



       are positiveAll polyclonal antibodies used for  



       immunofluorescence staining have been previously  



       tested and shown to have appropriate reactivities  



       with positive control specimens. ELECTRON  



       MICROSCOPYToluidine blue-stained sections reveals  



       one non-obsolescent glomerulus and 3 globally  



       sclerotic glomeruli. One open and 2 globally  



       sclerotic glomeruli are examined  



       ultrastructurally.Ultrastructure:  Examination of  



       the glomerular ultrastructure reveals that the  



       glomerular basement membrane shows wrinkling and  



       is variably thickened, focally measuring upto  



       1163 nm (normal male gudfrrx=694 - 430 nm nm;  



       Annie CAMERON. Arch Pathol Lab Med 133; 224-232).  



       Subendothelial, subepithelial, and  



       mesangial/paramesangial electron-dense, immune  



       complex-type deposits are not present. Podocyte  



       foot processes are diffusely effaced with  



       microvillous change.  









 POCT-GLUCOSE METER  2018 17:15:00    









   Test Item  Value  Reference Range  Comments









 POC-GLUCOSE METER (BEAKER) (test  229 mg/dL    TESTED AT [address]



 atsx=5219)      [facility] [address]



POCT-GLUCOSE [id number] 12:01:00





 Test Item  Value  Reference Range  Comments

 

 POC-GLUCOSE METER (BEAKER)  254 mg/dL    TESTED AT [address]



 (test usae=7772)      [facility] [address]



POCT-GLUCOSE [id number] 07:46:00





 Test Item  Value  Reference Range  Comments

 

 POC-GLUCOSE METER (BEAKER)  233 mg/dL    TESTED AT [address]



 (test rbmv=4470)      [place] [id number]



[id number] 05:56:00





 Test Item  Value  Reference Range  Comments

 

 PHOSPHORUS (BEAKER) (test code=604)  3.8 mg/dL  2.3-4.7  



[id number] 05:56:00





 Test Item  Value  Reference Range  Comments

 

 MAGNESIUM (BEAKER) (test code=627)  1.7 mg/dL  1.6-2.6  



BASIC METABOLIC [id number] 05:56:00





 Test Item  Value  Reference Range  Comments

 

 SODIUM (BEAKER) (test  132 meq/L  136-145  



 wpqe=195)      

 

 POTASSIUM (BEAKER) (test  4.4 meq/L  3.5-5.1  



 code=379)      

 

 CHLORIDE (BEAKER) (test  104 meq/L    



 code=382)      

 

 CO2 (BEAKER) (test  18 meq/L  22-29  



 code=355)      

 

 BLOOD UREA NITROGEN  44 mg/dL  7-21  



 (BEAKER) (test code=354)      

 

 CREATININE (BEAKER) (test  1.90 mg/dL  0.57-1.25  



 code=358)      

 

 GLUCOSE RANDOM (BEAKER)  204 mg/dL    



 (test code=652)      

 

 CALCIUM (BEAKER) (test  8.8 mg/dL  8.4-10.2  



 code=697)      

 

 EGFR (BEAKER) (test  34 mL/min/1.73 sq m    ESTIMATED GFR IS NOT AS



 code=1092)      ACCURATE AS CREATININE



       CLEARANCE IN PREDICTING



       GLOMERULAR FILTRATION



       RATE. ESTIMATED GFR IS



       NOT APPLICABLE FOR



       DIALYSIS PATIENTS.



CBC W/PLT COUNT &amp; AUTO [id number] 05:25:00





 Test Item  Value  Reference Range  Comments

 

 WHITE BLOOD CELL COUNT (BEAKER) (test code=775)  10.6 K/ L  3.5-10.5  

 

 RED BLOOD CELL COUNT (BEAKER) (test code=761)  2.90 M/ L  4.63-6.08  

 

 HEMOGLOBIN (BEAKER) (test code=410)  8.3 GM/DL  13.7-17.5  

 

 HEMATOCRIT (BEAKER) (test code=411)  25.4 %  40.1-51.0  

 

 MEAN CORPUSCULAR VOLUME (BEAKER) (test code=753)  87.6 fL  79.0-92.2  

 

 MEAN CORPUSCULAR HEMOGLOBIN (BEAKER) (test  28.6 pg  25.7-32.2  



 ytzh=754)      

 

 MEAN CORPUSCULAR HEMOGLOBIN CONC (BEAKER) (test  32.7 GM/DL  32.3-36.5  



 code=752)      

 

 RED CELL DISTRIBUTION WIDTH (BEAKER) (test  13.8 %  11.6-14.4  



 code=412)      

 

 PLATELET COUNT (BEAKER) (test code=756)  490 K/CU MM  150-450  

 

 MEAN PLATELET VOLUME (BEAKER) (test code=754)  8.9 fL  9.4-12.4  

 

 NUCLEATED RED BLOOD CELLS (BEAKER) (test  0 /100 WBC  0-0  



 code=413)      

 

 NEUTROPHILS RELATIVE PERCENT (BEAKER) (test  63 %    



 code=429)      

 

 LYMPHOCYTES RELATIVE PERCENT (BEAKER) (test  16 %    



 code=430)      

 

 MONOCYTES RELATIVE PERCENT (BEAKER) (test  12 %    



 code=431)      

 

 EOSINOPHILS RELATIVE PERCENT (BEAKER) (test  6 %    



 code=432)      

 

 BASOPHILS RELATIVE PERCENT (BEAKER) (test  1 %    



 code=437)      

 

 NEUTROPHILS ABSOLUTE COUNT (BEAKER) (test  6.69 K/ L  1.78-5.38  



 code=670)      

 

 LYMPHOCYTES ABSOLUTE COUNT (BEAKER) (test  1.71 K/ L  1.32-3.57  



 code=414)      

 

 MONOCYTES ABSOLUTE COUNT (BEAKER) (test  1.26 K/ L  0.30-0.82  



 code=415)      

 

 EOSINOPHILS ABSOLUTE COUNT (BEAKER) (test  0.68 K/ L  0.04-0.54  



 code=416)      

 

 BASOPHILS ABSOLUTE COUNT (BEAKER) (test  0.12 K/ L  0.01-0.08  



 code=417)      

 

 IMMATURE GRANULOCYTES-RELATIVE PERCENT (BEAKER)  2 %  0-1  



 (test code=2801)      



URINALYSIS W/ MICROSCOPIC2018-07-10 23:14:00





 Test Item  Value  Reference Range  Comments

 

 COLOR (BEAKER) (test code=470)  Light Yellow    

 

 CLARITY (BEAKER) (test code=469)  Clear    

 

 SPECIFIC GRAVITY UA (BEAKER) (test  1.007  1.001-1.035  



 code=468)      

 

 PH UA (BEAKER) (test code=467)  6.5  5.0-8.0  

 

 PROTEIN UA (BEAKER) (test code=464)  600 mg/dL  Negative  

 

 GLUCOSE UA (BEAKER) (test code=365)  500 mg/dL  Negative  

 

 KETONES UA (BEAKER) (test code=371)  Negative  Negative  

 

 BILIRUBIN UA (BEAKER) (test code=462)  Negative  Negative  

 

 BLOOD UA (BEAKER) (test code=461)  Small  Negative  

 

 NITRITE UA (BEAKER) (test code=465)  Negative  Negative  

 

 LEUKOCYTE ESTERASE UA (BEAKER) (test  Negative  Negative  



 zyyd=885)      

 

 UROBILINOGEN UA (BEAKER) (test code=463)  0.2 mg/dL  0.2-1.0  

 

 RBC UA (BEAKER) (test code=519)  3 /HPF    

 

 WBC UA (BEAKER) (test code=520)  2 /HPF    

 

 SQUAMOUS EPITHELIAL (BEAKER) (test  < /HPF    



 code=516)      

 

 HYALINE CASTS (BEAKER) (test code=514)  2 /LPF    

 

 AMORPHOUS CRYSTALS (BEAKER) (test  Rare    



 code=1584)      

 

 SOURCE(BEAKER) (test code=2795)  Urine, Clean Catch    



POCT-GLUCOSE METER2018-07-10 21:20:00





 Test Item  Value  Reference Range  Comments

 

 POC-GLUCOSE METER (BEAKER)  206 mg/dL    TESTED AT [address]



 (test wglt=2884)      [place] [address]



POCT-GLUCOSE [id number] 17:20:00





 Test Item  Value  Reference Range  Comments

 

 POC-GLUCOSE METER (BEAKER)  268 mg/dL    TESTED AT [address]



 (test fcwp=8611)      [place] [address]



POCT-GLUCOSE METER2018-07-10 16:20:00





 Test Item  Value  Reference Range  Comments

 

 POC-GLUCOSE METER (BEAKER)  249 mg/dL    TESTED AT [address]



 (test ccyr=5485)      [place] [address]



POCT-GLUCOSE [id number] 14:29:00





 Test Item  Value  Reference Range  Comments

 

 POC-GLUCOSE METER (BEAKER)  196 mg/dL    TESTED AT [address]



 (test fdgv=2600)      [place] [address]



PHOSPHORUS2018-07-10 08:17:00





 Test Item  Value  Reference Range  Comments

 

 PHOSPHORUS (BEAKER) (test code=604)  3.8 mg/dL  2.3-4.7  



MAGNESIUM2018-07-10 08:17:00





 Test Item  Value  Reference Range  Comments

 

 MAGNESIUM (BEAKER) (test code=627)  1.7 mg/dL  1.6-2.6  



BASIC METABOLIC PANEL2018-07-10 08:17:00





 Test Item  Value  Reference Range  Comments

 

 SODIUM (BEAKER) (test  133 meq/L  136-145  



 sfhm=883)      

 

 POTASSIUM (BEAKER) (test  4.7 meq/L  3.5-5.1  



 code=379)      

 

 CHLORIDE (BEAKER) (test  105 meq/L    



 code=382)      

 

 CO2 (BEAKER) (test  19 meq/L  22-29  



 code=355)      

 

 BLOOD UREA NITROGEN  42 mg/dL  7-21  



 (BEAKER) (test code=354)      

 

 CREATININE (BEAKER) (test  1.90 mg/dL  0.57-1.25  



 code=358)      

 

 GLUCOSE RANDOM (BEAKER)  186 mg/dL    



 (test code=652)      

 

 CALCIUM (BEAKER) (test  9.2 mg/dL  8.4-10.2  



 code=697)      

 

 EGFR (BEAKER) (test  34 mL/min/1.73 sq m    ESTIMATED GFR IS NOT AS



 code=1092)      ACCURATE AS CREATININE



       CLEARANCE IN PREDICTING



       GLOMERULAR FILTRATION



       RATE. ESTIMATED GFR IS



       NOT APPLICABLE FOR



       DIALYSIS PATIENTS.



POCT-GLUCOSE METER2018-07-10 08:15:00





 Test Item  Value  Reference Range  Comments

 

 POC-GLUCOSE METER (BEAKER)  225 mg/dL    TESTED AT [facility] [address] [facility]



 (test pzmi=5590)      [facility] [address]



CBC W/PLT COUNT &amp; AUTO DIFFERENTIAL2018-07-10 08:02:00





 Test Item  Value  Reference Range  Comments

 

 WHITE BLOOD CELL COUNT (BEAKER) (test code=775)  13.4 K/ L  3.5-10.5  

 

 RED BLOOD CELL COUNT (BEAKER) (test code=761)  3.10 M/ L  4.63-6.08  

 

 HEMOGLOBIN (BEAKER) (test code=410)  8.8 GM/DL  13.7-17.5  

 

 HEMATOCRIT (BEAKER) (test code=411)  27.7 %  40.1-51.0  

 

 MEAN CORPUSCULAR VOLUME (BEAKER) (test code=753)  89.4 fL  79.0-92.2  

 

 MEAN CORPUSCULAR HEMOGLOBIN (BEAKER) (test  28.4 pg  25.7-32.2  



 frmj=049)      

 

 MEAN CORPUSCULAR HEMOGLOBIN CONC (BEAKER) (test  31.8 GM/DL  32.3-36.5  



 code=752)      

 

 RED CELL DISTRIBUTION WIDTH (BEAKER) (test  13.7 %  11.6-14.4  



 code=412)      

 

 PLATELET COUNT (BEAKER) (test code=756)  551 K/CU MM  150-450  

 

 MEAN PLATELET VOLUME (BEAKER) (test code=754)  8.9 fL  9.4-12.4  

 

 NUCLEATED RED BLOOD CELLS (BEAKER) (test  0 /100 WBC  0-0  



 code=413)      

 

 NEUTROPHILS RELATIVE PERCENT (BEAKER) (test  69 %    



 code=429)      

 

 LYMPHOCYTES RELATIVE PERCENT (BEAKER) (test  12 %    



 code=430)      

 

 MONOCYTES RELATIVE PERCENT (BEAKER) (test  11 %    



 code=431)      

 

 EOSINOPHILS RELATIVE PERCENT (BEAKER) (test  6 %    



 code=432)      

 

 BASOPHILS RELATIVE PERCENT (BEAKER) (test  1 %    



 code=437)      

 

 NEUTROPHILS ABSOLUTE COUNT (BEAKER) (test  9.32 K/ L  1.78-5.38  



 code=670)      

 

 LYMPHOCYTES ABSOLUTE COUNT (BEAKER) (test  1.57 K/ L  1.32-3.57  



 code=414)      

 

 MONOCYTES ABSOLUTE COUNT (BEAKER) (test  1.42 K/ L  0.30-0.82  



 code=415)      

 

 EOSINOPHILS ABSOLUTE COUNT (BEAKER) (test  0.84 K/ L  0.04-0.54  



 code=416)      

 

 BASOPHILS ABSOLUTE COUNT (BEAKER) (test  0.09 K/ L  0.01-0.08  



 code=417)      

 

 IMMATURE GRANULOCYTES-RELATIVE PERCENT (BEAKER)  1 %  0-1  



 (test code=2801)      



POCT-GLUCOSE [id number] 21:16:00





 Test Item  Value  Reference Range  Comments

 

 POC-GLUCOSE METER (BEAKER)  302 mg/dL    TESTED AT [address]



 (test elei=7411)      [place] [address]



POCT-GLUCOSE [id number] 17:13:00





 Test Item  Value  Reference Range  Comments

 

 POC-GLUCOSE METER (BEAKER)  208 mg/dL    TESTED AT [address]



 (test scbu=4022)      [place] [address]



POCT-GLUCOSE [id number] 13:20:00





 Test Item  Value  Reference Range  Comments

 

 POC-GLUCOSE METER (BEAKER)  252 mg/dL    TESTED AT [address]



 (test wazn=5603)      [place] [address]



POCT-GLUCOSE [id number] 08:25:00





 Test Item  Value  Reference Range  Comments

 

 POC-GLUCOSE METER (BEAKER)  229 mg/dL    TESTED AT [address]



 (test bzbw=0675)      [place] [address]



BASIC METABOLIC [id number] 06:57:00





 Test Item  Value  Reference Range  Comments

 

 SODIUM (BEAKER) (test  131 meq/L  136-145  



 oydt=181)      

 

 POTASSIUM (BEAKER) (test  4.4 meq/L  3.5-5.1  



 code=379)      

 

 CHLORIDE (BEAKER) (test  102 meq/L    



 code=382)      

 

 CO2 (BEAKER) (test  19 meq/L  22-29  



 code=355)      

 

 BLOOD UREA NITROGEN  43 mg/dL  7-21  



 (BEAKER) (test code=354)      

 

 CREATININE (BEAKER) (test  2.24 mg/dL  0.57-1.25  



 code=358)      

 

 GLUCOSE RANDOM (BEAKER)  198 mg/dL    



 (test code=652)      

 

 CALCIUM (BEAKER) (test  8.7 mg/dL  8.4-10.2  



 code=697)      

 

 EGFR (BEAKER) (test  28 mL/min/1.73 sq m    ESTIMATED GFR IS NOT AS



 code=1092)      ACCURATE AS CREATININE



       CLEARANCE IN PREDICTING



       GLOMERULAR FILTRATION



       RATE. ESTIMATED GFR IS



       NOT APPLICABLE FOR



       DIALYSIS PATIENTS.



[id number] 06:55:00





 Test Item  Value  Reference Range  Comments

 

 PHOSPHORUS (BEAKER) (test code=604)  3.8 mg/dL  2.3-4.7  



[id number] 06:55:00





 Test Item  Value  Reference Range  Comments

 

 MAGNESIUM (BEAKER) (test code=627)  1.7 mg/dL  1.6-2.6  



CBC W/PLT COUNT &amp; AUTO [id number] 06:09:00





 Test Item  Value  Reference Range  Comments

 

 WHITE BLOOD CELL COUNT (BEAKER) (test code=775)  14.0 K/ L  3.5-10.5  

 

 RED BLOOD CELL COUNT (BEAKER) (test code=761)  3.12 M/ L  4.63-6.08  

 

 HEMOGLOBIN (BEAKER) (test code=410)  9.0 GM/DL  13.7-17.5  

 

 HEMATOCRIT (BEAKER) (test code=411)  27.6 %  40.1-51.0  

 

 MEAN CORPUSCULAR VOLUME (BEAKER) (test code=753)  88.5 fL  79.0-92.2  

 

 MEAN CORPUSCULAR HEMOGLOBIN (BEAKER) (test  28.8 pg  25.7-32.2  



 bjwm=554)      

 

 MEAN CORPUSCULAR HEMOGLOBIN CONC (BEAKER) (test  32.6 GM/DL  32.3-36.5  



 code=752)      

 

 RED CELL DISTRIBUTION WIDTH (BEAKER) (test  13.6 %  11.6-14.4  



 code=412)      

 

 PLATELET COUNT (BEAKER) (test code=756)  451 K/CU MM  150-450  

 

 MEAN PLATELET VOLUME (BEAKER) (test code=754)  8.8 fL  9.4-12.4  

 

 NUCLEATED RED BLOOD CELLS (BEAKER) (test  0 /100 WBC  0-0  



 code=413)      

 

 NEUTROPHILS RELATIVE PERCENT (BEAKER) (test  70 %    



 code=429)      

 

 LYMPHOCYTES RELATIVE PERCENT (BEAKER) (test  12 %    



 code=430)      

 

 MONOCYTES RELATIVE PERCENT (BEAKER) (test  10 %    



 code=431)      

 

 EOSINOPHILS RELATIVE PERCENT (BEAKER) (test  6 %    



 code=432)      

 

 BASOPHILS RELATIVE PERCENT (BEAKER) (test  1 %    



 code=437)      

 

 NEUTROPHILS ABSOLUTE COUNT (BEAKER) (test  9.86 K/ L  1.78-5.38  



 code=670)      

 

 LYMPHOCYTES ABSOLUTE COUNT (BEAKER) (test  1.63 K/ L  1.32-3.57  



 code=414)      

 

 MONOCYTES ABSOLUTE COUNT (BEAKER) (test  1.40 K/ L  0.30-0.82  



 code=415)      

 

 EOSINOPHILS ABSOLUTE COUNT (BEAKER) (test  0.85 K/ L  0.04-0.54  



 code=416)      

 

 BASOPHILS ABSOLUTE COUNT (BEAKER) (test  0.08 K/ L  0.01-0.08  



 code=417)      

 

 IMMATURE GRANULOCYTES-RELATIVE PERCENT (BEAKER)  1 %  0-1  



 (test code=2801)      



POCT-GLUCOSE [id number] 17:30:00





 Test Item  Value  Reference Range  Comments

 

 POC-GLUCOSE METER (BEAKER)  149 mg/dL    TESTED AT [address]



 (test irbs=4450)      [facility] [address]



POCT-GLUCOSE [id number] 12:10:00





 Test Item  Value  Reference Range  Comments

 

 POC-GLUCOSE METER (BEAKER)  245 mg/dL    TESTED AT [address]



 (test lhpd=1883)      [facility] [address]



CBC W/PLT COUNT &amp; AUTO [id number] 10:30:00





 Test Item  Value  Reference Range  Comments

 

 WHITE BLOOD CELL COUNT (BEAKER) (test code=775)  12.6 K/ L  3.5-10.5  

 

 RED BLOOD CELL COUNT (BEAKER) (test code=761)  2.95 M/ L  4.63-6.08  

 

 HEMOGLOBIN (BEAKER) (test code=410)  8.6 GM/DL  13.7-17.5  

 

 HEMATOCRIT (BEAKER) (test code=411)  26.3 %  40.1-51.0  

 

 MEAN CORPUSCULAR VOLUME (BEAKER) (test code=753)  89.2 fL  79.0-92.2  

 

 MEAN CORPUSCULAR HEMOGLOBIN (BEAKER) (test  29.2 pg  25.7-32.2  



 ksdr=561)      

 

 MEAN CORPUSCULAR HEMOGLOBIN CONC (BEAKER) (test  32.7 GM/DL  32.3-36.5  



 code=752)      

 

 RED CELL DISTRIBUTION WIDTH (BEAKER) (test  13.8 %  11.6-14.4  



 code=412)      

 

 PLATELET COUNT (BEAKER) (test code=756)  428 K/CU MM  150-450  

 

 MEAN PLATELET VOLUME (BEAKER) (test code=754)  9.1 fL  9.4-12.4  

 

 NUCLEATED RED BLOOD CELLS (BEAKER) (test  0 /100 WBC  0-0  



 code=413)      



(CELLAVISION MANUAL DIFF)2018 10:30:00





 Test Item  Value  Reference Range  Comments

 

 NEUTROPHILS - REL (CELLAVISION)(BEAKER) (test  83 %    



 code=2816)      

 

 LYMPHOCYTES - REL (CELLAVISION)(BEAKER) (test  8 %    



 code=2817)      

 

 MONOCYTES - REL (CELLAVISION)(BEAKER) (test  4 %    



 code=2818)      

 

 EOSINOPHILS - REL (CELLAVISION)(BEAKER) (test  4 %    



 code=2819)      

 

 BASOPHILS - REL (CELLAVISION)(BEAKER) (test  1 %    



 code=2820)      

 

 NEUTROPHILS - ABS (CELLAVISION)(BEAKER) (test  10.46 K/ul  1.78-5.38  



 code=2830)      

 

 LYMPHOCYTES - ABS (CELLAVISION)(BEAKER) (test  1.01 K/ul  1.32-3.57  



 code=2831)      

 

 MONOCYTES - ABS (CELLAVISION)(BEAKER) (test  0.50 K/uL  0.30-0.82  



 code=2832)      

 

 EOSINOPHILS - ABS (CELLAVISION)(BEAKER) (test  0.50 K/uL  0.04-0.54  



 code=2834)      

 

 BASOPHILS - ABS (CELLAVISION)(BEAKER) (test  0.13 K/uL  0.01-0.08  



 code=2835)      

 

 TOTAL COUNTED (BEAKER) (test code=1351)  100    

 

 MANUAL NRBC  CELLS (BEAKER) (test  1 /100 WBC  0-0  



 code=1353)      

 

 WBC MORPHOLOGY (BEAKER) (test code=487)  Normal    

 

 PLT MORPHOLOGY (BEAKER) (test code=486)  Normal    

 

 ANISOCYTOSIS (BEAKER) (test code=961)  1+ few    

 

 MICROCYTES (BEAKER) (test code=965)  1+ few    

 

 ARTIFACT (CELLAVISION)(BEAKER) (test code=3432)  Present    

 

 PLATELET CONCENTRATION (CELLAVISION)(BEAKER)  Adequate    



 (test code=3438)      



Received comment: User comments: Slide comments:POCT-GLUCOSE [id number] 08:
03:00





 Test Item  Value  Reference Range  Comments

 

 POC-GLUCOSE METER (BEAKER)  242 mg/dL    TESTED AT [facility] [address] [facility]



 (test uuhd=9177)      [facility] [address]



[id number] 07:26:00





 Test Item  Value  Reference Range  Comments

 

 PHOSPHORUS (BEAKER) (test code=604)  3.6 mg/dL  2.3-4.7  



[id number] 07:26:00





 Test Item  Value  Reference Range  Comments

 

 MAGNESIUM (BEAKER) (test code=627)  1.9 mg/dL  1.6-2.6  



BASIC METABOLIC [id number] 07:26:00





 Test Item  Value  Reference Range  Comments

 

 SODIUM (BEAKER) (test  131 meq/L  136-145  



 epfd=986)      

 

 POTASSIUM (BEAKER) (test  4.3 meq/L  3.5-5.1  



 code=379)      

 

 CHLORIDE (BEAKER) (test  102 meq/L    



 code=382)      

 

 CO2 (BEAKER) (test  20 meq/L  22-29  



 code=355)      

 

 BLOOD UREA NITROGEN  44 mg/dL  7-21  



 (BEAKER) (test code=354)      

 

 CREATININE (BEAKER) (test  1.98 mg/dL  0.57-1.25  



 code=358)      

 

 GLUCOSE RANDOM (BEAKER)  216 mg/dL    



 (test code=652)      

 

 CALCIUM (BEAKER) (test  8.8 mg/dL  8.4-10.2  



 code=697)      

 

 EGFR (BEAKER) (test  33 mL/min/1.73 sq m    ESTIMATED GFR IS NOT AS



 code=1092)      ACCURATE AS CREATININE



       CLEARANCE IN PREDICTING



       GLOMERULAR FILTRATION



       RATE. ESTIMATED GFR IS



       NOT APPLICABLE FOR



       DIALYSIS PATIENTS.



POCT-GLUCOSE [id number] 21:03:00





 Test Item  Value  Reference Range  Comments

 

 POC-GLUCOSE METER (BEAKER)  414 mg/dL    TESTED AT [address]



 (test xkav=9099)      [facility] [address]



POCT-GLUCOSE [id number] 17:11:00





 Test Item  Value  Reference Range  Comments

 

 POC-GLUCOSE METER (BEAKER)  201 mg/dL    TESTED AT [address]



 (test mqeg=2929)      [facility] [address]



POCT-GLUCOSE [id number] 11:55:00





 Test Item  Value  Reference Range  Comments

 

 POC-GLUCOSE METER (BEAKER)  228 mg/dL    TESTED AT [address]



 (test hovk=2564)      [facility] [address]



CALCIUM, [id number] 08:13:00





 Test Item  Value  Reference Range  Comments

 

 CALCIUM IONIZED (BEAKER) (test code=698)  1.08 mmol/L  1.12-1.27  

 

 PH, BLOOD (BEAKER) (test code=1810)  7.32    



CBC W/PLT COUNT &amp; AUTO [id number] 07:52:00





 Test Item  Value  Reference Range  Comments

 

 WHITE BLOOD CELL COUNT (BEAKER) (test code=775)  12.8 K/ L  3.5-10.5  

 

 RED BLOOD CELL COUNT (BEAKER) (test code=761)  3.11 M/ L  4.63-6.08  

 

 HEMOGLOBIN (BEAKER) (test code=410)  9.0 GM/DL  13.7-17.5  

 

 HEMATOCRIT (BEAKER) (test code=411)  28.0 %  40.1-51.0  

 

 MEAN CORPUSCULAR VOLUME (BEAKER) (test code=753)  90.0 fL  79.0-92.2  

 

 MEAN CORPUSCULAR HEMOGLOBIN (BEAKER) (test  28.9 pg  25.7-32.2  



 skih=082)      

 

 MEAN CORPUSCULAR HEMOGLOBIN CONC (BEAKER) (test  32.1 GM/DL  32.3-36.5  



 code=752)      

 

 RED CELL DISTRIBUTION WIDTH (BEAKER) (test  13.8 %  11.6-14.4  



 code=412)      

 

 PLATELET COUNT (BEAKER) (test code=756)  432 K/CU MM  150-450  

 

 MEAN PLATELET VOLUME (BEAKER) (test code=754)  9.0 fL  9.4-12.4  

 

 NUCLEATED RED BLOOD CELLS (BEAKER) (test  0 /100 WBC  0-0  



 code=413)      

 

 NEUTROPHILS RELATIVE PERCENT (BEAKER) (test  71 %    



 code=429)      

 

 LYMPHOCYTES RELATIVE PERCENT (BEAKER) (test  10 %    



 code=430)      

 

 MONOCYTES RELATIVE PERCENT (BEAKER) (test  11 %    



 code=431)      

 

 EOSINOPHILS RELATIVE PERCENT (BEAKER) (test  6 %    



 code=432)      

 

 BASOPHILS RELATIVE PERCENT (BEAKER) (test  1 %    



 code=437)      

 

 NEUTROPHILS ABSOLUTE COUNT (BEAKER) (test  9.05 K/ L  1.78-5.38  



 code=670)      

 

 LYMPHOCYTES ABSOLUTE COUNT (BEAKER) (test  1.33 K/ L  1.32-3.57  



 code=414)      

 

 MONOCYTES ABSOLUTE COUNT (BEAKER) (test  1.42 K/ L  0.30-0.82  



 code=415)      

 

 EOSINOPHILS ABSOLUTE COUNT (BEAKER) (test  0.76 K/ L  0.04-0.54  



 code=416)      

 

 BASOPHILS ABSOLUTE COUNT (BEAKER) (test  0.07 K/ L  0.01-0.08  



 code=417)      

 

 IMMATURE GRANULOCYTES-RELATIVE PERCENT (BEAKER)  1 %  0-1  



 (test code=2801)      



[id number] 07:49:00





 Test Item  Value  Reference Range  Comments

 

 PHOSPHORUS (BEAKER) (test code=604)  3.9 mg/dL  2.3-4.7  



[id number] 07:49:00





 Test Item  Value  Reference Range  Comments

 

 MAGNESIUM (BEAKER) (test code=627)  1.8 mg/dL  1.6-2.6  



COMPREHENSIVE METABOLIC [id number] 07:49:00





 Test Item  Value  Reference Range  Comments

 

 TOTAL PROTEIN (BEAKER)  6.2 gm/dL  6.0-8.3  



 (test code=770)      

 

 ALBUMIN (BEAKER) (test  2.9 g/dL  3.5-5.0  



 code=1145)      

 

 ALKALINE PHOSPHATASE  314 U/L    



 (BEAKER) (test code=346)      

 

 BILIRUBIN TOTAL (BEAKER)  0.3 mg/dL  0.2-1.2  



 (test code=377)      

 

 SODIUM (BEAKER) (test  132 meq/L  136-145  



 kqac=980)      

 

 POTASSIUM (BEAKER) (test  4.2 meq/L  3.5-5.1  



 code=379)      

 

 CHLORIDE (BEAKER) (test  102 meq/L    



 code=382)      

 

 CO2 (BEAKER) (test  19 meq/L  22-29  



 code=355)      

 

 BLOOD UREA NITROGEN  44 mg/dL  7-21  



 (BEAKER) (test code=354)      

 

 CREATININE (BEAKER) (test  2.15 mg/dL  0.57-1.25  



 code=358)      

 

 GLUCOSE RANDOM (BEAKER)  219 mg/dL    



 (test code=652)      

 

 CALCIUM (BEAKER) (test  8.7 mg/dL  8.4-10.2  



 code=697)      

 

 AST (SGOT) (BEAKER) (test  23 U/L  5-34  



 code=353)      

 

 ALT (SGPT) (BEAKER) (test  29 U/L  6-55  



 code=347)      

 

 EGFR (BEAKER) (test  30 mL/min/1.73 sq m    ESTIMATED GFR IS NOT AS



 code=1092)      ACCURATE AS CREATININE



       CLEARANCE IN PREDICTING



       GLOMERULAR FILTRATION



       RATE. ESTIMATED GFR IS



       NOT APPLICABLE FOR



       DIALYSIS PATIENTS.



POCT-GLUCOSE [id number] 07:34:00





 Test Item  Value  Reference Range  Comments

 

 POC-GLUCOSE METER (BEAKER)  251 mg/dL    TESTED AT [address]



 (test yfse=3303)      [facility] [address]



POCT-GLUCOSE [id number] 20:55:00





 Test Item  Value  Reference Range  Comments

 

 POC-GLUCOSE METER (BEAKER)  196 mg/dL    TESTED AT [address]



 (test xzet=0715)      [place] [id number]



HEMOGLOBIN AND [id number] 17:55:00





 Test Item  Value  Reference Range  Comments

 

 HEMOGLOBIN (BEAKER) (test code=410)  9.0 GM/DL  13.7-17.5  

 

 HEMATOCRIT (BEAKER) (test code=411)  27.6 %  40.1-51.0  



Call [id number] 13:46:00





 Test Item  Value  Reference Range  Comments

 

 POC-GLUCOSE METER (BEAKER)  245 mg/dL    TESTED AT [address]



 (test dlsn=0348)      [place] [address]



U/S, BIOPSY, RENAL (KIDNEY)2018 10:42:00Reason for exam:-&gt;MARIANNE, 
nephrotic proteinuriaFINAL REPORT PATIENT ID:   [id number]  Ultrasound guided 
core biopsy of the left native kidney dated 2018 Procedure: Core biopsy of 
the left native kidney. Clinical Indication: Proteinuria Sedation: Moderate 
sedation was administered. 0.5 mg of Versed and 25 mcg fentanyl IV was used for 
moderate sedation monitored under my direction. Total intraservice time of the 
sedation was 50 minutes. The patient's vital signs were monitored throughout 
the procedure and recorded in the patient's medical recordby the nurse. 
Anesthesia: 1% Xylocaine mixed with sodium bicarbonate local anesthesia. 
Technique: After obtained informed consent, ultrasound guided core biopsy of 
the left native kidney was performed under usual sterile technique. Using an 18 
gauge core biopsy needle, puncture was made posteriorly onthe left with real 
time ultrasound guidance. 2 passes were performed with  sufficient material for 
histology. Patient tolerated procedure well. Complication: None Estimated Blood 
Loss: None The specimen was sent to pathology. Impression: Successful ultrasound
-guided core biopsy of the left native kidney.  Signed: Demetris Costa 
Verified Date/Time:  2018 10:42:49 Reading Location: [address] 
Ultrasound Reading Room      Electronically signed by: DEMETRIS COSTA M.D. on 2018 10:42 AMPOCT-GLUCOSE [id number] 08:29:00





 Test Item  Value  Reference Range  Comments

 

 POC-GLUCOSE METER (BEAKER)  286 mg/dL    TESTED AT [address]



 (test fxxr=1766)      [place] [address]



CBC W/PLT COUNT &amp; AUTO [id number] 05:46:00





 Test Item  Value  Reference Range  Comments

 

 WHITE BLOOD CELL COUNT (BEAKER) (test code=775)  11.4 K/ L  3.5-10.5  

 

 RED BLOOD CELL COUNT (BEAKER) (test code=761)  2.96 M/ L  4.63-6.08  

 

 HEMOGLOBIN (BEAKER) (test code=410)  8.8 GM/DL  13.7-17.5  

 

 HEMATOCRIT (BEAKER) (test code=411)  26.4 %  40.1-51.0  

 

 MEAN CORPUSCULAR VOLUME (BEAKER) (test code=753)  89.2 fL  79.0-92.2  

 

 MEAN CORPUSCULAR HEMOGLOBIN (BEAKER) (test  29.7 pg  25.7-32.2  



 dlvf=704)      

 

 MEAN CORPUSCULAR HEMOGLOBIN CONC (BEAKER) (test  33.3 GM/DL  32.3-36.5  



 code=752)      

 

 RED CELL DISTRIBUTION WIDTH (BEAKER) (test  13.7 %  11.6-14.4  



 code=412)      

 

 PLATELET COUNT (BEAKER) (test code=756)  401 K/CU MM  150-450  

 

 MEAN PLATELET VOLUME (BEAKER) (test code=754)  9.0 fL  9.4-12.4  

 

 NUCLEATED RED BLOOD CELLS (BEAKER) (test  0 /100 WBC  0-0  



 code=413)      

 

 NEUTROPHILS RELATIVE PERCENT (BEAKER) (test  67 %    



 code=429)      

 

 LYMPHOCYTES RELATIVE PERCENT (BEAKER) (test  11 %    



 code=430)      

 

 MONOCYTES RELATIVE PERCENT (BEAKER) (test  14 %    



 code=431)      

 

 EOSINOPHILS RELATIVE PERCENT (BEAKER) (test  7 %    



 code=432)      

 

 BASOPHILS RELATIVE PERCENT (BEAKER) (test  1 %    



 code=437)      

 

 NEUTROPHILS ABSOLUTE COUNT (BEAKER) (test  7.70 K/ L  1.78-5.38  



 code=670)      

 

 LYMPHOCYTES ABSOLUTE COUNT (BEAKER) (test  1.26 K/ L  1.32-3.57  



 code=414)      

 

 MONOCYTES ABSOLUTE COUNT (BEAKER) (test  1.55 K/ L  0.30-0.82  



 code=415)      

 

 EOSINOPHILS ABSOLUTE COUNT (BEAKER) (test  0.74 K/ L  0.04-0.54  



 code=416)      

 

 BASOPHILS ABSOLUTE COUNT (BEAKER) (test  0.06 K/ L  0.01-0.08  



 code=417)      

 

 IMMATURE GRANULOCYTES-RELATIVE PERCENT (BEAKER)  1 %  0-1  



 (test code=2801)      



COMPREHENSIVE METABOLIC [id number] 05:41:00





 Test Item  Value  Reference Range  Comments

 

 TOTAL PROTEIN (BEAKER)  6.0 gm/dL  6.0-8.3  



 (test code=770)      

 

 ALBUMIN (BEAKER) (test  2.9 g/dL  3.5-5.0  



 code=1145)      

 

 ALKALINE PHOSPHATASE  346 U/L    



 (BEAKER) (test code=346)      

 

 BILIRUBIN TOTAL (BEAKER)  0.2 mg/dL  0.2-1.2  



 (test code=377)      

 

 SODIUM (BEAKER) (test  131 meq/L  136-145  



 njcl=801)      

 

 POTASSIUM (BEAKER) (test  3.9 meq/L  3.5-5.1  



 code=379)      

 

 CHLORIDE (BEAKER) (test  103 meq/L    



 code=382)      

 

 CO2 (BEAKER) (test  20 meq/L  22-29  



 code=355)      

 

 BLOOD UREA NITROGEN  44 mg/dL  7-21  



 (BEAKER) (test code=354)      

 

 CREATININE (BEAKER) (test  2.41 mg/dL  0.57-1.25  



 code=358)      

 

 GLUCOSE RANDOM (BEAKER)  288 mg/dL    



 (test code=652)      

 

 CALCIUM (BEAKER) (test  8.4 mg/dL  8.4-10.2  



 code=697)      

 

 AST (SGOT) (BEAKER) (test  24 U/L  5-34  



 code=353)      

 

 ALT (SGPT) (BEAKER) (test  29 U/L  6-55  



 code=347)      

 

 EGFR (BEAKER) (test  26 mL/min/1.73 sq m    ESTIMATED GFR IS NOT AS



 code=1092)      ACCURATE AS CREATININE



       CLEARANCE IN PREDICTING



       GLOMERULAR FILTRATION



       RATE. ESTIMATED GFR IS



       NOT APPLICABLE FOR



       DIALYSIS PATIENTS.



[id number] 05:40:00





 Test Item  Value  Reference Range  Comments

 

 PHOSPHORUS (BEAKER) (test code=604)  3.9 mg/dL  2.3-4.7  



[id number] 05:40:00





 Test Item  Value  Reference Range  Comments

 

 MAGNESIUM (BEAKER) (test code=627)  1.6 mg/dL  1.6-2.6  



CALCIUM, [id number] 05:30:00





 Test Item  Value  Reference Range  Comments

 

 CALCIUM IONIZED (BEAKER) (test code=698)  1.05 mmol/L  1.12-1.27  

 

 PH, BLOOD (BEAKER) (test code=1810)  7.36    



POCT-GLUCOSE [id number] 21:53:00





 Test Item  Value  Reference Range  Comments

 

 POC-GLUCOSE METER (BEAKER)  346 mg/dL    Notified RN MD/TESTED AT [facility]



 (test code=1538)      [address] [facility] [address]



POCT-GLUCOSE [id number] 17:08:00





 Test Item  Value  Reference Range  Comments

 

 POC-GLUCOSE METER (BEAKER)  365 mg/dL    Notified RN MD/TESTED AT [facility]



 (test code=1538)      [address] [facility] [address]



POCT-GLUCOSE [id number] 17:01:00





 Test Item  Value  Reference Range  Comments

 

 POC-GLUCOSE METER (BEAKER)  294 mg/dL    TESTED AT [facility] [address] [place] [address]



 (test rfru=8306)      [facility] [address]



URINE IMMUNOFIXATION, 24 [id number] 10:11:00





 Test Item  Value  Reference Range  Comments

 

 VOLUME, TOTAL (BEAKER) (test  1700 ml    



 code=1457)      

 

 PROTEIN, URINE (BEAKER)  176 mg/dL  0-14  



 (test code=1569)      

 

 PROTEIN, 24HR URINE (BEAKER)  2992 mg/24hr  0-300  



 (test code=1570)      

 

 ALBUMIN, 24HR URINE (BEAKER)  30.3 %    



 (test code=1609)      

 

 GLOBULIN, 24HR URINE  69.7 %    



 (BEAKER) (test code=1610)      

 

 URINE MOE ID (BEAKER) (test  No monoclonal proteins or    



 code=1587)  monoclonal free light chains    



   detected.    

 

 [id number]  Jocelynn Gonzalez MD    



 (BEAKER) (test code=2611)  (electronic signature)    



RAD, CHEST, 1 VIEW, NON [id number] 08:40:00Reason for exam:-&gt;edemaShould 
this be performed at the bedside?-&gt;YesFINAL REPORT PATIENT ID:   [id number] 
CLINICAL HISTORY: edema  TECHNIQUE: 1 view of the chest. COMPARISON: 2018 
IMPRESSION: The right central line remains in the SVC. Mild bilateral airspace 
opacities have decreased. Trace bilateral pleural effusions remain. The 
cardiomediastinal silhouette is magnified by technique.  Signed: Favian Weiner 
MDReport Verified Date/Time:  2018 08:40:13 Reading Location: [facility] Radiology Reading Room      Electronically signed by: FAVIAN WEINER M.D. 
 08:40 AMPOCT-GLUCOSE [id number] 08:13:00





 Test Item  Value  Reference Range  Comments

 

 POC-GLUCOSE METER (BEAKER)  193 mg/dL    [facility]ED [facility] [address] [facility]



 (test hmvu=4411)      [facility] [address]



COMPREHENSIVE METABOLIC [id number] 06:59:00





 Test Item  Value  Reference Range  Comments

 

 TOTAL PROTEIN (BEAKER)  5.9 gm/dL  6.0-8.3  



 (test code=770)      

 

 ALBUMIN (BEAKER) (test  2.8 g/dL  3.5-5.0  



 code=1145)      

 

 ALKALINE PHOSPHATASE  297 U/L    



 (BEAKER) (test code=346)      

 

 BILIRUBIN TOTAL (BEAKER)  0.3 mg/dL  0.2-1.2  



 (test code=377)      

 

 SODIUM (BEAKER) (test  132 meq/L  136-145  



 wrqh=009)      

 

 POTASSIUM (BEAKER) (test  3.7 meq/L  3.5-5.1  



 code=379)      

 

 CHLORIDE (BEAKER) (test  103 meq/L    



 code=382)      

 

 CO2 (BEAKER) (test  19 meq/L  22-29  



 code=355)      

 

 BLOOD UREA NITROGEN  42 mg/dL  7-21  



 (BEAKER) (test code=354)      

 

 CREATININE (BEAKER) (test  2.33 mg/dL  0.57-1.25  



 code=358)      

 

 GLUCOSE RANDOM (BEAKER)  168 mg/dL    



 (test code=652)      

 

 CALCIUM (BEAKER) (test  8.3 mg/dL  8.4-10.2  



 code=697)      

 

 AST (SGOT) (BEAKER) (test  27 U/L  5-34  



 code=353)      

 

 ALT (SGPT) (BEAKER) (test  31 U/L  6-55  



 code=347)      

 

 EGFR (BEAKER) (test  27 mL/min/1.73 sq m    ESTIMATED GFR IS NOT AS



 code=1092)      ACCURATE AS CREATININE



       CLEARANCE IN PREDICTING



       GLOMERULAR FILTRATION



       RATE. ESTIMATED GFR IS



       NOT APPLICABLE FOR



       DIALYSIS PATIENTS.



[id number] 06:57:00





 Test Item  Value  Reference Range  Comments

 

 PHOSPHORUS (BEAKER) (test code=604)  3.9 mg/dL  2.3-4.7  



[id number] 06:57:00





 Test Item  Value  Reference Range  Comments

 

 MAGNESIUM (BEAKER) (test code=627)  1.6 mg/dL  1.6-2.6  



CALCIUM, [id number] 06:56:00





 Test Item  Value  Reference Range  Comments

 

 CALCIUM IONIZED (BEAKER) (test code=698)  1.02 mmol/L  1.12-1.27  

 

 PH, BLOOD (BEAKER) (test code=1810)  7.37    



B-TYPE NATRIURETIC FACTOR (BNP)2018 06:43:00





 Test Item  Value  Reference Range  Comments

 

 B-TYPE NATRIURETIC PEPTIDE (BEAKER) (test  109 pg/mL  0-100  



 code=700)      



CBC W/PLT COUNT &amp; AUTO [id number] 06:32:00





 Test Item  Value  Reference Range  Comments

 

 WHITE BLOOD CELL COUNT (BEAKER) (test code=775)  11.0 K/ L  3.5-10.5  

 

 RED BLOOD CELL COUNT (BEAKER) (test code=761)  2.91 M/ L  4.63-6.08  

 

 HEMOGLOBIN (BEAKER) (test code=410)  8.5 GM/DL  13.7-17.5  

 

 HEMATOCRIT (BEAKER) (test code=411)  25.7 %  40.1-51.0  

 

 MEAN CORPUSCULAR VOLUME (BEAKER) (test code=753)  88.3 fL  79.0-92.2  

 

 MEAN CORPUSCULAR HEMOGLOBIN (BEAKER) (test  29.2 pg  25.7-32.2  



 sozb=770)      

 

 MEAN CORPUSCULAR HEMOGLOBIN CONC (BEAKER) (test  33.1 GM/DL  32.3-36.5  



 code=752)      

 

 RED CELL DISTRIBUTION WIDTH (BEAKER) (test  13.6 %  11.6-14.4  



 code=412)      

 

 PLATELET COUNT (BEAKER) (test code=756)  337 K/CU MM  150-450  

 

 MEAN PLATELET VOLUME (BEAKER) (test code=754)  9.1 fL  9.4-12.4  

 

 NUCLEATED RED BLOOD CELLS (BEAKER) (test  0 /100 WBC  0-0  



 code=413)      

 

 NEUTROPHILS RELATIVE PERCENT (BEAKER) (test  66 %    



 code=429)      

 

 LYMPHOCYTES RELATIVE PERCENT (BEAKER) (test  11 %    



 code=430)      

 

 MONOCYTES RELATIVE PERCENT (BEAKER) (test  13 %    



 code=431)      

 

 EOSINOPHILS RELATIVE PERCENT (BEAKER) (test  8 %    



 code=432)      

 

 BASOPHILS RELATIVE PERCENT (BEAKER) (test  1 %    



 code=437)      

 

 NEUTROPHILS ABSOLUTE COUNT (BEAKER) (test  7.21 K/ L  1.78-5.38  



 code=670)      

 

 LYMPHOCYTES ABSOLUTE COUNT (BEAKER) (test  1.23 K/ L  1.32-3.57  



 code=414)      

 

 MONOCYTES ABSOLUTE COUNT (BEAKER) (test  1.43 K/ L  0.30-0.82  



 code=415)      

 

 EOSINOPHILS ABSOLUTE COUNT (BEAKER) (test  0.89 K/ L  0.04-0.54  



 code=416)      

 

 BASOPHILS ABSOLUTE COUNT (BEAKER) (test  0.07 K/ L  0.01-0.08  



 code=417)      

 

 IMMATURE GRANULOCYTES-RELATIVE PERCENT (BEAKER)  2 %  0-1  



 (test code=2801)      



POCT-GLUCOSE [id number] 21:34:00





 Test Item  Value  Reference Range  Comments

 

 POC-GLUCOSE METER (BEAKER)  244 mg/dL    TESTED AT [address]



 (test wpyn=1927)      [place] [address]



POCT-GLUCOSE [id number] 17:02:00





 Test Item  Value  Reference Range  Comments

 

 POC-GLUCOSE METER (BEAKER)  192 mg/dL    TESTED AT [address]



 (test qhsl=3525)      [place] [id number]



POCT-GLUCOSE [id number] 11:45:00





 Test Item  Value  Reference Range  Comments

 

 POC-GLUCOSE METER (BEAKER)  219 mg/dL    TESTED AT [address]



 (test gwaa=3785)      [place] [id number]



COMPREHENSIVE METABOLIC [id number] 08:04:00





 Test Item  Value  Reference Range  Comments

 

 TOTAL PROTEIN (BEAKER)  6.0 gm/dL  6.0-8.3  



 (test code=770)      

 

 ALBUMIN (BEAKER) (test  2.8 g/dL  3.5-5.0  



 code=1145)      

 

 ALKALINE PHOSPHATASE  334 U/L    



 (BEAKER) (test code=346)      

 

 BILIRUBIN TOTAL (BEAKER)  0.3 mg/dL  0.2-1.2  



 (test code=377)      

 

 SODIUM (BEAKER) (test  128 meq/L  136-145  



 vaoq=235)      

 

 POTASSIUM (BEAKER) (test  3.8 meq/L  3.5-5.1  



 code=379)      

 

 CHLORIDE (BEAKER) (test  99 meq/L    



 code=382)      

 

 CO2 (BEAKER) (test  19 meq/L  22-29  



 code=355)      

 

 BLOOD UREA NITROGEN  43 mg/dL  7-21  



 (BEAKER) (test code=354)      

 

 CREATININE (BEAKER) (test  2.60 mg/dL  0.57-1.25  



 code=358)      

 

 GLUCOSE RANDOM (BEAKER)  214 mg/dL    



 (test code=652)      

 

 CALCIUM (BEAKER) (test  8.1 mg/dL  8.4-10.2  



 code=697)      

 

 AST (SGOT) (BEAKER) (test  34 U/L  5-34  



 code=353)      

 

 ALT (SGPT) (BEAKER) (test  38 U/L  6-55  



 code=347)      

 

 EGFR (BEAKER) (test  24 mL/min/1.73 sq m    ESTIMATED GFR IS NOT AS



 code=1092)      ACCURATE AS CREATININE



       CLEARANCE IN PREDICTING



       GLOMERULAR FILTRATION



       RATE. ESTIMATED GFR IS



       NOT APPLICABLE FOR



       DIALYSIS PATIENTS.



[id number] 07:58:00





 Test Item  Value  Reference Range  Comments

 

 PHOSPHORUS (BEAKER) (test code=604)  3.5 mg/dL  2.3-4.7  



[id number] 07:58:00





 Test Item  Value  Reference Range  Comments

 

 MAGNESIUM (BEAKER) (test code=627)  1.5 mg/dL  1.6-2.6  



POCT-GLUCOSE [id number] 07:46:00





 Test Item  Value  Reference Range  Comments

 

 POC-GLUCOSE METER (BEAKER)  239 mg/dL    TESTED AT [facility] [address] [facility]



 (test ojxa=0000)      [facility] [address]



CALCIUM, [id number] 07:07:00





 Test Item  Value  Reference Range  Comments

 

 CALCIUM IONIZED (BEAKER) (test code=698)  0.99 mmol/L  1.12-1.27  

 

 PH, BLOOD (BEAKER) (test code=1810)  7.36    



PROTHROMBIN [id number] 06:47:00





 Test Item  Value  Reference Range  Comments

 

 PROTIME (BEAKER) (test code=759)  15.1 seconds  11.7-14.7  

 

 INR (BEAKER) (test code=370)  1.2  <=5.9  



RECOMMENDED COUMADIN/WARFARIN INR THERAPY RANGESSTANDARD DOSE: 2.0 - 3.0   
Includes: PROPHYLAXIS forvenous thrombosis, systemic embolization; TREATMENT 
for venous thrombosis and/or pulmonary embolus.HIGH RISK: Target INR is 2.5-3.5 
for patients with mechanical heart valves.CBC W/PLT COUNT &amp; AUTO 
[id number] 06:46:00





 Test Item  Value  Reference Range  Comments

 

 WHITE BLOOD CELL COUNT (BEAKER) (test code=775)  11.4 K/ L  3.5-10.5  

 

 RED BLOOD CELL COUNT (BEAKER) (test code=761)  3.00 M/ L  4.63-6.08  

 

 HEMOGLOBIN (BEAKER) (test code=410)  8.6 GM/DL  13.7-17.5  

 

 HEMATOCRIT (BEAKER) (test code=411)  26.6 %  40.1-51.0  

 

 MEAN CORPUSCULAR VOLUME (BEAKER) (test code=753)  88.7 fL  79.0-92.2  

 

 MEAN CORPUSCULAR HEMOGLOBIN (BEAKER) (test  28.7 pg  25.7-32.2  



 lawn=812)      

 

 MEAN CORPUSCULAR HEMOGLOBIN CONC (BEAKER) (test  32.3 GM/DL  32.3-36.5  



 code=752)      

 

 RED CELL DISTRIBUTION WIDTH (BEAKER) (test  13.6 %  11.6-14.4  



 code=412)      

 

 PLATELET COUNT (BEAKER) (test code=756)  337 K/CU MM  150-450  

 

 MEAN PLATELET VOLUME (BEAKER) (test code=754)  9.3 fL  9.4-12.4  

 

 NUCLEATED RED BLOOD CELLS (BEAKER) (test  0 /100 WBC  0-0  



 code=413)      

 

 NEUTROPHILS RELATIVE PERCENT (BEAKER) (test  65 %    



 code=429)      

 

 LYMPHOCYTES RELATIVE PERCENT (BEAKER) (test  12 %    



 code=430)      

 

 MONOCYTES RELATIVE PERCENT (BEAKER) (test  12 %    



 code=431)      

 

 EOSINOPHILS RELATIVE PERCENT (BEAKER) (test  9 %    



 code=432)      

 

 BASOPHILS RELATIVE PERCENT (BEAKER) (test  0 %    



 code=437)      

 

 NEUTROPHILS ABSOLUTE COUNT (BEAKER) (test  7.45 K/ L  1.78-5.38  



 code=670)      

 

 LYMPHOCYTES ABSOLUTE COUNT (BEAKER) (test  1.38 K/ L  1.32-3.57  



 code=414)      

 

 MONOCYTES ABSOLUTE COUNT (BEAKER) (test  1.41 K/ L  0.30-0.82  



 code=415)      

 

 EOSINOPHILS ABSOLUTE COUNT (BEAKER) (test  0.99 K/ L  0.04-0.54  



 code=416)      

 

 BASOPHILS ABSOLUTE COUNT (BEAKER) (test  0.05 K/ L  0.01-0.08  



 code=417)      

 

 IMMATURE GRANULOCYTES-RELATIVE PERCENT (BEAKER)  1 %  0-1  



 (test code=2801)      



POCT-GLUCOSE [id number] 20:40:00





 Test Item  Value  Reference Range  Comments

 

 POC-GLUCOSE METER (BEAKER)  311 mg/dL    TESTED AT [address]



 (test cxup=3671)      [place] [address]



RAPID DRUG SCREEN, [id number] 19:24:00





 Test Item  Value  Reference Range  Comments

 

 BARBITURATE URINE (BEAKER) (test code=725)  Negative  Negative  

 

 BENZODIAZEPINE SCREEN URINE (BEAKER) (test  Negative  Negative  



 code=726)      

 

 COCAINE (METAB.) SCREEN (BEAKER) (test code=1164)  Negative  Negative  

 

 METHADONE SCREEN (BEAKER) (test code=1436)  Negative  Negative  

 

 OPIATE SCREEN URINE (BEAKER) (test code=734)  Negative  Negative  

 

 CANNABINOID SCREEN URINE (BEAKER) (test code=727)  Negative  Negative  

 

 AMPH/METHAMPH SCREEN (BEAKER) (test code=1438)  Negative  Negative  

 

 PHENCYCLIDINE SCREEN URINE (BEAKER) (test code=608)  Negative  Negative  

 

 OXYCODONE SCREEN URINE (BEAKER) (test code=2761)  Negative  Negative  



DRUG                CUTOFF CONC.Cocaine               300 ng/mL Cannabinoid    
        50 ng/mL Benzodiazepine        200 ng/mLBarbiturate           200 ng/
mLPhencyclidine          25 ng/mLOpiate         300 ng/mLMethadone             
300 ng/mLAmphetamine/         1000 ng/mL  MethamphetamineOxycodone             
300 ng/mLThis assay provides an unconfirmed qualitative test result for the 
clinical management of patients in emergency situations. Chain of custody not 
maintained. Some over-the-counter medications, as well as adulterants, may 
cause inaccurate results. Clinical correlation should be applied. A more 
comprehensive drug screen or confirmation of a detected drug may be performed 
upon request.POCT-GLUCOSE [id number] 17:47:00





 Test Item  Value  Reference Range  Comments

 

 POC-GLUCOSE METER (BEAKER)  241 mg/dL    TESTED AT [address]



 (test rvpi=3708)      [place] [id number]



CBC W/PLT COUNT &amp; AUTO [id number] 12:01:00





 Test Item  Value  Reference Range  Comments

 

 WHITE BLOOD CELL COUNT (BEAKER) (test code=775)  10.3 K/ L  3.5-10.5  

 

 RED BLOOD CELL COUNT (BEAKER) (test code=761)  3.04 M/ L  4.63-6.08  

 

 HEMOGLOBIN (BEAKER) (test code=410)  8.7 GM/DL  13.7-17.5  

 

 HEMATOCRIT (BEAKER) (test code=411)  27.2 %  40.1-51.0  

 

 MEAN CORPUSCULAR VOLUME (BEAKER) (test code=753)  89.5 fL  79.0-92.2  

 

 MEAN CORPUSCULAR HEMOGLOBIN (BEAKER) (test  28.6 pg  25.7-32.2  



 zoph=969)      

 

 MEAN CORPUSCULAR HEMOGLOBIN CONC (BEAKER) (test  32.0 GM/DL  32.3-36.5  



 code=752)      

 

 RED CELL DISTRIBUTION WIDTH (BEAKER) (test  13.6 %  11.6-14.4  



 code=412)      

 

 PLATELET COUNT (BEAKER) (test code=756)  315 K/CU MM  150-450  

 

 MEAN PLATELET VOLUME (BEAKER) (test code=754)  9.2 fL  9.4-12.4  

 

 NUCLEATED RED BLOOD CELLS (BEAKER) (test  0 /100 WBC  0-0  



 code=413)      



(CELLAVISION MANUAL DIFF)2018 12:01:00





 Test Item  Value  Reference Range  Comments

 

 NEUTROPHILS - REL (CELLAVISION)(BEAKER) (test  75 %    



 code=2816)      

 

 LYMPHOCYTES - REL (CELLAVISION)(BEAKER) (test  9 %    



 code=2817)      

 

 MONOCYTES - REL (CELLAVISION)(BEAKER) (test  9 %    



 code=2818)      

 

 EOSINOPHILS - REL (CELLAVISION)(BEAKER) (test  6 %    



 code=2819)      

 

 BASOPHILS - REL (CELLAVISION)(BEAKER) (test  1 %    



 code=2820)      

 

 NEUTROPHILS - ABS (CELLAVISION)(BEAKER) (test  7.73 K/ul  1.78-5.38  



 code=2830)      

 

 LYMPHOCYTES - ABS (CELLAVISION)(BEAKER) (test  0.93 K/ul  1.32-3.57  



 code=2831)      

 

 MONOCYTES - ABS (CELLAVISION)(BEAKER) (test  0.93 K/uL  0.30-0.82  



 code=2832)      

 

 EOSINOPHILS - ABS (CELLAVISION)(BEAKER) (test  0.62 K/uL  0.04-0.54  



 code=2834)      

 

 BASOPHILS - ABS (CELLAVISION)(BEAKER) (test  0.10 K/uL  0.01-0.08  



 code=2835)      

 

 TOTAL COUNTED (BEAKER) (test code=1351)  100    

 

 RBC MORPHOLOGY (BEAKER) (test code=762)  Normal    

 

 WBC MORPHOLOGY (BEAKER) (test code=487)  Normal    

 

 PLT MORPHOLOGY (BEAKER) (test code=486)  Normal    

 

 ARTIFACT (CELLAVISION)(BEAKER) (test code=3432)  Present    

 

 PLATELET CONCENTRATION (CELLAVISION)(BEAKER) (test  Adequate    



 xvij=1412)      



Received comment: User comments: Slide comments:POCT-GLUCOSE [id number] 11:
34:00





 Test Item  Value  Reference Range  Comments

 

 POC-GLUCOSE METER (BEAKER)  295 mg/dL    TESTED AT [address]



 (test ytpp=3487)      [facility] [address]



POCT-GLUCOSE [id number] 07:38:00





 Test Item  Value  Reference Range  Comments

 

 POC-GLUCOSE METER (BEAKER)  188 mg/dL    TESTED AT [address]



 (test tulz=9867)      [facility] [address]



CALCIUM, [id number] 06:26:00





 Test Item  Value  Reference Range  Comments

 

 CALCIUM IONIZED (BEAKER) (test code=698)  1.00 mmol/L  1.12-1.27  

 

 PH, BLOOD (BEAKER) (test code=1810)  7.35    



COMPREHENSIVE METABOLIC [id number] 06:13:00





 Test Item  Value  Reference Range  Comments

 

 TOTAL PROTEIN (BEAKER)  6.0 gm/dL  6.0-8.3  



 (test code=770)      

 

 ALBUMIN (BEAKER) (test  2.9 g/dL  3.5-5.0  



 code=1145)      

 

 ALKALINE PHOSPHATASE  336 U/L    



 (BEAKER) (test code=346)      

 

 BILIRUBIN TOTAL (BEAKER)  0.3 mg/dL  0.2-1.2  



 (test code=377)      

 

 SODIUM (BEAKER) (test  129 meq/L  136-145  



 odod=943)      

 

 POTASSIUM (BEAKER) (test  3.4 meq/L  3.5-5.1  



 code=379)      

 

 CHLORIDE (BEAKER) (test  101 meq/L    



 code=382)      

 

 CO2 (BEAKER) (test  19 meq/L  22-29  



 code=355)      

 

 BLOOD UREA NITROGEN  43 mg/dL  7-21  



 (BEAKER) (test code=354)      

 

 CREATININE (BEAKER) (test  2.84 mg/dL  0.57-1.25  



 code=358)      

 

 GLUCOSE RANDOM (BEAKER)  205 mg/dL    



 (test code=652)      

 

 CALCIUM (BEAKER) (test  8.1 mg/dL  8.4-10.2  



 code=697)      

 

 AST (SGOT) (BEAKER) (test  37 U/L  5-34  



 code=353)      

 

 ALT (SGPT) (BEAKER) (test  37 U/L  6-55  



 code=347)      

 

 EGFR (BEAKER) (test  21 mL/min/1.73 sq m    ESTIMATED GFR IS NOT AS



 code=1092)      ACCURATE AS CREATININE



       CLEARANCE IN PREDICTING



       GLOMERULAR FILTRATION



       RATE. ESTIMATED GFR IS



       NOT APPLICABLE FOR



       DIALYSIS PATIENTS.



[id number] 06:12:00





 Test Item  Value  Reference Range  Comments

 

 PHOSPHORUS (BEAKER) (test code=604)  3.6 mg/dL  2.3-4.7  



[id number] 06:12:00





 Test Item  Value  Reference Range  Comments

 

 MAGNESIUM (BEAKER) (test code=627)  1.6 mg/dL  1.6-2.6  



PROTHROMBIN [id number] 05:33:00





 Test Item  Value  Reference Range  Comments

 

 PROTIME (BEAKER) (test code=759)  14.0 seconds  11.7-14.7  

 

 INR (BEAKER) (test code=370)  1.1  <=5.9  



RECOMMENDED COUMADIN/WARFARIN INR THERAPY RANGESSTANDARD DOSE: 2.0 - 3.0   
Includes: PROPHYLAXIS forvenous thrombosis, systemic embolization; TREATMENT 
for venous thrombosis and/or pulmonary embolus.HIGH RISK: Target INR is 2.5-3.5 
for patients with mechanical heart valves.POCT-GLUCOSE [id number] 17:32:00





 Test Item  Value  Reference Range  Comments

 

 POC-GLUCOSE METER (BEAKER)  144 mg/dL    TESTED AT [address]



 (test xryu=0269)      [facility] [address]



URINE PROTEIN ELECTROPHORESIS, 24 [id number] 14:15:00





 Test Item  Value  Reference Range  Comments

 

 PROTEIN, 24HR URINE (BEAKER)  2992 mg/24hr  0-300  



 (test code=1570)      

 

 VOLUME, TOTAL (BEAKER) (test  1700 ml    



 code=1457)      

 

 ALBUMIN, 24HR URINE (BEAKER)  30.3 %    



 (test code=1609)      

 

 GLOBULIN, 24HR URINE  69.7 %    



 (BEAKER) (test code=1610)      

 

 UPEP, ID (BEAKER) (test  Spillage of large amounts of    



 code=1433)  globulin fractions may mask    



   underlying monoclonal proteins;    



   urine MOE ordered and results    



   pending.    

 

 PROTEIN, URINE (BEAKER)  176 mg/dL  0-14  



 (test code=1569)      

 

 [facility] PATHOLOGIST-2204  Jocelynn Gonzalez MD    



 (BEAKER) (test code=2598)  (electronic signature)    



POCT-GLUCOSE [id number] 12:04:00





 Test Item  Value  Reference Range  Comments

 

 POC-GLUCOSE METER (BEAKER)  155 mg/dL    TESTED AT [address]



 (test fzhw=0190)      [facility] [address]



CBC W/PLT COUNT &amp; AUTO [id number] 08:49:00





 Test Item  Value  Reference Range  Comments

 

 WHITE BLOOD CELL COUNT (BEAKER) (test code=775)  10.9 K/ L  3.5-10.5  

 

 RED BLOOD CELL COUNT (BEAKER) (test code=761)  2.96 M/ L  4.63-6.08  

 

 HEMOGLOBIN (BEAKER) (test code=410)  8.6 GM/DL  13.7-17.5  

 

 HEMATOCRIT (BEAKER) (test code=411)  26.2 %  40.1-51.0  

 

 MEAN CORPUSCULAR VOLUME (BEAKER) (test code=753)  88.5 fL  79.0-92.2  

 

 MEAN CORPUSCULAR HEMOGLOBIN (BEAKER) (test  29.1 pg  25.7-32.2  



 yono=225)      

 

 MEAN CORPUSCULAR HEMOGLOBIN CONC (BEAKER) (test  32.8 GM/DL  32.3-36.5  



 code=752)      

 

 RED CELL DISTRIBUTION WIDTH (BEAKER) (test  13.5 %  11.6-14.4  



 code=412)      

 

 PLATELET COUNT (BEAKER) (test code=756)  298 K/CU MM  150-450  

 

 MEAN PLATELET VOLUME (BEAKER) (test code=754)  9.2 fL  9.4-12.4  

 

 NUCLEATED RED BLOOD CELLS (BEAKER) (test  0 /100 WBC  0-0  



 code=413)      



(CELLAVISION MANUAL DIFF)2018 08:49:00





 Test Item  Value  Reference Range  Comments

 

 NEUTROPHILS - REL (CELLAVISION)(BEAKER) (test  78 %    



 code=2816)      

 

 LYMPHOCYTES - REL (CELLAVISION)(BEAKER) (test  7 %    



 code=2817)      

 

 MONOCYTES - REL (CELLAVISION)(BEAKER) (test  11 %    



 code=2818)      

 

 EOSINOPHILS - REL (CELLAVISION)(BEAKER) (test  4 %    



 code=2819)      

 

 NEUTROPHILS - ABS (CELLAVISION)(BEAKER) (test  8.50 K/ul  1.78-5.38  



 code=2830)      

 

 LYMPHOCYTES - ABS (CELLAVISION)(BEAKER) (test  0.76 K/ul  1.32-3.57  



 code=2831)      

 

 MONOCYTES - ABS (CELLAVISION)(BEAKER) (test  1.20 K/uL  0.30-0.82  



 code=2832)      

 

 EOSINOPHILS - ABS (CELLAVISION)(BEAKER) (test  0.44 K/uL  0.04-0.54  



 code=2834)      

 

 TOTAL COUNTED (BEAKER) (test code=1351)  100    

 

 RBC MORPHOLOGY (BEAKER) (test code=762)  Normal    

 

 WBC MORPHOLOGY (BEAKER) (test code=487)  Normal    

 

 PLT MORPHOLOGY (BEAKER) (test code=486)  Normal    

 

 ARTIFACT (CELLAVISION)(BEAKER) (test code=3432)  Present    

 

 PLATELET CONCENTRATION (CELLAVISION)(BEAKER) (test  Adequate    



 rokx=1580)      



Received comment: User comments: Slide comments:POCT-GLUCOSE [id number] 08:
16:00





 Test Item  Value  Reference Range  Comments

 

 POC-GLUCOSE METER (BEAKER)  166 mg/dL    TESTED AT [facility] [address] [facility]



 (test fwzf=7311)      [facility] [address]



CALCIUM, [id number] 06:10:00





 Test Item  Value  Reference Range  Comments

 

 CALCIUM IONIZED (BEAKER) (test code=698)  0.98 mmol/L  1.12-1.27  

 

 PH, BLOOD (BEAKER) (test code=1810)  7.40    



BASIC METABOLIC [id number] 05:06:00





 Test Item  Value  Reference Range  Comments

 

 SODIUM (BEAKER) (test  131 meq/L  136-145  



 lyqs=739)      

 

 POTASSIUM (BEAKER) (test  3.6 meq/L  3.5-5.1  



 code=379)      

 

 CHLORIDE (BEAKER) (test  100 meq/L    



 code=382)      

 

 CO2 (BEAKER) (test  21 meq/L  22-29  



 code=355)      

 

 BLOOD UREA NITROGEN  39 mg/dL  7-21  



 (BEAKER) (test code=354)      

 

 CREATININE (BEAKER) (test  2.74 mg/dL  0.57-1.25  



 code=358)      

 

 GLUCOSE RANDOM (BEAKER)  158 mg/dL    



 (test code=652)      

 

 CALCIUM (BEAKER) (test  8.1 mg/dL  8.4-10.2  



 code=697)      

 

 EGFR (BEAKER) (test  22 mL/min/1.73 sq m    ESTIMATED GFR IS NOT AS



 code=1092)      ACCURATE AS CREATININE



       CLEARANCE IN PREDICTING



       GLOMERULAR FILTRATION



       RATE. ESTIMATED GFR IS



       NOT APPLICABLE FOR



       DIALYSIS PATIENTS.



[id number] 05:03:00





 Test Item  Value  Reference Range  Comments

 

 PHOSPHORUS (BEAKER) (test code=604)  3.4 mg/dL  2.3-4.7  



[id number] 05:03:00





 Test Item  Value  Reference Range  Comments

 

 MAGNESIUM (BEAKER) (test code=627)  1.6 mg/dL  1.6-2.6  



POCT-GLUCOSE [id number] 21:25:00





 Test Item  Value  Reference Range  Comments

 

 POC-GLUCOSE METER (BEAKER)  252 mg/dL    TESTED AT [address]



 (test wrky=2969)      [facility] [address]



POCT-GLUCOSE [id number] 17:44:00





 Test Item  Value  Reference Range  Comments

 

 POC-GLUCOSE METER (BEAKER)  127 mg/dL    TESTED AT [address]



 (test hkqs=1009)      [facility] [address]



POCT-GLUCOSE [id number] 11:31:00





 Test Item  Value  Reference Range  Comments

 

 POC-GLUCOSE METER (BEAKER)  221 mg/dL    [facility]ED [facility] [address] [facility]



 (test uxhi=1516)      [facility] [address]



BLOOD [id number] 11:00:00





 Test Item  Value  Reference Range  Comments

 

 CULTURE (BEAKER) (test code=1095)  No growth in 5 days    



POCT-GLUCOSE [id number] 08:08:00





 Test Item  Value  Reference Range  Comments

 

 POC-GLUCOSE METER (BEAKER)  118 mg/dL    TESTED AT [address]



 (test uzkn=3243)      [facility] [address]



COMPREHENSIVE METABOLIC [id number] 06:57:00





 Test Item  Value  Reference Range  Comments

 

 TOTAL PROTEIN (BEAKER)  5.9 gm/dL  6.0-8.3  



 (test code=770)      

 

 ALBUMIN (BEAKER) (test  2.9 g/dL  3.5-5.0  



 code=1145)      

 

 ALKALINE PHOSPHATASE  276 U/L    



 (BEAKER) (test code=346)      

 

 BILIRUBIN TOTAL (BEAKER)  0.5 mg/dL  0.2-1.2  



 (test code=377)      

 

 SODIUM (BEAKER) (test  130 meq/L  136-145  



 wrsy=705)      

 

 POTASSIUM (BEAKER) (test  3.9 meq/L  3.5-5.1  



 code=379)      

 

 CHLORIDE (BEAKER) (test  99 meq/L    



 code=382)      

 

 CO2 (BEAKER) (test  20 meq/L  22-29  



 code=355)      

 

 BLOOD UREA NITROGEN  35 mg/dL  7-21  



 (BEAKER) (test code=354)      

 

 CREATININE (BEAKER) (test  2.39 mg/dL  0.57-1.25  



 code=358)      

 

 GLUCOSE RANDOM (BEAKER)  116 mg/dL    



 (test code=652)      

 

 CALCIUM (BEAKER) (test  8.0 mg/dL  8.4-10.2  



 code=697)      

 

 AST (SGOT) (BEAKER) (test  36 U/L  5-34  



 code=353)      

 

 ALT (SGPT) (BEAKER) (test  28 U/L  6-55  



 code=347)      

 

 EGFR (BEAKER) (test  26 mL/min/1.73 sq m    ESTIMATED GFR IS NOT AS



 code=1092)      ACCURATE AS CREATININE



       CLEARANCE IN PREDICTING



       GLOMERULAR FILTRATION



       RATE. ESTIMATED GFR IS



       NOT APPLICABLE FOR



       DIALYSIS PATIENTS.



[id number] 05:44:00





 Test Item  Value  Reference Range  Comments

 

 PHOSPHORUS (BEAKER) (test code=604)  3.2 mg/dL  2.3-4.7  



[id number] 05:44:00





 Test Item  Value  Reference Range  Comments

 

 MAGNESIUM (BEAKER) (test code=627)  1.7 mg/dL  1.6-2.6  



PROTHROMBIN [id number] 04:53:00





 Test Item  Value  Reference Range  Comments

 

 PROTIME (BEAKER) (test code=759)  15.5 seconds  11.7-14.7  

 

 INR (BEAKER) (test code=370)  1.2  <=5.9  



RECOMMENDED COUMADIN/WARFARIN INR THERAPY RANGESSTANDARD DOSE: 2.0 - 3.0   
Includes: PROPHYLAXIS forvenous thrombosis, systemic embolization; TREATMENT 
for venous thrombosis and/or pulmonary embolus.HIGH RISK: Target INR is 2.5-3.5 
for patients with mechanical heart valves.CBC W/PLT COUNT &amp; AUTO 
[id number] 04:43:00





 Test Item  Value  Reference Range  Comments

 

 WHITE BLOOD CELL COUNT (BEAKER) (test code=775)  11.2 K/ L  3.5-10.5  

 

 RED BLOOD CELL COUNT (BEAKER) (test code=761)  3.22 M/ L  4.63-6.08  

 

 HEMOGLOBIN (BEAKER) (test code=410)  9.2 GM/DL  13.7-17.5  

 

 HEMATOCRIT (BEAKER) (test code=411)  28.9 %  40.1-51.0  

 

 MEAN CORPUSCULAR VOLUME (BEAKER) (test code=753)  89.8 fL  79.0-92.2  

 

 MEAN CORPUSCULAR HEMOGLOBIN (BEAKER) (test  28.6 pg  25.7-32.2  



 wcrg=595)      

 

 MEAN CORPUSCULAR HEMOGLOBIN CONC (BEAKER) (test  31.8 GM/DL  32.3-36.5  



 code=752)      

 

 RED CELL DISTRIBUTION WIDTH (BEAKER) (test  13.3 %  11.6-14.4  



 code=412)      

 

 PLATELET COUNT (BEAKER) (test code=756)  327 K/CU MM  150-450  

 

 MEAN PLATELET VOLUME (BEAKER) (test code=754)  9.4 fL  9.4-12.4  

 

 NUCLEATED RED BLOOD CELLS (BEAKER) (test  0 /100 WBC  0-0  



 code=413)      

 

 NEUTROPHILS RELATIVE PERCENT (BEAKER) (test  68 %    



 code=429)      

 

 LYMPHOCYTES RELATIVE PERCENT (BEAKER) (test  11 %    



 code=430)      

 

 MONOCYTES RELATIVE PERCENT (BEAKER) (test  13 %    



 code=431)      

 

 EOSINOPHILS RELATIVE PERCENT (BEAKER) (test  7 %    



 code=432)      

 

 BASOPHILS RELATIVE PERCENT (BEAKER) (test  0 %    



 code=437)      

 

 NEUTROPHILS ABSOLUTE COUNT (BEAKER) (test  7.59 K/ L  1.78-5.38  



 code=670)      

 

 LYMPHOCYTES ABSOLUTE COUNT (BEAKER) (test  1.23 K/ L  1.32-3.57  



 code=414)      

 

 MONOCYTES ABSOLUTE COUNT (BEAKER) (test  1.49 K/ L  0.30-0.82  



 code=415)      

 

 EOSINOPHILS ABSOLUTE COUNT (BEAKER) (test  0.78 K/ L  0.04-0.54  



 code=416)      

 

 BASOPHILS ABSOLUTE COUNT (BEAKER) (test  0.04 K/ L  0.01-0.08  



 code=417)      

 

 IMMATURE GRANULOCYTES-RELATIVE PERCENT (BEAKER)  0 %  0-1  



 (test code=2801)      



CALCIUM, [id number] 04:27:00





 Test Item  Value  Reference Range  Comments

 

 CALCIUM IONIZED (BEAKER) (test code=698)  0.97 mmol/L  1.12-1.27  

 

 PH, BLOOD (BEAKER) (test code=1810)  7.42    



PROTEIN, 24 HOUR [id number] 02:50:00





 Test Item  Value  Reference Range  Comments

 

 PROTEIN, 24HR URINE (BEAKER) (test code=1570)  2992 mg/24hr  0-300  

 

 VOLUME, TOTAL (BEAKER) (test code=1457)  1700 ml    

 

 PROTEIN, URINE (BEAKER) (test code=1569)  176 mg/dL  0-14  



POCT-GLUCOSE [id number] 21:39:00





 Test Item  Value  Reference Range  Comments

 

 POC-GLUCOSE METER (BEAKER)  194 mg/dL    TESTED AT [address]



 (test vknr=4410)      [place] [id number]



POCT-GLUCOSE [id number] 17:08:00





 Test Item  Value  Reference Range  Comments

 

 POC-GLUCOSE METER (BEAKER)  183 mg/dL    TESTED AT [address]



 (test ycon=9624)      [place] [address]



POCT-GLUCOSE [id number] 12:10:00





 Test Item  Value  Reference Range  Comments

 

 POC-GLUCOSE METER (BEAKER)  157 mg/dL    TESTED AT [place] [id number] [facility]



 (test frpg=6168)      [facility] [address]



POCT-GLUCOSE [id number] 07:53:00





 Test Item  Value  Reference Range  Comments

 

 POC-GLUCOSE METER (BEAKER)  132 mg/dL    TESTED AT [place] [id number] [facility]



 (test xdtf=3433)      [facility] [address]



CALCIUM, [id number] 07:44:00





 Test Item  Value  Reference Range  Comments

 

 CALCIUM IONIZED (BEAKER) (test code=698)  0.94 mmol/L  1.12-1.27  

 

 PH, BLOOD (BEAKER) (test code=1810)  7.42    



COMPREHENSIVE METABOLIC [id number] 07:05:00





 Test Item  Value  Reference Range  Comments

 

 TOTAL PROTEIN (BEAKER)  5.9 gm/dL  6.0-8.3  



 (test code=770)      

 

 ALBUMIN (BEAKER) (test  2.9 g/dL  3.5-5.0  



 code=1145)      

 

 ALKALINE PHOSPHATASE  216 U/L    



 (BEAKER) (test code=346)      

 

 BILIRUBIN TOTAL (BEAKER)  0.6 mg/dL  0.2-1.2  



 (test code=377)      

 

 SODIUM (BEAKER) (test  132 meq/L  136-145  



 rsrx=802)      

 

 POTASSIUM (BEAKER) (test  3.6 meq/L  3.5-5.1  



 code=379)      

 

 CHLORIDE (BEAKER) (test  97 meq/L    



 code=382)      

 

 CO2 (BEAKER) (test  24 meq/L  22-29  



 code=355)      

 

 BLOOD UREA NITROGEN  36 mg/dL  7-21  



 (BEAKER) (test code=354)      

 

 CREATININE (BEAKER) (test  2.43 mg/dL  0.57-1.25  



 code=358)      

 

 GLUCOSE RANDOM (BEAKER)  113 mg/dL    



 (test code=652)      

 

 CALCIUM (BEAKER) (test  7.9 mg/dL  8.4-10.2  



 code=697)      

 

 AST (SGOT) (BEAKER) (test  29 U/L  5-34  



 code=353)      

 

 ALT (SGPT) (BEAKER) (test  25 U/L  6-55  



 code=347)      

 

 EGFR (BEAKER) (test  26 mL/min/1.73 sq m    ESTIMATED GFR IS NOT AS



 code=1092)      ACCURATE AS CREATININE



       CLEARANCE IN PREDICTING



       GLOMERULAR FILTRATION



       RATE. ESTIMATED GFR IS



       NOT APPLICABLE FOR



       DIALYSIS PATIENTS.



[id number] 07:04:00





 Test Item  Value  Reference Range  Comments

 

 PHOSPHORUS (BEAKER) (test code=604)  3.0 mg/dL  2.3-4.7  



[id number] 07:04:00





 Test Item  Value  Reference Range  Comments

 

 MAGNESIUM (BEAKER) (test code=627)  1.7 mg/dL  1.6-2.6  



CBC W/PLT COUNT &amp; AUTO [id number] 06:36:00





 Test Item  Value  Reference Range  Comments

 

 WHITE BLOOD CELL COUNT (BEAKER) (test code=775)  12.1 K/ L  3.5-10.5  

 

 RED BLOOD CELL COUNT (BEAKER) (test code=761)  3.30 M/ L  4.63-6.08  

 

 HEMOGLOBIN (BEAKER) (test code=410)  9.4 GM/DL  13.7-17.5  

 

 HEMATOCRIT (BEAKER) (test code=411)  29.3 %  40.1-51.0  

 

 MEAN CORPUSCULAR VOLUME (BEAKER) (test code=753)  88.8 fL  79.0-92.2  

 

 MEAN CORPUSCULAR HEMOGLOBIN (BEAKER) (test  28.5 pg  25.7-32.2  



 ghpt=087)      

 

 MEAN CORPUSCULAR HEMOGLOBIN CONC (BEAKER) (test  32.1 GM/DL  32.3-36.5  



 code=752)      

 

 RED CELL DISTRIBUTION WIDTH (BEAKER) (test  13.7 %  11.6-14.4  



 code=412)      

 

 PLATELET COUNT (BEAKER) (test code=756)  310 K/CU MM  150-450  

 

 MEAN PLATELET VOLUME (BEAKER) (test code=754)  9.7 fL  9.4-12.4  

 

 NUCLEATED RED BLOOD CELLS (BEAKER) (test  0 /100 WBC  0-0  



 code=413)      

 

 NEUTROPHILS RELATIVE PERCENT (BEAKER) (test  69 %    



 code=429)      

 

 LYMPHOCYTES RELATIVE PERCENT (BEAKER) (test  12 %    



 code=430)      

 

 MONOCYTES RELATIVE PERCENT (BEAKER) (test  12 %    



 code=431)      

 

 EOSINOPHILS RELATIVE PERCENT (BEAKER) (test  7 %    



 code=432)      

 

 BASOPHILS RELATIVE PERCENT (BEAKER) (test  0 %    



 code=437)      

 

 NEUTROPHILS ABSOLUTE COUNT (BEAKER) (test  8.28 K/ L  1.78-5.38  



 code=670)      

 

 LYMPHOCYTES ABSOLUTE COUNT (BEAKER) (test  1.40 K/ L  1.32-3.57  



 code=414)      

 

 MONOCYTES ABSOLUTE COUNT (BEAKER) (test  1.43 K/ L  0.30-0.82  



 code=415)      

 

 EOSINOPHILS ABSOLUTE COUNT (BEAKER) (test  0.81 K/ L  0.04-0.54  



 code=416)      

 

 BASOPHILS ABSOLUTE COUNT (BEAKER) (test  0.04 K/ L  0.01-0.08  



 code=417)      

 

 IMMATURE GRANULOCYTES-RELATIVE PERCENT (BEAKER)  1 %  0-1  



 (test code=2801)      



POCT-GLUCOSE [id number] 21:45:00





 Test Item  Value  Reference Range  Comments

 

 POC-GLUCOSE METER (BEAKER)  152 mg/dL    TESTED AT [facility] [address] [facility]



 (test ulvo=3456)      [facility] [address]



URINALYSIS W/ REFLEX URINE [id number] 19:43:00





 Test Item  Value  Reference Range  Comments

 

 COLOR (BEAKER) (test code=470)  Light Yellow    

 

 CLARITY (BEAKER) (test code=469)  Clear    

 

 SPECIFIC GRAVITY UA (BEAKER) (test code=468)  1.005  1.001-1.035  

 

 PH UA (BEAKER) (test code=467)  8.5  5.0-8.0  

 

 PROTEIN UA (BEAKER) (test code=464)  200 mg/dL  Negative  

 

 GLUCOSE UA (BEAKER) (test code=365)  500 mg/dL  Negative  

 

 KETONES UA (BEAKER) (test code=371)  Negative  Negative  

 

 BILIRUBIN UA (BEAKER) (test code=462)  Negative  Negative  

 

 BLOOD UA (BEAKER) (test code=461)  Trace  Negative  

 

 NITRITE UA (BEAKER) (test code=465)  Negative  Negative  

 

 LEUKOCYTE ESTERASE UA (BEAKER) (test code=466)  Negative  Negative  

 

 UROBILINOGEN UA (BEAKER) (test code=463)  0.2 mg/dL  0.2-1.0  

 

 RBC UA (BEAKER) (test code=519)  0 /HPF    

 

 WBC UA (BEAKER) (test code=520)  1 /HPF    

 

 BACTERIA (BEAKER) (test vqtf=728)  Rare    

 

 SQUAMOUS EPITHELIAL (BEAKER) (test code=516)  < /HPF    

 

 SOURCE(BEAKER) (test code=2795)      



POCT-GLUCOSE [id number] 16:59:00





 Test Item  Value  Reference Range  Comments

 

 POC-GLUCOSE METER (BEAKER)  184 mg/dL    TESTED AT [address]



 (test aywc=8659)      [facility] [address]



URINE PROTEIN ELECTROPHORESIS, [id number] 16:32:00





 Test Item  Value  Reference Range  Comments

 

 PROTEIN, URINE (BEAKER) (test  101 mg/dL  0-14  



 code=1569)      

 

 ALBUMIN URINE ELP (BEAKER)  45.1 %    



 (test code=1018)      

 

 GAMMA GLOBULIN URINE (BEAKER)  54.9 %    



 (test code=1015)      

 

 UPEP, ID-438 (BEAKER) (test  No monoclonal bands detected.    



 code=2604)      

 

 [facility]-PATHOLOGIST-438 (BEAKER)  Jocelynn Gonzalez MD    



 (test code=2605)  (electronic signature)    



PROTEIN ELECTROPHORESIS, [id number] 16:02:00





 Test Item  Value  Reference Range  Comments

 

 ALBUMIN FRACTION (BEAKER)  2.5 g/dL  3.5-5.5  



 (test code=405)      

 

 ALPHA 1 FRACTION (BEAKER)  0.3 g/dL  0.2-0.4  



 (test code=389)      

 

 ALPHA 2 FRACTION (BEAKER)  0.7 g/dL  0.5-0.9  



 (test code=390)      

 

 BETA FRACTION (BEAKER) (test  0.8 g/dL  0.6-1.1  



 code=392)      

 

 GAMMA GLOBULIN FRACTION  1.1 g/dL  0.7-1.7  



 (BEAKER) (test code=391)      

 

 INTERPRETATION-119 (BEAKER)  Decreased albumin with    



 (test code=2615)  concurrent relative increases    



   in all globulin fractions. No    



   monoclonal bands detected.    

 

 [facility]-PATHOLOGIST-119 (BEAKER)  Jocelynn Gonzalez MD    



 (test code=2616)  (electronic signature)    

 

 PROTEIN TOTAL SERUM, SPEP  5.3 gm/dL  6.0-8.3  



 (BEAKER) (test code=2660)      



POCT-GLUCOSE [id number] 11:58:00





 Test Item  Value  Reference Range  Comments

 

 POC-GLUCOSE METER (BEAKER)  197 mg/dL    [address]



 (test qvyj=5437)      [facility] [address]



POCT-GLUCOSE [id number] 08:42:00





 Test Item  Value  Reference Range  Comments

 

 POC-GLUCOSE METER (BEAKER)  119 mg/dL    TESTED AT [facility] [address] [facility]



 (test cxoh=3289)      [facility] [address]



HEMOGLOBIN [id number] 08:19:00





 Test Item  Value  Reference Range  Comments

 

 HEMOGLOBIN A1C (BEAKER) (test code=368)  5.9 %  4.3-6.1  



COMPREHENSIVE METABOLIC [id number] 05:26:00





 Test Item  Value  Reference Range  Comments

 

 TOTAL PROTEIN (BEAKER)  5.8 gm/dL  6.0-8.3  



 (test code=770)      

 

 ALBUMIN (BEAKER) (test  2.9 g/dL  3.5-5.0  



 code=1145)      

 

 ALKALINE PHOSPHATASE  132 U/L    



 (BEAKER) (test code=346)      

 

 BILIRUBIN TOTAL (BEAKER)  0.5 mg/dL  0.2-1.2  



 (test code=377)      

 

 SODIUM (BEAKER) (test  133 meq/L  136-145  



 sepf=982)      

 

 POTASSIUM (BEAKER) (test  3.5 meq/L  3.5-5.1  



 code=379)      

 

 CHLORIDE (BEAKER) (test  97 meq/L    



 code=382)      

 

 CO2 (BEAKER) (test  27 meq/L  22-29  



 code=355)      

 

 BLOOD UREA NITROGEN  32 mg/dL  7-21  



 (BEAKER) (test code=354)      

 

 CREATININE (BEAKER) (test  2.26 mg/dL  0.57-1.25  



 code=358)      

 

 GLUCOSE RANDOM (BEAKER)  103 mg/dL    



 (test code=652)      

 

 CALCIUM (BEAKER) (test  7.9 mg/dL  8.4-10.2  



 code=697)      

 

 AST (SGOT) (BEAKER) (test  24 U/L  5-34  



 code=353)      

 

 ALT (SGPT) (BEAKER) (test  18 U/L  6-55  



 code=347)      

 

 EGFR (BEAKER) (test  28 mL/min/1.73 sq m    ESTIMATED GFR IS NOT AS



 code=1092)      ACCURATE AS CREATININE



       CLEARANCE IN PREDICTING



       GLOMERULAR FILTRATION



       RATE. ESTIMATED GFR IS



       NOT APPLICABLE FOR



       DIALYSIS PATIENTS.



[id number] 04:54:00





 Test Item  Value  Reference Range  Comments

 

 PHOSPHORUS (BEAKER) (test code=604)  2.8 mg/dL  2.3-4.7  



[id number] 04:54:00





 Test Item  Value  Reference Range  Comments

 

 MAGNESIUM (BEAKER) (test code=627)  1.6 mg/dL  1.6-2.6  



CBC W/PLT COUNT &amp; AUTO [id number] 04:30:00





 Test Item  Value  Reference Range  Comments

 

 WHITE BLOOD CELL COUNT (BEAKER) (test code=775)  13.5 K/ L  3.5-10.5  

 

 RED BLOOD CELL COUNT (BEAKER) (test code=761)  3.33 M/ L  4.63-6.08  

 

 HEMOGLOBIN (BEAKER) (test code=410)  9.6 GM/DL  13.7-17.5  

 

 HEMATOCRIT (BEAKER) (test code=411)  29.5 %  40.1-51.0  

 

 MEAN CORPUSCULAR VOLUME (BEAKER) (test code=753)  88.6 fL  79.0-92.2  

 

 MEAN CORPUSCULAR HEMOGLOBIN (BEAKER) (test  28.8 pg  25.7-32.2  



 wuuz=992)      

 

 MEAN CORPUSCULAR HEMOGLOBIN CONC (BEAKER) (test  32.5 GM/DL  32.3-36.5  



 code=752)      

 

 RED CELL DISTRIBUTION WIDTH (BEAKER) (test  14.0 %  11.6-14.4  



 code=412)      

 

 PLATELET COUNT (BEAKER) (test code=756)  277 K/CU MM  150-450  

 

 MEAN PLATELET VOLUME (BEAKER) (test code=754)  9.2 fL  9.4-12.4  

 

 NUCLEATED RED BLOOD CELLS (BEAKER) (test  0 /100 WBC  0-0  



 code=413)      

 

 NEUTROPHILS RELATIVE PERCENT (BEAKER) (test  74 %    



 code=429)      

 

 LYMPHOCYTES RELATIVE PERCENT (BEAKER) (test  9 %    



 code=430)      

 

 MONOCYTES RELATIVE PERCENT (BEAKER) (test  12 %    



 code=431)      

 

 EOSINOPHILS RELATIVE PERCENT (BEAKER) (test  5 %    



 code=432)      

 

 BASOPHILS RELATIVE PERCENT (BEAKER) (test  0 %    



 code=437)      

 

 NEUTROPHILS ABSOLUTE COUNT (BEAKER) (test  9.96 K/ L  1.78-5.38  



 code=670)      

 

 LYMPHOCYTES ABSOLUTE COUNT (BEAKER) (test  1.25 K/ L  1.32-3.57  



 code=414)      

 

 MONOCYTES ABSOLUTE COUNT (BEAKER) (test  1.58 K/ L  0.30-0.82  



 code=415)      

 

 EOSINOPHILS ABSOLUTE COUNT (BEAKER) (test  0.63 K/ L  0.04-0.54  



 code=416)      

 

 BASOPHILS ABSOLUTE COUNT (BEAKER) (test  0.05 K/ L  0.01-0.08  



 code=417)      

 

 IMMATURE GRANULOCYTES-RELATIVE PERCENT (BEAKER)  1 %  0-1  



 (test code=2801)      



CALCIUM, [id number] 04:27:00





 Test Item  Value  Reference Range  Comments

 

 CALCIUM IONIZED (BEAKER) (test code=698)  0.94 mmol/L  1.12-1.27  

 

 PH, BLOOD (BEAKER) (test code=1810)  7.41    



POCT-GLUCOSE [id number] 21:41:00





 Test Item  Value  Reference Range  Comments

 

 POC-GLUCOSE METER (BEAKER)  225 mg/dL    TESTED AT [facility] [address] [facility]



 (test unsz=3059)      [facility] [address]



(CELLAVISION MANUAL DIFF)2018 16:57:00





 Test Item  Value  Reference Range  Comments

 

 NEUTROPHILS - REL (CELLAVISION)(BEAKER) (test  83 %    



 code=2816)      

 

 LYMPHOCYTES - REL (CELLAVISION)(BEAKER) (test  7 %    



 code=2817)      

 

 MONOCYTES - REL (CELLAVISION)(BEAKER) (test  8 %    



 code=2818)      

 

 EOSINOPHILS - REL (CELLAVISION)(BEAKER) (test  2 %    



 code=2819)      

 

 NEUTROPHILS - ABS (CELLAVISION)(BEAKER) (test  8.38 K/ul  1.78-5.38  



 code=2830)      

 

 LYMPHOCYTES - ABS (CELLAVISION)(BEAKER) (test  0.71 K/ul  1.32-3.57  



 code=2831)      

 

 MONOCYTES - ABS (CELLAVISION)(BEAKER) (test  0.81 K/uL  0.30-0.82  



 code=2832)      

 

 EOSINOPHILS - ABS (CELLAVISION)(BEAKER) (test  0.20 K/uL  0.04-0.54  



 code=2834)      

 

 TOTAL COUNTED (BEAKER) (test code=1351)  100    

 

 MANUAL NRBC  CELLS (BEAKER) (test  1 /100 WBC  0-0  



 code=1353)      

 

 SMUDGE CELLS (BEAKER) (test code=1371)  Present    

 

 GIANT PLATELETS (BEAKER) (test code=313)  Present    

 

 HYPOCHROMIA (BEAKER) (test code=963)  1+ few    

 

 ANISOCYTOSIS (BEAKER) (test code=961)  1+ few    

 

 POIKILOCYTES (BEAKER) (test code=966)  1+ few    

 

 PLATELET CONCENTRATION (CELLAVISION)(BEAKER)  Adequate    



 (test code=3438)      



Received comment: User comments: Slide comments:POCT-GLUCOSE [id number] 16:
37:00





 Test Item  Value  Reference Range  Comments

 

 POC-GLUCOSE METER (BEAKER)  190 mg/dL    TESTED AT [facility] [address] [facility]



 (test oyhv=2696)      [facility] [address]



PET, CARDIAC PERFUSION MULTIPLE STUDIES, REST AND [id number] 14:59:
00Reason for exam:-&gt;chest painFINAL REPORT PATIENT ID:   [id number] PROCEDURE:
      Rest/Stress MYOCARDIAL PERFUSION PET with regadenoson\XA9\ CPT CODE:     
     74431 INDICATION:      Chest pain, risk factors for CAD HISTORY:  Cardiac 
risk factors: Diabetes, hypertension, tobacco use. Other cardiovascular history
: No reported CAD. Recent cardiac symptoms: Chest pain, dyspnea. Current 
cardiovascular-related medications: Metoprolol. PROTOCOL:      Limited low-dose 
CT imaging was performed for attenuation correction. 40.0 mCi of Rb-82 chloride 
was injected iv at rest, and gated PET (positron emission tomography) images 
wereobtained. Subsequently, 40.1 mCi of Rb-82 chloride was injected iv at 
expected peak pharmacologic effect, and gated PET images were obtained.  
PRELIMINARY STRESS TEST DATA FROM NONINVASIVE CARDIOLOGY:Pharmacologic stress 
was by 10-second iv infusion of 0.4 mg of regadenoson. Radiotracer was 
tewgnler40 seconds after start of stress. Heart rate was 70 beats/min at rest 
and 85 beats/min (61% of MPHR)at tracer injection. BP was 90/58 mmHg at rest 
and 105/52 mmHg at tracer injection. Stress was stopped for predetermined 
endpoint. The patient experienced abdominal discomfort; treatment was not 
required. Preliminary ECG evaluation revealed normal sinus rhythm, nonspecific 
interventricular conduction delay at rest and no ischemic changes with stress. (
Final ECG interpretation and other stress and monitoring data are reported 
separately by Cardiology.)  IMAGING FINDINGS:        Study quality is 
good.Images obtained after stress injection show decreased tracer uptake in the 
apex. Resting images showmostly improved tracer uptake in the apex. LV volume 
appears normal. RV volume appears normal. Gatedimages obtained immediately 
after stress show normal LV wall motion. Gated images obtained at rest show 
normal LV wall motion. LVEF at rest is 58%. LVEF at stress is 64%. IMPRESSION: 
  1. Abnormal study.  2. Appropriate pharmacologic stress.  3. Abnormal 
myocardial perfusion.  There is a mild severity, medium size, mildly reversible
, perfusion defect in all the apical segments of the LV.  4. Normal resting LV 
function. No deterioration of function is noted with pharmacologic stress.  5. 
Extracardiactracer distribution is normal.  6. No previous [facility] study for 
comparison.   NONINVASIVE RISK STRATIFICATION: The above findings are 
considered intermediate risk (1% to 3% annual mortality rate) based on the 
following criterion: - Mild/moderate resting left ventricular dysfunction (LVEF 
35% to 49%)- Stress-induced moderate perfusion defect without LV dilation or 
increasedlung intake (thallium-201)(JACC. 2012;59(9):857-90.) Signed: Felton Erickson MDReport Verified Date/Time:  2018 14:59:03 Reading Location: [address] Reading Room    Electronically signed by: FELTON ERICKSON MD on 2018 02:59 PMPOCT-GLUCOSE [id number] 12:45:00





 Test Item  Value  Reference Range  Comments

 

 POC-GLUCOSE METER ([employer])  138 mg/dL    TESTED AT [facility] [address] [facility]



 (test ohys=8760)      [facility] [address]



LACTIC ACID, ARTERIAL, WHOLE [id number] 10:51:00





 Test Item  Value  Reference Range  Comments

 

 LACTATE BLOOD ARTERIAL (2) ([employer]) (test  1.1 mmol/L  0.5-2.2  



 code=2874)      



Effective 2016: Units/Reference Range ChangeNew: 0.5-2.2 mmol/L  Previous: 5
-20 mg/dLANTI-NUCLEAR ANTIBODY (MARSHA)2018 10:51:00





 Test Item  Value  Reference Range  Comments

 

 ANTI-NUCLEAR ANTIBODY (MARSHA) ([employer]) (test  Negative  Negative  



 code=418)      



VITAMIN D, 23-[id number] 06:31:00





 Test Item  Value  Reference Range  Comments

 

 VITAMIN D 25-OH (BEAKER) (test code=2764)  14.3 ng/mL  6.6-49.9  



Effective 10/11/2017: Reference Range ChangeNew: 6.6-49.9 ng/mL   Previous: 13.0
-47.8 ng/mLRecommended Vitamin D Target Range: 30.0-40.0 ng/mLTSH/FREE T4 IF 
[id number] 06:31:00





 Test Item  Value  Reference Range  Comments

 

 THYROID STIMULATING HORMONE (BEAKER) (test  1.55 uIU/mL  0.35-4.94  



 code=772)      



POCT-GLUCOSE [id number] 06:22:00





 Test Item  Value  Reference Range  Comments

 

 POC-GLUCOSE METER (BEAKER)  132 mg/dL    TESTED AT [facility] [address] [facility]



 (test qdtg=1705)      [facility] [address]



HEPATITIS PANEL, [id number] 06:07:00





 Test Item  Value  Reference Range  Comments

 

 HEPATITIS A IGM ANTIBODY (BEAKER) (test  Nonreactive  Nonreactive  



 code=498)      

 

 HEPATITIS B CORE IGM ANTIBODY (BEAKER) (test  Nonreactive  Nonreactive  



 code=645)      

 

 HEPATITIS C ANTIBODY (BEAKER) (test code=367)  Nonreactive  Nonreactive  

 

 HEPATITIS B SURFACE ANTIGEN (2) (BEAKER) (test  Nonreactive  Nonreactive  



 code=2585)      



COMPLEMENT COMPONENT [id number] 05:52:00





 Test Item  Value  Reference Range  Comments

 

 C4 COMPLEMENT (BEAKER) (test code=394)  23 mg/dL  15-57  



COMPLEMENT COMPONENT [id number] 05:52:00





 Test Item  Value  Reference Range  Comments

 

 C3 COMPLEMENT (BEAKER) (test code=393)  82 mg/dL    



PTH, [id number] 05:43:00





 Test Item  Value  Reference Range  Comments

 

 PARATHYROID HORMONE INTACT (BEAKER) (test  247.5 pg/mL  8.5-72.5  



 code=577)      



URIC [id number] 05:39:00





 Test Item  Value  Reference Range  Comments

 

 URIC ACID (BEAKER) (test code=773)  3.9 mg/dL  2.6-7.2  



[id number] 05:39:00





 Test Item  Value  Reference Range  Comments

 

 MAGNESIUM (BEAKER) (test code=627)  1.9 mg/dL  1.6-2.6  



[id number] 05:39:00





 Test Item  Value  Reference Range  Comments

 

 PHOSPHORUS (BEAKER) (test code=604)  2.8 mg/dL  2.3-4.7  



COMPREHENSIVE METABOLIC [id number] 05:39:00





 Test Item  Value  Reference Range  Comments

 

 TOTAL PROTEIN (BEAKER)  5.6 gm/dL  6.0-8.3  



 (test code=770)      

 

 ALBUMIN (BEAKER) (test  2.8 g/dL  3.5-5.0  



 code=1145)      

 

 ALKALINE PHOSPHATASE  112 U/L    



 (BEAKER) (test code=346)      

 

 BILIRUBIN TOTAL (BEAKER)  0.5 mg/dL  0.2-1.2  



 (test code=377)      

 

 SODIUM (BEAKER) (test  137 meq/L  136-145  



 plrn=353)      

 

 POTASSIUM (BEAKER) (test  3.4 meq/L  3.5-5.1  



 code=379)      

 

 CHLORIDE (BEAKER) (test  99 meq/L    



 code=382)      

 

 CO2 (BEAKER) (test  29 meq/L  22-29  



 code=355)      

 

 BLOOD UREA NITROGEN  31 mg/dL  7-21  



 (BEAKER) (test code=354)      

 

 CREATININE (BEAKER) (test  1.99 mg/dL  0.57-1.25  



 code=358)      

 

 GLUCOSE RANDOM (BEAKER)  111 mg/dL    



 (test code=652)      

 

 CALCIUM (BEAKER) (test  8.3 mg/dL  8.4-10.2  



 code=697)      

 

 AST (SGOT) (BEAKER) (test  22 U/L  5-34  



 code=353)      

 

 ALT (SGPT) (BEAKER) (test  17 U/L  6-55  



 code=347)      

 

 EGFR (BEAKER) (test  32 mL/min/1.73 sq m    ESTIMATED GFR IS NOT AS



 code=1092)      ACCURATE AS CREATININE



       CLEARANCE IN PREDICTING



       GLOMERULAR FILTRATION



       RATE. ESTIMATED GFR IS



       NOT APPLICABLE FOR



       DIALYSIS PATIENTS.



CREATINE KINASE (CK)2018 05:39:00





 Test Item  Value  Reference Range  Comments

 

 CREATINE KINASE TOTAL (BEAKER) (test code=380)  97 U/L    



CBC W/PLT COUNT &amp; AUTO [id number] 05:07:00





 Test Item  Value  Reference Range  Comments

 

 WHITE BLOOD CELL COUNT (BEAKER) (test code=775)  10.1 K/ L  3.5-10.5  

 

 RED BLOOD CELL COUNT (BEAKER) (test code=761)  3.27 M/ L  4.63-6.08  

 

 HEMOGLOBIN (BEAKER) (test code=410)  9.4 GM/DL  13.7-17.5  

 

 HEMATOCRIT (BEAKER) (test code=411)  28.4 %  40.1-51.0  

 

 MEAN CORPUSCULAR VOLUME (BEAKER) (test code=753)  86.9 fL  79.0-92.2  

 

 MEAN CORPUSCULAR HEMOGLOBIN (BEAKER) (test  28.7 pg  25.7-32.2  



 bcuz=290)      

 

 MEAN CORPUSCULAR HEMOGLOBIN CONC (BEAKER) (test  33.1 GM/DL  32.3-36.5  



 code=752)      

 

 RED CELL DISTRIBUTION WIDTH (BEAKER) (test  14.4 %  11.6-14.4  



 code=412)      

 

 PLATELET COUNT (BEAKER) (test code=756)  286 K/CU MM  150-450  

 

 MEAN PLATELET VOLUME (BEAKER) (test code=754)  9.6 fL  9.4-12.4  

 

 NUCLEATED RED BLOOD CELLS (BEAKER) (test  0 /100 WBC  0-0  



 code=413)      



CALCIUM, [id number] 05:06:00





 Test Item  Value  Reference Range  Comments

 

 CALCIUM IONIZED (BEAKER) (test code=698)  1.00 mmol/L  1.12-1.27  

 

 PH, BLOOD (BEAKER) (test code=1810)  7.46    



RHEUMATOID FACTOR [id number] 01:38:00





 Test Item  Value  Reference Range  Comments

 

 RHEUMATOID FACTOR TITER (BEAKER) (test code=2285)  :8    



RHEUMATOID FACTOR AB, REFLEX TO [id number] 01:38:00





 Test Item  Value  Reference Range  Comments

 

 RHEUMATOID FACTOR (BEAKER) (test code=573)  Positive    



POCT-GLUCOSE [id number] 22:04:00





 Test Item  Value  Reference Range  Comments

 

 POC-GLUCOSE METER (BEAKER)  212 mg/dL    TESTED AT [facility] [address] [facility]



 (test nmid=1081)      [facility] [address]



POCT-GLUCOSE [id number] 18:12:00





 Test Item  Value  Reference Range  Comments

 

 POC-GLUCOSE METER (BEAKER)  155 mg/dL    TESTED AT [facility] [address]



 (test sbzk=6239)      [facility] [address]



HIV-1 ANTIGEN WITH HIV-1/2 [id number] 16:21:00





 Test Item  Value  Reference Range  Comments

 

 HIV-1 ANTIGEN WITH HIV 1\T\2 ANTIBODY (2)  Nonreactive  Nonreactive  



 (BEAKER) (test code=2586)      



TROPONIN -65-34 16:04:00





 Test Item  Value  Reference Range  Comments

 

 TROPONIN I (BEAKER) (test code=397)  0.06 ng/mL  0.00-0.03  



Troponin I (TnI) levels must be interpreted in the context of the presenting 
symptoms and the clinical findings. Elevated TnI levels indicate myocardial 
damage, but are not specific for ischemic heart disease. Elevated TnI levels 
are seen in patients with other cardiac conditions (including myocarditis and 
congestive heart failure), and slight TnI elevations occur in patients with 
other conditions, including sepsis, renal failure, acidosis, acute neurological 
disease, and persistent tachyarrhythmia.CREATININE, RANDOM [id number] 16:00
:00





 Test Item  Value  Reference Range  Comments

 

 CREATININE URINE (BEAKER) (test code=375)  < mg/dL    



Reference Range: No NormalsPROTHROMBIN TIME/[id number] 15:58:00





 Test Item  Value  Reference Range  Comments

 

 PROTIME (BEAKER) (test code=759)  16.0 seconds  11.7-14.7  

 

 INR (BEAKER) (test code=370)  1.3  <=5.9  



RECOMMENDED COUMADIN/WARFARIN INR THERAPY RANGESSTANDARD DOSE: 2.0 - 3.0   
Includes: PROPHYLAXIS forvenous thrombosis, systemic embolization; TREATMENT 
for venous thrombosis and/or pulmonary embolus.HIGH RISK: Target INR is 2.5-3.5 
for patients with mechanical heart valves.PROTEIN, RANDOM [id number] 15:56:
00





 Test Item  Value  Reference Range  Comments

 

 PROTEIN, URINE (BEAKER) (test code=1569)  94 mg/dL  0-14  



SODIUM, RANDOM [id number] 15:56:00





 Test Item  Value  Reference Range  Comments

 

 SODIUM URINE (BEAKER) (test code=243)  91 meq/L    



Reference Range: No NormalsURINALYSIS W/ [id number] 15:53:00





 Test Item  Value  Reference Range  Comments

 

 COLOR (BEAKER) (test code=470)  Light Yellow    

 

 CLARITY (BEAKER) (test code=469)  Clear    

 

 SPECIFIC GRAVITY UA (BEAKER) (test code=468)  1.003  1.001-1.035  

 

 PH UA (BEAKER) (test code=467)  8.0  5.0-8.0  

 

 PROTEIN UA (BEAKER) (test code=464)  100 mg/dL  Negative  

 

 GLUCOSE UA (BEAKER) (test code=365)  30 mg/dL  Negative  

 

 KETONES UA (BEAKER) (test code=371)  Negative  Negative  

 

 BILIRUBIN UA (BEAKER) (test code=462)  Negative  Negative  

 

 BLOOD UA (BEAKER) (test code=461)  Small  Negative  

 

 NITRITE UA (BEAKER) (test code=465)  Negative  Negative  

 

 LEUKOCYTE ESTERASE UA (BEAKER) (test code=466)  Negative  Negative  

 

 UROBILINOGEN UA (BEAKER) (test code=463)  0.2 mg/dL  0.2-1.0  

 

 RBC UA (BEAKER) (test code=519)  1 /HPF    

 

 WBC UA (BEAKER) (test code=520)  4 /HPF    

 

 BACTERIA (BEAKER) (test ctiv=126)  Rare    

 

 MUCUS (BEAKER) (test code=1574)  Rare    

 

 SQUAMOUS EPITHELIAL (BEAKER) (test code=516)  < /HPF    

 

 SOURCE(BEAKER) (test code=2795)  Urine, [place]    



BASIC METABOLIC [id number] 15:52:00





 Test Item  Value  Reference Range  Comments

 

 SODIUM (BEAKER) (test  137 meq/L  136-145  



 wqim=605)      

 

 POTASSIUM (BEAKER) (test  3.3 meq/L  3.5-5.1  



 code=379)      

 

 CHLORIDE (BEAKER) (test  100 meq/L    



 code=382)      

 

 CO2 (BEAKER) (test  26 meq/L  22-29  



 code=355)      

 

 BLOOD UREA NITROGEN  23 mg/dL  7-21  



 (BEAKER) (test code=354)      

 

 CREATININE (BEAKER) (test  1.45 mg/dL  0.57-1.25  



 code=358)      

 

 GLUCOSE RANDOM (BEAKER)  120 mg/dL    



 (test code=652)      

 

 CALCIUM (BEAKER) (test  8.1 mg/dL  8.4-10.2  



 code=697)      

 

 EGFR (BEAKER) (test  47 mL/min/1.73 sq m    ESTIMATED GFR IS NOT AS



 code=1092)      ACCURATE AS CREATININE



       CLEARANCE IN PREDICTING



       GLOMERULAR FILTRATION



       RATE. ESTIMATED GFR IS



       NOT APPLICABLE FOR



       DIALYSIS PATIENTS.



POCT-GLUCOSE [id number] 12:09:00





 Test Item  Value  Reference Range  Comments

 

 POC-GLUCOSE METER (BEAKER)  87 mg/dL    TESTED AT [address]



 (test hvfm=3292)      [place] [id number]



POCT-GLUCOSE [id number] 11:21:00





 Test Item  Value  Reference Range  Comments

 

 POC-GLUCOSE METER (BEAKER)  90 mg/dL    TESTED AT [address]



 (test gcsk=1549)      [place] [id number]



POCT-GLUCOSE [id number] 11:12:00





 Test Item  Value  Reference Range  Comments

 

 POC-GLUCOSE METER (BEAKER)  111 mg/dL    TESTED AT [address]



 (test ybmi=0715)      [place] [address]



HEMOGLOBIN [id number] 09:59:00





 Test Item  Value  Reference Range  Comments

 

 HEMOGLOBIN A1C (BEAKER) (test code=368)  6.1 %  4.3-6.1  



CREATINE KINASE (CK), TOTAL AND [id number] 08:02:00





 Test Item  Value  Reference Range  Comments

 

 CREATINE KINASE TOTAL (BEAKER) (test code=380)  141 U/L    

 

 CREATINE KINASE-MB (BEAKER) (test code=750)  7.9 ng/mL  0.0-6.6  

 

 CREATINE KINASE-MB INDEX (BEAKER) (test code=395)  5.6 %    



CK-MB Reference Range:&lt;6.7      Normal6.7-10.0  Borderline&gt;10.0     
AbnormalTROPONIN -19-65 08:02:00





 Test Item  Value  Reference Range  Comments

 

 TROPONIN I (BEAKER) (test code=397)  0.05 ng/mL  0.00-0.03  








 Test Item  Value  Reference Range  Comments

 

 POTASSIUM (BEAKER) (test code=379)  4.6 meq/L  3.5-5.1  



CALCIUM, [id number] 07:49:00





 Test Item  Value  Reference Range  Comments

 

 CALCIUM IONIZED (BEAKER) (test code=698)  0.99 mmol/L  1.12-1.27  

 

 PH, BLOOD (BEAKER) (test code=1810)  7.45    



LACTIC ACID, VENOUS, WHOLE [id number] 06:58:00





 Test Item  Value  Reference Range  Comments

 

 LACTATE BLOOD VENOUS (2)  3.8 mmol/L  0.5-2.2  Specimen slightly hemolyzed



 (BEAKER) (test code=2872)      



Effective 2016: Units/Reference Range ChangeNew: 0.5-2.2 mmol/L  Previous: 5
-20 mg/dLCOMPREHENSIVE METABOLIC [id number] 04:38:00





 Test Item  Value  Reference Range  Comments

 

 TOTAL PROTEIN (BEAKER)  5.7 gm/dL  6.0-8.3  



 (test code=770)      

 

 ALBUMIN (BEAKER) (test  2.9 g/dL  3.5-5.0  



 code=1145)      

 

 ALKALINE PHOSPHATASE  89 U/L    



 (BEAKER) (test code=346)      

 

 BILIRUBIN TOTAL (BEAKER)  0.4 mg/dL  0.2-1.2  



 (test code=377)      

 

 SODIUM (BEAKER) (test  135 meq/L  136-145  



 ypwo=706)      

 

 POTASSIUM (BEAKER) (test  4.6 meq/L  3.5-5.1  



 code=379)      

 

 CHLORIDE (BEAKER) (test  98 meq/L    



 code=382)      

 

 CO2 (BEAKER) (test  20 meq/L  22-29  



 code=355)      

 

 BLOOD UREA NITROGEN  59 mg/dL  7-21  



 (BEAKER) (test code=354)      

 

 CREATININE (BEAKER) (test  2.81 mg/dL  0.57-1.25  



 code=358)      

 

 GLUCOSE RANDOM (BEAKER)  105 mg/dL    



 (test code=652)      

 

 CALCIUM (BEAKER) (test  8.6 mg/dL  8.4-10.2  



 code=697)      

 

 AST (SGOT) (BEAKER) (test  20 U/L  5-34  



 code=353)      

 

 ALT (SGPT) (BEAKER) (test  19 U/L  6-55  



 code=347)      

 

 EGFR (BEAKER) (test  22 mL/min/1.73 sq m    ESTIMATED GFR IS NOT AS



 code=1092)      ACCURATE AS CREATININE



       CLEARANCE IN PREDICTING



       GLOMERULAR FILTRATION



       RATE. ESTIMATED GFR IS



       NOT APPLICABLE FOR



       DIALYSIS PATIENTS.



B-TYPE NATRIURETIC FACTOR (BNP)2018 04:37:00





 Test Item  Value  Reference Range  Comments

 

 B-TYPE NATRIURETIC PEPTIDE (BEAKER) (test  561 pg/mL  0-100  



 code=700)      



[id number] 04:37:00





 Test Item  Value  Reference Range  Comments

 

 PHOSPHORUS (BEAKER) (test code=604)  6.1 mg/dL  2.3-4.7  



[id number] 04:37:00





 Test Item  Value  Reference Range  Comments

 

 MAGNESIUM (BEAKER) (test code=627)  1.8 mg/dL  1.6-2.6  



CBC W/PLT COUNT &amp; AUTO [id number] 04:18:00





 Test Item  Value  Reference Range  Comments

 

 WHITE BLOOD CELL COUNT (BEAKER) (test code=775)  8.2 K/ L  3.5-10.5  

 

 RED BLOOD CELL COUNT (BEAKER) (test code=761)  3.15 M/ L  4.63-6.08  

 

 HEMOGLOBIN (BEAKER) (test code=410)  9.0 GM/DL  13.7-17.5  

 

 HEMATOCRIT (BEAKER) (test code=411)  27.2 %  40.1-51.0  

 

 MEAN CORPUSCULAR VOLUME (BEAKER) (test code=753)  86.3 fL  79.0-92.2  

 

 MEAN CORPUSCULAR HEMOGLOBIN (BEAKER) (test  28.6 pg  25.7-32.2  



 itww=479)      

 

 MEAN CORPUSCULAR HEMOGLOBIN CONC (BEAKER) (test  33.1 GM/DL  32.3-36.5  



 code=752)      

 

 RED CELL DISTRIBUTION WIDTH (BEAKER) (test  14.5 %  11.6-14.4  



 code=412)      

 

 PLATELET COUNT (BEAKER) (test code=756)  319 K/CU MM  150-450  

 

 MEAN PLATELET VOLUME (BEAKER) (test code=754)  9.4 fL  9.4-12.4  

 

 NUCLEATED RED BLOOD CELLS (BEAKER) (test  0 /100 WBC  0-0  



 code=413)      

 

 NEUTROPHILS RELATIVE PERCENT (BEAKER) (test  69 %    



 code=429)      

 

 LYMPHOCYTES RELATIVE PERCENT (BEAKER) (test  10 %    



 code=430)      

 

 MONOCYTES RELATIVE PERCENT (BEAKER) (test  19 %    



 code=431)      

 

 EOSINOPHILS RELATIVE PERCENT (BEAKER) (test  2 %    



 code=432)      

 

 BASOPHILS RELATIVE PERCENT (BEAKER) (test  0 %    



 code=437)      

 

 NEUTROPHILS ABSOLUTE COUNT (BEAKER) (test  5.68 K/ L  1.78-5.38  



 code=670)      

 

 LYMPHOCYTES ABSOLUTE COUNT (BEAKER) (test  0.81 K/ L  1.32-3.57  



 code=414)      

 

 MONOCYTES ABSOLUTE COUNT (BEAKER) (test  1.55 K/ L  0.30-0.82  



 code=415)      

 

 EOSINOPHILS ABSOLUTE COUNT (BEAKER) (test  0.14 K/ L  0.04-0.54  



 code=416)      

 

 BASOPHILS ABSOLUTE COUNT (BEAKER) (test  0.02 K/ L  0.01-0.08  



 code=417)      

 

 IMMATURE GRANULOCYTES-RELATIVE PERCENT (BEAKER)  1 %  0-1  



 (test code=2801)      



[id number] 02:41:00





 Test Item  Value  Reference Range  Comments

 

 POTASSIUM (BEAKER) (test code=379)  4.7 meq/L  3.5-5.1  



LACTIC ACID, VENOUS, WHOLE [id number] 00:45:00





 Test Item  Value  Reference Range  Comments

 

 LACTATE BLOOD VENOUS (2)  7.6 mmol/L  0.5-2.2  Specimen slightly hemolyzed



 (BEAKER) (test code=2872)      



Effective 2016: Units/Reference Range ChangeNew: 0.5-2.2 mmol/L  Previous: 5
-20 mg/dLPOCT-GLUCOSE [id number] 00:27:00





 Test Item  Value  Reference Range  Comments

 

 POC-GLUCOSE METER (BEAKER)  159 mg/dL    TESTED AT [facility] [address] [facility]



 (test iixe=6907)      [facility] [address]



CREATINE KINASE (CK), TOTAL AND [id number] 22:57:00





 Test Item  Value  Reference Range  Comments

 

 CREATINE KINASE TOTAL (BEAKER) (test code=380)  175 U/L    

 

 CREATINE KINASE-MB (BEAKER) (test code=750)  12.3 ng/mL  0.0-6.6  

 

 CREATINE KINASE-MB INDEX (BEAKER) (test code=395)  7.0 %    



CK-MB Reference Range:&lt;6.7      Normal6.7-10.0  Borderline&gt;10.0     
AbnormalTROPONIN -83-30 22:57:00





 Test Item  Value  Reference Range  Comments

 

 TROPONIN I (BEAKER) (test code=397)  0.04 ng/mL  0.00-0.03  








 Test Item  Value  Reference Range  Comments

 

 POTASSIUM (BEAKER) (test code=379)  4.7 meq/L  3.5-5.1  



POCT-GLUCOSE [id number] 19:28:00





 Test Item  Value  Reference Range  Comments

 

 POC-GLUCOSE METER (BEAKER)  117 mg/dL    TESTED AT [facility] [address] [facility]



 (test ehcm=8808)      [facility] [address]



BASIC METABOLIC [id number] 19:07:00





 Test Item  Value  Reference Range  Comments

 

 SODIUM (BEAKER) (test  133 meq/L  136-145  



 ssca=714)      

 

 POTASSIUM (BEAKER) (test  4.6 meq/L  3.5-5.1  



 code=379)      

 

 CHLORIDE (BEAKER) (test  95 meq/L    



 code=382)      

 

 CO2 (BEAKER) (test  19 meq/L  22-29  



 code=355)      

 

 BLOOD UREA NITROGEN  57 mg/dL  7-21  



 (BEAKER) (test code=354)      

 

 CREATININE (BEAKER) (test  2.64 mg/dL  0.57-1.25  



 code=358)      

 

 GLUCOSE RANDOM (BEAKER)  131 mg/dL    



 (test code=652)      

 

 CALCIUM (BEAKER) (test  9.1 mg/dL  8.4-10.2  



 code=697)      

 

 EGFR (BEAKER) (test  23 mL/min/1.73 sq m    ESTIMATED GFR IS NOT AS



 code=1092)      ACCURATE AS CREATININE



       CLEARANCE IN PREDICTING



       GLOMERULAR FILTRATION



       RATE. ESTIMATED GFR IS



       NOT APPLICABLE FOR



       DIALYSIS PATIENTS.



Call [phone] ACID, VENOUS, WHOLE [id number] 19:00:00





 Test Item  Value  Reference Range  Comments

 

 LACTATE BLOOD VENOUS (2) (BEAKER) (test  5.8 mmol/L  0.5-2.2  



 code=2872)      



Effective 2016: Units/Reference Range ChangeNew: 0.5-2.2 mmol/L  Previous: 5
-20 mg/dLU/S, RENAL, [id number] 16:16:00Reason for exam:-&gt;akiShould 
this be performed at the bedside?-&gt;YesFINAL REPORT PATIENT ID:   [id number] 
Ultrasound of the Kidneys Clinical History:  marianne Discussion: Sonographic 
evaluation of the kidneys is performed. Right kidney:  10 x 4.7 x 4 cm, with 
cortical thickness of 1.1 cm.  Normal cortical echogenicity.  No mass.  No 
shadowing calculus. No hydronephrosis. Left kidney: 10.2 x 4.5 x 4.7 cm, with 
cortical thickness of 1.3 cm.  Normal cortical echogenicity.  [place].  No 
shadowing calculus.  No hydronephrosis. Limited doppler evaluation of right 
main renal artery and vein demonstrate patency. Left renal vessels are poorly 
visualized due to patient's body habitus. Bladder:  Decompressed with Grimes 
catheter. Impression: No hydronephrosis. Signed: [place] Verified Date
/Time:  2018 16:16:10 Reading Location: [address] Ultrasound Reading 
Room      Electronically signed by: RANCHO RASHEED M.D. on 2018 04:16 
PMCREATINE KINASE (CK), TOTAL AND [id number] 15:14:00





 Test Item  Value  Reference Range  Comments

 

 CREATINE KINASE TOTAL (BEAKER) (test code=380)  173 U/L    

 

 CREATINE KINASE-MB (BEAKER) (test code=750)  11.1 ng/mL  0.0-6.6  

 

 CREATINE KINASE-MB INDEX (BEAKER) (test code=395)  6.4 %    



CK-MB Reference Range:&lt;6.7      Normal6.7-10.0  Borderline&gt;10.0     
AbnormalTROPONIN -93-89 15:14:00





 Test Item  Value  Reference Range  Comments

 

 TROPONIN I (BEAKER) (test code=397)  0.04 ng/mL  0.00-0.03  



Troponin I (TnI) levels must be interpreted in the context of the presenting 
symptoms and the clinical findings. Elevated TnI levels indicate myocardial 
damage, but are not specific for ischemic heart disease. Elevated TnI levels 
are seen in patients with other cardiac conditions (including myocarditis and 
congestive heart failure), and slight TnI elevations occur in patients with 
other conditions, including sepsis, renal failure, acidosis, acute neurological 
disease, and persistent tachyarrhythmia.BASIC METABOLIC [id number] 15:11:00





 Test Item  Value  Reference Range  Comments

 

 SODIUM (BEAKER) (test  137 meq/L  136-145  



 ldfo=556)      

 

 POTASSIUM (BEAKER) (test  4.4 meq/L  3.5-5.1  



 code=379)      

 

 CHLORIDE (BEAKER) (test  97 meq/L    



 code=382)      

 

 CO2 (BEAKER) (test  17 meq/L  22-29  



 code=355)      

 

 BLOOD UREA NITROGEN  55 mg/dL  7-21  



 (BEAKER) (test code=354)      

 

 CREATININE (BEAKER) (test  2.48 mg/dL  0.57-1.25  



 code=358)      

 

 GLUCOSE RANDOM (BEAKER)  66 mg/dL    



 (test code=652)      

 

 CALCIUM (BEAKER) (test  9.3 mg/dL  8.4-10.2  



 code=697)      

 

 EGFR (BEAKER) (test  25 mL/min/1.73 sq m    ESTIMATED GFR IS NOT AS



 code=1092)      ACCURATE AS CREATININE



       CLEARANCE IN PREDICTING



       GLOMERULAR FILTRATION



       RATE. ESTIMATED GFR IS



       NOT APPLICABLE FOR



       DIALYSIS PATIENTS.



[id number] 15:06:00





 Test Item  Value  Reference Range  Comments

 

 PHOSPHORUS (BEAKER) (test code=604)  5.4 mg/dL  2.3-4.7  



[id number] 15:06:00





 Test Item  Value  Reference Range  Comments

 

 MAGNESIUM (BEAKER) (test code=627)  2.8 mg/dL  1.6-2.6  



URINALYSIS W/ REFLEX URINE [id number] 14:00:00





 Test Item  Value  Reference Range  Comments

 

 COLOR (BEAKER) (test code=470)  Yellow    

 

 CLARITY (BEAKER) (test code=469)  Hazy    

 

 SPECIFIC GRAVITY UA (BEAKER) (test code=468)  1.013  1.001-1.035  

 

 PH UA (BEAKER) (test code=467)  6.5  5.0-8.0  

 

 PROTEIN UA (BEAKER) (test code=464)  600 mg/dL  Negative  

 

 GLUCOSE UA (BEAKER) (test code=365)  200 mg/dL  Negative  

 

 KETONES UA (BEAKER) (test code=371)  Negative  Negative  

 

 BILIRUBIN UA (BEAKER) (test code=462)  Negative  Negative  

 

 BLOOD UA (BEAKER) (test code=461)  Small  Negative  

 

 NITRITE UA (BEAKER) (test code=465)  Negative  Negative  

 

 LEUKOCYTE ESTERASE UA (BEAKER) (test code=466)  Moderate  Negative  

 

 UROBILINOGEN UA (BEAKER) (test code=463)  0.2 mg/dL  0.2-1.0  

 

 RBC UA (BEAKER) (test code=519)  2 /HPF    

 

 WBC UA (BEAKER) (test code=520)  3 /HPF    

 

 SQUAMOUS EPITHELIAL (BEAKER) (test code=516)  < /HPF    

 

 GRANULAR CASTS (BEAKER) (test code=515)  2 /LPF    

 

 SOURCE(BEAKER) (test code=2795)      



CBC W/PLT COUNT &amp; AUTO [id number] 12:50:00





 Test Item  Value  Reference Range  Comments

 

 WHITE BLOOD CELL COUNT (BEAKER) (test code=775)  14.7 K/ L  3.5-10.5  

 

 RED BLOOD CELL COUNT (BEAKER) (test code=761)  3.47 M/ L  4.63-6.08  

 

 HEMOGLOBIN (BEAKER) (test code=410)  10.2 GM/DL  13.7-17.5  

 

 HEMATOCRIT (BEAKER) (test code=411)  32.0 %  40.1-51.0  

 

 MEAN CORPUSCULAR VOLUME (BEAKER) (test code=753)  92.2 fL  79.0-92.2  

 

 MEAN CORPUSCULAR HEMOGLOBIN (BEAKER) (test  29.4 pg  25.7-32.2  



 itnn=095)      

 

 MEAN CORPUSCULAR HEMOGLOBIN CONC (BEAKER) (test  31.9 GM/DL  32.3-36.5  



 code=752)      

 

 RED CELL DISTRIBUTION WIDTH (BEAKER) (test  14.7 %  11.6-14.4  



 code=412)      

 

 PLATELET COUNT (BEAKER) (test code=756)  450 K/CU MM  150-450  

 

 MEAN PLATELET VOLUME (BEAKER) (test code=754)  9.9 fL  9.4-12.4  

 

 NUCLEATED RED BLOOD CELLS (BEAKER) (test  0 /100 WBC  0-0  



 code=413)      



(CELLAVISION MANUAL DIFF)2018 12:50:00





 Test Item  Value  Reference Range  Comments

 

 NEUTROPHILS - REL (CELLAVISION)(BEAKER) (test  78 %    



 code=2816)      

 

 LYMPHOCYTES - REL (CELLAVISION)(BEAKER) (test  8 %    



 code=2817)      

 

 MONOCYTES - REL (CELLAVISION)(BEAKER) (test  10 %    



 code=2818)      

 

 BANDS - REL (CELLAVISION)(BEAKER) (test  1 %  0-10  



 code=2826)      

 

 ATYPICAL LYMPHOCYTES - REL (CELLAVISION)(BEAKER)  3 %  0-0  



 (test code=2829)      

 

 NEUTROPHILS - ABS (CELLAVISION)(BEAKER) (test  11.47 K/ul  1.78-5.38  



 code=2830)      

 

 LYMPHOCYTES - ABS (CELLAVISION)(BEAKER) (test  1.18 K/ul  1.32-3.57  



 code=2831)      

 

 MONOCYTES - ABS (CELLAVISION)(BEAKER) (test  1.47 K/uL  0.30-0.82  



 code=2832)      

 

 BANDS - ABS (CELLAVISION)(BEAKER) (test  0.15 K/uL  0.00-0.80  



 code=2840)      

 

 ATYPICAL LYMPHOCYTES - ABS (CELLAVISION)(BEAKER)  0.44 K/uL  0.00-0.00  



 (test code=2858)      

 

 TOTAL COUNTED (BEAKER) (test code=1351)  100    

 

 PLT MORPHOLOGY (BEAKER) (test code=486)  Normal    

 

 SMUDGE CELLS (BEAKER) (test code=1371)  Present    

 

 POIKILOCYTES (BEAKER) (test code=966)  2+ moderate    

 

 HI CELLS (BEAKER) (test code=474)  2+ moderate    

 

 PLATELET CONCENTRATION (CELLAVISION)(BEAKER)  Adequate    



 (test code=3438)      



Received comment: User comments: Slide comments:HEPATITIS B SURFACE [id number]
[id number] 12:11:00





 Test Item  Value  Reference Range  Comments

 

 HEPATITIS B SURFACE ANTIGEN (2) (BEAKER) (test  Nonreactive  Nonreactive  



 code=2115)      



HEPATITIS B CORE ANTIBODY, [id number] 12:11:00





 Test Item  Value  Reference Range  Comments

 

 HEPATITIS B CORE IGM ANTIBODY (BEAKER) (test  Nonreactive  Nonreactive  



 code=645)      



HEPATITIS C [id number] 12:11:00





 Test Item  Value  Reference Range  Comments

 

 HEPATITIS C ANTIBODY (BEAKER) (test code=367)  Nonreactive  Nonreactive  



HEPATITIS B CORE ANTIBODY, [id number] 12:11:00





 Test Item  Value  Reference Range  Comments

 

 HEPATITIS B CORE TOTAL ANTIBODY (BEAKER) (test  Nonreactive  Nonreactive  



 code=497)      



POCT-GLUCOSE [id number] 12:05:00





 Test Item  Value  Reference Range  Comments

 

 POC-GLUCOSE METER (BEAKER)  179 mg/dL    TESTED AT [facility] [address] [facility]



 (test pwot=0009)      [facility] [address]



LACTIC ACID, VENOUS, WHOLE [id number] 11:18:00





 Test Item  Value  Reference Range  Comments

 

 LACTATE BLOOD VENOUS (2) (BEAKER) (test  8.0 mmol/L  0.5-2.2  



 code=2872)      



Effective 2016: Units/Reference Range ChangeNew: 0.5-2.2 mmol/L  Previous: 5
-20 mg/[id number] 11:16:00





 Test Item  Value  Reference Range  Comments

 

 POTASSIUM (BEAKER) (test code=379)  5.3 meq/L  3.5-5.1  



BLOOD GAS, [id number] 10:48:00





 Test Item  Value  Reference Range  Comments

 

 PH VENOUS (BEAKER) (test code=701)  7.49  7.32-7.42  

 

 PCO2 VENOUS (BEAKER) (test code=755)  24 mmHg  41-51  

 

 PO2 VENOUS (BEAKER) (test code=702)  41 mmHg  25-40  

 

 O2 SATURATION VENOUS (BEAKER) (test code=703)  86.8 %  40.0-70.0  

 

 HCO3 VENOUS (BEAKER) (test code=705)  18 mmol/L  21-29  

 

 BASE EXCESS VENOUS (BEAKER) (test code=704)  -4.6 mmol/L  -2.0-3.0  

 

 PATIENT TEMPERATURE (BEAKER) (test code=1818)  34.5 C    

 

 FIO2 (BEAKER) (test code=1819)  36.0 %    



[id number] 10:27:00





 Test Item  Value  Reference Range  Comments

 

 SODIUM (BEAKER) (test code=381)  133 meq/L  136-145  

 

 POTASSIUM (BEAKER) (test code=379)  6.8 meq/L  3.5-5.1  

 

 CHLORIDE (BEAKER) (test code=382)  102 meq/L    

 

 CO2 (BEAKER) (test code=355)  8 meq/L  22-29  



Call [id number] FUNCTION [id number] 10:23:00





 Test Item  Value  Reference Range  Comments

 

 TOTAL PROTEIN (BEAKER) (test code=770)  6.3 gm/dL  6.0-8.3  

 

 ALBUMIN (BEAKER) (test code=1145)  3.2 g/dL  3.5-5.0  

 

 BILIRUBIN TOTAL (BEAKER) (test code=377)  0.4 mg/dL  0.2-1.2  

 

 BILIRUBIN DIRECT (BEAKER) (test code=706)  0.2 mg/dL  0.1-0.5  

 

 ALKALINE PHOSPHATASE (BEAKER) (test code=346)  95 U/L    

 

 AST (SGOT) (BEAKER) (test code=353)  20 U/L  5-34  

 

 ALT (SGPT) (BEAKER) (test code=347)  21 U/L  6-55  



Call [id number] 10:14:00





 Test Item  Value  Reference Range  Comments

 

 PHOSPHORUS (BEAKER) (test code=604)  10.6 mg/dL  2.3-4.7  



[id number] 10:12:00





 Test Item  Value  Reference Range  Comments

 

 MAGNESIUM (BEAKER) (test code=627)  2.2 mg/dL  1.6-2.6  



BLOOD GAS, [id number] 09:10:00





 Test Item  Value  Reference Range  Comments

 

 PH VENOUS (BEAKER) (test code=701)  7.24  7.32-7.42  

 

 PCO2 VENOUS (BEAKER) (test code=755)  24 mmHg  41-51  

 

 PO2 VENOUS (BEAKER) (test code=702)  54 mmHg  25-40  

 

 O2 SATURATION VENOUS (BEAKER) (test code=703)  86.6 %  40.0-70.0  

 

 HCO3 VENOUS (BEAKER) (test code=705)  10 mmol/L  21-29  

 

 BASE EXCESS VENOUS (BEAKER) (test code=704)  -16.0 mmol/L  -2.0-3.0  

 

 PATIENT TEMPERATURE (BEAKER) (test code=1818)  35.6 C    

 

 FIO2 (BEAKER) (test code=1819)  36.0 %    



RAD, CHEST, 1 VIEW, NON [id number] 08:59:00Reason for exam:-&gt;edemaShould 
this be performed at the bedside?-&gt;YesFINAL REPORT PATIENT ID:   [id number] 
Chest one view Discussion: Semiconfluent bilateral pulmonary opacities 
presumably representing edema. There is some suspected scattered nodularity 
that probably represents granulomas. No gross effusion or pneumothorax. Right 
IJ line in place. Heart size normal. Signed: Chente Delarosa Verified Date
/Time:  2018 08:59:05 Reading Location: [facility] Radiology Reading 
Room      Electronically signed by: CHENTE DELAROSA M.D. on 2018 08:59 
[id number] 07:53:00





 Test Item  Value  Reference Range  Comments

 

 PHOSPHORUS (BEAKER) (test code=604)  10.8 mg/dL  2.3-4.7  



CALCIUM, [id number] 07:12:00





 Test Item  Value  Reference Range  Comments

 

 CALCIUM IONIZED (BEAKER) (test code=698)  1.14 mmol/L  1.12-1.27  

 

 PH, BLOOD (BEAKER) (test code=1810)  7.11    



BASIC METABOLIC [id number] 07:04:00





 Test Item  Value  Reference Range  Comments

 

 SODIUM (BEAKER) (test  129 meq/L  136-145  



 oqgq=497)      

 

 POTASSIUM (BEAKER) (test  7.3 meq/L  3.5-5.1  



 code=379)      

 

 CHLORIDE (BEAKER) (test  102 meq/L    



 code=382)      

 

 CO2 (BEAKER) (test  6 meq/L  22-29  



 code=355)      

 

 BLOOD UREA NITROGEN  123 mg/dL  7-21  



 (BEAKER) (test code=354)      

 

 CREATININE (BEAKER) (test  4.47 mg/dL  0.57-1.25  



 code=358)      

 

 GLUCOSE RANDOM (BEAKER)  222 mg/dL    



 (test code=652)      

 

 CALCIUM (BEAKER) (test  9.5 mg/dL  8.4-10.2  



 code=697)      

 

 EGFR (BEAKER) (test  13 mL/min/1.73 sq m    ESTIMATED GFR IS NOT AS



 code=1092)      ACCURATE AS CREATININE



       CLEARANCE IN PREDICTING



       GLOMERULAR FILTRATION



       RATE. ESTIMATED GFR IS



       NOT APPLICABLE FOR



       DIALYSIS PATIENTS.



POCT-GLUCOSE [id number] 06:57:00





 Test Item  Value  Reference Range  Comments

 

 POC-GLUCOSE METER (BEAKER)  203 mg/dL    TESTED AT [facility] [address] [facility]



 (test rnsd=4555)      [facility] [address]



CREATINE KINASE (CK), TOTAL AND [id number] 06:54:00





 Test Item  Value  Reference Range  Comments

 

 CREATINE KINASE TOTAL (BEAKER) (test code=380)  94 U/L    

 

 CREATINE KINASE-MB (BEAKER) (test code=750)  7.4 ng/mL  0.0-6.6  

 

 CREATINE KINASE-MB INDEX (BEAKER) (test code=395)  7.9 %    



CK-MB Reference Range:&lt;6.7      Normal6.7-10.0  Borderline&gt;10.0     
AbnormalTROPONIN -10-25 06:54:00





 Test Item  Value  Reference Range  Comments

 

 TROPONIN I (BEAKER) (test code=397)  0.02 ng/mL  0.00-0.03  








 Test Item  Value  Reference Range  Comments

 

 MAGNESIUM (BEAKER) (test code=627)  2.3 mg/dL  1.6-2.6  



B-TYPE NATRIURETIC FACTOR (BNP)2018 06:38:00





 Test Item  Value  Reference Range  Comments

 

 B-TYPE NATRIURETIC PEPTIDE (BEAKER) (test  866 pg/mL  0-100  



 code=700)      



PROTHROMBIN [id number] 06:36:00





 Test Item  Value  Reference Range  Comments

 

 PROTIME (BEAKER) (test code=759)  16.1 seconds  11.7-14.7  

 

 INR (BEAKER) (test code=370)  1.3  <=5.9  



RECOMMENDED COUMADIN/WARFARIN INR THERAPY RANGESSTANDARD DOSE: 2.0 - 3.0   
Includes: PROPHYLAXIS forvenous thrombosis, systemic embolization; TREATMENT 
for venous thrombosis and/or pulmonary embolus.HIGH RISK: Target INR is 2.5-3.5 
for patients with mechanical heart valves.LACTIC ACID, VENOUS, WHOLE [id number] 06:15:00





 Test Item  Value  Reference Range  Comments

 

 LACTATE BLOOD VENOUS (2) (BEAKER) (test  8.9 mmol/L  0.5-2.2  



 code=2872)      



Effective 2016: Units/Reference Range ChangeNew: 0.5-2.2 mmol/L  Previous: 5
-20 mg/dL

## 2018-09-05 NOTE — EKG
Test Date:    2018-09-05               Test Time:    09:50:13

Technician:   JORGE                                   

                                                     

MEASUREMENT RESULTS:                                       

Intervals:                                           

Rate:         59                                     

TN:           184                                    

QRSD:         106                                    

QT:           484                                    

QTc:          479                                    

Axis:                                                

P:            73                                     

TN:           184                                    

QRS:          10                                     

T:            65                                     

                                                     

INTERPRETIVE STATEMENTS:                                       

                                                     

Sinus bradycardia

Otherwise normal ECG

Compared to ECG 08/03/2018 12:37:38

Sinus rhythm no longer present

ST (T wave) deviation no longer present

Prolonged QT interval no longer present



Electronically Signed On 09-05-18 17:50:51 CDT by Avery Sanchez

## 2018-09-05 NOTE — XMS REPORT
Clinical Summary

 Created on:2018



Patient:Pinky Pérez

Sex:Male

:1935

External Reference #:[id number]





Demographics







 Address  [address] [place] [address]

 

 Home Phone  [phone]

 

 Preferred Language  English

 

 Marital Status  Unknown

 

 Druze Affiliation  Unknown

 

 Race  White

 

 Ethnic Group   or 









Author







 Organization  [facility]

 

 Address  [address] [place] [address]

 

 Phone  [phone]









Support







 Name  Relationship  Address  Phone

 

 Unavailable  [employer]  [address]  [phone]



     [place] [address]  

 

 Unavailable  Unavailable  [address]  [phone]



     [place] [address]  









Care Team Providers







 Name  Role  Phone

 

 Unavailable  Primary Care Provider  Unavailable









Allergies







 Active Allergy  Reactions  Severity  Noted Date  Comments

 

 Adhesive Tape-Silicones  Rash  Medium  2017  







Current Medications







 Prescription  Sig.  Disp.  Refills  Start Date  End Date  Status

 

 aspirin 81 MG  Take 1 tablet  30 tablet  0  2018  Active



 chewable tablet  (81 mg total)          



   by mouth daily.          

 

 atorvastatin  Take 1 tablet  30 tablet  0  2018  Active



 (LIPITOR) 40 MG  (40 mg total)          



 tablet  by mouth          



   nightly.          

 

 gabapentin  Take 1 capsule  30 capsule  0  2018  Active



 (NEURONTIN) 300 MG  (300 mg total)          



 capsule  by mouth          



   nightly.          

 

 metoprolol  Take 1 tablet  30 tablet  0  2018  Active



 (LOPRESSOR) 25 MG  (25 mg total)          



 tablet  by mouth 2          



   (two) times          



   daily.          

 

 sodium bicarbonate  Take 1 tablet  30 tablet  0  2018  Active



 650 MG tablet  (650 mg total)          



   by mouth 2          



   (two) times          



   daily.          

 

 torsemide (DEMADEX)  Take 1 tablet  30 tablet  0  2018  
Active



 20 MG tablet  (20 mg total)          



   by mouth daily.          

 

 traMADol (ULTRAM) 50  Take 1 tablet  30 tablet  0  2018  




 mg tablet  (50 mg total)          



   by mouth every          



   6 (six) hours          



   as needed for          



   up to 10 days.          



   Max Daily          



   Amount: 200 mg          







Active Problems







 Problem  Noted Date

 

 BPH (benign prostatic hyperplasia)  2018

 

 Diabetic neuropathy (HCC)  2018

 

 Hyperlipidemia  2018

 

 Hyperphosphatemia  2018

 

 Anemia of chronic disease  2018

 

 Hyperglycemia  2018

 

 Muscle atrophy  2018

 

 Leukocytosis  2018

 

 Acute exacerbation of CHF (congestive heart failure) ([facility])  2018

 

 Acute cystitis without hematuria  2018

 

 Hyperkalemia  2018

 

 Type 2 diabetes mellitus with complication, without long-term current use  



 of insulin ([facility])  

 

 Bradycardia  2018

 

 Shortness of breath  2018

 

 MARIANNE (acute kidney injury) ([facility])  2018







Encounters







 Date  Type  Specialty  Care Team  Description

 

 2018 -  Hospital Encounter  General Internal  Ever Temple  MARIANNE (acute 
kidney



 2018    Walt Darling MD



  injury) ([facility])



       Guillermo Forte MD  

 

 2018  Orders Only  General Internal    



     Medicine    



after 2017



Social History







 Tobacco Use  Types  Packs/Day  Years Used  Date

 

 Never Smoker        









 Smokeless Tobacco: Never Used      









 Sex Assigned at Birth  Date Recorded

 

 Not on file  







Last Filed Vital Signs







 Vital Sign  Reading  Time Taken

 

 Blood Pressure  170/74  2018  2:35 PM CDT

 

 Pulse  65  2018  2:35 PM CDT

 

 Temperature  35.8 C (96.4 F)  2018  2:35 PM CDT

 

 Respiratory Rate  18  2018  2:35 PM CDT

 

 Oxygen Saturation  99%  2018  2:35 PM CDT

 

 Inhaled Oxygen Concentration  -  -

 

 Weight  103.2 kg (227 lb 8.2 oz)  2018  6:01 AM CDT

 

 Height  172.7 cm (5' 8")  2018  6:00 AM CDT

 

 Body Mass Index  34.59  2018  6:01 AM CDT







Plan of Treatment

Not on file



Procedures







 Procedure Name  Priority  Date/Time  Associated Diagnosis  Comments

 

 US GUIDE, VASCULAR  Routine  2018  2:43 PM  MARIANNE (acute kidney  Results 
for this



 ACCESS    CDT  injury) ([facility])  procedure are in



         the results



         section.

 

 INSERT NON-TUNNEL  Routine  2018  2:43 PM  MARIANNE (acute kidney  Results 
for this



 CV CATH    CDT  injury) ([facility])  procedure are in



         the results



         section.



after 2017



Results

RHYTHM STRIP - SCAN (2018  1:41 PM)POC-Glucose meter (2018  5:13 PM)
Only the most recent of62 resultswithin the time period is included.





 Component  Value  Ref Range

 

 POC-Glucose Meter  229 (H)Comment: TESTED AT [address]  70 - 
110 mg/dL



   [place] [address]  









 Specimen  Performing Laboratory

 

 Blood  [address] [address]



CBC with platelet count + automated diff (2018  4:58 AM)Only the most 
recent of16 resultswithin the time period is included.





 Component  Value  Ref Range

 

 WBC  10.6 (H)  3.5 - 10.5 K/L

 

 RBC  2.90 (L)  4.63 - 6.08 M/L

 

 Hemoglobin  8.3 (L)  13.7 - 17.5 GM/DL

 

 Hematocrit  25.4 (L)  40.1 - 51.0 %

 

 MCV  87.6  79.0 - 92.2 fL

 

 MCH  28.6  25.7 - 32.2 pg

 

 MCHC  32.7  32.3 - 36.5 GM/DL

 

 RDW  13.8  11.6 - 14.4 %

 

 Platelets  490 (H)  150 - 450 K/CU MM

 

 MPV  8.9 (L)  9.4 - 12.4 fL

 

 nRBC  0  0 - 0 /100 WBC

 

 % Neutros  63  %

 

 % Lymphs  16  %

 

 % Monos  12  %

 

 % Eos  6  %

 

 % Baso  1  %

 

 # Neutros  6.69 (H)  1.78 - 5.38 K/L

 

 # Lymphs  1.71  1.32 - 3.57 K/L

 

 # Monos  1.26 (H)  0.30 - 0.82 K/L

 

 # Eos  0.68 (H)  0.04 - 0.54 K/L

 

 # Baso  0.12 (H)  0.01 - 0.08 K/L

 

 Immature Granulocytes-Relative  2 (H)  0 - 1 %









 Specimen  Performing Laboratory

 

 Blood  [address] [address]



CBC with platelet count + automated diff (2018  4:58 AM)Only the most 
recent of16 resultswithin the time period is included.





 Specimen  Performing Laboratory

 

 Blood  









 Narrative

 

 The following orders were created for panel order CBC with platelet count + 
automated



 diff.







 Procedure



 Abnormality Status 







 ---------



 ----------- ------ 







 CBC with platelet count ...[[id number]]AbnormalFinal



 result 







  







 Please view results for these tests on the individual orders.



Phosphorus (2018  4:58 AM)Only the most recent of18 resultswithin the 
time period is included.





 Component  Value  Ref Range

 

 Phosphorus  3.8  2.3 - 4.7 mg/dL









 Specimen  Performing Laboratory

 

 Blood  [address] [address]



Magnesium (2018  4:58 AM)Only the most recent of18 resultswithin the time 
period is included.





 Component  Value  Ref Range

 

 Magnesium  1.7  1.6 - 2.6 mg/dL









 Specimen  Performing Laboratory

 

 Blood  [address] [address]



Basic Metabolic Panel (2018  4:58 AM)Only the most recent of9 
resultswithin the time period is included.





 Component  Value  Ref Range

 

 Sodium  132 (L)  136 - 145 meq/L

 

 Potassium  4.4  3.5 - 5.1 meq/L

 

 Chloride  104  98 - 107 meq/L

 

 CO2  18 (L)  22 - 29 meq/L

 

 BUN  44 (H)  7 - 21 mg/dL

 

 Creatinine  1.90 (H)  0.57 - 1.25 mg/dL

 

 Glucose  204 (H)  70 - 105 mg/dL

 

 Calcium  8.8  8.4 - 10.2 mg/dL

 

 EGFR  34Comment: ESTIMATED GFR IS NOT AS ACCURATE AS  mL/min/1.73 sq m



   CREATININE CLEARANCE IN PREDICTING GLOMERULAR FILTRATION  



   RATE. ESTIMATED GFR IS NOT APPLICABLE FOR DIALYSIS  



   PATIENTS.  









 Specimen  Performing Laboratory

 

 Blood  [address] [address]



Urinalysis w/Microscopic (07/10/2018 10:53 PM)Only the most recent of2 
resultswithin the time period is included.





 Component  Value  Ref Range

 

 Color, UA  Light Yellow  

 

 Clarity, UA  Clear  

 

 Specific Gravity, UA  1.007  1.001 - 1.035

 

 pH, UA  6.5  5.0 - 8.0

 

 Protein, UA  600 mg/dL (A)  Negative

 

 Glucose, UA  500 mg/dL (A)  Negative

 

 Ketones, UA  Negative  Negative

 

 Bilirubin, UA  Negative  Negative

 

 Blood, UA  Small (A)  Negative

 

 Nitrite, UA  Negative  Negative

 

 Leukocytes, UA  Negative  Negative

 

 Urobilinogen, UA  0.2  0.2 - 1.0 mg/dL

 

 RBC, UA  3  /HPF

 

 WBC, UA  2  /HPF

 

 Squam Epithel, UA  <1  /HPF

 

 Hyaline Casts, UA  2  /LPF

 

 Amorphous Crystals  Rare  

 

 Specimen Source  Urine, Clean Catch  









 Specimen  Performing Laboratory

 

 Urine - Urine, Clean Catch  [address] [address]



Manual Differential (2018  5:31 AM)Only the most recent of5 resultswithin 
the time period is included.





 Component  Value  Ref Range

 

 % Neutros  83  %

 

 % Lymphs  8  %

 

 % Monos  4  %

 

 % Eos  4  %

 

 % Baso  1  %

 

 # Neutros  10.46 (H)  1.78 - 5.38 K/ul

 

 # Lymphs  1.01 (L)  1.32 - 3.57 K/ul

 

 # Monos  0.50  0.30 - 0.82 K/uL

 

 # Eos  0.50  0.04 - 0.54 K/uL

 

 # Baso  0.13 (H)  0.01 - 0.08 K/uL

 

 Total Counted  100  

 

 nRBC (manual)  1 (H)  0 - 0 /100 WBC

 

 WBC Morphology  Normal  

 

 Platelet Morphology  Normal  

 

 Anisocytosis  1+ few  

 

 Microcytes  1+ few  

 

 Artifact  Present  

 

 Platelet Conc  Adequate  









 Specimen  Performing Laboratory

 

 Blood  [address] [address]









 Narrative

 

 Received comment: 







 User comments: 







 Slide comments:



Calcium, Ionized (2018  6:44 AM)Only the most recent of12 resultswithin 
the time period is included.





 Component  Value  Ref Range

 

 Calcium, Ion  1.08 (L)  1.12 - 1.27 mmol/L

 

 pH, Blood  7.32  









 Specimen  Performing Laboratory

 

 Blood  [address] [address]



Comprehensive metabolic panel (2018  6:44 AM)Only the most recent of10 
resultswithin the time period is included.





 Component  Value  Ref Range

 

 Protein, Total  6.2  6.0 - 8.3 gm/dL

 

 Albumin  2.9 (L)  3.5 - 5.0 g/dL

 

 Alkaline Phosphatase  314 (H)  40 - 150 U/L

 

 Total Bilirubin  0.3  0.2 - 1.2 mg/dL

 

 Sodium  132 (L)  136 - 145 meq/L

 

 Potassium  4.2  3.5 - 5.1 meq/L

 

 Chloride  102  98 - 107 meq/L

 

 CO2  19 (L)  22 - 29 meq/L

 

 BUN  44 (H)  7 - 21 mg/dL

 

 Creatinine  2.15 (H)  0.57 - 1.25 mg/dL

 

 Glucose  219 (H)  70 - 105 mg/dL

 

 Calcium  8.7  8.4 - 10.2 mg/dL

 

 AST  23  5 - 34 U/L

 

 ALT  29  6 - 55 U/L

 

 EGFR  30Comment: ESTIMATED GFR IS NOT AS ACCURATE AS  mL/min/1.73 sq m



   CREATININE CLEARANCE IN PREDICTING GLOMERULAR  



   FILTRATION RATE. ESTIMATED GFR IS NOT  



   APPLICABLE FOR DIALYSIS PATIENTS.  









 Specimen  Performing Laboratory

 

 Blood  [address] [address]



Hemoglobin and hematocrit (2018  5:20 PM)





 Component  Value  Ref Range

 

 Hemoglobin  9.0 (L)  13.7 - 17.5 GM/DL

 

 Hematocrit  27.6 (L)  40.1 - 51.0 %









 Specimen  Performing Laboratory

 

 Blood - Central Venous Line  [address] [address]









 Narrative

 

 Call [phone]



Tissue Exam (2018 11:15 AM)





 Component  Value  Ref Range

 

 Case Report  Surgical Pathology Report
 Case: [id number]
  



   Authorizing Provider:Guillermo Forte, Collected:
 2018 Casandra CLARK

  



   Ordering Location: [address] Received:
2018 1115  



    Service

  



   Pathologist: Joana Zepeda MD

  



   Specimen:Kidney, NATIVE KIDNEY

  



     

 

 DIAGNOSIS  KIDNEY, LEFT, NEEDLE BIOPSIES  



   - ADVANCED DIABETIC GLOMERULOSCLEROSIS  



   - NEGATIVE FOR IMMUNE MEDIATED GLOMERULONEPHRITIS  



   - DIFFUSE INTERSTITIAL FIBROSIS AND TUBULAR ATROPHY (~70%)  



   - MODERATE TO SEVERE ARTERIAL INTIMAL SCLEROSIS  



   - DIFFUSE ARTERIOLAR HYALINOSIS  



   - SEE COMMENT  



   Signing Pathologist Direct Phone Line: [phone]  

 

 COMMENT  No immune mediated glomerulosclerosis is seen based on negative 
immunofluorescence and absence of electron dense deposits on electron 
microscopy.  



   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 
advanced diabetic glomerulosclerosis, class IV (see ref #1) characterized by 
nodular lesions and global glomerulosclerosis inv  



   olving 56% (15/27) of all examined glomeruli, along with marked interstitial 
fibrosis and tubular atrophy involving about 70% of renal parenchyma.  



     



   Reference:  



   1. MALATHI Garcia., Tosha, A.L., KIMBERLI Collins., JH Cardona., SHARRI Duarte., 
MAURICIO Lopez., ANA Dye., JOSE Schwarz., WILLA Valencia, de LEIGHTON Garber., et al. 
Pathologic classification of diabetic nephropathy. J Am Soc Nephrol 21:556-563, 
2010.  



     



     



   The results were communicated to Dr. Caldwell by Dr. Zepeda on 2018.  

 

 CPT Code(s)  63225, 88313 x3, 12087, 88350 x7, 91995  



     

 

 CLINICAL HISTORY  History as communicated by Dr. Caldwell: 83 year old  



   with long standing history of DM, now presents with  



   increase in serum creatinine and 3g proteinuria.  



   All serologies negative  

 

 SPECIMEN SOURCE  Native kidney biopsy  

 

 GROSS DESCRIPTION  The specimen is received in three parts all labeled  



   with the patient's information and labeled "native  



   kidney biopsy".  Received in formalin are two  



   tan-white core biopsies measuring 0.6 and 1.7 cm in  



   length. The specimen is entirely submitted A1.  



   Received fresh is a 1 cm tan core submitted for  



   frozen for immunofluorescence staining and received  



   in glutaraldehyde is a 0.3 cm tan core submitted  



   for electron microscopy. CG/pl  

 

 MICROSCOPIC DESCRIPTION  LIGHT MICROSCOPY:  



   Sections show two cores of tissue composed of cortex 50% and medulla 50%.
  



   Glomeruli: Approximately 19 glomeruli are examined of which 10 glomeruli are 
globally sclerotic/obsolescent or show near complete obsolescence. The 
capillary tuft in some sclerotic glomeruli is wrinkled  



    and shrunken. The remaining non-globally sclerotic glomeruli are enlarged 
and have nodular mesangial expansion by PAS- and silver-positive matrix with 
normal to mild segmental hypercellularity. There i  



   s mild wrinkling of capillary tuft. Focal and segmental sclerosis is seen.
No endocapillary hypercellularity, crescents or thrombi are seen.  



   Tubules and interstitium: There is severe interstitial fibrosis with focal 
tubular atrophy and mild interstitial inflammatory cell infiltration by 
lymphocytes admixed with eosinophils and neutrophils, i  



   nvolving about 70% of renal cortex. Non-atrophic proximal tubules are 
focally ectatic with loss of brush borders.  



   Vessels: Interlobular arteries show moderate intimal sclerosis and 
thickening. There is severe diffuse arteriolosclerosis.  



     



   Congo red stain is negative for amyloid deposition.  



     



   Special stains:Alan trichrome, PAS and Saldana silver stains were 
necessary for evaluation of this biopsy and showed expected staining patterns 
of internal control tissue matrix structures.  



     



   Direct Immunofluorescence:  



   Histology: H&E-stained sections show 2 non-obsolescent glomeruli and 2 
obsolescent glomeruli.  



     



   Immunofluorescence findings:  



   IgA: negative in open glomeruli, focal weak granular mesangial staining in 
sclerotic glomerulus, tubular casts are positive  



   IgG: no significant glomerular, tubulointerstitial or vascular staining.  



   IgM: negative in glomeruli  



   C3: negative in glomeruli,Macario's capsule +.  



   C1q: no significant glomerular, tubulointerstitial or vascular staining.  



   Fibrinogen: no significant glomerular, tubulointerstitial or vascular 
staining.  



   Kappa: negative in open glomeruli, focal weak granular mesangial 
staining in sclerotic glomerulus, tubular casts are positive  



   Lambda:negative in open glomeruli, focal weak granular mesangial 
staining in sclerotic glomerulus, tubular casts are positive  



     



   All polyclonal antibodies used for immunofluorescence staining have been 
previously tested and shown to have appropriate reactivities with positive 
control specimens.  



     



   ELECTRON MICROSCOPY  



   Toluidine blue-stained sections reveals one non-obsolescent glomerulus and 3 
globally sclerotic glomeruli. One open and 2 globally sclerotic glomeruli are 
examined ultrastructurally.  



   Ultrastructure:Examination of the glomerular ultrastructure reveals that 
the glomerular basement membrane shows wrinkling and is variably thickened, 
focally measuring upto 1163 nm (normal male avera  



   ta=565 - 430 nm nm; Annie CROUCH Arch Pathol Lab Med 133; 224-232). Subendothelial
, subepithelial, and mesangial/paramesangial electron-dense, immune complex-
type deposits are not present. Podocyte foot proc  



   esses are diffusely effaced with microvillous change.  



     



     



     









 Specimen  Performing Laboratory

 

 Tissue - Kidney  [address] renal biopsy (2018 10:40 AM)





 Specimen  Performing Laboratory

 

   [facility]









 Narrative

 

 FINAL REPORT







  







 PATIENT ID: [id number]







  







  







 Ultrasound guided core biopsy of the left native kidney dated 2018







  







 Procedure: Core biopsy of the left native kidney.







  







 Clinical Indication: Proteinuria







  







 Sedation: Moderate sedation was administered. 0.5 mg of Versed and 25 







 mcg fentanyl IV was used for moderate sedation monitored under my 







 direction. Total intraservice time of the sedation was 50 minutes. 







 The patient's vital signs were monitored throughout the procedure and 







 recorded in the patient's medical record by the nurse.







  







 Anesthesia: 1% Xylocaine mixed with sodium bicarbonate local 







 anesthesia.







  







 Technique: After obtained informed consent, ultrasound guided core 







 biopsy of the left native kidney was performed under usual sterile 







 technique. Using an 18 gauge core biopsy needle, puncture was made 







 posteriorly on the left with real time ultrasound guidance. 2 passes 







 were performed withsufficient material for histology. Patient 







 tolerated procedure well.







  







 Complication: None







  







 Estimated Blood Loss: None







  







 The specimen was sent to pathology.







  







 Impression: Successful ultrasound-guided core biopsy of the left 







 native kidney. 







  







 Signed: Demetris Costa MD







 Report Verified Date/Time:2018 10:42:49







  







 Reading Location: [address] Ultrasound Reading Room







 Electronically signed by: DEMETRIS COSTA M.D. on 2018 10:42 AM







 









 Procedure Note

 

 Interface, External Ris In - 2018 12:12 PM CDT



FINAL REPORT



 



 PATIENT ID:   [id number]



 



 



 Ultrasound guided core biopsy of the left native kidney dated 2018



 



 Procedure: Core biopsy of the left native kidney.



 



 Clinical Indication: Proteinuria



 



 Sedation: Moderate sedation was administered. 0.5 mg of Versed and 25



 mcg fentanyl IV was used for moderate sedation monitored under my



 direction. Total intraservice time of the sedation was 50 minutes.



 The patient's vital signs were monitored throughout the procedure and



 recorded in the patient's medical record by the nurse.



 



 Anesthesia: 1% Xylocaine mixed with sodium bicarbonate local



 anesthesia.



 



 Technique: After obtained informed consent, ultrasound guided core



 biopsy of the left native kidney was performed under usual sterile



 technique. Using an 18 gauge core biopsy needle, puncture was made



 posteriorly on the left with real time ultrasound guidance. 2 passes



 were performed with  sufficient material for histology. Patient



 tolerated procedure well.



 



 Complication: None



 



 Estimated Blood Loss: None



 



 The specimen was sent to pathology.



 



 Impression: Successful ultrasound-guided core biopsy of the left



 native kidney.



 



 Signed: Demetris Costa MD



 Report Verified Date/Time:  2018 10:42:49



 



 Reading Location: [address] Ultrasound Reading Room



       Electronically signed by: DEMETRIS COSTA M.D. on 2018 10:42 AM



 



XR chest 1 view portable / bedside (2018  8:38 AM)Only the most recent 
of2 resultswithin the time period is included.





 Specimen  Performing Laboratory

 

   [facility]









 Narrative

 

 FINAL REPORT







  







 PATIENT ID: [id number]







  







 CLINICAL HISTORY: edema 







  







 TECHNIQUE: 1 view of the chest.







  







 COMPARISON: 2018







  







 IMPRESSION:







  







 The right central line remains in the SVC. Mild bilateral airspace 







 opacities have decreased. Trace bilateral pleural effusions remain. 







 The cardiomediastinal silhouette is magnified by technique. 







  







 Signed: Favian Weiner MD







 Report Verified Date/Time:2018 08:40:13







  







 Reading Location: [facility] Radiology Reading Room







 Electronically signed by: FAVIAN WEINER M.D. on 2018 08:40 
AM







 









 Procedure Note

 

 Interface, External Ris In - 2018  8:42 AM CDT



FINAL REPORT



 



 PATIENT ID:   [id number]



 



 CLINICAL HISTORY: edema



 



 TECHNIQUE: 1 view of the chest.



 



 COMPARISON: 2018



 



 IMPRESSION:



 



 The right central line remains in the SVC. Mild bilateral airspace



 opacities have decreased. Trace bilateral pleural effusions remain.



 The cardiomediastinal silhouette is magnified by technique.



 



 Signed: Favian Weiner MD



 Report Verified Date/Time:  2018 08:40:13



 



 Reading Location: [facility] Radiology Reading Room



       Electronically signed by: FAVIAN WEINER M.D. on 2018 08:40 AM



 



B-type Natriuretic Factor (BNP) (2018  6:01 AM)Only the most recent of3 
resultswithin the time period is included.





 Component  Value  Ref Range

 

 BNP  109 (H)  0 - 100 pg/mL









 Specimen  Performing Laboratory

 

 Blood - Central Venous Line  [address] [address]



Prothrombin time/INR (2018  5:46 AM)Only the most recent of5 
resultswithin the time period is included.





 Component  Value  Ref Range

 

 Protime  15.1 (H)  11.7 - 14.7 seconds

 

 INR  1.2  <=5.9









 Specimen  Performing Laboratory

 

 Blood - Central Venous Line  [address] [address]









 Narrative

 

  







 RECOMMENDED COUMADIN/WARFARIN INR THERAPY RANGES







 STANDARD DOSE: 2.0 - 3.0 Includes: PROPHYLAXIS for venous thrombosis, 
systemic



 embolization; TREATMENT for venous thrombosis and/or pulmonary embolus.







 HIGH RISK: Target INR is 2.5-3.5 for patients with mechanical heart valves.



Rapid drug screen, urine (2018  6:33 PM)





 Component  Value  Ref Range

 

 Barbiturate Screen  Negative  Negative

 

 Benzodiazepine Screen  Negative  Negative

 

 Cocaine (Metab.) Screen  Negative  Negative

 

 Methadone Screen  Negative  Negative

 

 Opiate Screen  Negative  Negative

 

 Cannabinoid Screen  Negative  Negative

 

 Amph/Methamph Screen  Negative  Negative

 

 Phencyclidine Screen  Negative  Negative

 

 Oxycodone Screen  Negative  Negative









 Specimen  Performing Laboratory

 

 Urine  [address] [address]









 Narrative

 

  







 DRUGCUTOFF CONC.







 Cocaine 300 ng/mL 







 Rgcanrpvlfu28 ng/mL 







 Czotunvdnpwbwj063 ng/mL







 Barbiturate 200 ng/mL







 Tkylxhujegptr01 ng/mL







 Onyxwy423 ng/mL







 Methadone 300 ng/mL







 Amphetamine/ 1000 ng/mL







 Methamphetamine







 Oxycodone 300 ng/mL







  







 This assay provides an unconfirmed qualitative test result for the clinical



 management of patients in emergency situations. Chain of custody not 
maintained. Some



 over-the-counter medications, as well as adulterants, may cause inaccurate 
results.



 Clinical correlation should be applied. A more comprehensive drug screen or



 confirmation of a detected drug may be performed upon request.



Urine Immunofixation, 24 hour (2018 11:19 PM)





 Component  Value  Ref Range

 

 Volume, Urine  1700  ml

 

 Protein, Urine  176 (H)  0 - 14 mg/dL

 

 Protein, 24hr Urine  2992 (H)  0 - 300 mg/24hr

 

 Albumin, 24hr Urine  30.3  %

 

 Globulin, 24hr Urine  69.7  %

 

 Urine MOE ID  No monoclonal proteins or monoclonal free light  



   chains detected.  

 

 Pathologist:  Jocelynn Gonzalez MD (electronic signature)  









 Specimen  Performing Laboratory

 

 Urine - Urine, Voided  [address] [address]



Urine Protein Electrophoresis, 24 hour (2018 11:19 PM)





 Component  Value  Ref Range

 

 Protein, 24hr Urine  2992 (H)  0 - 300 mg/24hr

 

 Volume, Urine  1700  ml

 

 Albumin, 24hr Urine  30.3  %

 

 Globulin, 24hr Urine  69.7  %

 

 UPEP, ID  Spillage of large amounts of globulin fractions  



   may mask underlying monoclonal proteins; urine  



   MOE ordered and results pending.  

 

 Protein, Urine  176 (H)  0 - 14 mg/dL

 

 Pathologist:  Jocelynn Gonzalez MD (electronic signature)  









 Specimen  Performing Laboratory

 

 Urine - Urine, Voided  [address] [address]



Protein, 24 hour urine (2018 11:19 PM)





 Component  Value  Ref Range

 

 Protein, 24hr Urine  2992 (H)  0 - 300 mg/24hr

 

 Volume, Urine  1700  ml

 

 Protein, Urine  176 (H)  0 - 14 mg/dL









 Specimen  Performing Laboratory

 

 Urine - Urine, Voided  [address] [address]



Urinalysis w/Microscopic + Reflex to Culture (2018  7:08 PM)Only the most 
recent of2 resultswithin the time period is included.





 Component  Value  Ref Range

 

 Color, UA  Light Yellow  

 

 Clarity, UA  Clear  

 

 Specific Gravity, UA  1.005  1.001 - 1.035

 

 pH, UA  8.5 (H)  5.0 - 8.0

 

 Protein, UA  200 mg/dL (A)  Negative

 

 Glucose, UA  500 mg/dL (A)  Negative

 

 Ketones, UA  Negative  Negative

 

 Bilirubin, UA  Negative  Negative

 

 Blood, UA  Trace (A)  Negative

 

 Nitrite, UA  Negative  Negative

 

 Leukocytes, UA  Negative  Negative

 

 Urobilinogen, UA  0.2  0.2 - 1.0 mg/dL

 

 RBC, UA  0  /HPF

 

 WBC, UA  1  /HPF

 

 Bacteria, UA  Rare  

 

 Squam Epithel, UA  <1  /HPF

 

 Specimen Source    









 Specimen  Performing Laboratory

 

 Urine - Urine, Voided  [address] [address]



Hemoglobin A1c (2018  4:08 AM)Only the most recent of2 resultswithin the 
time period is included.





 Component  Value  Ref Range

 

 Hemoglobin A1C  5.9  4.3 - 6.1 %









 Specimen  Performing Laboratory

 

 Blood - Central Venous Line  [address] [address]



NM myocardial perfusion PET (rest and stress) (2018 12:03 PM)





 Specimen  Performing Laboratory

 

   GE [facility]









 Narrative

 

 FINAL REPORT







  







 PATIENT ID: [id number]







  







 PROCEDURE:Rest/Stress MYOCARDIAL PERFUSION PET with 







 regadenoson\XA9\







  







 CPT CODE:24038







  







 INDICATION:Chest pain, risk factors for CAD







  







 HISTORY:Cardiac risk factors: Diabetes, hypertension, 







 tobacco use. Other cardiovascular history: No reported CAD. Recent 







 cardiac symptoms: Chest pain, dyspnea. Current cardiovascular-related 







 medications: Metoprolol.







  







 PROTOCOL:Limited low-dose CT imaging was performed for 







 attenuation correction. 40.0 mCi of Rb-82 chloride was injected iv at 







 rest, and gated PET (positron emission tomography) images were 







 obtained. Subsequently, 40.1 mCi of Rb-82 chloride was injected iv at 







 expected peak pharmacologic effect, and gated PET images were 







 obtained. 







  







 PRELIMINARY STRESS TEST DATA FROM NONINVASIVE CARDIOLOGY:







 Pharmacologic stress was by 10-second iv infusion of 0.4 mg of 







 regadenoson. Radiotracer was injected 30 seconds after start of 







 stress. Heart rate was 70 beats/min at rest and 85 beats/min (61% of 







 MPHR) at tracer injection. BP was 90/58 mmHg at rest and 105/52 mmHg 







 at tracer injection. Stress was stopped for predetermined endpoint. 







 The patient experienced abdominal discomfort; treatment was not 







 required. Preliminary ECG evaluation revealed normal sinus rhythm, 







 nonspecific interventricular conduction delay at rest and no ischemic 







 changes with stress. (Final ECG interpretation and other stress and 







 monitoring data are reported separately by Cardiology.) 







  







 IMAGING FINDINGS:Study quality is good. Images obtained after 







 stress injection show decreased tracer uptake in the apex. Resting 







 images show mostly improved tracer uptake in the apex. LV volume 







 appears normal. RV volume appears normal. Gated images obtained 







 immediately after stress show normal LV wall motion. Gated images 







 obtained at rest show normal LV wall motion. LVEF at rest is 58%. 







 LVEF at stress is 64%.







  







 IMPRESSION: 1. Abnormal study.2. Appropriate pharmacologic 







 stress.3. Abnormal myocardial perfusion.There is a mild severity, 







 medium size, mildly reversible, perfusion defect in all the apical 







 segments of the LV.4. Normal resting LV function. No deterioration 







 of function is noted with pharmacologic stress.5. Extracardiac 







 tracer distribution is normal.6. No previous [facility] study for 







 comparison.







  







 NONINVASIVE RISK STRATIFICATION: 







 The above findings are considered intermediate risk (1% to 3% annual 







 mortality rate) based on the following criterion: 







 - Mild/moderate resting left ventricular dysfunction (LVEF 35% to 49%)







 - Stress-induced moderate perfusion defect without LV dilation or 







 increased







 lung intake (thallium-201)







 (JACC. 2012;59(9):521-16.)







  







 Signed: Felton Erickson MD







 Report Verified Date/Time:2018 14:59:03







  







 Reading Location: [address] Reading Room







 Electronically signed by: FELTON ERICKSON MD on 2018 02:59 
PM







 









 Procedure Note

 

 Interface, External Ris In - 2018  3:01 PM CDT



FINAL REPORT



 



 PATIENT ID:   [id number]



 



 PROCEDURE:      Rest/Stress MYOCARDIAL PERFUSION PET with



 regadenoson\XA9\



 



 CPT CODE:          00246



 



 INDICATION:      Chest pain, risk factors for CAD



 



 HISTORY:          Cardiac risk factors: Diabetes, hypertension,



 tobacco use. Other cardiovascular history: No reported CAD. Recent



 cardiac symptoms: Chest pain, dyspnea. Current cardiovascular-related



 medications: Metoprolol.



 



 PROTOCOL:      Limited low-dose CT imaging was performed for



 attenuation correction. 40.0 mCi of Rb-82 chloride was injected iv at



 rest, and gated PET (positron emission tomography) images were



 obtained. Subsequently, 40.1 mCi of Rb-82 chloride was injected iv at



 expected peak pharmacologic effect, and gated PET images were



 obtained.



 



 PRELIMINARY STRESS TEST DATA FROM NONINVASIVE CARDIOLOGY:



 Pharmacologic stress was by 10-second iv infusion of 0.4 mg of



 regadenoson. Radiotracer was injected 30 seconds after start of



 stress. Heart rate was 70 beats/min at rest and 85 beats/min (61% of



 MPHR) at tracer injection. BP was 90/58 mmHg at rest and 105/52 mmHg



 at tracer injection. Stress was stopped for predetermined endpoint.



 The patient experienced abdominal discomfort; treatment was not



 required. Preliminary ECG evaluation revealed normal sinus rhythm,



 nonspecific interventricular conduction delay at rest and no ischemic



 changes with stress. (Final ECG interpretation and other stress and



 monitoring data are reported separately by Cardiology.)



 



 IMAGING FINDINGS:        Study quality is good. Images obtained after



 stress injection show decreased tracer uptake in the apex. Resting



 images show mostly improved tracer uptake in the apex. LV volume



 appears normal. RV volume appears normal. Gated images obtained



 immediately after stress show normal LV wall motion. Gated images



 obtained at rest show normal LV wall motion. LVEF at rest is 58%.



 LVEF at stress is 64%.



 



 IMPRESSION:   1. Abnormal study.  2. Appropriate pharmacologic



 stress.  3. Abnormal myocardial perfusion.  There is a mild severity,



 medium size, mildly reversible, perfusion defect in all the apical



 segments of the LV.  4. Normal resting LV function. No deterioration



 of function is noted with pharmacologic stress.  5. Extracardiac



 tracer distribution is normal.  6. No previous [facility] study for



 comparison.



 



 NONINVASIVE RISK STRATIFICATION:



 The above findings are considered intermediate risk (1% to 3% annual



 mortality rate) based on the following criterion:



 - Mild/moderate resting left ventricular dysfunction (LVEF 35% to 49%)



 - Stress-induced moderate perfusion defect without LV dilation or



 increased



 lung intake (thallium-201)



 (JACC. 2012;59(9):857-33.)



 



 Signed: Felton Erickson MD



 Report Verified Date/Time:  2018 14:59:03



 



 Reading Location: [address] [facility] Reading Room



     Electronically signed by: FELTON ERICKSON MD on 2018 02:59 PM



 



Treadmill tolerance(Non-Nuclear Treadmill) (2018 11:58 AM)





 Specimen  Performing Laboratory

 

   GE [facility]









 Narrative

 

 Protocol Name Regadenoson 







 Time In Exercise Phase 00:01:00 







 Max. Systolic  mmHg







 Max Diastolic BP 52 mmHg







 Max Heart Rate 85 BPM







 Max Predicted Heart Rate 138 BPM







 Reason For Termination Predetermined end point 







 Reason for Test Chest Pain 







 Target HR Formula (220 - Age)*100% 







 Arrhythmias ventricular premature beats-isolated 







 Resting ECG Normal sinus rhythm 







 Nonspecific Intraventricular Conduction Delay:Pos







 ST Changes No Significant Changes 







 Overall Impression Indeterminate due to pharmacological stress 







 Chest Pain CHEST TIGHTNESS 







 HR Response To Exercise







 BP Response To Exercise







  







 metoprolol







 Interpretation:indeterminate gxt/mibi to follow







 Confirmed by fellow Alexy Haile ([id number]) on 2018 1:18:29 PM







 Confirmed by MD AGRAWAL MAJID ([id number]) on 2018 3:56:57 PM









 Procedure Note

 

 Interface, External Ris In - 2018  3:57 PM CDT



Protocol Name Regadenoson



 Time In Exercise Phase 00:01:00



 Max. Systolic  mmHg



 Max Diastolic BP 52 mmHg



 Max Heart Rate 85 BPM



 Max Predicted Heart Rate 138 BPM



 Reason For Termination Predetermined end point



 Reason for Test Chest Pain



 Target HR Formula (220 - Age)*100%



 Arrhythmias ventricular premature beats-isolated



 Resting ECG Normal sinus rhythm



 Nonspecific Intraventricular Conduction Delay:Pos



 ST Changes No Significant Changes



 Overall Impression Indeterminate due to pharmacological stress



 Chest Pain CHEST TIGHTNESS



 HR Response To Exercise



 BP Response To Exercise



 



 metoprolol



 Interpretation:  indeterminate gxt/mibi to follow



 Confirmed by fellow Alexy Haile ([id number]) on 2018 1:18:29 PM



 Confirmed by MD AGRAWAL MAJID ([id number]) on 2018 3:56:57 PM



Lactic acid, arterial, whole blood (2018  9:57 AM)





 Component  Value  Ref Range

 

 Lactate, Art  1.1  0.5 - 2.2 mmol/L









 Specimen  Performing Laboratory

 

 Blood, Arterial - Central [place] [address]









 Narrative

 

  







 Effective 2016: Units/Reference Range Change







 New: 0.5-2.2 mmol/LPrevious: 5-20 mg/dL



TSH/Free T4 If Indicated (2018  4:34 AM)





 Component  Value  Ref Range

 

 TSH  1.55  0.35 - 4.94 uIU/mL









 Specimen  Performing Laboratory

 

 Blood - Central Venous Line  [address] [address]



Hepatitis panel, acute (2018  4:34 AM)





 Component  Value  Ref Range

 

 Hep A IgM  Nonreactive  Nonreactive

 

 Hep B C IgM  Nonreactive  Nonreactive

 

 Hepatitis C Ab  Nonreactive  Nonreactive

 

 hepatitis B Surface Ag  Nonreactive  Nonreactive









 Specimen  Performing Laboratory

 

 Blood - Central Venous Line  [address] [address]



Vitamin D, 25-Hydroxy (2018  4:34 AM)





 Component  Value  Ref Range

 

 Vitamin D 25-Hydroxy  14.3  6.6 - 49.9 ng/mL









 Specimen  Performing Laboratory

 

 Blood - Central Venous Line  [address] [address]









 Narrative

 

  







 Effective 10/11/2017: Reference Range Change







 New: 6.6-49.9 ng/mL Previous: 13.0-47.8 ng/mL







  







 Recommended Vitamin D Target Range: 30.0-40.0 ng/mL



Complement Component C3 (2018  4:34 AM)





 Component  Value  Ref Range

 

 C3 Complement  82  82 - 193 mg/dL









 Specimen  Performing Laboratory

 

 Blood - Central Venous Line  [address] [address]



Complement Component C4 (2018  4:34 AM)





 Component  Value  Ref Range

 

 C4 Complement  23  15 - 57 mg/dL









 Specimen  Performing Laboratory

 

 Blood - Central Venous Line  [address] [address]



Uric acid (2018  4:34 AM)





 Component  Value  Ref Range

 

 Uric Acid  3.9  2.6 - 7.2 mg/dL









 Specimen  Performing Laboratory

 

 Blood - Central Venous Line  [address] [address]



PTH, intact (2018  4:34 AM)





 Component  Value  Ref Range

 

 PTH  247.5 (H)  8.5 - 72.5 pg/mL









 Specimen  Performing Laboratory

 

 Blood - Central Venous Line  [facility]







   [address] [place] [address]



Creatine Kinase (CK) (2018  4:34 AM)





 Component  Value  Ref Range

 

 Total CK  97  29 - 200 U/L









 Specimen  Performing Laboratory

 

 Blood - Central Venous Line  [address] [address]



TRANSFUSION SERVICE REPORT - SCAN (2018  6:01 PM)Venous doppler arms 
bilateral (2018  4:55 PM)





 Component  Value  Ref Range

 

 Ejection Fraction    









 Specimen  Performing Laboratory

 

   [facility] ECHO HEARTLAB MKCKESSON CPACS









 Impressions

 

 Right Impression







 1. The jugular vein is not visualized due to invasive line placement.







 2. There is no deep venous obstruction in the subclavian, axillary,







 brachial, radial or ulnar veins.







 3. There is no superficial venous obstruction in the cephalic or basilic







 veins.







 Left Impression







 1. There is no deep venous obstruction in the jugular, subclavian, axillary,







 brachial, radial or ulnar veins.







 2. There is no superficial venous obstruction in the cephalic or basilic







 veins.







  







  Conclusions







  







  Summary







  







  Venous duplex imaging and compression of the bilateral upper extremities







  was performed. The veins were adequately visualized except as noted above.







  The bilateral venous systems were patent and compressible with no evidence







  of thrombus where visualized.







  







  Signature







  







  ----------------------------------------------------------------







  Electronically signed by BRITTANY Feliciano MD,







  RPVI(Interpreting physician) on 2018 04:39 AM







  ----------------------------------------------------------------







  







 Velocities are measured in cm/s ; Diameters are measured in cm







 









 Narrative

 

 [facility] - Upper Extremities Veins







  







  Demographics







  







  Patient Name PINKY PÉREZ Date of Study 2018







  







  [id number]



 Age 83







  







  Visit Number [id number] GenderMale







  







  Accession Number [id number] Date of Birth 1935







  







  [facility] Number [id number]







  Physician







  







  SonographAntonio Prescott InterpretingBRITTANY Feliciano MD,







 [facility]




  RPVI







  







 Procedure







 Type of Study:







  







  Veins: Upper Extremities Veins, VENOUS DOPPLER ARMS, BILATERAL.







  







 Indications for Study:Edema.







  







 Patient Status:Routine.







 Study Location:Portable.







 Technical Quality:Adequate visualization.







  







 Risk Factors







 History of Disease







 +------------------+----------+--------------------------------------------+







 !Diagnosis



 !Date!Comments




 !







 +------------------+----------+--------------------------------------------+







 !History/Risk!2018!CHF, DM, MARIANNE, Leukocytosis, Muscle atrophy
, !







 !Factors:!!Morbid



 obesity!







 +------------------+----------+--------------------------------------------+







 









 Procedure Note

 

 Interface, External Ris In - 2018  4:39 AM CDT



[facility] - Upper Extremities Veins



 



  Demographics



 



  Patient Name     PINKY PÉREZ     Date of Study     2018



 



  MRN              [id number]         Age               83



 



  Visit Number     [id number]       Gender            Male



 



  Accession Number [id number]         Date of Birth     1935



 



  Adalgisa RODRIGUEZ    Room Number       6A09



  Physician



 



  Sonographer      Dayday Prescott Interpreting      BRITTANY Feliciano MD,



                   RVS              Physician         RPVI



 



 Procedure



 Type of Study:



 



  Veins: Upper Extremities Veins, VENOUS DOPPLER ARMS, BILATERAL.



 



 Indications for Study:Edema.



 



 Patient Status:Routine.



 Study Location:Portable.



 Technical Quality:Adequate visualization.



 



 Risk Factors



 History of Disease



 +------------------+----------+--------------------------------------------+



 !Diagnosis         !Date      !Comments                                    !



 +------------------+----------+--------------------------------------------+



 !History/Risk      !2018!CHF, DM, MARIANNE, Leukocytosis, Muscle atrophy, !



 !Factors:          !          !Morbid obesity                              !



 +------------------+----------+--------------------------------------------+



 



 Impressions



 Right Impression



 1. The jugular vein is not visualized due to invasive line placement.



 2. There is no deep venous obstruction in the subclavian, axillary,



 brachial, radial or ulnar veins.



 3. There is no superficial venous obstruction in the cephalic or basilic



 veins.



 Left Impression



 1. There is no deep venous obstruction in the jugular, subclavian, axillary,



 brachial, radial or ulnar veins.



 2. There is no superficial venous obstruction in the cephalic or basilic



 veins.



 



  Conclusions



 



  Summary



 



  Venous duplex imaging and compression of the bilateral upper extremities



  was performed. The veins were adequately visualized except as noted above.



  The bilateral venous systems were patent and compressible with no evidence



  of thrombus where visualized.



 



  Signature



 



  ----------------------------------------------------------------



  Electronically signed by BRITTANY Feliciano MD,



  RPVI(Interpreting physician) on 2018 04:39 AM



  ----------------------------------------------------------------



 



 Velocities are measured in cm/s ; Diameters are measured in cm



Urine Protein Electrophoresis, random (2018  4:12 PM)





 Component  Value  Ref Range

 

 Protein, Urine  101 (H)  0 - 14 mg/dL

 

 Albumin %, Urine  45.1  %

 

 Globulin %, Urine  54.9  %

 

 UPEP,ID  No monoclonal bands detected.  

 

 Pathologist:  Jocelynn Gonzalez MD (electronic signature)  









 Specimen  Performing Laboratory

 

 Urine - [place]rine, [place] [address]



ECG 12 lead (2018  4:03 PM)Only the most recent of2 resultswithin the 
time period is included.





 Specimen  Performing Laboratory

 

   GE [facility]









 Narrative

 

 Ventricular Rate 90 BPM







 Atrial Rate 90 BPM







 P-R Interval 158 ms







 QRS Duration 96 ms







 Q-T Interval 472 ms







 QTC Calculation(Bazett) 577 ms







 P Axis 53 degrees







 R Axis 14 degrees







 T Axis 64 degrees







  







 Normal sinus rhythm







 ST & T wave abnormality, consider inferior ischemia







 Prolonged QT







 Abnormal ECG







 When compared with ECG of 2018 09:59,







 Non-specific change in ST segment in Anterior leads







 T wave inversion now evident in Inferior leads







 QT has lengthened







 Confirmed by MD WILBURN JOSEPH P ([id number]) on 2018 5:07:59 PM









 Procedure Note

 

 Interface, External Ris In - 2018  5:08 PM CDT



Ventricular Rate 90 BPM



 Atrial Rate 90 BPM



 P-R Interval 158 ms



 QRS Duration 96 ms



 Q-T Interval 472 ms



 QTC Calculation(Bazett) 577 ms



 P Axis 53 degrees



 R Axis 14 degrees



 T Axis 64 degrees



 



 Normal sinus rhythm



 ST & T wave abnormality, consider inferior ischemia



 Prolonged QT



 Abnormal ECG



 When compared with ECG of 2018 09:59,



 Non-specific change in ST segment in Anterior leads



 T wave inversion now evident in Inferior leads



 QT has lengthened



 Confirmed by MD WILBURN JOSEPH P ([id number]) on 2018 5:07:59 PM



Sodium, random urine (2018  3:27 PM)





 Component  Value  Ref Range

 

 Sodium Urine  91  meq/L









 Specimen  Performing Laboratory

 

 Urine - Urine, [address] [address]









 Narrative

 

  







 Reference Range: No Normals



Protein, random urine (2018  3:27 PM)





 Component  Value  Ref Range

 

 Protein, Urine  94 (H)  0 - 14 mg/dL









 Specimen  Performing Laboratory

 

 Urine - Urine, [address] [address]



Creatinine, random urine (2018  3:27 PM)





 Component  Value  Ref Range

 

 Creatinine, Ur  <5.0  mg/dL









 Specimen  Performing Laboratory

 

 Urine - [place], [place] [address]









 Narrative

 

  







 Reference Range: No Normals



Troponin I (2018  3:24 PM)Only the most recent of5 resultswithin the time 
period is included.





 Component  Value  Ref Range

 

 Troponin I  0.06 (H)  0.00 - 0.03 ng/mL









 Specimen  Performing Laboratory

 

 [place] [address]









 Narrative

 

  







 Troponin I (TnI) levels must be interpreted in the context of the presenting 
symptoms



 and the clinical findings. Elevated TnI levels indicate myocardial damage, but 
are



 not specific for ischemic heart disease. Elevated TnI levels are seen in 
patients



 with other cardiac conditions (including myocarditis and congestive heart 
failure),



 and slight TnI elevations occur in patients with other conditions, including 
sepsis,



 renal failure, acidosis, acute neurological disease, and persistent 
tachyarrhythmia.



Protein electrophoresis, serum (2018  3:24 PM)





 Component  Value  Ref Range

 

 Albumin Fraction  2.5 (L)  3.5 - 5.5 g/dL

 

 Alpha 1 Fraction  0.3  0.2 - 0.4 g/dL

 

 Alpha 2 Fraction  0.7  0.5 - 0.9 g/dL

 

 Beta Fraction  0.8  0.6 - 1.1 g/dL

 

 Gamma Globulin Fraction  1.1  0.7 - 1.7 g/dL

 

 Interpretation  Decreased albumin with concurrent relative  



   increases in all globulin fractions. No  



   monoclonal bands detected.  

 

 Pathologist:  Jocelynn Gonzalez MD (electronic signature)  

 

 Protein, Total  5.3 (L)  6.0 - 8.3 gm/dL









 Specimen  Performing Laboratory

 

 Blood  [address] [address]



HIV-1 Antigen with HIV-1/2 Antibody (2018  3:21 PM)





 Component  Value  Ref Range

 

 HIV-1 Antigen with HIV 1&2 Antibody  Nonreactive  Nonreactive









 Specimen  Performing Laboratory

 

 Blood - Central Venous Line  [address] [address]



Glomerular basement membrane antibody (2018  3:21 PM)





 Component  Value  Ref Range

 

 GBM Ab  <1.0  <1.0 AI



   Comment:  



     



   Interpretation:  



    < 1.0 AI No Antibody Detected  



   > OR=1.0 AI Antibody Detected  









 Specimen  Performing Laboratory

 

 Blood - Central Venous Line  [facility]







   [facility]







   [address] [place] [address]









 Narrative

 

 Performing Lab







  EZ







  [facility] [address] [id number] [place] [address]







  CLAUDIA Cabrera MD, PhD, ZULAY



Rheumatoid factor Ab, reflex to titer (2018  3:21 PM)





 Component  Value  Ref Range

 

 Rheumatoid Factor  Positive  









 Specimen  Performing Laboratory

 

 Blood - Central Venous Line  [address] [address]



Anti-Neutrophil Cytoplasmic Ab (ANCA) (2018  3:21 PM)





 Component  Value  Ref Range

 

 Proteinase-3 Ab  <1.0  <1.0 AI



   Comment:  



     



   <1.0 AI No Antibody Detected  



   > or=1.0 AI Antibody Detected  



     



   Autoantibodies to proteinase-3 (MD-3) are accepted as characteristic for  



   granulomatosis with polyangiitis (GPA, Wegener's), and are detectable in 95%
  



   of the histologically proven cases. The cytoplasmic IFA pattern, (c-ANCA), 
is  



   based largely on autoantibody to MD-3 which serves as the primary antigen.  



   These autoantibodies are present in active disease.  

 

 Myeloperoxidase Ab  <1.0  <1.0 AI



   Comment:  



     



   <1.0 AI No Antibody Detected  



   > or=1.0 AI Antibody Detected  



     



   Autoantibodies to myeloperoxidase (MPO) are commonly associated with the  



   following small-vessel vasculitides: microscopic polyangiitis, polyarteritis
  



   nodosa, Churg-Salomón syndrome, necrotizing and crescentic 
glomerulonephritis  



   and occasionally granulomatosis with polyangiitis (GPA, Wegener's). The  



   perinuclear IFA pattern, (p-ANCA) is based largely on autoantibody  



   to myeloperoxidase which serves as the primary antigen. These autoantibodies
  



   are present in active disease.  









 Specimen  Performing Laboratory

 

 Blood - Central Venous Line  [facility]







   [facility]







   [address] [place] [address]









 Narrative

 

 Performing Lab







  EZ







  [facility]







  [address] [place] [address]







  CLAUDIA Cabrera MD, PhD, ZULAY



Rheumatoid factor titer (2018  3:21 PM)





 Component  Value  Ref Range

 

 Rheumatoid Factor TTR  1:8  









 Specimen  Performing Laboratory

 

 Blood - Central Venous Line  [address] [address]



Anti-Nuclear Antibody (MARSHA) (2018  3:21 PM)





 Component  Value  Ref Range

 

 MARSHA  Negative  Negative









 Specimen  Performing Laboratory

 

 Blood - Central Venous Line  [address] [address]



Lactic acid, venous, whole blood (2018  6:17 AM)Only the most recent of5 
resultswithin the time period is included.





 Component  Value  Ref Range

 

 Lactate, Venous  3.8 (H)Comment: Specimen slightly hemolyzed  0.5 - 2.2 mmol/L









 Specimen  Performing Laboratory

 

 Blood - Central Venous Line  [address] [address]









 Narrative

 

  







 Effective 2016: Units/Reference Range Change







 New: 0.5-2.2 mmol/LPrevious: 5-20 mg/dL



Potassium (2018  6:17 AM)Only the most recent of4 resultswithin the time 
period is included.





 Component  Value  Ref Range

 

 Potassium  4.6  3.5 - 5.1 meq/L









 Specimen  Performing Laboratory

 

 Blood - Central Venous Line  [address] [address]



Creatine Kinase (CK), Total and MB (2018  6:17 AM)Only the most recent 
of4 resultswithin the time period is included.





 Component  Value  Ref Range

 

 Total CK  141  29 - 200 U/L

 

 CK-MB  7.9 (H)  0.0 - 6.6 ng/mL

 

 MB Relative Index  5.6  %









 Specimen  Performing Laboratory

 

 Blood - Central Venous Line  [address] [address]









 Narrative

 

 CK-MB Reference Range:







 <6.7Normal







 6.7-10.0Borderline







 >10.0 Abnormal



ECHOCARDIOGRAM REPORT - SCAN (2018  5:14 PM)Central Line (2018  2:
43 PM)





 Narrative

 

 José Miguel Alonso ACNP 20182:43 PM







 Central Line







 Date/Time: 2018 8:00 AM







 Performed by: JOSÉ MIGUEL ALONSO







 Authorized by: JOSÉ MIGUEL ALONSO 







 Consent: The procedure was performed in an emergent situation. Verbal 







 consent obtained.







 Risks and benefits: risks, benefits and alternatives were discussed







 Consent given by: patient







 Patient understanding: patient states understanding of the procedure being 







 performed







 Patient consent: the patient's understanding of the procedure matches 







 consent given







 Procedure consent: procedure consent matches procedure scheduled







 Relevant documents: relevant documents present and verified







 Test results: test results available and properly labeled







 Site marked: the operative site was marked







 Imaging studies: imaging studies available







 Required items: required blood products, implants, devices, and special 







 equipment available







 Patient identity confirmed: verbally with patient, arm band and 







 hospital-assigned identification number







 Time out: Immediately prior to procedure a "time out" was called to verify 







 the correct patient, procedure, equipment, support staff and site/side 







 marked as required.







 Indications: vascular access







 Anesthesia: local infiltration







  







 Anesthesia:







 Local Anesthetic: lidocaine 2% without epinephrine







 Anesthetic total: 5 mL







  







 Sedation:







 Patient sedated: no







 Preparation: skin prepped with 2% chlorhexidine and skin prepped with 







 ChloraPrep







 Skin prep agent dried: skin prep agent completely dried prior to procedure







 Sterile barriers: all five maximum sterile barriers used - cap, mask, 







 sterile gown, sterile gloves, and large sterile sheet







 Hand hygiene: hand hygiene performed prior to central venous catheter 







 insertion







 Location details: right internal jugular







 Patient position: Trendelenburg







 Catheter type: triple lumen







 Catheter size: 14 Fr







 Pre-procedure: landmarks identified







 Ultrasound guidance: yes







 Sterile ultrasound techniques: sterile gel and sterile probe covers were 







 used







 Number of attempts: 1







 Successful placement: yes







 Post-procedure: line sutured and dressing applied







 Assessment: blood return through all ports,free fluid flow,placement 







 verified by x-ray and no pneumothorax on x-ray







 Patient tolerance: Patient tolerated the procedure well with no immediate 







 complications







 Immediate Post-Procedure Note







  







 Date/Time: 2018 8:00 AM







 Assistants to the procedure: None







 Pre-procedure diagnosis: MARIANNE with symptomatic hyperkalemia







 Post-procedure diagnosis: MARIANNE with symptomatic Hyperkalemia







 Procedures Performed: Central Line







 Specimens removed: None







 Estimated blood loss (mL): None







 Complications: None







 Type of anesthesia: None







 Grafts or Implants: None







  







 Comments: Wire removed from patient, all sharps accounted for and disposed 







 of properly.







  







 



2D Echo W/Doppler(CW/PW/Color) (2018  2:12 PM)





 Component  Value  Ref Range

 

 Ejection Fraction    









 Specimen  Performing Laboratory

 

   [facility] ECHO HEARTLAB MKCKESSON [facility]









 Narrative

 

 Transthoracic Echocardiography Report (TTE)







  







  Demographics







  







  Patient Name PINKY PÉREZ Date of Study 2018







  







  [id number]



 GenderMale







  







  Visit Number [id number] Race
Unknown







  







  [id number] Number [id number]







  Number







  







  Date of Birth1935 Referring Physician Jumana oMntez MD







  







  Age82 year(s) Sonographer 
Noe Rosario [facility]







  







  Ryann Lynn,Interpreting
Jamison Wilburn MD







 [facility] Physician







  







 Procedure







  







  Type of







  Study TTE procedure:2DECHO W DOPPLER(CW/PW/COLOR) (STAT)







  







 Indications:Known or suspected heart failure.







  







 Clinical History







 DIABETES, BRADYCARDIA, OBESITY







  







 Contrast Medium: Definity. Amount - 2 ml







  







 Height: 68 inches Weight: 106.59 kg (235 lbs) BSA: 2.19 m^2 BMI: 35.73







 kg/m^2







  







 HR: 91 bpm BP: 121/57 mmHg







  







  Summary







  The left ventricle is chamber size (by vol index) is normal (male - LVED







  vol - 34-74ml/m2). Normal LV wall thickness. All of the LV segments are







  hyperkinetic . Global LV systolic function hyperdynamic . LVEF by







  Petit's method of disk assessment is increased (>70%) .







  Grade 1 diastolic dysfunction (impaired relaxation and low-normal LA







  pressure).







  Estimated peak systolic PA pressure is 40-45 mmHg .







  







  Previous Study







  No prior exam available for comparison.







  







  Signature







  







  ----------------------------------------------------------------







  Electronically signed by Jamison Wilburn MD(Interpreting







  physician) on 2018 04:24 PM







  ----------------------------------------------------------------







  







  Findings







  







 Technical Quality: Technically difficult exam.







  







  Left Ventricle LV endocardium is adequately visualized with IV







 ultrasound enhancing agent.







 The left ventricle is chamber 
size



 (by vol index)







 is normal (male - LVED vol -



 34-74ml/m2).







 Normal LV wall thickness.







 All of the LV segments are



 hyperkinetic .







 Global LV systolic function



 hyperdynamic .







 LVEF by Petit's method of 
disk



 assessment is







 increased (>70%) .







 The LVEF was measured using 
Petit's



 bi-plane







 method of disk .







 High (cardiac index >4 L/min/m2
)



 cardiac output







 state at rest is noted.







 Grade 1 diastolic dysfunction



 (impaired relaxation







 and low-normal LA pressure).







  







  Left AtriumLA size is normal (16-34 ml/m2) .







  







  Right VentricleThe right ventricular chamber size and systolic







 function are within normal 
limits.







  







  Right Atrium RA cavity size is normal .







  







  Aortic Valve Normal AoV structure and function.







  







  Mitral Valve Normal MV structure and function.







  







  Tricuspid ValveTV structure is normal.







 Mild tricuspid regurgitation.







 Estimated peak systolic PA 
pressure



 is 40-45 mmHg .







  







  Pulmonic Valve Normal PV structure and function by limited 
views







 and Doppler.







  







  AortaAortic root size (SInus of Valsalva



 diameter) is







 normal .







  







  PericardiumNo pericardial effusion is visualized.







  







  IVC/SVC/PA/PV/PleuralThe estimated RA pressure by IVC dynamics 0-5mmHg .







 The inferior vena cava size is 
normal



 .







 The inferior vena cava is 
partially



 visualized.







  







 Chambers/Structures







  







  Left Atrium







  







  LA Volume: 58.11 ml LA Area: 18.55 cm^
2







  LA Vol. Index: 27 ml/m^2







  







  Left Ventricle







  







  LVIDd: 3.57 cm







  LV Septum Diastolic: 1.02 cm







  LV PW Diastolic: 1.12 cm







  LVEDV Petit's:135.21 ml







  LVESV Petit's:36.43 ml







  LVEF Petit's: 73 %LVEDVI: 62



 ml/m^2







 




  LVESVI: 17 ml/m^2







  LVOT Diameter: 2.38 cm







  







 Doppler/Quantitative Measurements







  







  Mitral Valve







  







  MV Peak E-Wave: 0.83 m/sMV Peak A-Wave: 1.05 m/s







  E/
A



 Ratio: 0.79







  
Peak



 Gradient: 2.73 mmHg







 



 Deceleration Time: 235.7 msec







  







  MV Gerson. Peak:







  







  Tissue Doppler







  







  E' Septal Velocity: 0.06 m/sE/E': 14.35







  







  Aortic Valve







  







  Peak Velocity: 1.51 m/sMean Velocity: 
1.07 m/s







  Peak Gradient: 9.18 mmHg Mean Gradient: 5.05 
mmHg







  AV Area (continuity): 3.87 cm^2







  AV VTI: 27.63 cm







  







  AV DVI: 0.87







  







  LVOT







  







  Peak Velocity: 1.26 m/s Peak Gradient: 6.39 mmHg







  Mean Velocity: 0.87 m/s Mean Gradient: 3.37 mmHg







  LVOT Diameter: 2.38 cmLVOT VTI: 24.02 cm







  LVOT Area: 4.45 cm^2LVOT SV:106.81 ml







  LVOT CO: 9.72 l/min LVOT CI: 4.44 l/min/m^2







  







  Tricuspid Valve







  







  TR Velocity: 3.05 m/s







  TR Gradient: 37.24 mmHg







 









 Procedure Note

 

 Interface, External Ris In - 2018  4:24 PM CDT



Transthoracic Echocardiography Report (TTE)



 



  Demographics



 



  Patient Name   PINKY PÉREZ     Date of Study       2018



 



  MRN            [id number]         Gender              Male



 



  Visit Number   [id number]       Race                Unknown



 



  Accession      [id number]        Room Number         6A09



  Number



 



  Date of Birth  1935       Referring Physician Jumana Montez MD



 



  Age            83 year(s)       Sonographer         Noe Rosario [facility]



 



  Analyst        Nupur Lynn,    Interpreting        Jamison Wilburn MD



                 [facility]             Physician



 



 Procedure



 



  Type of



  Study     TTE procedure:2DECHO W DOPPLER(CW/PW/COLOR) (STAT)



 



 Indications:Known or suspected heart failure.



 



 Clinical History



 DIABETES, BRADYCARDIA, OBESITY



 



 Contrast Medium: Definity. Amount - 2 ml



 



 Height: 68 inches Weight: 106.59 kg (235 lbs) BSA: 2.19 m^2 BMI: 35.73



 kg/m^2



 



 HR: 91 bpm BP: 121/57 mmHg



 



  Summary



  The left ventricle is chamber size (by vol index) is normal (male - LVED



  vol - 34-74ml/m2). Normal LV wall thickness. All of the LV segments are



  hyperkinetic . Global LV systolic function hyperdynamic . LVEF by



  Petit's method of disk assessment is increased (>70%) .



  Grade 1 diastolic dysfunction (impaired relaxation and low-normal LA



  pressure).



  Estimated peak systolic PA pressure is 40-45 mmHg .



 



  Previous Study



  No prior exam available for comparison.



 



  Signature



 



  ----------------------------------------------------------------



  Electronically signed by Jamison Wilburn MD(Interpreting



  physician) on 2018 04:24 PM



  ----------------------------------------------------------------



 



  Findings



 



 Technical Quality: Technically difficult exam.



 



  Left Ventricle         LV endocardium is adequately visualized with IV



                         ultrasound enhancing agent.



                         The left ventricle is chamber size (by vol index)



                         is normal (male - LVED vol - 34-74ml/m2).



                         Normal LV wall thickness.



                         All of the LV segments are hyperkinetic .



                         Global LV systolic function hyperdynamic .



                         LVEF by Petit's method of disk assessment is



                         increased (>70%) .



                         The LVEF was measured using Petit's bi-plane



                         method of disk .



                         High (cardiac index >4 L/min/m2) cardiac output



                         state at rest is noted.



                         Grade 1 diastolic dysfunction (impaired relaxation



                         and low-normal LA pressure).



 



  Left Atrium            LA size is normal (16-34 ml/m2) .



 



  Right Ventricle        The right ventricular chamber size and systolic



                         function are within normal limits.



 



  Right Atrium           RA cavity size is normal .



 



  Aortic Valve           Normal AoV structure and function.



 



  Mitral Valve           Normal MV structure and function.



 



  Tricuspid Valve        TV structure is normal.



                         Mild tricuspid regurgitation.



                         Estimated peak systolic PA pressure is 40-45 mmHg .



 



  Pulmonic Valve         Normal PV structure and function by limited views



                         and Doppler.



 



  Aorta                  Aortic root size (SInus of Valsalva diameter) is



                         normal .



 



  Pericardium            No pericardial effusion is visualized.



 



  IVC/SVC/PA/PV/Pleural  The estimated RA pressure by IVC dynamics 0-5mmHg .



                         The inferior vena cava size is normal .



                         The inferior vena cava is partially visualized.



 



 Chambers/Structures



 



  Left Atrium



 



  LA Volume: 58.11 ml                     LA Area: 18.55 cm^2



  LA Vol. Index: 27 ml/m^2



 



  Left Ventricle



 



  LVIDd: 3.57 cm



  LV Septum Diastolic: 1.02 cm



  LV PW Diastolic: 1.12 cm



  LVEDV Petit's:135.21 ml



  LVESV Petit's:36.43 ml



  LVEF Petit's: 73 %                        LVEDVI: 62 ml/m^2



                                              LVESVI: 17 ml/m^2



  LVOT Diameter: 2.38 cm



 



 Doppler/Quantitative Measurements



 



  Mitral Valve



 



  MV Peak E-Wave: 0.83 m/s              MV Peak A-Wave: 1.05 m/s



                                        E/A Ratio: 0.79



                                        Peak Gradient: 2.73 mmHg



                                        Deceleration Time: 235.7 msec



 



  MV Gerson. Peak:



 



  Tissue Doppler



 



  E' Septal Velocity: 0.06 m/s          E/E': 14.35



 



  Aortic Valve



 



  Peak Velocity: 1.51 m/s                  Mean Velocity: 1.07 m/s



  Peak Gradient: 9.18 mmHg                 Mean Gradient: 5.05 mmHg



  AV Area (continuity): 3.87 cm^2



  AV VTI: 27.63 cm



 



  AV DVI: 0.87



 



  LVOT



 



  Peak Velocity: 1.26 m/s             Peak Gradient: 6.39 mmHg



  Mean Velocity: 0.87 m/s             Mean Gradient: 3.37 mmHg



  LVOT Diameter: 2.38 cm              LVOT VTI: 24.02 cm



  LVOT Area: 4.45 cm^2                LVOT SV:106.81 ml



  LVOT CO: 9.72 l/min                 LVOT CI: 4.44 l/min/m^2



 



  Tricuspid Valve



 



  TR Velocity: 3.05 m/s



  TR Gradient: 37.24 mmHg



 



US renal complete (2018  1:48 PM)





 Specimen  Performing Laboratory

 

   [employer]

 

 FINAL REPORT







  







 PATIENT ID: [id number]







  







 Ultrasound of the Kidneys







  







 Clinical History:marianne







  







 Discussion:







  







 Sonographic evaluation of the kidneys is performed.







  







 Right kidney:10 x 4.7 x 4 cm, with cortical thickness of 1.1 cm.







 Normal cortical echogenicity.No mass.No shadowing calculus. No 







 hydronephrosis.







  







 Left kidney: 10.2 x 4.5 x 4.7 cm, with cortical thickness of 1.3 cm.







 Normal cortical echogenicity.No mass.No shadowing calculus.No 







 hydronephrosis.







  







 Limited doppler evaluation of right main renal artery and vein 







 demonstrate patency. Left renal vessels are poorly visualized due to 







 patient's body habitus.







  







 Bladder:Decompressed with Grimes catheter.







  







 Impression:







  







 No hydronephrosis.







  







 Signed: Rancho Rasheed MD







 Report Verified Date/Time:2018 16:16:10







  







 Reading Location: [address] Ultrasound Reading Room







 Electronically signed by: RANCHO RASHEED M.D. on 2018 04:16 
PM







 









 Procedure Note

 

 Interface, External Ris In - 2018  4:18 PM CDT



FINAL REPORT



 



 PATIENT ID:   [id number]



 



 Ultrasound of the Kidneys



 



 Clinical History:  marianne



 



 Discussion:



 



 Sonographic evaluation of the kidneys is performed.



 



 Right kidney:  10 x 4.7 x 4 cm, with cortical thickness of 1.1 cm.



 Normal cortical echogenicity.  No mass.  No shadowing calculus. No



 hydronephrosis.



 



 Left kidney: 10.2 x 4.5 x 4.7 cm, with cortical thickness of 1.3 cm.



 Normal cortical echogenicity.  No mass.  No shadowing calculus.  No



 hydronephrosis.



 



 Limited doppler evaluation of right main renal artery and vein



 demonstrate patency. Left renal vessels are poorly visualized due to



 patient's body habitus.



 



 Bladder:  Decompressed with Grimes catheter.



 



 Impression:



 



 No hydronephrosis.



 



 Signed: Rancho Rasheed MD



 Report Verified Date/Time:  2018 16:16:10



 



 Reading Location: [address] Ultrasound Reading Room



         Electronically signed by: RANCHO RASHEED M.D. on 2018 04:16 PM



 



Blood gas, venous (2018 10:38 AM)Only the most recent of2 resultswithin 
the time period is included.





 Component  Value  Ref Range

 

 pH, Sedrick  7.49 (H)  7.32 - 7.42

 

 pCO2, Sedrick  24 (L)  41 - 51 mmHg

 

 pO2, Sedrick  41 (H)  25 - 40 mmHg

 

 O2 Sat, Sedrick  86.8 (H)  40.0 - 70.0 %

 

 HCO3, Sedrick  18 (L)  21 - 29 mmol/L

 

 Base Excess, Sedrick  -4.6 (L)  -2.0 - 3.0 mmol/L

 

 Patient Temperature  34.5  C

 

 FIO2  36.0  %









 Specimen  Performing Laboratory

 

 Blood  [facility] [address] [address]



Hepatitis C antibody (2018 10:15 AM)





 Component  Value  Ref Range

 

 Hepatitis C Ab  Nonreactive  Nonreactive









 Specimen  Performing Laboratory

 

 Blood  [address] [address]



Hepatitis B core antibody, IgM (2018 10:15 AM)





 Component  Value  Ref Range

 

 Hep B C IgM  Nonreactive  Nonreactive









 Specimen  Performing Laboratory

 

 Blood  [address] [address]



Hepatitis B core antibody, total (2018 10:15 AM)





 Component  Value  Ref Range

 

 Hep B Core Total Ab  Nonreactive  Nonreactive









 Specimen  Performing Laboratory

 

 Blood  [address] [address]



Hepatitis B surface antigen (2018 10:15 AM)





 Component  Value  Ref Range

 

 hepatitis B Surface Ag  Nonreactive  Nonreactive









 Specimen  Performing Laboratory

 

 Blood  [address] [address]



Hepatic function panel (2018  8:57 AM)





 Component  Value  Ref Range

 

 Protein, Total  6.3  6.0 - 8.3 gm/dL

 

 Albumin  3.2 (L)  3.5 - 5.0 g/dL

 

 Total Bilirubin  0.4  0.2 - 1.2 mg/dL

 

 Bilirubin, Direct  0.2  0.1 - 0.5 mg/dL

 

 Alkaline Phosphatase  95  40 - 150 U/L

 

 AST  20  5 - 34 U/L

 

 ALT  21  6 - 55 U/L









 Specimen  Performing Laboratory

 

 Blood - Central Venous Line  [address] [address]









 Narrative

 

 Call [phone]



Electrolytes (2018  8:57 AM)





 Component  Value  Ref Range

 

 Sodium  133 (L)  136 - 145 meq/L

 

 Potassium  6.8 (HH)  3.5 - 5.1 meq/L

 

 Chloride  102  98 - 107 meq/L

 

 CO2  8 (LL)  22 - 29 meq/L









 Specimen  Performing Laboratory

 

 Blood - Central Venous Line  [address] [address]









 Narrative

 

 Call [phone]



Type and screen, automated (2018  5:47 AM)





 Component  Value  Ref Range

 

 ABO/RH AUTOMATED (BEAKER)  O POSITIVE  

 

 Ab Scrn  NEGATIVE  









 Specimen  Performing Laboratory

 

 [place] [address]



Blood culture (2018  5:46 AM)





 Component  Value  Ref Range

 

 Result  No growth in 5 days  









 Specimen  Performing Laboratory

 

 Blood - Central Venous Line  [facility] [address] [address]



after 2017

## 2018-09-05 NOTE — P.HP
Certification for Inpatient


Patient admitted to: Observation


With expected LOS: <2 Midnights


Patient will require the following post-hospital care: Home Health Services


Practitioner: I am a practitioner with admitting privileges, knowledge of 

patient current condition, hospital course, and medical plan of care.


Services: Services provided to patient in accordance with Admission 

requirements found in Title 42 Section 412.3 of the Code of Federal Regulations





Patient History


Date of Service: 09/05/18


[facility]rology-Dr. Rosas


Reason for admission: Altered mental status


History of Present Illness: 





83-year-old  male present emergency room with altered mental status.  

Patient found to be hypoglycemic.  Blood sugar was as low as 30. Patient was 

given glucagon in the emergency room.  This improved.  His mentation also 

improved.  Patient recently started dialysis about 2 weeks ago.  Patient with 

multiple medical problems including hypertension, diabetes, hyperlipidemia.  

Family reports that he still takes medications for diabetes including metformin.





The ER patient was evaluated.  Blood sugar was 49.  Blood sugars have improved.

  Sodium 130, potassium 5.1, GFR 13. White count 11. Hemoglobin 9.9.  BNP 

elevated.  Chest x-ray unremarkable.  Patient also had hypothermia.  Patient to 

begin bear hugger.





When I saw the patient the ER, he appeared comfortable.  Patient seemed to be 

more appropriate.  Patient was able follow commands.  Family at bedside.  

Family not aware of what medications he is currently on.


Allergies





adhesive tape Adverse Reaction (Intermediate, Verified 02/09/17 15:25)


 Rash





Home medications list reviewed: Yes


Home Medications: 








Metoprolol Tartrate [Lopressor*] 12.5 mg PO BID 12/08/15 


Multivitamin [Multivitamins] 1 each PO DAILY 02/10/16 


Furosemide [Lasix] 40 mg PO Q6HR 02/09/17 


Gabapentin [Gralise] 300 mg PO BEDTIME 02/09/17 


Amlodipine [Norvasc*] 10 mg PO DAILY #30 tab 02/11/17 


Aspirin 325 mg PO DAILY 07/30/18 


Etodolac 300 mg PO BID PRN 07/30/18 


Lisinopril 40 mg PO DAILY 07/30/18 


Metformin HCl 1,000 mg PO BID 07/30/18 


Triamcinolone 0.1% Crm [Kenalog 0.1% Cream*] 1 puja TOP BID 07/30/18 








- Past Medical/Surgical History


Diabetic: Yes


-: Hyperlipidemia


-: HTN


-: Diabetes mellitus type 2


-: End-stage renal disease on dialysis


-: History of myasthenia gravis


-: Peripheral neuropathy


-: Cataracts


-: Crow cataracts


-: broken leg 2006


-: finger surgery


-: prostate surgery


Psychosocial/ Personal History: Patient is .  He has 3 children.





- Family History


  ** Father


-: Heart disease





  ** Mother


-: Hypertension





  ** Sister


-: Hypertension, Diabetes, Cancer, Kidney disease





  ** Brother


-: Heart disease, Cancer





- Social History


Smoking Status: Never smoker


Alcohol use: No


CD- Drugs: No


Caffeine use: Yes


Place of Residence: Home





Review of Systems


General: Weakness, As per HPI


Eyes: Unremarkable


ENT: Unremarkable


Respiratory: Unremarkable


Cardiovascular: Unremarkable


Gastrointestinal: Unremarkable


Genitourinary: Unremarkable


Musculoskeletal: Unremarkable


Integumentary: Unremarkable


Neurological: As per HPI


Lymphatics: Unremarkable





Physical Examination





- Vital Signs


Temperature: 97.1 F


Blood Pressure: 106/60


Pulse: 55


Respirations: 17





- Physical Exam


General: Alert, In no apparent distress, Oriented x2, Cooperative, Other (

Patient appears slightly confused but appropriate.)


HEENT: Atraumatic, Normocephalic, Other (Dry mucous membranes noted.)


Neck: Supple


Respiratory: Clear to auscultation bilaterally, Normal air movement


Cardiovascular: Normal pulses, Regular rate/rhythm


Gastrointestinal: Normal bowel sounds, Soft and benign, Non-distended, No 

tenderness, No masses, No rebound, No guarding


Musculoskeletal: No tenderness, No warmth


Integumentary: No tenderness/swelling, No erythema, No warmth, No cyanosis, 

Other (Scab noted to the left clavicle area.)


Neurological: Normal speech, Normal strength at 5/5 x4 extr, Normal tone, 

Normal affect





- Studies


Laboratory Data (last 24 hrs)





09/05/18 09:40: WBC 11.0 H, Hgb 9.9 L, Hct 31.1 L, Plt Count 412 H


09/05/18 09:40: Sodium 130 L, Potassium 5.1, BUN 38 H, Creatinine 4.50 H, 

Glucose 49 L*, Magnesium 2.0, Total Bilirubin 0.4, AST 31, ALT 25, Alkaline 

Phosphatase 134 H, Lipase 325








Assessment and Plan





- Plan





Impression:


Altered mental status


Hypoglycemia


Hypothermia


End-stage renal disease on dialysis.


Recent hospitalization for nephrotic syndrome secondary to metformin.


Hypertension


Anemia likely of chronic disease





Plan:  Patient will be admitted.  Will need to monitor blood sugars closely.  

Will provide glucose if required.  Encourage oral intake.  Will need to discuss 

with nephrology as the patient will require dialysis.  Will provide bear hugger 

at this time.  Will need to review home medications as the patient is unaware 

of what he takes.  Will also need to address advance directives with family.  

Will need to obtain more information from Nephrology to further address.


Discharge Plan: Home


Plan to discharge in: 24 Hours





- Advance Directives


Does patient have a Living Will: No


Does patient have a Durable POA for Healthcare: No


Time Spent Managing Pts Care (In Minutes): 55

## 2018-09-05 NOTE — EDPHYS
Physician Documentation                                                                           

 [facility]                                                                

Name: Pinky Avitia                                                                                 

Age: 83 yrs                                                                                       

Sex: Male                                                                                         

: 1935                                                                                   

MRN: [id number]                                                                                   

Arrival Date: 2018                                                                          

Time: 09:19                                                                                       

Account#: [id number]                                                                            

Bed 5                                                                                             

Private MD:                                                                                       

ED Physician Nikita Leger                                                                      

HPI:                                                                                              

                                                                                             

09:44 This 83 yrs old  Male presents to ER via EMS with complaints of Low Blood Sugar.paulino 

09:44 The patient or guardian reports hypoglycemia. Onset: The symptoms/episode               pualino 

      began/occurred just prior to arrival, this morning. Associated signs and symptoms:          

      Pertinent positives: nausea. Current symptoms: In the emergency department the              

      patient's symptoms are unchanged from the initial presentation, despite home                

      interventions, despite EMS interventions. The patient has experienced similar episodes      

      in the past, a few times.                                                                   

                                                                                                  

Historical:                                                                                       

- Allergies:                                                                                      

09:50 adhesive tape-silicones;                                                                tw2 

- Home Meds:                                                                                      

09:50 amlodipine 10 mg tab 1 tab once daily [Active]; Lasix 40 mg Oral tab once daily         tw2 

      [Active]; gabapentin 300 mg Oral cap 1 cap at bedtime [Active]; Lipitor Oral [Active];      

      metformin 1,000 mg Oral tab 2 times per day [Active]; metoprolol tartrate 25 mg Oral        

      tab 0.5 tab 2 times per day [Active]; multivitamin Oral daily [Active]; Vitamin D Oral      

      400 unit daily [Active];                                                                    

- PMHx:                                                                                           

09:50 RENAL FAILURE; paralysis; muscle atrophy; Hypertension; Hyperlipidemia; Diabetes -      tw2 

      NIDDM;                                                                                      

                                                                                                  

- Immunization history:: Adult Immunizations.                                                     

- Social history:: Smoking status: .                                                              

- Family history:: not pertinent.                                                                 

- Ebola Screening: : Patient denies travel to an Ebola-affected area in the 21 days               

  before illness onset.                                                                           

                                                                                                  

                                                                                                  

ROS:                                                                                              

09:45 Constitutional: Negative for fever, chills, and weight loss, Eyes: Negative for injury, paulino 

      pain, redness, and discharge, ENT: Negative for injury, pain, and discharge, Neck:          

      Negative for injury, pain, and swelling, Cardiovascular: Negative for chest pain,           

      palpitations, and edema, Respiratory: Negative for shortness of breath, cough,              

      wheezing, and pleuritic chest pain, Abdomen/GI: Negative for abdominal pain, nausea,        

      vomiting, diarrhea, and constipation, Back: Negative for injury and pain, : Negative      

      for injury, bleeding, discharge, and swelling, Skin: Negative for injury, rash, and         

      discoloration, Psych: Negative for depression, anxiety, suicide ideation, homicidal         

      ideation, and hallucinations, Allergy/Immunology: Negative for hives, rash, and             

      allergies, Endocrine: Negative for neck swelling, polydipsia, polyuria, polyphagia, and     

      marked weight changes, Hematologic/Lymphatic: Negative for swollen nodes, abnormal          

      bleeding, and unusual bruising.                                                             

09:45 MS/extremity: Positive for decreased range of motion, swelling, of the right arm, left      

      arm, right leg and left leg.                                                                

09:45 Neuro: Positive for altered mental status, weakness.                                        

                                                                                                  

Exam:                                                                                             

09:45 Constitutional:  This is a well developed, well nourished patient who is awake, alert,  paulino 

      and in no acute distress. Head/Face:  Normocephalic, atraumatic. Eyes:  Pupils equal        

      round and reactive to light, extra-ocular motions intact.  Lids and lashes normal.          

      Conjunctiva and sclera are non-icteric and not injected.  Cornea within normal limits.      

      Periorbital areas with no swelling, redness, or edema. ENT:  Nares patent. No nasal         

      discharge, no septal abnormalities noted.  Tympanic membranes are normal and external       

      auditory canals are clear.  Oropharynx with no redness, swelling, or masses, exudates,      

      or evidence of obstruction, uvula midline.  Mucous membranes moist. Neck:  Trachea          

      midline, no thyromegaly or masses palpated, and no cervical lymphadenopathy.  Supple,       

      full range of motion without nuchal rigidity, or vertebral point tenderness.  No            

      Meningismus. Chest/axilla:  Normal chest wall appearance and motion.  Nontender with no     

      deformity.  No lesions are appreciated. Cardiovascular:  Regular rate and rhythm with a     

      normal S1 and S2.  No gallops, murmurs, or rubs.  Normal PMI, no JVD.  No pulse             

      deficits. Abdomen/GI:  Soft, non-tender, with normal bowel sounds.  No distension or        

      tympany.  No guarding or rebound.  No evidence of tenderness throughout. Back:  No          

      spinal tenderness.  No costovertebral tenderness.  Full range of motion. Male :           

      Normal genitalia with no discharge or lesions. MS/ Extremity:  Pulses equal, no             

      cyanosis.  Neurovascular intact.  Full, normal range of motion. Psych:  Awake, alert,       

      with orientation to person, place and time.  Behavior, mood, and affect are within          

      normal limits.                                                                              

09:45 Respiratory: the patient does not display signs of respiratory distress,  Respirations:     

      normal, Breath sounds: bronchial sounds, that are mild, are scattered, decreased breath     

      sounds, Respiratory rate:  18                                                               

                                                                                                  

Vital Signs:                                                                                      

09:19  / 130; Pulse 74; Resp 17; Pulse Ox 99% on R/A; Pain 0/10;                        tw2 

10:41  / 60; Pulse 56; Resp 16 S; Temp 97.1; Pulse Ox 100% on R/A; Pain 0/10;           sg  

11:56 BP 93 / 53; Pulse 54; Resp 17; Pulse Ox 100% on R/A;                                    tw2 

12:55  / 60; Pulse 55; Resp 17; Pulse Ox 100% on R/A; Pain 0/10;                        sg  

                                                                                                  

MDM:                                                                                              

09:29 Patient medically screened.                                                             [facility] 

09:45 Data reviewed: vital signs, nurses notes, lab test result(s), EKG, radiologic studies,  paulino 

      plain films.                                                                                

                                                                                                  

                                                                                             

09:30 Order name: Basic Metabolic Panel; Complete Time: 11:33                                 [facility] 

                                                                                             

09:30 Order name: CBC with Diff; Complete Time: 10:26                                         [facility] 

                                                                                             

09:30 Order name: Ckmb; Complete Time: 11:33                                                  [facility] 

                                                                                             

09:30 Order name: CPK; Complete Time: 11:33                                                   [facility] 

                                                                                             

09:30 Order name: LFT's; Complete Time: 11:33                                                 [facility] 

                                                                                             

09:30 Order name: Magnesium; Complete Time: 11:33                                             [facility] 

                                                                                             

09:30 Order name: NT PRO-BNP; Complete Time: 11:33                                            [facility] 

                                                                                             

09:30 Order name: PT-INR; Complete Time: 12:20                                                [facility] 

                                                                                             

09:30 Order name: Ptt, Activated; Complete Time: 12:20                                        [facility] 

                                                                                             

09:30 Order name: Troponin (emerg Dept Use Only); Complete Time: 11:33                        [facility] 

                                                                                             

09:30 Order name: XRAY Chest (1 view); Complete Time: 10:52                                   [facility] 

                                                                                             

09:30 Order name: Lipase; Complete Time: 11:33                                                [facility] 

                                                                                             

11:36 Order name: ABG Arterial Blood Gas; Complete Time: 12:20                                [facility]

                                                                                             

09:30 Order name: EKG; Complete Time: 09:31                                                   [facility] 

                                                                                             

09:30 Order name: Cardiac monitoring; Complete Time: 09:38                                    [facility] 

                                                                                             

09:30 Order name: EKG - Nurse/Tech; Complete Time: 09:38                                      [facility] 

                                                                                             

09:30 Order name: IV Saline Lock; Complete Time: 10:45                                        [facility] 

                                                                                             

09:30 Order name: Labs collected and sent; Complete Time: 09:48                               [facility] 

                                                                                             

09:30 Order name: O2 Per Protocol; Complete Time: 09:38                                       [facility] 

                                                                                             

09:30 Order name: O2 Sat Monitoring; Complete Time: 09:38                                     [facility] 

                                                                                             

10:31 Order name: CONS Physician Consult                                                      [facility]

                                                                                             

10:57 Order name: Labs - recollect needed; Complete Time: 11:51                               bd  

                                                                                                  

Administered Medications:                                                                         

09:45 Drug: D50W 25 ml {Note: 1/2 amp ordered at this time per Dr. Leger via dialysis blue tw2 

      port .} Route: IVP; Site: Other;                                                            

11:00 Follow up: Response: No adverse reaction; Blood sugar is elevated                       sg  

11:15 Not Given (Duplicate Order): HEParin 3000 units IV at calculated rate See               iw  

      Administration Instructions; pack blue dialysis port                                        

12:28 Drug: Sodium Bicarbonate 1 amp Route: IVP; Site: left antecubital;                      sg  

13:10 Follow up: Response: No adverse reaction                                                sg  

12:29 Drug: D5W 1000 ml, Sodium Bicarbonate 150 mEq Route: IV; Rate: 50 ml/hr; Site: left     sg  

      antecubital;                                                                                

13:10 Follow up: Response: No adverse reaction; IV Status: Infusion continued upon admission  sg  

                                                                                                  

                                                                                                  

Point of Care Testing:                                                                            

      Blood Glucose:                                                                              

09:20 Blood Glucose: 44 mg/dL;                                                                tw2 

10:21 Blood Glucose: 100 mg/dL;                                                               sg  

09:20 PROVIDER NOTIFIED                                                                       tw2 

      Ranges:                                                                                     

      Critical Glucose Levels:Adult <50 mg/dl or >400 mg/dl  <40 mg/dl or >180 mg/dl       

Disposition:                                                                                      

18 10:28 Hospitalization ordered by Vinicio Ro for Inpatient Admission. Preliminary     

  diagnosis are Hypoglycemia, unspecified, Obesity, unspecified, End stage                        

  renal disease, Acidosis.                                                                        

- Bed requested for Telemetry/MedSurg (Inpatient).                                                

- Status is Inpatient Admission.                                                              iw  

- Condition is Fair.                                                                              

- Problem is new.                                                                                 

- Symptoms have improved.                                                                         

UTI on Admission? No                                                                              

                                                                                                  

                                                                                                  

                                                                                                  

Signatures:                                                                                       

Dispatcher [employer]                           EDMS                                                 

Mavis Rodriguez Steven, RN                         RN   sg                                                   

Nikita Leger MD MD cha Williams, Irene, RN RN   iw                                                   

Lore Reynaga RN                          RN   tw2                                                  

                                                                                                  

Corrections: (The following items were deleted from the chart)                                    

11:37 10:28 Hospitalization Ordered by Vinicio Ro DO for Observation. Preliminary          [facility] 

      diagnosis is Hypoglycemia, unspecified; Obesity, unspecified; End stage renal disease.      

      Bed requested for Telemetry/MedSurg (observation). Status is Observation. Condition is      

      Fair. Problem is new. Symptoms have improved. UTI on Admission? No. paulino                     

11:37 11:37 2018 10:28 Hospitalization Ordered by Vinicio Ro DO for Inpatient        bd  

      Admission. Preliminary diagnosis is Hypoglycemia, unspecified; Obesity, unspecified;        

      End stage renal disease; Acidosis. Bed requested for Telemetry/MedSurg (Inpatient).         

      Status is Inpatient Admission. Condition is Fair. Problem is new. Symptoms have             

      improved. UTI on Admission? No. paulino                                                         

12:09 11:37 2018 10:28 Hospitalization Ordered by Vinicio Ro DO for Inpatient        bd  

      Admission. Preliminary diagnosis is Hypoglycemia, unspecified; Obesity, unspecified;        

      End stage renal disease. Bed requested for Telemetry/MedSurg (observation). Status is       

      Inpatient Admission. Condition is Fair. Problem is new. Symptoms have improved. UTI on      

      Admission? No. bd                                                                           

13:20 12:09 2018 10:28 Hospitalization Ordered by Vinicio Ro DO for Inpatient        iw  

      Admission. Preliminary diagnosis is Hypoglycemia, unspecified; Obesity, unspecified;        

      End stage renal disease; Acidosis. Bed requested for Telemetry/MedSurg (Inpatient).         

      Status is Inpatient Admission. Condition is Fair. Problem is new. Symptoms have             

      improved. UTI on Admission? No. bd                                                          

                                                                                                  

**************************************************************************************************

## 2018-09-06 LAB
ALBUMIN SERPL BCP-MCNC: 3 G/DL (ref 3.4–5)
ALP SERPL-CCNC: 110 U/L (ref 45–117)
ALT SERPL W P-5'-P-CCNC: 20 U/L (ref 12–78)
AST SERPL W P-5'-P-CCNC: 23 U/L (ref 15–37)
BUN BLD-MCNC: 19 MG/DL (ref 7–18)
BUN BLD-MCNC: 22 MG/DL (ref 7–18)
GLUCOSE SERPLBLD-MCNC: 162 MG/DL (ref 74–106)
GLUCOSE SERPLBLD-MCNC: 81 MG/DL (ref 74–106)
HCT VFR BLD CALC: 25.5 % (ref 39.6–49)
LYMPHOCYTES # SPEC AUTO: 0.5 K/UL (ref 0.7–4.9)
MAGNESIUM SERPL-MCNC: 1.9 MG/DL (ref 1.8–2.4)
MCH RBC QN AUTO: 29.9 PG (ref 27–35)
MCV RBC: 91.6 FL (ref 80–100)
PMV BLD: 7 FL (ref 7.6–11.3)
POTASSIUM SERPL-SCNC: 3.4 MMOL/L (ref 3.5–5.1)
POTASSIUM SERPL-SCNC: 3.7 MMOL/L (ref 3.5–5.1)
RBC # BLD: 2.78 M/UL (ref 4.33–5.43)

## 2018-09-06 PROCEDURE — 5A1D70Z PERFORMANCE OF URINARY FILTRATION, INTERMITTENT, LESS THAN 6 HOURS PER DAY: ICD-10-PCS

## 2018-09-06 RX ADMIN — HYDROCORTISONE SODIUM SUCCINATE SCH MG: 100 INJECTION, POWDER, FOR SOLUTION INTRAMUSCULAR; INTRAVENOUS at 08:22

## 2018-09-06 RX ADMIN — SODIUM CHLORIDE SCH: 3 INJECTION, SOLUTION INTRAVENOUS at 08:40

## 2018-09-06 RX ADMIN — HUMAN INSULIN SCH: 100 INJECTION, SOLUTION SUBCUTANEOUS at 11:30

## 2018-09-06 RX ADMIN — HUMAN INSULIN SCH UNIT: 100 INJECTION, SOLUTION SUBCUTANEOUS at 17:12

## 2018-09-06 RX ADMIN — SODIUM CHLORIDE SCH ML: 3 INJECTION, SOLUTION INTRAVENOUS at 14:44

## 2018-09-06 RX ADMIN — SODIUM CHLORIDE SCH ML: 3 INJECTION, SOLUTION INTRAVENOUS at 21:06

## 2018-09-06 RX ADMIN — Medication SCH ML: at 21:06

## 2018-09-06 RX ADMIN — HYDROCORTISONE SODIUM SUCCINATE SCH MG: 100 INJECTION, POWDER, FOR SOLUTION INTRAMUSCULAR; INTRAVENOUS at 14:44

## 2018-09-06 RX ADMIN — SODIUM CHLORIDE SCH MLS: 0.45 INJECTION, SOLUTION INTRAVENOUS at 12:09

## 2018-09-06 RX ADMIN — LEVOFLOXACIN SCH MLS: 250 INJECTION, SOLUTION INTRAVENOUS at 17:13

## 2018-09-06 RX ADMIN — HUMAN INSULIN SCH: 100 INJECTION, SOLUTION SUBCUTANEOUS at 07:22

## 2018-09-06 RX ADMIN — PANTOPRAZOLE SODIUM SCH MG: 40 TABLET, DELAYED RELEASE ORAL at 07:21

## 2018-09-06 RX ADMIN — DEXTROSE MONOHYDRATE SCH: 100 INJECTION, SOLUTION INTRAVENOUS at 10:15

## 2018-09-06 RX ADMIN — ASPIRIN SCH MG: 81 TABLET, COATED ORAL at 08:22

## 2018-09-06 RX ADMIN — HYDROCORTISONE SODIUM SUCCINATE SCH MG: 100 INJECTION, POWDER, FOR SOLUTION INTRAMUSCULAR; INTRAVENOUS at 21:06

## 2018-09-06 RX ADMIN — ENOXAPARIN SODIUM SCH MG: 30 INJECTION SUBCUTANEOUS at 17:12

## 2018-09-06 RX ADMIN — HEPARIN SODIUM PRN UNIT: 1000 INJECTION, SOLUTION INTRAVENOUS; SUBCUTANEOUS at 22:50

## 2018-09-06 RX ADMIN — DEXTROSE MONOHYDRATE SCH: 100 INJECTION, SOLUTION INTRAVENOUS at 22:45

## 2018-09-06 RX ADMIN — HUMAN INSULIN SCH UNIT: 100 INJECTION, SOLUTION SUBCUTANEOUS at 21:06

## 2018-09-06 RX ADMIN — Medication SCH ML: at 08:22

## 2018-09-06 NOTE — RAD REPORT
EXAM DESCRIPTION:  US - UPPER EXTREMITY VENOUS UNILATE - 9/6/2018 9:46 am

 

CLINICAL HISTORY:  edema r/o DVT

Arm swelling.

 

COMPARISON:  No comparisons

 

FINDINGS:  Left upper extremity venous system was interrogated with Doppler technique. Normal flow, c
ompressibility and augmentation was noted. There is no DVT present.

 

IMPRESSION:  No evidence of left upper extremity deep venous thrombosis.

## 2018-09-06 NOTE — P.PN
Subjective


Date of Service: 09/06/18


Primary Care Provider: [facility]; Nephrology-Dr. Rosas


Chief Complaint: Altered mental status


Subjective: Doing well (Patient doing well and responding appropriately.  Blood 

pressure stable.)





Physical Examination





- Vital Signs


Temperature: 98.9 F


Blood Pressure: 83/57


Pulse: 81


Respirations: 17


Pulse Ox (%): 100





- Physical Exam


General: Alert, In no apparent distress, Cooperative


HEENT: Atraumatic


Neck: Supple


Respiratory: Clear to auscultation bilaterally, Normal air movement


Cardiovascular: Normal pulses, Regular rate/rhythm


Gastrointestinal: Normal bowel sounds, Soft and benign, Non-distended, No 

tenderness, No masses, No rebound, No guarding


Musculoskeletal: No tenderness, No warmth


Integumentary: Other (Edema to the upper extremities noted.nonpitting edema to 

the lower extremities.)


Neurological: Normal speech, Normal strength at 5/5 x4 extr, Normal tone, 

Normal affect





- Studies


Laboratory Data (last 24 hrs)





09/05/18 11:20: PT 13.2 H, INR 1.12, APTT 36.6


09/05/18 09:40: WBC 11.0 H, Hgb 9.9 L, Hct 31.1 L, Plt Count 412 H


09/05/18 09:40: Sodium 130 L, Potassium 5.1, BUN 38 H, Creatinine 4.50 H, 

Glucose 49 L*, Magnesium 2.0, Total Bilirubin 0.4, AST 31, ALT 25, Alkaline 

Phosphatase 134 H, Lipase 325





Medications List Reviewed: Yes





Assessment & Plan


Discharge Plan: Home


Plan to discharge in: Greater than 2 days


Physician Review Additional Text: 





Impression: 


Acute encephalopathy etiology unknown need to consider sepsis versus adrenal 

insufficiency versus acute on chronic renal failure.


Metabolic acidosis


Hypotension


Hypothermia


End-stage renal disease on dialysis


History of hypertension


Diabetes mellitus type 2 with hypoglycemia


Patient bed-bound





Plan:


Will need to obtain more history from family.  We also need to obtain his 

current medications at home.  Patient previously hospitalized for sepsis, 

hypothermia, hypotension and acute renal failure requiring initiation of 

dialysis.  Renal failure was suspected related to metformin.  Patient with 

possible adrenal insufficiency.  Patient on IV hydrocortisone.  Patient also on 

bicarb drip due to metabolic acidosis.  Case discussed at length with 

nephrology.  Will continue to monitor closely.  Blood pressure is slightly 

improved.  Edema to the upper extremities noted. Will check venous Doppler to 

rule out DVT.  Hypothermia resolved.  Patient with end-stage renal disease on 

dialysis.  Patient did get dialysis yesterday.  Will discuss further with 

nephrology.  Patient with diabetes with noted hypoglycemia.  Will need to 

determine if the patient is getting adequate nutrition at home.  Will also need 

to see if the patient is still continued on metformin as this was on his 

medication list.  Will need to discuss with family about plan of care in the 

future.  Patient is mainly bed-bound.  Will need to consider long-term 

placement if to difficult to take care at home.  Advanced directives address in 

detail with son yesterday.


Time Spent Managing Pts Care (In Minutes): 55

## 2018-09-07 LAB
ALBUMIN SERPL BCP-MCNC: 3.2 G/DL (ref 3.4–5)
ALP SERPL-CCNC: 125 U/L (ref 45–117)
ALT SERPL W P-5'-P-CCNC: 19 U/L (ref 12–78)
AST SERPL W P-5'-P-CCNC: 25 U/L (ref 15–37)
BUN BLD-MCNC: 10 MG/DL (ref 7–18)
BUN BLD-MCNC: 9 MG/DL (ref 7–18)
GLUCOSE SERPLBLD-MCNC: 124 MG/DL (ref 74–106)
GLUCOSE SERPLBLD-MCNC: 130 MG/DL (ref 74–106)
HCT VFR BLD CALC: 27.4 % (ref 39.6–49)
LYMPHOCYTES # SPEC AUTO: 0.5 K/UL (ref 0.7–4.9)
MAGNESIUM SERPL-MCNC: 1.9 MG/DL (ref 1.8–2.4)
MCH RBC QN AUTO: 30.1 PG (ref 27–35)
MCV RBC: 88.8 FL (ref 80–100)
PMV BLD: 7 FL (ref 7.6–11.3)
POTASSIUM SERPL-SCNC: 3 MMOL/L (ref 3.5–5.1)
POTASSIUM SERPL-SCNC: 3.6 MMOL/L (ref 3.5–5.1)
RBC # BLD: 3.09 M/UL (ref 4.33–5.43)

## 2018-09-07 RX ADMIN — HYDROCORTISONE SODIUM SUCCINATE SCH MG: 100 INJECTION, POWDER, FOR SOLUTION INTRAMUSCULAR; INTRAVENOUS at 08:34

## 2018-09-07 RX ADMIN — HUMAN INSULIN SCH: 100 INJECTION, SOLUTION SUBCUTANEOUS at 20:51

## 2018-09-07 RX ADMIN — ASPIRIN SCH MG: 81 TABLET, COATED ORAL at 08:34

## 2018-09-07 RX ADMIN — Medication SCH ML: at 08:36

## 2018-09-07 RX ADMIN — SODIUM CHLORIDE SCH ML: 3 INJECTION, SOLUTION INTRAVENOUS at 13:11

## 2018-09-07 RX ADMIN — SODIUM CHLORIDE SCH ML: 3 INJECTION, SOLUTION INTRAVENOUS at 20:52

## 2018-09-07 RX ADMIN — DEXTROSE MONOHYDRATE SCH: 100 INJECTION, SOLUTION INTRAVENOUS at 10:14

## 2018-09-07 RX ADMIN — HUMAN INSULIN SCH: 100 INJECTION, SOLUTION SUBCUTANEOUS at 07:30

## 2018-09-07 RX ADMIN — HYDROCORTISONE SODIUM SUCCINATE SCH MG: 100 INJECTION, POWDER, FOR SOLUTION INTRAMUSCULAR; INTRAVENOUS at 13:11

## 2018-09-07 RX ADMIN — HUMAN INSULIN SCH: 100 INJECTION, SOLUTION SUBCUTANEOUS at 11:30

## 2018-09-07 RX ADMIN — ENOXAPARIN SODIUM SCH MG: 30 INJECTION SUBCUTANEOUS at 17:50

## 2018-09-07 RX ADMIN — PANTOPRAZOLE SODIUM SCH MG: 40 TABLET, DELAYED RELEASE ORAL at 06:29

## 2018-09-07 RX ADMIN — HYDROCORTISONE SODIUM SUCCINATE SCH MG: 100 INJECTION, POWDER, FOR SOLUTION INTRAMUSCULAR; INTRAVENOUS at 20:52

## 2018-09-07 RX ADMIN — HUMAN INSULIN SCH: 100 INJECTION, SOLUTION SUBCUTANEOUS at 16:30

## 2018-09-07 RX ADMIN — SODIUM BICARBONATE TAB 325 MG SCH MG: 325 TAB at 08:33

## 2018-09-07 RX ADMIN — SODIUM CHLORIDE SCH ML: 3 INJECTION, SOLUTION INTRAVENOUS at 08:34

## 2018-09-07 RX ADMIN — LEVOFLOXACIN SCH MLS: 250 INJECTION, SOLUTION INTRAVENOUS at 17:50

## 2018-09-07 RX ADMIN — Medication SCH ML: at 20:51

## 2018-09-07 RX ADMIN — SODIUM BICARBONATE TAB 325 MG SCH MG: 325 TAB at 20:51

## 2018-09-07 NOTE — PN
Date of Progress Note:  09/06/2018



Chief Complaint:  End-stage renal disease, acidosis, electrolyte abnormalities, 
fluid overload.



History Of Present Illness:  The patient is an 83-year-old man who initiated on 
chronic hemodialysis.  He was found to have diabetic nephropathy with global 
sclerosis of greater than 70%.  The patient was started on dialysis on Monday, 
Wednesday, Friday.  He has diabetes mellitus.  He missed dialysis on Monday and 
presented to the hospital because of generalized weakness.  He was found to 
have hypoglycemia and severe metabolic acidosis with hyperkalemia of mild 
degree.  The patient received dialysis yesterday and was started on sodium 
bicarbonate drip and treatment of metabolic acidosis.  Bicarbonate level has 
improved somewhat today.  He is undergoing dialysis for metabolic clearance and 
to obtain ultrafiltration.



Review of Systems:

The patient denies PND or orthopnea.  Denies chest pain or palpitation.



Physical Examination:

Lungs:  Clear to auscultation bilaterally. 

Heart:  S1-S2. 

Abdomen:  Soft, benign. 

Extremities:  Slight edema.



Impression And Plan:  

1.   End-stage renal disease and metabolic acidosis.  The patient will have 
dialysis and plan is to reevaluate bicarbonate and stop sodium bicarbonate drip.

2.   Hypoglycemia.  The patient previously was taken off medication for 
diabetes.  The patient is diet controlled at this point.

3.   Hypertension continue metoprolol.  Cardiac workup pending to rule out 
acute coronary syndrome.

I spent total 36 min including 26 min to coordinate care plan.



EUGENIA

DD:  09/06/2018 22:04:00   Voice ID:  [id number]

DT:  09/07/2018 02:25:31   Report ID:  [id number]

STEPAN

## 2018-09-07 NOTE — P.PN
Subjective


Date of Service: 09/07/18


Primary Care Provider: [facility]; Nephrology-Dr. Rosas


Chief Complaint: Altered mental status


Subjective: Doing well





Physical Examination





- Vital Signs


Temperature: 98.5 F


Blood Pressure: 151/69


Pulse: 90


Respirations: 18


Pulse Ox (%): 100





- Physical Exam


General: Alert, In no apparent distress, Oriented x3, Cooperative


HEENT: Atraumatic


Neck: Supple


Respiratory: Clear to auscultation bilaterally, Normal air movement


Cardiovascular: Normal pulses, Regular rate/rhythm


Gastrointestinal: Normal bowel sounds, Soft and benign, Non-distended, No 

tenderness, No masses, No rebound, No guarding


Musculoskeletal: No erythema, No tenderness, No warmth


Integumentary: No erythema, No warmth, No cyanosis, Other (Swelling to the left 

upper extremity improved.)


Neurological: Normal speech, Normal strength at 5/5 x4 extr, Normal tone, 

Normal affect





- Studies


Medications List Reviewed: Yes





Assessment & Plan


Discharge Plan: Home


Plan to discharge in: 48 Hours


Physician Review Additional Text: 





Impression: 


Acute encephalopathy etiology unknown need to consider sepsis versus adrenal 

insufficiency versus acute on chronic renal failure.


Metabolic acidosis


Hypotension


Hypothermia


End-stage renal disease on dialysis


History of hypertension


Diabetes mellitus type 2 with hypoglycemia


Edema to extremities


Patient bed-bound





Plan:


Overall patient has improved.  Patient without any encephalopathy at this time.

  Will continue IV antibiotic therapy.  So far blood cultures negative.  Will 

repeat chest x-ray again today.  Case discussed at length with nephrology 

yesterday.  Will continue with dialysis.  Patient on steroid treatment for 

adrenal insufficiency.  Cortisol level pending.  Venous Doppler of the upper 

extremity was done.  No DVT noted. Renal function significantly improved.  

Hypothermia resolved.  Continue to monitor blood pressure closely.  May need to 

restart back his medication.  Will continue monitor diabetes.  Dietary 

consulted to further recommend dietary changes.  Case discussed at length with 

case management.  Will need to make sure arrangements for plan of care at home 

is arranged.  Will discuss with  further along with family to 

make sure patient is adequately care for.  


Time Spent Managing Pts Care (In Minutes): 55

## 2018-09-08 LAB
ALBUMIN SERPL BCP-MCNC: 3 G/DL (ref 3.4–5)
ALP SERPL-CCNC: 138 U/L (ref 45–117)
ALT SERPL W P-5'-P-CCNC: 18 U/L (ref 12–78)
AST SERPL W P-5'-P-CCNC: 27 U/L (ref 15–37)
BUN BLD-MCNC: 17 MG/DL (ref 7–18)
GLUCOSE SERPLBLD-MCNC: 208 MG/DL (ref 74–106)
HCT VFR BLD CALC: 28 % (ref 39.6–49)
LYMPHOCYTES # SPEC AUTO: 0.4 K/UL (ref 0.7–4.9)
MAGNESIUM SERPL-MCNC: 1.8 MG/DL (ref 1.8–2.4)
MCH RBC QN AUTO: 29.6 PG (ref 27–35)
MCV RBC: 88.2 FL (ref 80–100)
MORPHOLOGY BLD-IMP: (no result)
PMV BLD: 7.1 FL (ref 7.6–11.3)
POTASSIUM SERPL-SCNC: 3 MMOL/L (ref 3.5–5.1)
POTASSIUM SERPL-SCNC: 4.3 MMOL/L (ref 3.5–5.1)
RBC # BLD: 3.18 M/UL (ref 4.33–5.43)

## 2018-09-08 RX ADMIN — HYDROCORTISONE SODIUM SUCCINATE SCH MG: 100 INJECTION, POWDER, FOR SOLUTION INTRAMUSCULAR; INTRAVENOUS at 08:49

## 2018-09-08 RX ADMIN — Medication SCH ML: at 08:50

## 2018-09-08 RX ADMIN — SODIUM CHLORIDE SCH ML: 3 INJECTION, SOLUTION INTRAVENOUS at 22:13

## 2018-09-08 RX ADMIN — HUMAN INSULIN SCH UNIT: 100 INJECTION, SOLUTION SUBCUTANEOUS at 12:04

## 2018-09-08 RX ADMIN — HUMAN INSULIN SCH UNIT: 100 INJECTION, SOLUTION SUBCUTANEOUS at 17:37

## 2018-09-08 RX ADMIN — SODIUM CHLORIDE SCH ML: 3 INJECTION, SOLUTION INTRAVENOUS at 08:49

## 2018-09-08 RX ADMIN — ENOXAPARIN SODIUM SCH MG: 30 INJECTION SUBCUTANEOUS at 17:37

## 2018-09-08 RX ADMIN — HYDROCORTISONE SODIUM SUCCINATE SCH MG: 100 INJECTION, POWDER, FOR SOLUTION INTRAMUSCULAR; INTRAVENOUS at 22:13

## 2018-09-08 RX ADMIN — LEVOFLOXACIN SCH MLS: 250 INJECTION, SOLUTION INTRAVENOUS at 17:37

## 2018-09-08 RX ADMIN — SODIUM CHLORIDE SCH: 3 INJECTION, SOLUTION INTRAVENOUS at 14:00

## 2018-09-08 RX ADMIN — PANTOPRAZOLE SODIUM SCH MG: 40 TABLET, DELAYED RELEASE ORAL at 05:33

## 2018-09-08 RX ADMIN — Medication SCH ML: at 22:13

## 2018-09-08 RX ADMIN — ASPIRIN SCH MG: 81 TABLET, COATED ORAL at 08:49

## 2018-09-08 RX ADMIN — HUMAN INSULIN SCH: 100 INJECTION, SOLUTION SUBCUTANEOUS at 21:00

## 2018-09-08 RX ADMIN — HUMAN INSULIN SCH UNIT: 100 INJECTION, SOLUTION SUBCUTANEOUS at 08:43

## 2018-09-08 NOTE — PN
Date of Progress Note:  09/07/2018



Chief Complaint:  End-stage renal disease on dialysis, severe metabolic acidosis
, electrolyte abnormalities, fluid overload.



History Of Present Illness:  The patient received dialysis yesterday.  The 
patient has history of diabetic nephropathy with global glomerulosclerosis  of 
greater than 70%.  The patient was started on dialysis on Monday, Wednesday, 
Friday.  During this admission, he remains in ICU.  He was dialyzed with daily 
dialysis to control metabolic acidosis with hyperkalemia of mild degree.  
Yesterday, potassium was re-evaluated and hyperkalemia resolved, although 
metabolic acidosis remained severely advanced, and the patient was on sodium 
bicarbonate drip to treat metabolic acidosis, received second dialysis 
treatment to control acidosis and today, lab work is done and it showed 
improvement of sodium bicarbonate from 15 to 24 over last 24 hours.  Azotemia 
is in good control. 



The patient was found to have hypoalbuminemia.  He is advancing with p.o. 
intake. 

Fluid overload, edema. The patient received dialysis with ultrafiltration.  
Peripheral edema is somewhat improving.



Review of Systems:

The patient denies fever, chills.  Denies PND, orthopnea.



Physical Examination:

Lungs:  Few crackles at bases. 

Heart:  S1, S2. 

Abdomen:  Soft, benign. 

Extremities:  Some edema in upper and lower extremities.



Laboratory Data:  Potassium today was 3.0.  After replacement, potassium level 
improved to 3.6, bicarbonate remains at stable range from 24 to 25 today.



Impression And Plan:  

1.   End-stage renal disease.  Continue dialysis 3 times per week.  Next 
dialysis tomorrow.

2.   Metabolic acidosis.  The patient will continue sodium bicarbonate tablet 
as IV bicarbonate drip is completed.  The patient will continue dialysis for 
metabolic clearance and to control azotemia and metabolic acidosis.  Adjust 
electrolytes according to lab results with dialysis.

3.   Hypoglycemia.  The patient was taken off diabetic medication.  Continue to 
monitor glucose level.

4.   Hypertension.  Continue metoprolol.  Cardiac workup was done to rule out 
acute coronary syndrome.





JOELLEN/YOLA

DD:  09/07/2018 21:16:29   Voice ID:  [id number]

DT:  09/08/2018 02:10:23   Report ID:  [id number]

STEPAN

## 2018-09-08 NOTE — P.PN
Subjective


Date of Service: 09/08/18


Primary Care Provider: [facility]; Nephrology-Dr. Rosas


Chief Complaint: Altered mental status


Subjective: Improving





Physical Examination





- Vital Signs


Temperature: 97.1 F


Blood Pressure: 163/79


Pulse: 85


Respirations: 20


Pulse Ox (%): 100





- Physical Exam


General: Alert, In no apparent distress, Oriented x3, Cooperative


HEENT: Atraumatic


Neck: Supple


Respiratory: Clear to auscultation bilaterally, Normal air movement


Cardiovascular: Normal pulses, Regular rate/rhythm


Gastrointestinal: Normal bowel sounds, Soft and benign, Non-distended, No masses

, No rebound, No guarding


Musculoskeletal: No erythema, No tenderness, No warmth


Integumentary: No erythema, No warmth, No cyanosis


Neurological: Normal speech, Normal strength at 5/5 x4 extr, Normal tone, 

Normal affect





- Studies


Medications List Reviewed: Yes





Assessment & Plan


Discharge Plan: Home


Plan to discharge in: 24 Hours


Physician Review Additional Text: 





Impression: 


Acute encephalopathy etiology unknown need to consider sepsis versus adrenal 

insufficiency versus acute on chronic renal failure.


Metabolic acidosis


Hypotension


Hypothermia


End-stage renal disease on dialysis


History of hypertension


Diabetes mellitus type 2 with hypoglycemia


Edema to extremities


Patient bed-bound





Plan:


Overall patient has improved.  Patient was transferred to the ICU to the floor.

  What physical therapy ambulate patient.  So far blood cultures negative.  

Doubt infectious process.  There was some suspicion for adrenal insufficiency.  

IV steroid was decreased.  Patient to get dialysis today.  Will discuss with 

nephrology about the possibility of discharge soon.   was 

arranging transportation to and from home to dialysis.  Will need to discuss 

plan of care with family.  Will try to arrange for family discussion meeting.  

Patient will continue off diabetic medication due to hypoglycemia.  Encourage 

oral intake.  Hypotension resolved.  Hypothermia resolved.


Time Spent Managing Pts Care (In Minutes): 55

## 2018-09-09 LAB
BUN BLD-MCNC: 25 MG/DL (ref 7–18)
GLUCOSE SERPLBLD-MCNC: 242 MG/DL (ref 74–106)
MAGNESIUM SERPL-MCNC: 1.9 MG/DL (ref 1.8–2.4)
POTASSIUM SERPL-SCNC: 3.8 MMOL/L (ref 3.5–5.1)

## 2018-09-09 RX ADMIN — HUMAN INSULIN SCH UNIT: 100 INJECTION, SOLUTION SUBCUTANEOUS at 12:42

## 2018-09-09 RX ADMIN — HUMAN INSULIN SCH: 100 INJECTION, SOLUTION SUBCUTANEOUS at 20:39

## 2018-09-09 RX ADMIN — ASPIRIN SCH MG: 81 TABLET, COATED ORAL at 08:42

## 2018-09-09 RX ADMIN — Medication SCH ML: at 08:41

## 2018-09-09 RX ADMIN — LEVOFLOXACIN SCH MLS: 250 INJECTION, SOLUTION INTRAVENOUS at 17:00

## 2018-09-09 RX ADMIN — Medication SCH ML: at 20:38

## 2018-09-09 RX ADMIN — SODIUM CHLORIDE SCH: 3 INJECTION, SOLUTION INTRAVENOUS at 14:00

## 2018-09-09 RX ADMIN — HUMAN INSULIN SCH UNIT: 100 INJECTION, SOLUTION SUBCUTANEOUS at 08:40

## 2018-09-09 RX ADMIN — SODIUM CHLORIDE SCH: 3 INJECTION, SOLUTION INTRAVENOUS at 20:38

## 2018-09-09 RX ADMIN — ENOXAPARIN SODIUM SCH MG: 30 INJECTION SUBCUTANEOUS at 16:58

## 2018-09-09 RX ADMIN — HUMAN INSULIN SCH UNIT: 100 INJECTION, SOLUTION SUBCUTANEOUS at 16:58

## 2018-09-09 RX ADMIN — SODIUM CHLORIDE SCH ML: 3 INJECTION, SOLUTION INTRAVENOUS at 08:42

## 2018-09-09 RX ADMIN — HYDROCORTISONE SODIUM SUCCINATE SCH MG: 100 INJECTION, POWDER, FOR SOLUTION INTRAMUSCULAR; INTRAVENOUS at 08:41

## 2018-09-09 RX ADMIN — PANTOPRAZOLE SODIUM SCH MG: 40 TABLET, DELAYED RELEASE ORAL at 06:10

## 2018-09-09 NOTE — RAD REPORT
EXAM DESCRIPTION:  CT - Abdomen   Pelvis Wo Contrast - 9/9/2018 2:57 pm

 

CLINICAL HISTORY:  Abdominal pain

 

COMPARISON:  CT February 2014

 

TECHNIQUE:  Axial 5 mm thick CT imaging of the abdomen and pelvis was performed without IV contrast. 
No IV contrast was given because of allergy, abnormal renal function, patient refusal or physician re
quest. No oral contrast

 

All CT scans are performed using dose optimization technique as appropriate and may include automated
 exposure control or mA/KV adjustment according to patient size.

 

FINDINGS:  Small bilateral pleural effusions are present. Calcified pleural plaquing present. No power
cardial thickening or effusion.

 

The liver, spleen and pancreas show no suspicious findings on non-contrast imaging. Multiple gallston
es are present. Active gallbladder process is not suspected. No biliary tree dilatation.

 

No hydronephrosis or suspicious renal mass.  No significant adrenal finding. Isodense renal masses an
d pyelonephritis cannot be excluded in the absence of IV contrast. Urinary bladder is mostly contract
ed limiting detail.

 

No gastric dilatation or gastric wall thickening. No dilated large or small bowel loop. No free air, 
free fluid or inflammatory stranding. No mass or bulky lymphadenopathy. Patient has very pronounced m
uscle atrophy in the pelvis. Right psoas atrophy also present.

 

Disc and bony degenerative changes are present.

 

 

IMPRESSION:  Non-contrast enhanced CT abdomen and pelvis imaging show no acute picious finding. Above
 detailed findings are not significantly different from February.

 

Full assessment is limited is the absence of IV contrast.

## 2018-09-09 NOTE — P.PN
Subjective


Date of Service: 09/09/18


Primary Care Provider: [facility]; Nephrology-Dr. Rosas


Chief Complaint: Altered mental status


Subjective: Improving





Physical Examination





- Vital Signs


Temperature: 97 F


Blood Pressure: 139/76


Pulse: 92


Respirations: 18


Pulse Ox (%): 100





- Physical Exam


General: Alert, In no apparent distress, Oriented x3, Cooperative


HEENT: Atraumatic


Neck: Supple


Respiratory: Clear to auscultation bilaterally, Normal air movement


Cardiovascular: Normal pulses, Regular rate/rhythm


Gastrointestinal: Normal bowel sounds, Soft and benign, Non-distended, No 

tenderness, No masses, No rebound, No guarding


Musculoskeletal: No erythema, No tenderness, No warmth


Integumentary: No tenderness/swelling, No erythema, No warmth, No cyanosis


Neurological: Normal speech, Normal strength at 5/5 x4 extr, Normal tone, 

Normal affect





- Studies


Medications List Reviewed: Yes





Assessment & Plan


Discharge Plan: Home


Plan to discharge in: 24 Hours


Physician Review Additional Text: 





Impression: 


Acute encephalopathy etiology unknown need to consider sepsis versus adrenal 

insufficiency versus acute on chronic renal failure.


Metabolic acidosis, resolved


Hypotension, resolved likely from medication


Hypothermia, resolved


End-stage renal disease on dialysis


History of hypertension, off medication


Diabetes mellitus type 2 with hypoglycemia, currently off medication


Edema to extremities


Patient bed-bound





Plan:


Overall patient has improved.  Patient has done well.  So far blood cultures 

negative.  Patient on IV antibiotic therapy currently.  Course all level still 

pending.  Will reduce hydrocortisone.  Blood pressure stable off medication.  

Patient has received dialysis.  Will check A1c as the patient has been on a 

sliding scale.  Will need to determine if patient will require medication at 

discharge. Still trying to determine what the patient is using at home.  Will 

discuss with  tomorrow to make sure arrangements for 

transportation to and from home to dialysis is arrange.  Will also need to 

determine if the patient will require home health.  There has been some concern 

about his care at home.  Will need to verify with family concerning this.  

Anticipate discharge tomorrow.  Will discuss with nephrology about plan of 

care.  Will need to consider discontinuing IV antibiotic therapy.  Will also 

need to consider discontinuing IV steroids.  Will discuss with nephrology to 

further address.  Patient will likely need home health and possibly physical 

therapy at discharge.


Time Spent Managing Pts Care (In Minutes): 55

## 2018-09-10 LAB
BUN BLD-MCNC: 31 MG/DL (ref 7–18)
GLUCOSE SERPLBLD-MCNC: 120 MG/DL (ref 74–106)
HCT VFR BLD CALC: 27.2 % (ref 39.6–49)
LYMPHOCYTES # SPEC AUTO: 1.4 K/UL (ref 0.7–4.9)
MAGNESIUM SERPL-MCNC: 1.6 MG/DL (ref 1.8–2.4)
MCH RBC QN AUTO: 29.6 PG (ref 27–35)
MCV RBC: 86.6 FL (ref 80–100)
PMV BLD: 7.5 FL (ref 7.6–11.3)
POTASSIUM SERPL-SCNC: 3.7 MMOL/L (ref 3.5–5.1)
RBC # BLD: 3.14 M/UL (ref 4.33–5.43)

## 2018-09-10 PROCEDURE — 5A1D70Z PERFORMANCE OF URINARY FILTRATION, INTERMITTENT, LESS THAN 6 HOURS PER DAY: ICD-10-PCS

## 2018-09-10 RX ADMIN — HUMAN INSULIN SCH UNIT: 100 INJECTION, SOLUTION SUBCUTANEOUS at 08:23

## 2018-09-10 RX ADMIN — Medication SCH ML: at 22:22

## 2018-09-10 RX ADMIN — METOPROLOL TARTRATE SCH MG: 25 TABLET ORAL at 22:21

## 2018-09-10 RX ADMIN — HYDROCORTISONE SODIUM SUCCINATE SCH MG: 100 INJECTION, POWDER, FOR SOLUTION INTRAMUSCULAR; INTRAVENOUS at 08:22

## 2018-09-10 RX ADMIN — SODIUM CHLORIDE SCH: 3 INJECTION, SOLUTION INTRAVENOUS at 21:00

## 2018-09-10 RX ADMIN — HUMAN INSULIN SCH UNIT: 100 INJECTION, SOLUTION SUBCUTANEOUS at 22:23

## 2018-09-10 RX ADMIN — SODIUM CHLORIDE SCH ML: 3 INJECTION, SOLUTION INTRAVENOUS at 08:22

## 2018-09-10 RX ADMIN — HUMAN INSULIN SCH: 100 INJECTION, SOLUTION SUBCUTANEOUS at 16:30

## 2018-09-10 RX ADMIN — SODIUM CHLORIDE SCH: 3 INJECTION, SOLUTION INTRAVENOUS at 12:34

## 2018-09-10 RX ADMIN — HEPARIN SODIUM PRN UNIT: 1000 INJECTION, SOLUTION INTRAVENOUS; SUBCUTANEOUS at 15:35

## 2018-09-10 RX ADMIN — ENOXAPARIN SODIUM SCH MG: 30 INJECTION SUBCUTANEOUS at 19:22

## 2018-09-10 RX ADMIN — PANTOPRAZOLE SODIUM SCH MG: 40 TABLET, DELAYED RELEASE ORAL at 05:41

## 2018-09-10 RX ADMIN — GABAPENTIN PRN MG: 100 CAPSULE ORAL at 22:21

## 2018-09-10 RX ADMIN — ASPIRIN SCH MG: 81 TABLET, COATED ORAL at 08:22

## 2018-09-10 RX ADMIN — METOPROLOL TARTRATE SCH MG: 25 TABLET ORAL at 08:23

## 2018-09-10 RX ADMIN — HUMAN INSULIN SCH UNIT: 100 INJECTION, SOLUTION SUBCUTANEOUS at 12:46

## 2018-09-10 RX ADMIN — Medication SCH ML: at 08:23

## 2018-09-10 NOTE — P.PN
Subjective


Date of Service: 09/10/18


Primary Care Provider: [facility]; Nephrology-Dr. Rosas


Chief Complaint: Altered mental status


Subjective: Doing well





Physical Examination





- Vital Signs


Temperature: 97.8 F


Blood Pressure: 116/65


Pulse: 92


Respirations: 18


Pulse Ox (%): 100





- Physical Exam


General: Alert, In no apparent distress, Oriented x3, Cooperative


HEENT: Atraumatic


Neck: Supple


Respiratory: Clear to auscultation bilaterally, Normal air movement


Cardiovascular: Normal pulses, Regular rate/rhythm


Gastrointestinal: Normal bowel sounds, Soft and benign, Non-distended, No masses

, No rebound, No guarding


Musculoskeletal: No erythema, No tenderness, No warmth


Integumentary: No tenderness/swelling, No erythema, No warmth, No cyanosis


Neurological: Normal speech, Normal strength at 5/5 x4 extr, Normal tone, 

Normal affect





- Studies


Medications List Reviewed: Yes





Assessment & Plan


Discharge Plan: Home


Plan to discharge in: 24 Hours


Physician Review Additional Text: 





Impression: 


Acute encephalopathy etiology unknown need to consider sepsis versus adrenal 

insufficiency versus acute on chronic renal failure.


Metabolic acidosis, resolved


Hypotension, resolved likely from medication


Hypothermia, resolved


End-stage renal disease on dialysis


History of hypertension, off medication


Diabetes mellitus type 2 with hypoglycemia, currently off medication


Edema to extremities


Patient bed-bound





Plan:


Overall patient has improved.  Patient has done well.  So far blood cultures 

negative.  Patient on IV antibiotic therapy currently.  Case discussed at 

length with nephrology.  Will plan for discharge but will need to make sure 

arrangements for transportation from home to dialysis is arranged.  No need for 

antibiotic therapy at this time.  Will also need to gather information and data 

on what he is taking at home as medications may be interfering with his current 

status.  Will also discuss with  about plan of care at home.  

Once this has been arranged will plan for discharge.


Hypertension resolved likely from medication.  Overall stable without 

medication.


Hypothermia resolved.


Patient with end-stage renal disease on dialysis.  Patient will get dialysis 

today.  Patient will continue with dialysis Mondays, Wednesdays and Fridays.


Patient with diabetes with noted hypoglycemia upon admission.  This has 

resolved.  No need for diabetic medication at this time.  Will make sure that 

the patient does not get metformin in the future.





I will turn the service over to Dr. Tompkins tomorrow.  I will go over plan of 

care with her. 


Time Spent Managing Pts Care (In Minutes): 55

## 2018-09-10 NOTE — PN
Date of Progress Note:  09/09/2018



Chief Complaint:  End-stage renal disease, on dialysis.



History Of Present Illness:  The patient is dialysis dependent.  The patient 
was found to have hypophosphatemia and replacement was ordered. 



The patient has mild fluid overload.  Dialysis will be rescheduled for tomorrow 
due to the fact the patient has persistent hypophosphatemia, he is requiring 
phosphorus replacement.  The patient was taken off binders. 



Azotemia and electrolytes remain in acceptable control. 



Diabetes mellitus.  Blood glucose is elevated.  Hydrocortisone will be tapered 
off gradually. 



The patient was found to have leukocytosis.  Urine culture, blood cultures were 
obtained and pending.  CT scan of the abdomen without contrast will be ordered 
to rule out hydronephrosis.



Review of Systems:

Denies fever, chills.  He is complaining of some abdominal discomfort.



Physical Examination:

Lungs:  Clear to auscultation bilaterally. 

Heart:  S1, S2. 

Abdomen:  Soft, benign. 

Extremities:  Slight edema in both legs.



Laboratory Data:  WBC 13.4, hemoglobin 9.4, platelet count is 292,000.  
Chemistry showed sodium 133, potassium 3.8, chloride 99, CO2 27, BUN 25, 
creatinine 2.1, glucose 242, phosphorus 1.6, calcium 7.9, magnesium 1.9. 

I/P

Hypophosphatemia.  Diet was changed to regular diet.  Continue IV potassium 
replacement with potassium sulfate to treat hypophosphatemia. 



Potassium level has improved from 3.8 to 4.4.  Monitor electrolytes closely.  
Dialysis will be scheduled for tomorrow. 



Leukocytosis.  CT scan of the abdomen and pelvis without contrast was ordered 
to evaluate for possible etiology of abdominal pain.  Noncontrast CT abdomen 
and pelvis did not show acute findings.  No hydronephrosis or suspicious mass.  
No significant adrenal findings.  Small bilateral pleural effusion is present.  
Liver, spleen, and pancreas showed no suspicious findings on noncontrast tests.
  Multiple gallstones are present.  Active gallbladder process is not suspected.



1.   Leukocytosis, pending workup for bacteremia done.  Blood culture showed no 
growth. Patient was started on antibiotic of broad spectrum.

2.   Hypophosphatemia.  Continue replacement.

3.   Mild peripheral edema.  Continue low-sodium diet and p.o. fluid 
restriction. Consider to resume diuretic if urine output adequate. Monitor 
urine output and fluid balance

4.   End-stage renal disease. Renal biopsy showed advanced chronic changes 
consistent with advanced kidney disease, 70% of global glomerulosclerosis was 
found on renal biopsy. Plan is to monitor renal panel , consider 24 hrs 
creatinine clearance .  Dialysis will be done tomorrow.

5.   Hypertension.  Blood pressure in acceptable control.

6.   Leukocytosis.  Awaiting final culture from the left neck wound.  Further 
antibiotic adjustment per primary team.



I spent total 36 min including 25 min to coordinate care plan.

JOELLEN/YOLA

DD:  09/09/2018 21:02:02   Voice ID:  [id number]

DT:  09/10/2018 01:53:17   Report ID:  [id number]

STEPAN

## 2018-09-11 LAB
BUN BLD-MCNC: 20 MG/DL (ref 7–18)
GLUCOSE SERPLBLD-MCNC: 145 MG/DL (ref 74–106)
HCT VFR BLD CALC: 25.6 % (ref 39.6–49)
LYMPHOCYTES # SPEC AUTO: 1.3 K/UL (ref 0.7–4.9)
MAGNESIUM SERPL-MCNC: 1.7 MG/DL (ref 1.8–2.4)
MCH RBC QN AUTO: 29.8 PG (ref 27–35)
MCV RBC: 87.2 FL (ref 80–100)
PMV BLD: 8 FL (ref 7.6–11.3)
POTASSIUM SERPL-SCNC: 4.2 MMOL/L (ref 3.5–5.1)
RBC # BLD: 2.93 M/UL (ref 4.33–5.43)

## 2018-09-11 PROCEDURE — 5A1D70Z PERFORMANCE OF URINARY FILTRATION, INTERMITTENT, LESS THAN 6 HOURS PER DAY: ICD-10-PCS

## 2018-09-11 RX ADMIN — PANTOPRAZOLE SODIUM SCH MG: 40 TABLET, DELAYED RELEASE ORAL at 06:49

## 2018-09-11 RX ADMIN — METOPROLOL TARTRATE SCH MG: 25 TABLET ORAL at 09:26

## 2018-09-11 RX ADMIN — ASPIRIN SCH MG: 81 TABLET, COATED ORAL at 09:26

## 2018-09-11 RX ADMIN — SODIUM CHLORIDE SCH: 3 INJECTION, SOLUTION INTRAVENOUS at 09:00

## 2018-09-11 RX ADMIN — HUMAN INSULIN SCH: 100 INJECTION, SOLUTION SUBCUTANEOUS at 07:30

## 2018-09-11 RX ADMIN — HUMAN INSULIN SCH UNIT: 100 INJECTION, SOLUTION SUBCUTANEOUS at 23:15

## 2018-09-11 RX ADMIN — SODIUM CHLORIDE SCH: 3 INJECTION, SOLUTION INTRAVENOUS at 13:09

## 2018-09-11 RX ADMIN — METOPROLOL TARTRATE SCH MG: 25 TABLET ORAL at 23:12

## 2018-09-11 RX ADMIN — HUMAN INSULIN SCH: 100 INJECTION, SOLUTION SUBCUTANEOUS at 16:30

## 2018-09-11 RX ADMIN — Medication SCH ML: at 23:15

## 2018-09-11 RX ADMIN — Medication SCH ML: at 09:26

## 2018-09-11 RX ADMIN — HUMAN INSULIN SCH UNIT: 100 INJECTION, SOLUTION SUBCUTANEOUS at 13:08

## 2018-09-11 RX ADMIN — ENOXAPARIN SODIUM SCH MG: 30 INJECTION SUBCUTANEOUS at 17:47

## 2018-09-11 RX ADMIN — GABAPENTIN PRN MG: 100 CAPSULE ORAL at 09:26

## 2018-09-11 NOTE — PN
Date of Progress Note:  09/10/2018



Chief Complaint:  End-stage renal disease, on dialysis.



History Of Present Illness:  The patient is undergoing dialysis today.  He has fluid overload, periph
eral edema.  The patient has persistent hypophosphatemia and had phosphorus replacement ordered for t
his admission.  The patient was taken off binders. 



Azotemia.  Electrolytes remain in acceptable control.  The patient has history of diabetic kidney dis
ease.  He was taken off diabetic oral hypoglycemic agent because of hypoglycemia.  Blood glucose is s
tabilizing. 



Leukocytosis.  The patient had urine culture, blood culture obtained; and CT scan of the abdomen and 
pelvis did not show hydronephrosis.



Review of Systems:

Denies PND, orthopnea.



Objective:  Lungs:  Clear to auscultation bilaterally. 

Heart:  S1, S2. 

Abdomen:  Soft, benign, nontender. 

Extremities:  Edema in both legs.



Laboratory Data:  Chemistry showed sodium 133, potassium 3.8, chloride 99, CO2 27, BUN 25, creatinine
 2.1, glucose 242, phosphorus 1.6, calcium 7.9, magnesium 1.9.



Impression And Plan:  

1.Hypophosphatemia.  Continue treatment with IV potassium replacement as needed and potassium phosph
ate to treat hypophosphatemia.

2.Hypokalemia, resolved.  Monitor electrolytes closely.

3.Leukocytosis.  Workup is pending to rule out underlying infection.  CT scan of the abdomen and pel
vis without contrast did not show hydronephrosis.  White count is elevated, although the patient was 


treated with steroids.  This may be steroid related.

4.Hypomagnesemia.  Continue magnesium replacement.





JOELLEN/YOLA

DD:  09/10/2018 23:53:06Voice ID:  [id number]

DT:  09/11/2018 04:54:06Report ID:  [id number]

## 2018-09-11 NOTE — PN
Date of Progress Note:  09/11/2018



Subjective:  The patient seen and examined.  Chart reviewed and case discussed with RN, also drake
d with Dr. Gibson.  The patient states that he has wound on his neck for past couple of months.  St
ated it started as pimple to the nurse, however, he told me that this was from complications of oxyge
n tubing on the side of his neck.  His history is inconsistent.



Review of Systems:

Negative except as above.



Medications:  List reviewed.



Code Status:  Do not resuscitate.



Objective:  Vital Signs:  Temperature 97.6, heart rate 78, blood pressure 144/63, respirations 16, O2
 96% on room air. 

General:  Awake, alert, oriented x3.  Some mild distress.  Elderly male, ill-appearing, obese, BMI 30
. 

CV:  S1 and S2.  No murmurs.  Regular rate and rhythm.  Peripheral pulses present. 

Respiratory:  Moving air well bilaterally.  No wheezing or stridor. 

Gastrointestinal:  Abdomen is soft, nontender, nondistended.  Positive bowel sounds. 

Extremities:  No clubbing, cyanosis, edema. 

Neurologic:  Nonfocal. 

Skin:  The patient has large ulcerated foul-smelling wound on the anterior aspect of the left neck an
d clavicle area.  No drainage.



Laboratory Data:  Sodium 135, potassium 4.2, chloride 102, CO2 26, BUN 20, creatinine 1.4, glucose 14
5, calcium 7.7, and magnesium 1.7.  WBC 9.4, H and H 8.7, 25.6, platelets 265, neutrophils 69%.  Bloo
d cultures, no growth in 5 days.  Wound culture from the left neck shows MRSA.



Assessment:  An 83-year-old male with:

1.Acute metabolic encephalopathy, likely secondary to sepsis and possible acute on chronic renal angie
lure, improved.

2.Metabolic acidosis, resolved.

3.Hypotension, likely secondary to medications, which have been adjusted.

4.Hypothermia, resolved.

5.Methicillin-resistant staphylococcus aureus from the neck.  We will add vancomycin.  Surgical cons
ultation for possible debridement.  Blood cultures, no growth to date.

6.History of hypertension.  Medications have been taken off due to hypotension.

7.End-stage renal disease, on hemodialysis.  We will continue as scheduled.  Appreciate Dr. Rosas
's input.

8.Diabetes mellitus type 2 with hypoglycemia, currently off medications.

9.Obesity.  Body mass index 30.





SA/MODL

DD:  09/11/2018 12:31:02Voice ID:  [id number]

DT:  09/11/2018 16:43:11Report ID:  [id number]

## 2018-09-12 LAB
ALBUMIN SERPL BCP-MCNC: 2.2 G/DL (ref 3.4–5)
BUN BLD-MCNC: 27 MG/DL (ref 7–18)
GLUCOSE SERPLBLD-MCNC: 166 MG/DL (ref 74–106)
HCT VFR BLD CALC: 25.7 % (ref 39.6–49)
LYMPHOCYTES # SPEC AUTO: 1.1 K/UL (ref 0.7–4.9)
MAGNESIUM SERPL-MCNC: 2 MG/DL (ref 1.8–2.4)
MCH RBC QN AUTO: 29.7 PG (ref 27–35)
MCV RBC: 89.1 FL (ref 80–100)
PMV BLD: 8.4 FL (ref 7.6–11.3)
POTASSIUM SERPL-SCNC: 4.4 MMOL/L (ref 3.5–5.1)
RBC # BLD: 2.88 M/UL (ref 4.33–5.43)

## 2018-09-12 PROCEDURE — 0JB50ZZ EXCISION OF LEFT NECK SUBCUTANEOUS TISSUE AND FASCIA, OPEN APPROACH: ICD-10-PCS

## 2018-09-12 RX ADMIN — HUMAN INSULIN SCH: 100 INJECTION, SOLUTION SUBCUTANEOUS at 07:30

## 2018-09-12 RX ADMIN — PANTOPRAZOLE SODIUM SCH: 40 TABLET, DELAYED RELEASE ORAL at 05:31

## 2018-09-12 RX ADMIN — METOPROLOL TARTRATE SCH MG: 25 TABLET ORAL at 22:00

## 2018-09-12 RX ADMIN — ASPIRIN SCH: 81 TABLET, COATED ORAL at 09:00

## 2018-09-12 RX ADMIN — Medication SCH ML: at 10:10

## 2018-09-12 RX ADMIN — METOPROLOL TARTRATE SCH MG: 25 TABLET ORAL at 10:07

## 2018-09-12 RX ADMIN — HUMAN INSULIN SCH: 100 INJECTION, SOLUTION SUBCUTANEOUS at 16:30

## 2018-09-12 RX ADMIN — ENOXAPARIN SODIUM SCH MG: 30 INJECTION SUBCUTANEOUS at 17:44

## 2018-09-12 RX ADMIN — HUMAN INSULIN SCH: 100 INJECTION, SOLUTION SUBCUTANEOUS at 21:00

## 2018-09-12 RX ADMIN — HUMAN INSULIN SCH: 100 INJECTION, SOLUTION SUBCUTANEOUS at 11:30

## 2018-09-12 RX ADMIN — Medication SCH ML: at 22:24

## 2018-09-12 NOTE — PN
Date of Progress Note:  09/12/2018



Subjective:  The patient is seen and examined.  Chart reviewed and case discussed with RN and Dr. Laina winston.  The patient went for debridement today.  He did well postoperatively.  He did not have any co
mplications.  The patient will continue dialysis as scheduled.  I spoke with Dr. Rosas.  We will s
et up IV vancomycin as outpatient with dialysis.



Review of Systems:

Negative except as above.



Medications:  List reviewed.



Physical Examination:

Vital Signs:  Temperature 97.4, heart rate 71, blood pressure 165/79, respirations 20, O2 98% on room
 air. 

General:  Awake, alert, oriented x3, not in acute distress.  Elderly male, obese, BMI 30. 

CV:  S1, S2.  No murmurs.  Regular rate and rhythm.  Peripheral pulses present. 

Respiratory:  Moving air well bilaterally.  No wheezing. 

Abdomen:  Abdomen is soft, nontender, nondistended.  Positive bowel sounds. 

Extremities:  No clubbing, cyanosis.  Pedal edema present. 

Neurologic:  Nonfocal. 

Skin:  The patient has a wound on the left lateral neck, bandaged.  Clean, dry, intact.



Laboratory Data:  Sodium 133, potassium 4.4, chloride 103, CO2 24, BUN 27, creatinine 1.8, glucose 16
6, calcium 7.6, phosphorus 1.9, magnesium 2.  Albumin 2.2, .  WBC 11.2, H and H 8.5 and 25.7, 
platelets 217, neutrophils 71%.  Blood cultures, no growth to date.  Final wound culture from the lef
t neck showing MRSA.



Assessment And Plan:  An 83-year-old male with:

1.Acute metabolic encephalopathy secondary to sepsis and acute on chronic renal failure, resolved.

2.Metabolic acidosis.

3.Hypotension secondary to medications adjusted.

4.Hypothermia, resolved.

5.Methicillin-resistant Staphylococcus aureus from neck wound, status post debridement of necrotic u
lcer.  We will continue vancomycin with dialysis.

6.History of hypertension, stable.  The patient who had been hypotensive, has been taken off his med
ications.

7.End-stage renal disease, on hemodialysis.  We will continue nephrology on board.

8.Diabetes mellitus type 2 with hypoglycemia, off medications now.

9.Obesity, BMI 30.





SA/MODL

DD:  09/12/2018 16:04:30Voice ID:  [id number]

DT:  09/12/2018 20:49:07Report ID:  [id number]

## 2018-09-12 NOTE — PN
Date of Progress Note:  09/12/2018



Subjective:  The patient doing the same.  The patient underwent debridement of the lower neck, upper 
chest on the left eschar of the skin.



Objective:  Vital Signs:  When I saw the patient, blood pressure 128/51, pulse of 67.  Afebrile. 

Chest:  Clear to auscultation. 

Heart:  S1, S2.  Systolic murmur. 

Abdomen:  Soft.  Nontender. 

Extremities:  Plus edema.



Laboratory Data:  WBC 11.2, H and H 8.5/25.7, platelets of 217.  Sodium 133, potassium 4.4, bicarb 24
, BUN 27, creatinine 1.8, calcium 7.6, phosphorus 1.9, magnesium of 2.  .



Current Medications:  The patient on its include:

1.Aspirin.

2.Vancomycin.

3.Lovenox.

4.Metoprolol 12.5.

5.Gabapentin.

6.Zofran.

7.Pantoprazole.



Assessment And Plan:  

1.End-stage renal disease.  Possible recovery.  I am going to continue to arrange for the dialysis. 
 We will keep watching creatinine, predialysis.

2.Anasarca.  Continue ultrafiltration.

3.Hypertension, controlled, optimal.  Continue current treatment.

4.Methicillin-resistant staphylococcus aureus infection.  Continue 

vancomycin to be arranged with dialysis.  We will follow up with Surgery for debridement.





SUE

DD:  09/12/2018 15:01:16Voice ID:  [id number]

DT:  09/12/2018 16:17:31Report ID:  [id number]

## 2018-09-12 NOTE — PN
Date of Progress Note:  09/11/2018



Subjective:  The patient found to have MRSA bacteremia secondary to skin infection.



Physical Examination:

Vital Signs:  Blood pressure 133/75, pulse of 86. 

Chest:  Clear to auscultation. 

Heart:  S1, S2.  Systolic murmur. 

Abdomen:  Soft, nontender. 

Extremities:  +1 edema.



Laboratory Data:  H and H 8.7/25.6.  Sodium 135, potassium 4.2, bicarb 26, BUN 20, creatinine 1.4, ca
lcium 7.7, magnesium 1.7.



Current Medications:  The patient on its include:

1.Vancomycin.

2.Aspirin.

3.Lovenox.

4.Metoprolol 12.5.

5.Gabapentin.

6.Pantoprazole.



Assessment And Plan:  

1.End-stage renal disease secondary to acute kidney injury.  The patient showing some recovery.  I d
o sequential today then we will watch the patient for recovery as outpatient.

2.Hypertension, controlled, optimal.  Continue utilize the blood pressure for more ultrafiltration.

3.Anasarca secondary to cardiorenal nephrotic range of proteinuria.  We will continue ultrafiltratio
n.

4.Skin infection with methicillin-resistant Staphylococcus aureus.  We will start the patient on van
comycin.  We will follow up with 

the primary.

5.Diabetes as by primary.





MA/YOLA

DD:  09/11/2018 21:35:11Voice ID:  [id number]

DT:  09/12/2018 02:10:43Report ID:  [id number]

## 2018-09-12 NOTE — P.BOP
Preoperative diagnosis: necrotic ulcer left neck


Postoperative diagnosis: same


Primary procedure: Excisional debridement subQ of infected necrotic ulcer left 

neck


Secondary procedure: cm


Estimated blood loss: <10cc


Specimen: necrotic tissue, culture


Findings: infected necrotic ulcer left neck


Anesthesia: MAC


Complications: None


Transferred to: Recovery Room


Condition: Good

## 2018-09-13 LAB
ALBUMIN SERPL BCP-MCNC: 2.1 G/DL (ref 3.4–5)
BUN BLD-MCNC: 31 MG/DL (ref 7–18)
GLUCOSE SERPLBLD-MCNC: 160 MG/DL (ref 74–106)
HCT VFR BLD CALC: 24.6 % (ref 39.6–49)
LYMPHOCYTES # SPEC AUTO: 1.2 K/UL (ref 0.7–4.9)
MCH RBC QN AUTO: 29.9 PG (ref 27–35)
MCV RBC: 87.7 FL (ref 80–100)
PMV BLD: 8.4 FL (ref 7.6–11.3)
POTASSIUM SERPL-SCNC: 3.6 MMOL/L (ref 3.5–5.1)
RBC # BLD: 2.81 M/UL (ref 4.33–5.43)

## 2018-09-13 RX ADMIN — ASPIRIN SCH MG: 81 TABLET, COATED ORAL at 10:07

## 2018-09-13 RX ADMIN — PANTOPRAZOLE SODIUM SCH MG: 40 TABLET, DELAYED RELEASE ORAL at 06:16

## 2018-09-13 RX ADMIN — HUMAN INSULIN SCH: 100 INJECTION, SOLUTION SUBCUTANEOUS at 07:30

## 2018-09-13 RX ADMIN — HUMAN INSULIN SCH: 100 INJECTION, SOLUTION SUBCUTANEOUS at 21:00

## 2018-09-13 RX ADMIN — Medication SCH ML: at 10:08

## 2018-09-13 RX ADMIN — Medication SCH ML: at 23:21

## 2018-09-13 RX ADMIN — HUMAN INSULIN SCH UNIT: 100 INJECTION, SOLUTION SUBCUTANEOUS at 11:30

## 2018-09-13 RX ADMIN — METOPROLOL TARTRATE SCH MG: 25 TABLET ORAL at 10:05

## 2018-09-13 RX ADMIN — METOPROLOL TARTRATE SCH MG: 25 TABLET ORAL at 22:00

## 2018-09-13 RX ADMIN — HUMAN INSULIN SCH UNIT: 100 INJECTION, SOLUTION SUBCUTANEOUS at 17:53

## 2018-09-13 RX ADMIN — ALBUMIN (HUMAN) SCH MG: 5 SOLUTION INTRAVENOUS at 11:08

## 2018-09-13 RX ADMIN — ENOXAPARIN SODIUM SCH MG: 30 INJECTION SUBCUTANEOUS at 17:00

## 2018-09-13 NOTE — PN
Subjective:  The patient seen and examined.  Chart reviewed and case discussed 
with RN and Dr. Rosas.  The patient doing well.  Denies any complaints.  
Complaining about his scooter that is not been fixed by the [facility].  I explained to 
him that he needs to contact the scooter company.



Review of Systems:

Negative except as above.



Medications:  List reviewed.



Physical Examination:

Vital Signs:  Temperature 97.5, heart rate 89, blood pressure 174/72, 
respirations 18, O2 99% on room air. 

General:  Awake, alert, oriented x3.  Elderly male, obese, BMI 30. 

CV:  S1, S2.  No murmurs.  Peripheral pulses present. 

Respiratory:  Moving air well bilaterally.  No wheezing. 

Gastrointestinal:  Abdomen is soft, nontender, nondistended.  Positive bowel 
sounds. 

Extremities:  No clubbing, cyanosis.  The patient does have some pedal edema. 

Neurologic:  Nonfocal. 

Skin:  The patient has a wound on the left lateral neck, bandaged, no drainage.
  Clean, dry, and intact.



Laboratory Data:  Sodium 133, potassium 3.6, chloride 101, CO2 23, BUN 31, 
creatinine 2, glucose 160, calcium 7.8, phosphorus 2.3, albumin 2.1.  WBC 10.1, 
H and H 8.4 and 24.6, platelets 288, neutrophils 67%.  Wound culture from the 
left neck growing MRSA.  Blood cultures negative.  Final chest x-ray shows 
lungs are clear of infiltrate, intravenous catheter in place, calcified lung 
granulomas, personally reviewed.



Assessment And Plan:  An 83-year-old male with:

1.   Acute metabolic encephalopathy secondary to sepsis and renal failure, 
resolved.

2.   Metabolic acidosis.

3.   Sepsis, resolved.

4.   Hypertension, medications adjusted.

5.   Hypothermia, resolved.

6.   Methicillin-resistant Staphylococcus aureus from neck wound, status post 
debridement of necrotic ulcer.  Continue vancomycin, adjusted dose now will be 
daily.

7.   End-stage renal disease, on hemodialysis.  Per Nephrology we will not be 
going to dialyse him as of right now.  His kidney function has improved.

8.   History of hypertension, came in hypotensive.

9.   Diabetes mellitus type 2 with hypoglycemia.  Medications discontinued.

10.   Obesity, BMI 30.



Plan:  Set up LTAC referral for wound care, long-term IV antibiotics, and to 
monitor kidney function.





/YOLA

DD:  09/13/2018 16:59:19   Voice ID:  [id number]

DT:  09/13/2018 22:07:10   Report ID:  [id number]

MTDD

## 2018-09-13 NOTE — RAD REPORT
EXAM DESCRIPTION:  [facility] Single View9/13/2018 12:25 pm

 

CLINICAL HISTORY:  Shortness of breath

 

COMPARISON:  September 5, 2018

 

FINDINGS:   The lungs appear clear of acute infiltrate. The heart is normal size. Central venous cath
eter remains in place. Calcified lung granulomas are noted.

 

IMPRESSION:   No acute abnormalities displayed

## 2018-09-14 LAB
ALBUMIN SERPL BCP-MCNC: 2 G/DL (ref 3.4–5)
BUN BLD-MCNC: 33 MG/DL (ref 7–18)
CREAT 24H UR-MRATE: 180 MG/24H (ref 600–2000)
CREAT UR-SCNC: 20 MG/DL (ref 20–370)
GLUCOSE SERPLBLD-MCNC: 143 MG/DL (ref 74–106)
HCT VFR BLD CALC: 25 % (ref 39.6–49)
LYMPHOCYTES # SPEC AUTO: 1.3 K/UL (ref 0.7–4.9)
MCH RBC QN AUTO: 29.2 PG (ref 27–35)
MCV RBC: 87.6 FL (ref 80–100)
PMV BLD: 8.1 FL (ref 7.6–11.3)
POTASSIUM SERPL-SCNC: 4.1 MMOL/L (ref 3.5–5.1)
RBC # BLD: 2.86 M/UL (ref 4.33–5.43)
SPECIMEN VOL 24H UR: 900 ML
UA COMPLETE W REFLEX CULTURE PNL UR: (no result)
UA DIPSTICK W REFLEX MICRO PNL UR: (no result)

## 2018-09-14 RX ADMIN — SODIUM CHLORIDE SCH MLS: 9 INJECTION, SOLUTION INTRAVENOUS at 18:53

## 2018-09-14 RX ADMIN — HUMAN INSULIN SCH UNIT: 100 INJECTION, SOLUTION SUBCUTANEOUS at 17:19

## 2018-09-14 RX ADMIN — HUMAN INSULIN SCH: 100 INJECTION, SOLUTION SUBCUTANEOUS at 07:30

## 2018-09-14 RX ADMIN — METOPROLOL TARTRATE SCH MG: 25 TABLET ORAL at 21:29

## 2018-09-14 RX ADMIN — METOPROLOL TARTRATE SCH MG: 25 TABLET ORAL at 09:00

## 2018-09-14 RX ADMIN — Medication SCH ML: at 09:00

## 2018-09-14 RX ADMIN — ENOXAPARIN SODIUM SCH MG: 30 INJECTION SUBCUTANEOUS at 18:06

## 2018-09-14 RX ADMIN — PANTOPRAZOLE SODIUM SCH: 40 TABLET, DELAYED RELEASE ORAL at 06:30

## 2018-09-14 RX ADMIN — Medication SCH ML: at 21:31

## 2018-09-14 RX ADMIN — HUMAN INSULIN SCH: 100 INJECTION, SOLUTION SUBCUTANEOUS at 11:30

## 2018-09-14 RX ADMIN — HUMAN INSULIN SCH: 100 INJECTION, SOLUTION SUBCUTANEOUS at 21:00

## 2018-09-14 RX ADMIN — ASPIRIN SCH MG: 81 TABLET, COATED ORAL at 09:00

## 2018-09-14 RX ADMIN — ALBUMIN (HUMAN) SCH MG: 5 SOLUTION INTRAVENOUS at 09:28

## 2018-09-14 NOTE — PN
Date of Progress Note:  09/14/2018



Subjective:  The patient was seen and examined.  Chart reviewed and case discussed with RN and Dr. Bullock.  The patient was discharged yesterday, however refused to go to [facility] once transport a
rrived, complaining that he did not know he was being transferred there.  However, during grand round
s with , , nurse manager, and primary nurse present at the bedside, the ricardo
ent was given his options and he was agreeable for [facility] transfer yesterday.  Regardless, the patie
nt states that he will not go to that facility, he wants to go home as he wants to spend time with hi
s grandson.  He understands that he needs wound care, IV antibiotics long-term, and most beneficial f
or him would be a facility where he can be watched and monitored closely in terms of his wounds, his 
kidney function, and his IV antibiotics; however, he refused.  He understands the risks.



Review of Systems:

Negative except as above.



Medications:  List reviewed.



Physical Examination:

Vital Signs:  Temperature 98, heart rate 72, blood pressure 165/78, respirations 18, O2 98% on room a
ir. 

General:  Awake, alert, oriented x3.  Not in any acute distress.  Elderly male, obese, BMI 30. 

CV:  S1, S2.  Peripheral pulses present. 

Respiratory:  Moving air well bilaterally.  No wheezing. 

Gastrointestinal:  Abdomen is soft, nontender, nondistended.  Positive bowel sounds. 

Extremities:  No clubbing or cyanosis.  Trace pedal edema. 

Neurologic:  Nonfocal.



Laboratory Data:  Sodium 134, potassium 4.1, chloride 102, CO2 21, BUN 33, creatinine 2, glucose 143,
 calcium 7.3, phosphorus 3.2.  WBC 9.9, H and H 8.4 and 25, platelets 328, neutrophils 63.3%.  Left n
elliott wound growing MRSA.  Blood cultures negative.



Assessment And Plan:  An 83-year-old male with:

1.Acute metabolic encephalopathy, resolved.

2.Sepsis, resolved.

3.Metabolic acidosis, resolved.

4.Methicillin-resistant Staphylococcus aureus from neck wound, status post debridement of wound.  Co
ntinue IV antibiotics for 2 week.

5.End-stage renal disease, improving, likely able to come off hemodialysis.

6.Essential hypertension.  Had been hypertensive.  Medications have been adjusted.

7.Hypothermia, resolved.

8.Recent hospitalization for nephrotic syndrome, secondary to metformin.  Metformin discontinued.

9.Diabetes mellitus type 2 with hypoglycemia, non-insulin dependent.

10.Obesity, body mass index 30.



Plan:  To discharge home with home health after IV antibiotics set up.





PABLO

DD:  09/14/2018 17:11:57Voice ID:  [id number]

DT:  09/14/2018 22:36:21Report ID:  [id number]

## 2018-09-14 NOTE — PN
Date of Progress Note:  09/13/2018



Subjective:  The patient status post debridement of the lower neck wound, tolerated well, growing MRS
NATALIE.



Objective:  Vital Signs:  Blood pressure 153/70, pulse of 85. 

Chest:  Faint crackles on the base. 

Heart:  S1, S2.  Regular. 

Abdomen:  Soft, nontender. 

Extremities:  +1 edema.



Laboratory Data:  WBC 10.1, H and H 8.4/24.6, platelets 288.  Sodium 133, potassium 3.6, bicarb 23, B
UN 31, creatinine 2, GFR 32, calcium 7.8, phosphorous 2.3.



Current Medications:  The patient on its include:

1.Tylenol.

2.Vancomycin.

3.Metoprolol 12.5.

4.Pantoprazole.



Assessment And Plan:  

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  The patient sh
owing some recovery, I am going to hold on the dialysis today, and we will start the patient 24-hour 
creatinine clearance, and we will follow up.

2.We will start the patient on Lasix.

3.Hypertension, controlled, not optimal.

4.Methicillin-resistant Staphylococcus aureus cellulitis.

5.Diabetes.  Will follow up with primary.

6.Cellulitis.  Continue vancomycin, we will change the vancomycin to 

daily.  The patient is going to require treatment for long time, we will consider LTAC.





SUE

DD:  09/13/2018 23:22:07Voice ID:  [id number]

DT:  09/14/2018 04:01:45Report ID:  [id number]

## 2018-09-14 NOTE — DS
Date of Discharge:  09/14/2018



Consultants:  Dr. Gibson, General Surgery, Dr. Machado and Dr. Rosas with Nephrology.



Procedures:  On 09/12/2018, debridement of left lateral neck wound necrotic ulcer.  Pathology shows l
eft necrotic wound, excision shows gangrenous necrosis.



Admitting Diagnoses:  

1.Altered mental status.

2.Hypoglycemia.

3.Hypothermia.

4.End-stage renal disease, on hemodialysis.

5.Recent hospitalization for nephrotic syndrome secondary to metformin.

6.Essential hypertension.

7.Anemia of chronic disease.



Discharge Diagnoses:  

1.Acute metabolic encephalopathy secondary to sepsis and renal failure, resolved.

2.Metabolic acidosis, resolved.

3.Sepsis, resolved.

4.Methicillin-resistant Staphylococcus aureus, neck wound status post debridement, on vancomycin for
 2 weeks.

5.End-stage renal disease.  He is on hemodialysis, currently on hold due to improvement in kidney fu
nction.

6.Essential hypertension.  Had been hypotensive.  Medications adjusted.

7.Hypothermia, resolved.

8.Recent hospitalization for nephrotic syndrome secondary to metformin.  Metformin has been disconti
nued.

9.Diabetes mellitus type 2 with hypoglycemia, non-insulin dependent.  Metformin discontinued.

10.Obesity, BMI 30.



Hospital Course:  The patient is an 83-year-old male, came into the hospital for altered mental statu
s, found to be hypoglycemic.  Sugar was 30.  The patient was started on dialysis about 2 weeks ago.  
He does take metformin.  He did have nephrotic syndrome because of diabetes.  He was resuscitated.  H
e was found to be septic and acidotic, given IV fluids.  His temperature improved.  His white count i
mproved as well.  He did have a wound on his left neck, which was debrided by Dr. Gibson and was fo
und to have MRSA.  Blood cultures were negative.  The patient was seen by Nephrology, Dr. Ralph Machado, and his dialysis was continued.  The patient's kidney function did improve and he 
will slowly be weaned off dialysis.  The patient was receiving vancomycin for his MRSA due to his wou
nd and his chronic kidney disease as well as IV antibiotics.  He was referred to AC.  The patient w
as excepted to [facility] and was discharged to the LTAC in a stable condition.



Activity:  Fall precautions.



Diet:  Renal, diabetic diet.



Followup:  Follow up with PCP in 1 week.  Follow up with nephrologist, Dr. Rosas in 2 weeks.  Retu
rn to ER for worsening condition.  Follow up with general surgeon, Dr. Gibson in 7-10 days.



Medications:  As per medication reconciliation list.  The patient's blood pressure medications have b
een adjusted.  He will no longer be on metformin due to nephrotic syndrome and hypoglycemia.  We will
 continue vancomycin for 2 weeks. 



Weekly CBC, CMP, ESR and CRP.  Repeat wound cultures after antibiotics are completed.  Adjust vanc do
se according to trough and renal function. 



Total time spent discharging the patient was 38 minutes.  For physical exam findings, please see prog
ress note dictated on the day of discharge.





/YOLA

DD:  09/13/2018 17:48:01Voice ID:  [id number]

DT:  09/14/2018 16:17:23Report ID:  [id number]

## 2018-09-15 LAB
ALBUMIN SERPL BCP-MCNC: 2.1 G/DL (ref 3.4–5)
BUN BLD-MCNC: 32 MG/DL (ref 7–18)
COHGB MFR BLDA: 1.5 % (ref 0–1.5)
GLUCOSE SERPLBLD-MCNC: 145 MG/DL (ref 74–106)
OXYHGB MFR BLDA: 68.4 % (ref 94–97)
POTASSIUM SERPL-SCNC: 3.5 MMOL/L (ref 3.5–5.1)
SAO2 % BLDA: 69.7 % (ref 92–98.5)

## 2018-09-15 RX ADMIN — HUMAN INSULIN SCH: 100 INJECTION, SOLUTION SUBCUTANEOUS at 11:30

## 2018-09-15 RX ADMIN — HUMAN INSULIN SCH: 100 INJECTION, SOLUTION SUBCUTANEOUS at 21:00

## 2018-09-15 RX ADMIN — METOPROLOL TARTRATE SCH MG: 25 TABLET ORAL at 21:53

## 2018-09-15 RX ADMIN — Medication SCH ML: at 21:00

## 2018-09-15 RX ADMIN — ENOXAPARIN SODIUM SCH MG: 30 INJECTION SUBCUTANEOUS at 18:41

## 2018-09-15 RX ADMIN — METOPROLOL TARTRATE SCH MG: 25 TABLET ORAL at 10:27

## 2018-09-15 RX ADMIN — PANTOPRAZOLE SODIUM SCH MG: 40 TABLET, DELAYED RELEASE ORAL at 06:36

## 2018-09-15 RX ADMIN — ALBUMIN (HUMAN) SCH MG: 5 SOLUTION INTRAVENOUS at 10:30

## 2018-09-15 RX ADMIN — ASPIRIN SCH MG: 81 TABLET, COATED ORAL at 10:29

## 2018-09-15 RX ADMIN — Medication SCH ML: at 10:32

## 2018-09-15 RX ADMIN — HUMAN INSULIN SCH: 100 INJECTION, SOLUTION SUBCUTANEOUS at 16:30

## 2018-09-15 RX ADMIN — HUMAN INSULIN SCH: 100 INJECTION, SOLUTION SUBCUTANEOUS at 07:30

## 2018-09-15 NOTE — PN
Date of Progress Note:  09/14/2018



Subjective:  The patient is doing well.  The patient is status post 
debridement.  The patient's last dialysis was Tuesday.



Physical Examination:

Vital Signs:  When I saw the patient, blood pressure 160/79, pulse ox 79. 

Chest:  Faint crackles on the base.  Dressing on the upper chest. 

Heart:  S1, S2.  Systolic murmur. 

Abdomen:  Soft, nontender. 

Extremities:  Plus edema, more prominent edema on the upper extremity.



Laboratory Data:  WBC 9.9, H and H 8.4/25.  Sodium 134, potassium 4.1, bicarb 21
, BUN 33, creatinine 2, calcium 7.3, and phosphorous 3.2.



Current Medications:  The patient on include;

1.   Tylenol.

2.   Lovenox.

3.   Hydralazine.

4.   Metoprolol.

5.   Zofran.

6.   Vancomycin.



Assessment And Plan:  

1.   Acute kidney injury secondary to toxic acute tubular necrosis, poor 
perfusion, ATN.  Slightly on the wet side, I am going to start the patient on 
Lasix 40 mg daily and we will monitor the patient.  I am going to keep holding 
on the dialysis for the time being.

2.   Hypertension, controlled, not optimal.  We will add Lasix.

3.   Skin infection with methicillin-resistant Staphylococcus aureus, status 
post debridement.  Continue vancomycin.  We will change it to every 24 hours.  
We will follow up GFR  for the recovery .  The patient 

refused to go to LTAC.  We will arrange for home health.  PICC line cannot be 
inserted today, and we will follow up.





SUE

DD:  09/14/2018 19:40:40   Voice ID:  [id number]

DT:  09/15/2018 01:13:15   Report ID:  [id number]

MTDD

## 2018-09-15 NOTE — PN
Date of Progress Note:  09/15/2018



Subjective:  The patient was admitted with septic shock.  Renal failure.  Used to be on dialysis.  We
 thought the patient recovered infarction.  The patient nonoliguric.  We have done 24-hour creatinine
 clearance, last dialysis 4 days ago.



Laboratory Data:  H and H 8.4/25.  Sodium 135, potassium 3.5, bicarb 21, BUN 32, creatinine 2.  Urine
 creatinine of 20, volume of 900.  Creatinine clearance only 6.



Objective:  Vital Signs:  When I saw the patient, blood pressure of 158/75, pulse of 76.  Afebrile. 

Chest:  Faint crackles bilateral base. 

Heart:  S1, S2.  Regular. 

Abdomen:  Soft.  Nontender. 

Extremity:  Trace edema, more prominent edema +1 to 2 on the upper extremity.



Medications:  Current medications the patient on its include,

1.Lasix 40 mg daily.

2.Albumin.

3.Gabapentin.

4.Hydralazine.

5.Metoprolol. 

6.Pantoprazole.

7.Vancomycin 1 g q.24.



Assessment And Plan:  

1.Acute kidney injury, glomerular filtration rate less than 6.  We will resume the patient on dialys
is.  We will dialyze the patient tomorrow, and the patient already set up for outpatient dialysis, TT
S, at [facility], at 12:30, transportation has been arranged.

2.Hypertension, controlled, optimal.  We will follow up blood pressure after dialysis.

3.Anasarca, secondary to renal failure.  Continue diuresis.  We will challenge tomorrow.

4.Cellulitis, status post debridement.  Methicillin-resistant Staphylococcus aureus.  Continue vanco
mycin daily.  Tomorrow after dialysis, we will switch it to 1 g after each dialysis, and we will foll
ow up. 

Case discussed with Dr. Matos and with the patient, verbalized understanding.





SUE

DD:  09/15/2018 17:22:40Voice ID:  [id number]

DT:  09/15/2018 19:16:34Report ID:  [id number]

## 2018-09-15 NOTE — RAD REPORT
EXAM DESCRIPTION:  RAD - Chest Single View - 9/15/2018 1:22 am

 

CLINICAL HISTORY:   Device placement PICC line placement

 

COMPARISON:  09/13/2008

 

FINDINGS:   A PICC line has been inserted with its tip in the region of the distal left brachiocephal
ic vein.

 

The lungs appear clear of acute infiltrate. The heart is normal size. Right central venous line remai
ns in place

 

IMPRESSION:   PICC line with its tip in the region of the distal left brachiocephalic vein

## 2018-09-15 NOTE — PN
Date of Progress Note:  09/15/2018



History:  The patient seen and examined.  Chart reviewed and case discussed with RN.  The patient is 
doing well.  Has declined SNF yesterday.  No further complaints.



Review of Systems:

Negative except as above.



Medications:  List reviewed.



Physical Examination:

Vital Signs:  Temperature 97, heart rate 78, blood pressure 159/74, respirations 18, O2 99% on room a
ir. 

General:  Awake, alert, oriented x3.  Elderly male, obese. 

CV:  S1, S2.  Peripheral pulses present. 

Respiratory:  Moving air well bilaterally.  No wheezing. 

Gastrointestinal:  Abdomen is soft, nontender, nondistended.  Positive bowel sounds. 

Extremities:  No clubbing, cyanosis, some trace pedal edema. 

Neuro:  Nonfocal.



Laboratory Data:  Sodium 135, potassium 3.5, chloride 103, CO2 21, BUN 32, creatinine 2, glucose 145,
 calcium 7.7, phosphorus 3.5, albumin 2.1.  WBC 9.9, H and H 8.4, 25, platelets 328, neutrophils 63%.
  Wound cultures, Staph aureus, MRSA.



Assessment:  An 83-year-old male with:

1.Acute metabolic encephalopathy, resolved secondary to sepsis.

2.Sepsis, resolved secondary to wound infection.

3.Metabolic acidosis, resolved.

4.Methicillin-resistant Staphylococcus aureus in the wound from the neck, status post debridement.  
We will continue IV antibiotics for total of 2 weeks.  The patient refused LTAC and SNF, wants to go 
home only.  He understands the risks.  Home Health being set up.

5.End-stage kidney disease, improving.  Hemodialysis on hold, as his kidney has improved.

6.Essential hypertension, medications adjusted due to hypotension.

7.Hypothermia, resolved.

8.Diabetes mellitus type 2 with hypoglycemia, non-insulin dependent, off metformin due to previous h
ospitalization and nephrotic syndrome.

9.Obesity, BMI 30.



Plan:  Discharge home with Home Health once IV antibiotic is set up.





/YOLA

DD:  09/15/2018 09:03:46Voice ID:  [id number]

DT:  09/15/2018 12:07:02Report ID:  [id number]

## 2018-09-16 VITALS — OXYGEN SATURATION: 100 %

## 2018-09-16 LAB
ALBUMIN SERPL BCP-MCNC: 2.2 G/DL (ref 3.4–5)
BUN BLD-MCNC: 30 MG/DL (ref 7–18)
GLUCOSE SERPLBLD-MCNC: 124 MG/DL (ref 74–106)
POTASSIUM SERPL-SCNC: 3.3 MMOL/L (ref 3.5–5.1)

## 2018-09-16 PROCEDURE — 5A1D70Z PERFORMANCE OF URINARY FILTRATION, INTERMITTENT, LESS THAN 6 HOURS PER DAY: ICD-10-PCS

## 2018-09-16 RX ADMIN — ENOXAPARIN SODIUM SCH MG: 30 INJECTION SUBCUTANEOUS at 21:34

## 2018-09-16 RX ADMIN — METOPROLOL TARTRATE SCH MG: 25 TABLET ORAL at 10:40

## 2018-09-16 RX ADMIN — SODIUM CHLORIDE SCH MLS: 9 INJECTION, SOLUTION INTRAVENOUS at 05:44

## 2018-09-16 RX ADMIN — Medication SCH APPL: at 09:00

## 2018-09-16 RX ADMIN — HUMAN INSULIN SCH: 100 INJECTION, SOLUTION SUBCUTANEOUS at 21:00

## 2018-09-16 RX ADMIN — ASPIRIN SCH MG: 81 TABLET, COATED ORAL at 10:40

## 2018-09-16 RX ADMIN — HUMAN INSULIN SCH: 100 INJECTION, SOLUTION SUBCUTANEOUS at 07:30

## 2018-09-16 RX ADMIN — Medication SCH ML: at 21:00

## 2018-09-16 RX ADMIN — METOPROLOL TARTRATE SCH MG: 25 TABLET ORAL at 21:34

## 2018-09-16 RX ADMIN — PANTOPRAZOLE SODIUM SCH MG: 40 TABLET, DELAYED RELEASE ORAL at 05:44

## 2018-09-16 RX ADMIN — Medication SCH ML: at 10:42

## 2018-09-16 RX ADMIN — HUMAN INSULIN SCH: 100 INJECTION, SOLUTION SUBCUTANEOUS at 16:30

## 2018-09-16 RX ADMIN — HUMAN INSULIN SCH: 100 INJECTION, SOLUTION SUBCUTANEOUS at 11:30

## 2018-09-16 RX ADMIN — ALBUMIN (HUMAN) SCH MG: 5 SOLUTION INTRAVENOUS at 10:42

## 2018-09-16 NOTE — PN
Date of Progress Note:  09/16/2018



Subjective:  The patient is feeling the same.  Still have the swelling in the 
upper extremities.



Objective:  Vital Signs:  Blood pressure 152/70, and afebrile. 

Chest:  Faint crackles bilateral base. 

Heart:  S1, S2.  Systolic murmur. 

Abdomen:  Soft.  Nontender. 

Extremity:  No edema on the lower extremity.  Edema on the upper extremity.  
Had right IJ cath and left PICC line.



Labs:  WBC of 9.9, H and H of 8.4/25.  Sodium 135, potassium 3.3, bicarb 23, 
BUN 30, creatinine 2, GFR of 32, calcium 8, phosphorus 3.9.



Current Medications:  The patient on include,

1.   Lasix 40 daily.

2.   Tylenol.

3.   Gabapentin.

4.   Mannitol.

5.   Metoprolol 12.5.

6.   Pantoprazole.

7.   Vancomycin.



Assessment And Plan:  

1.   End-stage renal disease.  Unfortunately, we did not confirm that he has 
recovered.  Creatinine clearance 24 hours it is just 7.  We will resume 
dialysis.  The patient is going to be dialyzed today, then he can be dialyzed 
Tuesday, Thursday, and Saturday.

2.   Hypertension, controlled.  I can adjust his blood pressure medication.  We 
will follow up.

3.   Anemia of chronic kidney disease.  Continue Epogen.

4.   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

5.   Cellulitis with methicillin-resistant Staphylococcus aureus.  The patient 
is going to receive the vancomycin after dialysis.  No need to set up for 
outpatient IV antibiotic.  We will follow up. 



The patient cleared from the renal standpoint for discharge planning.





SUE

DD:  09/16/2018 14:16:23   Voice ID:  [id number]

DT:  09/16/2018 20:04:45   Report ID:  [id number]

MTDUSHA

## 2018-09-17 VITALS — TEMPERATURE: 97.4 F | DIASTOLIC BLOOD PRESSURE: 68 MMHG | SYSTOLIC BLOOD PRESSURE: 147 MMHG

## 2018-09-17 RX ADMIN — PANTOPRAZOLE SODIUM SCH MG: 40 TABLET, DELAYED RELEASE ORAL at 05:59

## 2018-09-17 RX ADMIN — Medication SCH APPL: at 08:40

## 2018-09-17 RX ADMIN — METOPROLOL TARTRATE SCH MG: 25 TABLET ORAL at 08:41

## 2018-09-17 RX ADMIN — HUMAN INSULIN SCH: 100 INJECTION, SOLUTION SUBCUTANEOUS at 11:30

## 2018-09-17 RX ADMIN — HUMAN INSULIN SCH: 100 INJECTION, SOLUTION SUBCUTANEOUS at 07:30

## 2018-09-17 RX ADMIN — ALBUMIN (HUMAN) SCH MG: 5 SOLUTION INTRAVENOUS at 08:42

## 2018-09-17 RX ADMIN — ASPIRIN SCH MG: 81 TABLET, COATED ORAL at 08:42

## 2018-09-17 RX ADMIN — Medication SCH ML: at 08:40

## 2018-09-17 NOTE — DS
Date of Discharge:  09/16/2018



Consultants:  Dr. Rosas with Nephrology; Dr. Bales with Infectious Disease; Dr. Gibson, General
 Surgery; Dr. Sloan, Nephrology.



Procedures:  09/12/2018, debridement of left lateral neck wound necrotic ulcer.  Pathology showing ne
crotic wound and excision showing gangrenous necrosis.



Admitting Diagnoses:  

1.Altered mental status.

2.Hypoglycemia.

3.Hypothermia.

4.End-stage renal disease, on hemodialysis.

5.Recent hospitalization for nephrotic syndrome secondary to metformin.

6.Essential hypertension.

7.Anemia of chronic disease.



Discharge Diagnoses:  

1.Acute metabolic encephalopathy secondary to sepsis and renal failure, resolved.

2.Metabolic acidosis, resolved.

3.Sepsis, resolved.

4.MRI today of the neck wound status post debridement on vancomycin for a total of 2 weeks.

5.End-stage kidney disease, on hemodialysis.

6.Essential hypertension.  Had been hypotensive, medications adjusted.

7.Hypothermia, resolved.

8.Recent hospitalization for nephrotic syndrome secondary to metformin.  Metformin discontinued.

9.Diabetes mellitus type 2 with hyperglycemia, non-insulin dependent.

10.Obesity, BMI 30.



Hospital Course:  Please see previous discharge summary for details.  However, the patient was initia
lly set to be discharged on 09/14/2018, however, refused to go to LTAC and arrangements had to be mad
e for chair time adjustment, transportation to and from dialysis, and IV vancomycin with dialysis.  T
he patient initially had improvement in his kidney function and was not going to require dialysis.  H
owever, 24-hour urine showed worsening creatinine clearance and therefore was re-initiated on dialysi
s.  The patient is an 83-year-old male who was admitted to the hospital for altered mental status.  H
e had some hypoglycemia and he was hypotensive.  The patient's metformin was discontinued.  His blood
 pressure medications were also adjusted.  He did have a wound on his left neck, which was debrided b
y Dr. Gibson, found to have MRSA.  Blood cultures did not show any growth.  He was started on IV an
tibiotics.  The patient continued on dialysis.  However, with improvement in kidney function it was h
eld.  However, after 24-hour urine, he was recommended to be restarted on dialysis.  The patient was 
initially referred to LTAC in [facility], however, last minute refused due to previous bad ex
perience and therefore had to be set up with Home Health as he was no longer to be on dialysis.  Foster
jeb, with hemodialysis being resumed, vancomycin will be given with dialysis.



Activity:  Fall precautions.



Diet:  Renal diabetic diet.



Followup:  Follow up with primary care physician in 1 week.  Follow up with nephrologist, Dr. Alana stack in 2 weeks.  Return to ER for worsening condition.  Follow up with Dr. Gibson, surgeon in 7 to 10
 days for wound check.



Medications:  As per medication reconciliation list.  The patient will need weekly CBC, CMP, ESR, and
 CRP.  Repeat wound cultures after antibiotics are completed. 



Total time spent discharging the patient was 40 minutes.



Physical Examination:

General:  Awake, alert, and oriented.  No acute distress. 

CV:  S1, S2.  No murmurs. 

Respiratory:  Moving air well bilaterally. 

Abdomen:  Soft, nontender, nondistended.  Positive bowel sounds. 

Extremities:  No clubbing, cyanosis.  There is no peripheral edema. 

Neurologic:  Nonfocal.





SA/MODL

DD:  09/16/2018 11:22:24Voice ID:  [id number]

DT:  09/17/2018 06:18:27Report ID:  [id number]

## 2018-09-17 NOTE — PN
Subjective:  The patient lying in bed.  Denies any headache, nausea, vomiting, chest pain, abdominal 
pain, constipation, or diarrhea.



Objective:  Vital Signs:  Temperature 97, pulse 74, respiration 18, blood pressure 155/76. 

Lungs:  Basal crackles. 

Heart:  S1, S2.  Regular. 

Abdomen:  Soft, nontender.  Bowel sounds positive. 

Extremities:  No edema.



Laboratory Data:  WBC 9.9, hemoglobin 8.4, platelets are 328.  Chemistry shows sodium 135, potassium 
3.3, chloride 102, bicarb 23, BUN 30, creatinine 2, glucose 124.  Wound cultures from the neck site s
hows the Staph aureus and MRSA.



Assessment/plan:  Sepsis.  Neck wound shows methicillin-resistant Staphylococcus aureus and staph aur
eus.  Continue antibiotic and supportive care.  Medihoney to the neck site.  We will follow the patie
nt as needed.





ADITHYA/YOLA

DD:  09/17/2018 14:18:41Voice ID:  [id number]

DT:  09/17/2018 19:46:27Report ID:  [id number]

## 2018-09-17 NOTE — PN
Date of Progress Note:  09/17/2018



Subjective:  The patient seen and examined.  Chart reviewed and case discussed with RN.  The patient 
did not finish up his dialysis and vancomycin till later on at night, therefore, was unable to be dis
charged home yesterday.  The patient is doing well otherwise.



Review of Systems:

Negative except as above.



Medications:  List reviewed.



Physical Examination:

Vital Signs:  Temperature 97.6, heart rate 74, blood pressure 155/76, respirations 18, O2 99% on room
 air. 

General:  Awake, alert, oriented x3, not in any acute distress.  Elderly male. 

CV:  S1, S2.  No murmurs. 

Respiratory:  Moving air well bilaterally. 

Gastrointestinal:  Abdomen is soft, nontender, nondistended.  Positive bowel sounds. 

Extremities:  No clubbing, cyanosis.  Trace edema. 

Neurologic:  Nonfocal.  

Skin:  The patient has left anterior neck wound clean, dry, intact.



Laboratory Data:  Glucose 152, 182.  Microbiology:  Wound growing MRSA.



Assessment And Plan:  

1.Acute metabolic encephalopathy secondary to sepsis and renal failure, resolved.

2.Metabolic acidosis, resolved.

3.Sepsis, resolved.

4.Methicillin-resistant Staphylococcus neck wound, status post debridement.

5.End-stage kidney disease, on hemodialysis.

6.Essential hypertension, stable.

7.Hypothermia, resolved.

8.Recent hospitalization for nephrotic syndrome secondary to metformin.

9.Diabetes mellitus type 2 with hypoglycemia, non-insulin dependent, now with hyperglycemia, metform
in discontinued.

10.Obesity, BMI 30.



Plan:  Discharge home to continue hemodialysis and vancomycin for a total of 14 days.





/YOLA

DD:  09/17/2018 13:26:23Voice ID:  [id number]

DT:  09/17/2018 18:01:48Report ID:  [id number]

## 2018-09-18 NOTE — PN
Date of Progress Note:  09/17/2018



Chief Complaint:  End-stage renal disease, on dialysis.



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
titial chronic changes.  The patient underwent re-evaluation for creatinine clearance and his creatin
ine clearance is less than 10.  The patient resumed dialysis, received dialysis on Saturday.  He will
 continue dialysis on Tuesday, Thursday, and Saturday.



Review of Systems:

The patient denies PND or orthopnea.



Physical Examination:

Vital Signs:  Blood pressure 150/70, heart rate 80.  Chest:  Diminished breath sounds at bases. 

Heart:  S1, S2.  No pericardial friction rub. 

Abdomen:  Soft, benign, nontender. 

Extremities:  Minimal edema.



Laboratory Data:  Sodium 131, potassium 3.3, bicarbonate 33, BUN 30, creatinine 2, calcium 8.2, phosp
horus 3.9.



Impression And Plan:  

1.End-stage renal disease.  The patient will continue dialysis 3 times per week; on Tuesday, Thursda
y, and Saturday.

2.Hypertension, controlled.

3.Anemia of chronic kidney disease.  Continue Epogen.

4.Cellulitis with methicillin-resistant Staphylococcus aureus.  The patient will receive vancomycin 
with dialysis.





JOELLEN/MODL

DD:  09/17/2018 21:45:24Voice ID:  [id number]

DT:  09/18/2018 02:33:33Report ID:  [id number]

## 2018-09-20 NOTE — OP
Date of Procedure:  09/12/2018



Surgeon:  Bruno Gibson MD



Preoperative Diagnosis:  Necrotic ulcer, left neck.



Postoperative Diagnosis:  Necrotic ulcer, left neck.



Procedure:  Excisional debridement down to subcu of infected necrotic left leg ulcer 15 x 6 cm.



Anesthesia:  MAC plus local.



Indications:  This is the case of a male who comes to us with necrotic ulcer and infected left neck r
egion.  It goes down to deep subcutaneous tissue.  They want surgical debridement, so I fully explain
ed to the patient the benefits, alternatives, and risks of debridement which include but are not limi
acacia to infection, bleeding, damage to adjacent structures, anesthesia complication, nonhealing wound,
 MI, and even death.  He also understands this may not relieve his symptoms.  He might need more than
 one surgical intervention.  He will require wound care.  He signed a consent.



Description Of Procedure:  The patient was brought to the operating room, placed in supine position. 
 Anesthesia was done without complication.  A time-out was called.  Left neck was prepped and draped 
in usual sterile fashion.  Local anesthesia was applied over the area followed by sharp incision of t
he necrotic tissue with a sharp knife.  All the dead tissue was removed.  The area was irrigated.  Th
e area was packed with wet-to-dry dressing after 

hemostasis was obtained.  The patient tolerated the procedure well.  The patient was sent to recovery
 room in stable condition.





KATJA

DD:  09/20/2018 11:16:53Voice ID:  [id number]

DT:  09/20/2018 21:31:14Report ID:  [id number]

## 2022-07-30 NOTE — P.BOP
Preoperative diagnosis: ESRD


Postoperative diagnosis: same


Primary procedure: 1. Placement of Hemosplit hemodialysis catheter


Secondary procedure: 2. interpretation of fluoroscopy


Other procedure(s): 3. RIght neck ultrasound


Estimated blood loss: <20cc


Specimen: none


Findings: as above


Anesthesia: General


Complications: None


Transferred to: Recovery Room


Condition: Good
<-- Click to add NO pertinent Family History

## 2022-11-07 NOTE — P.PN
Subjective


Date of Service: 07/31/18


Primary Care Provider: Dr. Ashraf; Nephrology-Dr. Cabezas


Chief Complaint: Shortness of breath, edema





Patient seen and examined at bedside with RN.  Chart reviewed.  Case discussed 

with cardiology, nephrology, general surgery.  Patient has no complaints to 

offer at this time.  Has been doing well overall.  Is scheduled for dialysis 

tomorrow morning.  States that his shortness of breath has gotten better.  No 

other complaints to offer.





Review of Systems


General: As per HPI





Physical Examination





- Vital Signs


Temperature: 97.4 F


Blood Pressure: 116/44


Pulse: 71


Respirations: 13


Pulse Ox (%): 98





- Physical Exam


General: Alert, In no apparent distress, Oriented x3


HEENT: Atraumatic, PERRLA, EOMI


Neck: Supple, JVD not distended


Respiratory: Clear to auscultation bilaterally, Normal air movement


Cardiovascular: Regular rate/rhythm, Normal S1 S2


Gastrointestinal: Normal bowel sounds, No tenderness


Musculoskeletal: No tenderness, Other (Dialysis catheter in place on the right  

femoral area)


Integumentary: No rashes


Neurological: Normal speech, Normal tone, Normal affect


Lymphatics: No axilla or inguinal lymphadenopathy





- Studies


Medications List Reviewed: Yes





Assessment & Plan





- Problems (Diagnosis)


(1) Renal failure (ARF), acute on chronic


Onset Date: 07/30/18   Current Visit: Yes   Status: Acute   


Plan: 


Patient with acute kidney injury.  Most likely secondary to possible nephrotic 

syndrome, 


-awaiting paperwork from Joshua Pan


-nephrology consulted here.  Appreciated recommendations.


-patient with temporary dialysis catheter getting dialyzed here in the 

hospital.  Next dialysis session on Wednesday.  The N creatinine improving.


-continue to monitor at this time.


-hyperkalemia and metabolic acidosis related to the acute kidney injury has 

resolved now.


Qualifiers: 


   Chronic kidney disease stage: stage 5, not on chronic dialysis 





(2) Bradycardia


Onset Date: 07/30/18   Current Visit: Yes   Status: Acute   


Plan: 


Resolved now.


-status post pacing in the ER.


-dopamine drip was started.  Stopped yesterday at 4:00 p.m..  Currently heart 

rate between 80s to 90s


-continuous cardiac monitoring.


-cardiology consulted.  Appreciated Reccs








(3) Anasarca


Onset Date: 07/23/18   Current Visit: No   Status: Acute   


Plan: 


Most likely secondary to acute renal failure.








(4) Diabetes


Onset Date: 04/18/18   Current Visit: No   Status: Chronic   


Qualifiers: 


   Diabetes mellitus type: type 2   Diabetes mellitus long term insulin use: 

without long term use   Diabetes mellitus complication status: with unspecified 

complications   Qualified Code(s): E11.8 - Type 2 diabetes mellitus with 

unspecified complications   





(5) HTN (hypertension)


Onset Date: 04/18/18   Current Visit: No   Status: Chronic   


Qualifiers: 


   Hypertension type: essential hypertension   Qualified Code(s): I10 - 

Essential (primary) hypertension   


Discharge Plan: Other


Plan to discharge in: 72 Hours





- Code Status/Comfort Care


Code Status Assessed: Yes


Critical Care: Yes [Follow-Up] : a follow-up visit [Home] : at home, [unfilled] , at the time of the visit. [Other Location: e.g. Home (Enter Location, City,State)___] : at [unfilled] [Patient] : the patient [This encounter was initiated by telehealth (audio with video) and converted to telephone (audio only) due to technical difficulties.] : This encounter was initiated by telehealth (audio with video) and converted to telephone (audio only) due to technical difficulties. [Follow-Up Visit] : a follow-up visit for [Obesity] : obesity